# Patient Record
Sex: MALE | Race: WHITE | URBAN - METROPOLITAN AREA
[De-identification: names, ages, dates, MRNs, and addresses within clinical notes are randomized per-mention and may not be internally consistent; named-entity substitution may affect disease eponyms.]

---

## 2022-06-29 ENCOUNTER — TELEPHONE (OUTPATIENT)
Dept: PAIN MANAGEMENT | Age: 62
End: 2022-06-29

## 2022-07-06 ENCOUNTER — TELEPHONE (OUTPATIENT)
Dept: PAIN MANAGEMENT | Age: 62
End: 2022-07-06

## 2022-07-06 NOTE — TELEPHONE ENCOUNTER
PATIENT IS SCHEDULED FOR A FOLLOW UP ON 7/26    PATIENT TAKES OXYCODONE 5/325  TAKE 1 TABLET Q6H      I WAS CALLING TO SEE IF THE PATIENT NEEDED A REFILL BEFORE HIS APPOINTMENT

## 2022-07-13 PROBLEM — M51.36 DEGENERATION OF LUMBAR INTERVERTEBRAL DISC: Status: ACTIVE | Noted: 2022-07-13

## 2022-07-15 ENCOUNTER — OFFICE VISIT (OUTPATIENT)
Dept: PAIN MANAGEMENT | Age: 62
End: 2022-07-15
Payer: MEDICARE

## 2022-07-15 VITALS
OXYGEN SATURATION: 95 % | SYSTOLIC BLOOD PRESSURE: 126 MMHG | HEART RATE: 84 BPM | DIASTOLIC BLOOD PRESSURE: 74 MMHG | BODY MASS INDEX: 33.88 KG/M2 | HEIGHT: 74 IN | TEMPERATURE: 97.1 F | WEIGHT: 264 LBS

## 2022-07-15 DIAGNOSIS — M19.011 LOCALIZED OSTEOARTHRITIS OF BOTH SHOULDER REGIONS: ICD-10-CM

## 2022-07-15 DIAGNOSIS — M54.16 LUMBAR RADICULOPATHY: ICD-10-CM

## 2022-07-15 DIAGNOSIS — M51.36 DEGENERATION OF LUMBAR INTERVERTEBRAL DISC: Primary | ICD-10-CM

## 2022-07-15 DIAGNOSIS — M19.012 LOCALIZED OSTEOARTHRITIS OF BOTH SHOULDER REGIONS: ICD-10-CM

## 2022-07-15 PROCEDURE — 99213 OFFICE O/P EST LOW 20 MIN: CPT | Performed by: PAIN MEDICINE

## 2022-07-15 RX ORDER — OXYCODONE HYDROCHLORIDE AND ACETAMINOPHEN 5; 325 MG/1; MG/1
TABLET ORAL
COMMUNITY
Start: 2022-06-15 | End: 2022-07-15 | Stop reason: SDUPTHER

## 2022-07-15 RX ORDER — OXYCODONE HYDROCHLORIDE AND ACETAMINOPHEN 5; 325 MG/1; MG/1
1 TABLET ORAL 3 TIMES DAILY
Qty: 84 TABLET | Refills: 0 | Status: SHIPPED | OUTPATIENT
Start: 2022-07-15 | End: 2022-08-12

## 2022-07-15 RX ORDER — OMEPRAZOLE 10 MG/1
10 CAPSULE, DELAYED RELEASE ORAL DAILY
COMMUNITY

## 2022-07-15 ASSESSMENT — ENCOUNTER SYMPTOMS
EYES NEGATIVE: 1
ALLERGIC/IMMUNOLOGIC NEGATIVE: 1
RESPIRATORY NEGATIVE: 1
GASTROINTESTINAL NEGATIVE: 1

## 2022-07-15 NOTE — PROGRESS NOTES
Lui Don (:  1960) is a 64 y.o. male,Established patient, here for evaluation of the following chief complaint(s):  Back Pain         ASSESSMENT/PLAN:  1. Degeneration of lumbar intervertebral disc  2. Localized osteoarthritis of both shoulder regions  3. Lumbar radiculopathy    Patient presents with   low back which goes into the buttocks on both sides down the back of both legs. The pain in the left leg will go down to the calf, the right leg will go to the back of the knee. He had TFESI done on the left at L4-5 and right L5-S1 on 2022, he states this procedure gave him a week of relief from his pain in his legs but back pain persists. Stim was discussed with the pt as well as a surgey. pt is still having pain from the surgery he had on the left shoulder about 5 weeks ago. Working with PT , he is also having trouble lifting the arm because of the swelling. pain level today is a 7/10 ,Pt is currently prescribed Oxycodone 5/325 mg TID,  he would like to go back on the QID. Will wait on Rt shoulder surgery till he recovers from his left shoulder surgery. No adverse effects, no aberrant behaviors, he is able to complete his daily functions. OARRS reviewed, consistent  Will continue current meds. Subjective   SUBJECTIVE/OBJECTIVE:  History of Present Illness    Patient Identification  Lui Don is a 64 y.o. male. Patient information was obtained from patient. History/Exam limitations: none. Patient presented to the Emergency Department ambulatory. Chief Complaint   Back Pain      Patient presents with   low back which goes into the buttocks on both sides down the back of both legs. The pain in the left leg will go down to the calf, the right leg will go to the back of the knee. He had TFESI done on the left at L4-5 and right L5-S1 on 2022, he states this procedure gave him a week of relief from his pain in his legs but back pain persists.  Stim was discussed with the pt as well as a surgey. pt is still having pain from the surgery he had on the left shoulder about 5 weeks ago. Working with PT , he is also having trouble lifting the arm because of the swelling. pain level today is a 7/10 ,Pt is currently prescribed Oxycodone 5/325 mg TID,  he would like to go back on the QID. Will wait on Rt shoulder surgery till he recovers from his left shoulder surgery. No adverse effects, no aberrant behaviors, he is able to complete his daily functions. OARRS reviewed, consistent    History reviewed. No pertinent past medical history. History reviewed. No pertinent family history. Current Outpatient Medications:  oxyCODONE-acetaminophen (PERCOCET) 5-325 MG per tablet, TAKE 1 TABLET BY MOUTH EVERY 8 HOURS AS NEEDED, Disp: , Rfl:   omeprazole (PRILOSEC) 10 MG delayed release capsule, Take 10 mg by mouth in the morning., Disp: , Rfl:     No current facility-administered medications for this visit. No Known Allergies  Social History    Socioeconomic History      Marital status:     Tobacco Use      Smoking status: Never      Smokeless tobacco: Never    Vaping Use      Vaping Use: Never used    Substance and Sexual Activity      Alcohol use: Never      Drug use: Never  /74   Pulse 84   Temp 97.1 °F (36.2 °C)   Ht 6' 2\" (1.88 m)   Wt 264 lb (119.7 kg)   SpO2 95%   BMI 33.90 kg/m²       Review of Systems   Constitutional: Negative. HENT: Negative. Eyes: Negative. Respiratory: Negative. Cardiovascular: Negative. Gastrointestinal: Negative. Endocrine: Negative. Genitourinary: Negative. Musculoskeletal: Negative. Skin: Negative. Allergic/Immunologic: Negative. Neurological: Negative. Hematological: Negative. Psychiatric/Behavioral: Negative. All other systems reviewed and are negative. Objective   Physical Exam  Constitutional:       Appearance: Normal appearance. He is normal weight. HENT:      Head: Normocephalic and atraumatic. Right Ear: Ear canal normal.      Left Ear: Ear canal normal.      Nose: Nose normal.      Mouth/Throat:      Mouth: Mucous membranes are moist.   Eyes:      Extraocular Movements: Extraocular movements intact. Conjunctiva/sclera: Conjunctivae normal.      Pupils: Pupils are equal, round, and reactive to light. Cardiovascular:      Rate and Rhythm: Normal rate and regular rhythm. Pulses: Normal pulses. Heart sounds: Normal heart sounds. Pulmonary:      Effort: Pulmonary effort is normal.      Breath sounds: Normal breath sounds. Abdominal:      General: Abdomen is flat. Bowel sounds are normal.      Palpations: Abdomen is soft. Musculoskeletal:         General: Normal range of motion. Cervical back: Normal range of motion and neck supple. Skin:     General: Skin is warm. Capillary Refill: Capillary refill takes less than 2 seconds. Neurological:      General: No focal deficit present. Mental Status: He is alert and oriented to person, place, and time. Mental status is at baseline. Psychiatric:         Mood and Affect: Mood normal.         Behavior: Behavior normal.         Thought Content: Thought content normal.         Judgment: Judgment normal.          On this date 7/15/2022 I have spent 15 minutes reviewing previous notes, test results and face to face with the patient discussing the diagnosis and importance of compliance with the treatment plan as well as documenting on the day of the visit. An electronic signature was used to authenticate this note.     --Marc Bolaños MD

## 2022-08-17 ENCOUNTER — OFFICE VISIT (OUTPATIENT)
Dept: PAIN MANAGEMENT | Age: 62
End: 2022-08-17
Payer: MEDICARE

## 2022-08-17 VITALS
HEIGHT: 73 IN | SYSTOLIC BLOOD PRESSURE: 138 MMHG | TEMPERATURE: 98 F | DIASTOLIC BLOOD PRESSURE: 72 MMHG | WEIGHT: 271 LBS | BODY MASS INDEX: 35.92 KG/M2

## 2022-08-17 DIAGNOSIS — M54.16 LUMBAR RADICULOPATHY: ICD-10-CM

## 2022-08-17 DIAGNOSIS — M51.36 DEGENERATION OF LUMBAR INTERVERTEBRAL DISC: Primary | ICD-10-CM

## 2022-08-17 DIAGNOSIS — M19.011 LOCALIZED OSTEOARTHRITIS OF BOTH SHOULDER REGIONS: ICD-10-CM

## 2022-08-17 DIAGNOSIS — S13.9XXA NECK SPRAIN, INITIAL ENCOUNTER: ICD-10-CM

## 2022-08-17 DIAGNOSIS — M19.012 LOCALIZED OSTEOARTHRITIS OF BOTH SHOULDER REGIONS: ICD-10-CM

## 2022-08-17 LAB
6-ACETYLMORPHINE, UR: NORMAL
AMPHETAMINE SCREEN, URINE: NORMAL
BARBITURATE SCREEN, URINE: NORMAL
BENZODIAZEPINE SCREEN, URINE: NORMAL
CANNABINOID SCREEN URINE: NORMAL
COCAINE METABOLITE, URINE: NORMAL
CREATININE URINE: NORMAL
EDDP, URINE: NORMAL
ETHANOL URINE: NORMAL
MDMA URINE: NORMAL
METHADONE SCREEN, URINE: NORMAL
METHAMPHETAMINE, URINE: NORMAL
OPIATES, URINE: NORMAL
OXYCODONE: NORMAL
PCP: NORMAL
PH, URINE: NORMAL
PHENCYCLIDINE, URINE: NORMAL
PROPOXYPHENE, URINE: NORMAL
TRICYCLIC ANTIDEPRESSANTS, UR: NORMAL

## 2022-08-17 PROCEDURE — 99213 OFFICE O/P EST LOW 20 MIN: CPT | Performed by: PAIN MEDICINE

## 2022-08-17 RX ORDER — FLUTICASONE FUROATE, UMECLIDINIUM BROMIDE AND VILANTEROL TRIFENATATE 100; 62.5; 25 UG/1; UG/1; UG/1
POWDER RESPIRATORY (INHALATION)
COMMUNITY

## 2022-08-17 RX ORDER — OXYCODONE HYDROCHLORIDE AND ACETAMINOPHEN 5; 325 MG/1; MG/1
TABLET ORAL
COMMUNITY
End: 2022-08-17 | Stop reason: SDUPTHER

## 2022-08-17 RX ORDER — GABAPENTIN 300 MG/1
CAPSULE ORAL
COMMUNITY
Start: 2022-05-07

## 2022-08-17 RX ORDER — OXYCODONE HYDROCHLORIDE AND ACETAMINOPHEN 5; 325 MG/1; MG/1
1 TABLET ORAL EVERY 8 HOURS PRN
Qty: 84 TABLET | Refills: 0 | Status: SHIPPED | OUTPATIENT
Start: 2022-08-17 | End: 2022-09-14

## 2022-08-17 RX ORDER — ALBUTEROL SULFATE 90 UG/1
2 AEROSOL, METERED RESPIRATORY (INHALATION) EVERY 4 HOURS PRN
COMMUNITY
Start: 2022-07-26

## 2022-08-17 NOTE — PROGRESS NOTES
Meg Duggan (:  1960) is a 64 y.o. male,Established patient, here for evaluation of the following chief complaint(s):  Back Pain         ASSESSMENT/PLAN:  Patient following up for  low back which goes into the buttocks on both sides down the back of both legs. The pain in the left leg will go down to the calf, the right leg will go to the back of the knee. He had TFESI done on the left at L4-5 and right L5-S1 on 2022, he states this procedure gave him a week of relief from his pain in his legs but back pain persists. Stim was discussed with the pt as well as a surgey. pt is still having pain from the surgery he had on the left shoulder about may 10th . Still Working with PT , he is also having trouble lifting the arm because of the swelling. pain level today is a 7/10 ,Pt is currently prescribed Oxycodone 5/325 mg TID. Good relief, no side effects no adverse events, able to do his ADLs, Will wait on Rt shoulder surgery till he recovers from his left shoulder surgery. No adverse effects, no aberrant behaviors, he is able to complete his daily functions. New onset Neck pain past 1 week  no radiation. Will treate his neck pain as a sprain andfollow. OARRS reviewed, consistent  Will continue current meds. Will get UA today. Subjective   SUBJECTIVE/OBJECTIVE:  History of Present Illness    Patient Identification  Meg Duggan is a 64 y.o. male. Patient information was obtained from patient. History/Exam limitations: none. Patient presented to the Emergency Department ambulatory. Chief Complaint   Back Pain  Patient following up for  low back which goes into the buttocks on both sides down the back of both legs. The pain in the left leg will go down to the calf, the right leg will go to the back of the knee. He had TFESI done on the left at L4-5 and right L5-S1 on 2022, he states this procedure gave him a week of relief from his pain in his legs but back pain persists.  Stim was discussed with the pt as well as a surgey. pt is still having pain from the surgery he had on the left shoulder about may 10th . Still Working with PT , he is also having trouble lifting the arm because of the swelling. pain level today is a 7/10 ,Pt is currently prescribed Oxycodone 5/325 mg TID. Good relief, no side effects no adverse events, able to do his ADLs, Will wait on Rt shoulder surgery till he recovers from his left shoulder surgery. No adverse effects, no aberrant behaviors, he is able to complete his daily functions. New onset Neck pain past 1 week  no radiation. OARRS reviewed, consistent    History reviewed. No pertinent past medical history. History reviewed. No pertinent family history. Current Outpatient Medications:  oxyCODONE-acetaminophen (PERCOCET) 5-325 MG per tablet, TAKE 1 TABLET BY MOUTH EVERY 8 HOURS AS NEEDED, Disp: , Rfl:   omeprazole (PRILOSEC) 10 MG delayed release capsule, Take 10 mg by mouth in the morning., Disp: , Rfl:     No current facility-administered medications for this visit. No Known Allergies  Social History    Socioeconomic History      Marital status:     Tobacco Use      Smoking status: Never      Smokeless tobacco: Never    Vaping Use      Vaping Use: Never used    Substance and Sexual Activity      Alcohol use: Never      Drug use: Never  /74   Pulse 84   Temp 97.1 °F (36.2 °C)   Ht 6' 2\" (1.88 m)   Wt 264 lb (119.7 kg)   SpO2 95%   BMI 33.90 kg/m²       Review of Systems   Constitutional: Negative. HENT: Negative. Eyes: Negative. Respiratory: Negative. Cardiovascular: Negative. Gastrointestinal: Negative. Endocrine: Negative. Genitourinary: Negative. Musculoskeletal: Negative. Skin: Negative. Allergic/Immunologic: Negative. Neurological: Negative. Hematological: Negative. Psychiatric/Behavioral: Negative. All other systems reviewed and are negative.        Objective   Physical Exam  Constitutional:       Appearance: Normal appearance. He is normal weight. HENT:      Head: Normocephalic and atraumatic. Right Ear: Ear canal normal.      Left Ear: Ear canal normal.      Nose: Nose normal.      Mouth/Throat:      Mouth: Mucous membranes are moist.   Eyes:      Extraocular Movements: Extraocular movements intact. Conjunctiva/sclera: Conjunctivae normal.      Pupils: Pupils are equal, round, and reactive to light. Cardiovascular:      Rate and Rhythm: Normal rate and regular rhythm. Pulses: Normal pulses. Heart sounds: Normal heart sounds. Pulmonary:      Effort: Pulmonary effort is normal.      Breath sounds: Normal breath sounds. Abdominal:      General: Abdomen is flat. Bowel sounds are normal.      Palpations: Abdomen is soft. Musculoskeletal:         General: Normal range of motion. Cervical back: Normal range of motion and neck supple. Left sided facet tenderness,     Good Gait able to walk on toes and heels, no tenderness, SLRs painful both sides. Skin:     General: Skin is warm. Capillary Refill: Capillary refill takes less than 2 seconds. Neurological:      General: No focal deficit present. Mental Status: He is alert and oriented to person, place, and time. Mental status is at baseline. Psychiatric:         Mood and Affect: Mood normal.         Behavior: Behavior normal.         Thought Content: Thought content normal.         Judgment: Judgment normal.          On this date 8/17/2022 I have spent 15 minutes reviewing previous notes, test results and face to face with the patient discussing the diagnosis and importance of compliance with the treatment plan as well as documenting on the day of the visit. An electronic signature was used to authenticate this note.     --Merissa Steele MD

## 2022-08-29 ENCOUNTER — TELEPHONE (OUTPATIENT)
Dept: PAIN MANAGEMENT | Age: 62
End: 2022-08-29

## 2022-08-29 NOTE — TELEPHONE ENCOUNTER
Patient is scheduled to see Dr Lg Portillo Tuesday morning @ 9AM and was hoping that he could get an injection at that time. He saw a neurosurgeon who suggested a \"Right Trapezius Muscle Trigger Point Injection\" and that his pain management doctor perform the procedure. Can Dr Lg Portillo do this tomorrow morning or advise what patient should do?

## 2022-08-30 ENCOUNTER — OFFICE VISIT (OUTPATIENT)
Dept: PAIN MANAGEMENT | Age: 62
End: 2022-08-30
Payer: MEDICARE

## 2022-08-30 VITALS
HEIGHT: 73 IN | OXYGEN SATURATION: 96 % | WEIGHT: 270 LBS | BODY MASS INDEX: 35.78 KG/M2 | DIASTOLIC BLOOD PRESSURE: 84 MMHG | SYSTOLIC BLOOD PRESSURE: 136 MMHG | HEART RATE: 88 BPM | TEMPERATURE: 97 F

## 2022-08-30 DIAGNOSIS — S13.9XXA NECK SPRAIN, INITIAL ENCOUNTER: Primary | ICD-10-CM

## 2022-08-30 DIAGNOSIS — M47.812 CERVICAL SPONDYLOSIS WITHOUT MYELOPATHY: ICD-10-CM

## 2022-08-30 PROCEDURE — 99213 OFFICE O/P EST LOW 20 MIN: CPT | Performed by: PAIN MEDICINE

## 2022-08-30 RX ORDER — PREDNISONE 50 MG/1
50 TABLET ORAL DAILY
COMMUNITY
Start: 2022-08-25 | End: 2022-09-01

## 2022-08-30 NOTE — PROGRESS NOTES
Andrae Foley (:  1960) is a 64 y.o. male,Established patient, here for evaluation of the following chief complaint(s):  Back Pain         ASSESSMENT/PLAN:  Neck pain   past 2-3 weeks with Clicking an Popping Rt side, Seen neuro surgery , remote injury in the past,  Dropping things past 1 year, No loss of bowel or bladder controll. Will Get PT and plan Facet blocks as needed. Back Pain  Patient Known for  low back which goes into the buttocks on both sides down the back of both legs. The pain in the left leg will go down to the calf, the right leg will go to the back of the knee. He had TFESI done on the left at L4-5 and right L5-S1 on 2022, he states this procedure gave him a week of relief from his pain in his legs but back pain persists. Stim was discussed with the pt as well as a surgey. pt is still having pain from the surgery he had on the left shoulder about may 10th . Still Working with PT , he is also having trouble lifting the arm because of the swelling. pain level today is a 7/10 ,Pt is currently prescribed Oxycodone 5/325 mg TID. Good relief, no side effects no adverse events, able to do his ADLs, Will wait on Rt shoulder surgery till he recovers from his left shoulder surgery. No adverse effects, no aberrant behaviors, he is able to complete his daily functions. New onset Neck pain past 1 week  no radiation. Will treate his neck pain as a sprain andfollow. OARRS reviewed, consistent, UA consistent    Subjective   SUBJECTIVE/OBJECTIVE:  History of Present Illness    Patient Identification  Andrae Foley is a 64 y.o. male. Patient information was obtained from patient. History/Exam limitations: none. Patient presented to the Emergency Department ambulatory. Chief Complaint   Neck pain past 2-3 weeks with Clicking an Popping Rt side, Seen neuro surgery , remote injury in the past,  Dropping things past 1 year, No loss of bowel or bladder controll.   Back Pain  Patient known for  low back which goes into the buttocks on both sides down the back of both legs. The pain in the left leg will go down to the calf, the right leg will go to the back of the knee. He had TFESI done on the left at L4-5 and right L5-S1 on 2/7/2022, he states this procedure gave him a week of relief from his pain in his legs but back pain persists. Stim was discussed with the pt as well as a surgey. pt is still having pain from the surgery he had on the left shoulder about may 10th . Still Working with PT , he is also having trouble lifting the arm because of the swelling. pain level today is a 7/10 ,Pt is currently prescribed Oxycodone 5/325 mg TID. Good relief, no side effects no adverse events, able to do his ADLs, Will wait on Rt shoulder surgery till he recovers from his left shoulder surgery. No adverse effects, no aberrant behaviors, he is able to complete his daily functions. New onset Neck pain past 1 week  no radiation. OARRS reviewed, consistent    History reviewed. No pertinent past medical history. History reviewed. No pertinent family history. Current Outpatient Medications:  oxyCODONE-acetaminophen (PERCOCET) 5-325 MG per tablet, TAKE 1 TABLET BY MOUTH EVERY 8 HOURS AS NEEDED, Disp: , Rfl:   omeprazole (PRILOSEC) 10 MG delayed release capsule, Take 10 mg by mouth in the morning., Disp: , Rfl:     No current facility-administered medications for this visit. No Known Allergies  Social History    Socioeconomic History      Marital status:     Tobacco Use      Smoking status: Never      Smokeless tobacco: Never    Vaping Use      Vaping Use: Never used    Substance and Sexual Activity      Alcohol use: Never      Drug use: Never  /74   Pulse 84   Temp 97.1 °F (36.2 °C)   Ht 6' 2\" (1.88 m)   Wt 264 lb (119.7 kg)   SpO2 95%   BMI 33.90 kg/m²       Review of Systems   Constitutional: Negative. HENT: Negative. Eyes: Negative. Respiratory: Negative. Cardiovascular: Negative. Gastrointestinal: Negative. Endocrine: Negative. Genitourinary: Negative. Musculoskeletal: Negative. Skin: Negative. Allergic/Immunologic: Negative. Neurological: Negative. Hematological: Negative. Psychiatric/Behavioral: Negative. All other systems reviewed and are negative. Objective   Physical Exam  Constitutional:       Appearance: Normal appearance. He is normal weight. HENT:      Head: Normocephalic and atraumatic. Right Ear: Ear canal normal.      Left Ear: Ear canal normal.      Nose: Nose normal.      Mouth/Throat:      Mouth: Mucous membranes are moist.   Eyes:      Extraocular Movements: Extraocular movements intact. Conjunctiva/sclera: Conjunctivae normal.      Pupils: Pupils are equal, round, and reactive to light. Cardiovascular:      Rate and Rhythm: Normal rate and regular rhythm. Pulses: Normal pulses. Heart sounds: Normal heart sounds. Pulmonary:      Effort: Pulmonary effort is normal.      Breath sounds: Normal breath sounds. Abdominal:      General: Abdomen is flat. Bowel sounds are normal.      Palpations: Abdomen is soft. Musculoskeletal:         General: Normal range of motion. Cervical back: Normal range of motion and neck supple. Left sided facet tenderness,     Good Gait able to walk on toes and heels, no tenderness, SLRs painful both sides. Skin:     General: Skin is warm. Capillary Refill: Capillary refill takes less than 2 seconds. Neurological:      General: No focal deficit present. Mental Status: He is alert and oriented to person, place, and time. Mental status is at baseline. Psychiatric:         Mood and Affect: Mood normal.         Behavior: Behavior normal.         Thought Content:  Thought content normal.         Judgment: Judgment normal.          On this date 8/17/2022 I have spent 15 minutes reviewing previous notes, test results and face to face with the patient discussing the diagnosis and importance of compliance with the treatment plan as well as documenting on the day of the visit. An electronic signature was used to authenticate this note.     --Tayler Dave MD

## 2022-08-31 ENCOUNTER — TELEPHONE (OUTPATIENT)
Dept: PAIN MANAGEMENT | Age: 62
End: 2022-08-31

## 2022-08-31 NOTE — TELEPHONE ENCOUNTER
ORDER PLACED:    Date: 8/30/22  Description: LEFT C5,6,7 MBB  Order Number: [id number]  Ordering Provider: Tori Whyte  Performing Provider: Tori Whyte  CPT Codes: 79063  ICD10 Codes: O76.368

## 2024-02-09 ENCOUNTER — OFFICE VISIT (OUTPATIENT)
Dept: NEUROSURGERY | Facility: CLINIC | Age: 64
End: 2024-02-09
Payer: MEDICARE

## 2024-02-09 VITALS
WEIGHT: 260 LBS | BODY MASS INDEX: 34.46 KG/M2 | HEART RATE: 108 BPM | HEIGHT: 73 IN | DIASTOLIC BLOOD PRESSURE: 76 MMHG | SYSTOLIC BLOOD PRESSURE: 144 MMHG | TEMPERATURE: 97.8 F

## 2024-02-09 DIAGNOSIS — M47.12 CERVICAL SPONDYLOSIS WITH MYELOPATHY: Primary | ICD-10-CM

## 2024-02-09 DIAGNOSIS — R26.89 IMBALANCE: ICD-10-CM

## 2024-02-09 PROBLEM — G47.33 OSA (OBSTRUCTIVE SLEEP APNEA): Status: ACTIVE | Noted: 2022-05-10

## 2024-02-09 PROBLEM — M43.26 FUSION OF LUMBAR SPINE: Status: ACTIVE | Noted: 2023-09-17

## 2024-02-09 PROBLEM — J38.3 VOCAL CORD MASS: Status: ACTIVE | Noted: 2023-08-29

## 2024-02-09 PROBLEM — Z98.890 PONV (POSTOPERATIVE NAUSEA AND VOMITING): Status: ACTIVE | Noted: 2023-10-12

## 2024-02-09 PROBLEM — R11.2 PONV (POSTOPERATIVE NAUSEA AND VOMITING): Status: ACTIVE | Noted: 2023-10-12

## 2024-02-09 PROBLEM — M25.312 ROTATOR CUFF INSUFFICIENCY OF LEFT SHOULDER: Status: ACTIVE | Noted: 2024-01-22

## 2024-02-09 PROCEDURE — 1036F TOBACCO NON-USER: CPT | Performed by: NURSE PRACTITIONER

## 2024-02-09 PROCEDURE — 99205 OFFICE O/P NEW HI 60 MIN: CPT | Performed by: NURSE PRACTITIONER

## 2024-02-09 RX ORDER — PANTOPRAZOLE SODIUM 40 MG/1
40 TABLET, DELAYED RELEASE ORAL
COMMUNITY
Start: 2022-04-11

## 2024-02-09 RX ORDER — GABAPENTIN 300 MG/1
300 CAPSULE ORAL 4 TIMES DAILY
COMMUNITY
Start: 2016-03-03

## 2024-02-09 RX ORDER — LOSARTAN POTASSIUM 25 MG/1
25 TABLET ORAL DAILY
COMMUNITY
Start: 2023-03-10

## 2024-02-09 RX ORDER — TAMSULOSIN HYDROCHLORIDE 0.4 MG/1
0.4 CAPSULE ORAL DAILY
COMMUNITY
Start: 2022-11-07

## 2024-02-09 RX ORDER — ATORVASTATIN CALCIUM 40 MG/1
40 TABLET, FILM COATED ORAL DAILY
COMMUNITY
Start: 2023-12-01

## 2024-02-09 RX ORDER — METOPROLOL SUCCINATE 50 MG/1
50 TABLET, EXTENDED RELEASE ORAL DAILY
COMMUNITY
Start: 2023-09-11

## 2024-02-09 RX ORDER — ASPIRIN 81 MG/1
81 TABLET ORAL
COMMUNITY
Start: 2012-07-31

## 2024-02-09 RX ORDER — OXYCODONE AND ACETAMINOPHEN 5; 325 MG/1; MG/1
1 TABLET ORAL 3 TIMES DAILY
COMMUNITY
Start: 2022-05-31

## 2024-02-09 ASSESSMENT — PAIN SCALES - GENERAL: PAINLEVEL: 8

## 2024-02-09 ASSESSMENT — PATIENT HEALTH QUESTIONNAIRE - PHQ9
2. FEELING DOWN, DEPRESSED OR HOPELESS: NOT AT ALL
1. LITTLE INTEREST OR PLEASURE IN DOING THINGS: NOT AT ALL
SUM OF ALL RESPONSES TO PHQ9 QUESTIONS 1 AND 2: 0

## 2024-02-09 NOTE — PROGRESS NOTES
"It was a pleasure to see Jerman Colón on 2/9/2024. He is a 63 y.o. year old, right-handed male who presents, with his wife, to the [facility] for evaluation of cervical stenosis. Patient is referred by Ferdinand Sullivan MD. 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 He lives at home with his wife. He is retired from law enforcement.    Jerman Colón has had symptoms of neck pain over the past at least 2 years without inciting event. He was previously scheduled for ACDF, but surgical procedure was changed to PCDF. During initial procedure, appropriate pathway to cervical spine could not be found, so procedure was aborted. He is planning lumbar procedure with Dr. Sullivan, who instructed him to have cervical procedure completed prior to lumbar intervention. His referral is to Dr. Marcos Doran    He is here today for assessment for surgical intervention.. He reports significant.  Thus far, patient has tried oral medications with little to no improvement of symptoms. He denies change in bowel / bladder function, saddle anesthesia, . He admits to imbalance, difficulty dressing, difficulty holding / opening objects    PREVIOUS TREATMENTS  Pain Management - prior Cervical YVES in 2023 -not helpful  NSAIDs  Gabapentin  Topical patch    Previous Spine Surgery:  L5 - S1 decompression, fusion 09/12/2023, by Dr. Christopher Velasco at [facility]      Smoker: No   Anticoagulation / Antiplatelets: No     ROS: 12 / 12 systems reviewed and are negative unless noted in HPI    /76 (BP Location: Left arm, Patient Position: Sitting, BP Cuff Size: Adult)   Pulse 108   Temp 36.6 °C (97.8 °F) (Temporal)   Ht 1.854 m (6' 1\")   Wt 118 kg (260 lb)   BMI 34.30 kg/m²     ON EXAM:  General: Well developed, awake/alert/oriented x 3, no distress, alert and cooperative  Skin: Warm and dry, no " visible lesions / rashes  ENMT: Mucous membranes moist, no apparent injury  Head/Neck: No apparent injury. HEAD FLEXED / FIXED FORWARD. SIGNIFICANT LIMITATION WITH ALL ROM  Respiratory/Thorax: Normal breathing with good chest expansion, thorax symmetric  Cardiovascular: No JVD  Gastrointestinal: Non-distended  NEUROLOGICAL EXAM:  EOMI, face symmetric  Motor Strength:  SHOULDER AB/ADDUCTION, ELBOW FLEXION / EXTENSION 4 / 5; BILATERAL INTEROSSEI 3 / 5; otherwise, 5/5 in BUE  Muscle Tone: Normal without spasticity or contractures in all extremities  Muscle Bulk: Normal and symmetric in all extremities  Posture:  -- Cervical: FIXED IN FLEXION  -- Thoracic: Normal  -- Lumbar : Normal  Paraspinal muscle spasm/tenderness absent.  No palpable tenderness along the spinous processes.  Sensation: DECREASED SENSORY IN LEFT 3rd, 4th, 5th fingers (prior injury); otherwise, SILT in BUE  Gait: Normal, TANDEM GAIT IS UNSTEADY  Deep Tendon Reflexes: 3+ BLE, 1+ BUE    Krissy's sign PRESENT, mildly bilaterally  Lhermitte Sign/Spurling Sign: PRESENT  Finger escape sign: Absent   and release test: Negative  Romberg test: Negative    Jerman Colón has symptoms and exam findings consistent with cervical myelopathy. He has significant limitation with cervical spine ROM, suspicious for DISH, facet arthrosis, and / or ligament calcification. We reviewed report of dynamic cervical  x-rays from 2023 (images are not available at today's visit). We reviewed MRI C Spine images via PACS from more than one year ago, from outside facility, that demonstrates significant facet arthropathy, mild to moderate canal stenosis. Physical Therapy will not improve his symptoms at this point. Encouraged follow up after repeat MRI Cervical Spine to evaluate progression of disease and for surgical planning. He verbalizes understanding and agreement with plan.  Sneha Rivas, APRN-CNP

## 2024-02-12 DIAGNOSIS — R26.89 IMBALANCE: ICD-10-CM

## 2024-02-12 DIAGNOSIS — M47.12 CERVICAL SPONDYLOSIS WITH MYELOPATHY: Primary | ICD-10-CM

## 2024-03-08 DIAGNOSIS — R26.89 IMBALANCE: ICD-10-CM

## 2024-03-08 DIAGNOSIS — M47.12 CERVICAL SPONDYLOSIS WITH MYELOPATHY: Primary | ICD-10-CM

## 2024-04-29 ENCOUNTER — HOSPITAL ENCOUNTER (OUTPATIENT)
Dept: RADIOLOGY | Facility: HOSPITAL | Age: 64
End: 2024-04-29
Payer: MEDICARE

## 2024-04-29 ENCOUNTER — APPOINTMENT (OUTPATIENT)
Dept: RADIOLOGY | Facility: HOSPITAL | Age: 64
End: 2024-04-29
Payer: MEDICARE

## 2024-04-29 ENCOUNTER — ANESTHESIA EVENT (OUTPATIENT)
Dept: RADIOLOGY | Facility: HOSPITAL | Age: 64
End: 2024-04-29
Payer: MEDICARE

## 2024-04-29 ENCOUNTER — ANESTHESIA (OUTPATIENT)
Dept: RADIOLOGY | Facility: HOSPITAL | Age: 64
End: 2024-04-29
Payer: MEDICARE

## 2024-05-03 ENCOUNTER — APPOINTMENT (OUTPATIENT)
Dept: NEUROSURGERY | Facility: CLINIC | Age: 64
End: 2024-05-03
Payer: MEDICARE

## 2024-05-29 ENCOUNTER — APPOINTMENT (OUTPATIENT)
Dept: NEUROSURGERY | Facility: CLINIC | Age: 64
End: 2024-05-29
Payer: MEDICARE

## 2024-06-07 ENCOUNTER — HOSPITAL ENCOUNTER (OUTPATIENT)
Dept: RADIOLOGY | Facility: HOSPITAL | Age: 64
Discharge: HOME | End: 2024-06-07
Payer: MEDICARE

## 2024-06-07 VITALS
OXYGEN SATURATION: 95 % | RESPIRATION RATE: 14 BRPM | SYSTOLIC BLOOD PRESSURE: 158 MMHG | HEART RATE: 77 BPM | DIASTOLIC BLOOD PRESSURE: 60 MMHG | TEMPERATURE: 97.2 F

## 2024-06-07 DIAGNOSIS — R26.89 IMBALANCE: ICD-10-CM

## 2024-06-07 DIAGNOSIS — M47.12 CERVICAL SPONDYLOSIS WITH MYELOPATHY: ICD-10-CM

## 2024-06-07 DIAGNOSIS — K86.2 CYST OF PANCREAS (HHS-HCC): ICD-10-CM

## 2024-06-07 DIAGNOSIS — M79.604 PAIN IN RIGHT LEG: ICD-10-CM

## 2024-06-07 DIAGNOSIS — M79.605 PAIN IN LEFT LEG: ICD-10-CM

## 2024-06-07 DIAGNOSIS — M48.062 SPINAL STENOSIS, LUMBAR REGION WITH NEUROGENIC CLAUDICATION: ICD-10-CM

## 2024-06-07 PROCEDURE — 72148 MRI LUMBAR SPINE W/O DYE: CPT

## 2024-06-07 PROCEDURE — A9575 INJ GADOTERATE MEGLUMI 0.1ML: HCPCS

## 2024-06-07 PROCEDURE — 3700000002 HC GENERAL ANESTHESIA TIME - EACH INCREMENTAL 1 MINUTE

## 2024-06-07 PROCEDURE — 72141 MRI NECK SPINE W/O DYE: CPT

## 2024-06-07 PROCEDURE — 2500000005 HC RX 250 GENERAL PHARMACY W/O HCPCS: Performed by: NURSE ANESTHETIST, CERTIFIED REGISTERED

## 2024-06-07 PROCEDURE — 7100000010 HC PHASE TWO TIME - EACH INCREMENTAL 1 MINUTE

## 2024-06-07 PROCEDURE — 3700000001 HC GENERAL ANESTHESIA TIME - INITIAL BASE CHARGE

## 2024-06-07 PROCEDURE — 74183 MRI ABD W/O CNTR FLWD CNTR: CPT

## 2024-06-07 PROCEDURE — 2550000001 HC RX 255 CONTRASTS

## 2024-06-07 PROCEDURE — 7100000009 HC PHASE TWO TIME - INITIAL BASE CHARGE

## 2024-06-07 PROCEDURE — 2500000004 HC RX 250 GENERAL PHARMACY W/ HCPCS (ALT 636 FOR OP/ED): Performed by: NURSE ANESTHETIST, CERTIFIED REGISTERED

## 2024-06-07 RX ORDER — MIDAZOLAM HYDROCHLORIDE 1 MG/ML
0.5 INJECTION INTRAMUSCULAR; INTRAVENOUS
Status: DISCONTINUED | OUTPATIENT
Start: 2024-06-07 | End: 2024-06-07 | Stop reason: HOSPADM

## 2024-06-07 RX ORDER — MIDAZOLAM HYDROCHLORIDE 1 MG/ML
INJECTION INTRAMUSCULAR; INTRAVENOUS AS NEEDED
Status: DISCONTINUED | OUTPATIENT
Start: 2024-06-07 | End: 2024-06-07

## 2024-06-07 RX ORDER — ONDANSETRON HYDROCHLORIDE 2 MG/ML
INJECTION, SOLUTION INTRAVENOUS AS NEEDED
Status: DISCONTINUED | OUTPATIENT
Start: 2024-06-07 | End: 2024-06-07

## 2024-06-07 RX ORDER — LIDOCAINE HCL/PF 100 MG/5ML
SYRINGE (ML) INTRAVENOUS AS NEEDED
Status: DISCONTINUED | OUTPATIENT
Start: 2024-06-07 | End: 2024-06-07

## 2024-06-07 RX ORDER — HYDROMORPHONE HYDROCHLORIDE 1 MG/ML
0.2 INJECTION, SOLUTION INTRAMUSCULAR; INTRAVENOUS; SUBCUTANEOUS EVERY 5 MIN PRN
Status: DISCONTINUED | OUTPATIENT
Start: 2024-06-07 | End: 2024-06-07 | Stop reason: HOSPADM

## 2024-06-07 RX ORDER — HYDROMORPHONE HYDROCHLORIDE 1 MG/ML
0.4 INJECTION, SOLUTION INTRAMUSCULAR; INTRAVENOUS; SUBCUTANEOUS EVERY 5 MIN PRN
Status: DISCONTINUED | OUTPATIENT
Start: 2024-06-07 | End: 2024-06-07 | Stop reason: HOSPADM

## 2024-06-07 RX ORDER — METHOCARBAMOL 100 MG/ML
1000 INJECTION, SOLUTION INTRAMUSCULAR; INTRAVENOUS ONCE
Status: DISCONTINUED | OUTPATIENT
Start: 2024-06-07 | End: 2024-06-07 | Stop reason: HOSPADM

## 2024-06-07 RX ORDER — PHENYLEPHRINE 10 MG/250 ML(40 MCG/ML)IN 0.9 % SOD.CHLORIDE INTRAVENOUS
CONTINUOUS PRN
Status: DISCONTINUED | OUTPATIENT
Start: 2024-06-07 | End: 2024-06-07

## 2024-06-07 RX ORDER — LIDOCAINE IN NACL,ISO-OSMOT/PF 30 MG/3 ML
0.1 SYRINGE (ML) INJECTION ONCE
Status: DISCONTINUED | OUTPATIENT
Start: 2024-06-07 | End: 2024-06-07 | Stop reason: HOSPADM

## 2024-06-07 RX ORDER — DROPERIDOL 2.5 MG/ML
0.62 INJECTION, SOLUTION INTRAMUSCULAR; INTRAVENOUS ONCE AS NEEDED
Status: DISCONTINUED | OUTPATIENT
Start: 2024-06-07 | End: 2024-06-07 | Stop reason: HOSPADM

## 2024-06-07 RX ORDER — ACETAMINOPHEN 325 MG/1
650 TABLET ORAL EVERY 4 HOURS PRN
Status: DISCONTINUED | OUTPATIENT
Start: 2024-06-07 | End: 2024-06-07 | Stop reason: HOSPADM

## 2024-06-07 RX ORDER — SODIUM CHLORIDE, SODIUM LACTATE, POTASSIUM CHLORIDE, CALCIUM CHLORIDE 600; 310; 30; 20 MG/100ML; MG/100ML; MG/100ML; MG/100ML
INJECTION, SOLUTION INTRAVENOUS CONTINUOUS PRN
Status: DISCONTINUED | OUTPATIENT
Start: 2024-06-07 | End: 2024-06-07

## 2024-06-07 RX ORDER — OXYCODONE HYDROCHLORIDE 5 MG/1
5 TABLET ORAL EVERY 4 HOURS PRN
Status: DISCONTINUED | OUTPATIENT
Start: 2024-06-07 | End: 2024-06-07 | Stop reason: HOSPADM

## 2024-06-07 RX ORDER — PROPOFOL 10 MG/ML
INJECTION, EMULSION INTRAVENOUS AS NEEDED
Status: DISCONTINUED | OUTPATIENT
Start: 2024-06-07 | End: 2024-06-07

## 2024-06-07 RX ORDER — ROCURONIUM BROMIDE 10 MG/ML
INJECTION, SOLUTION INTRAVENOUS AS NEEDED
Status: DISCONTINUED | OUTPATIENT
Start: 2024-06-07 | End: 2024-06-07

## 2024-06-07 RX ORDER — PHENYLEPHRINE HYDROCHLORIDE 10 MG/ML
INJECTION INTRAVENOUS AS NEEDED
Status: DISCONTINUED | OUTPATIENT
Start: 2024-06-07 | End: 2024-06-07

## 2024-06-07 RX ORDER — GADOTERATE MEGLUMINE 376.9 MG/ML
15 INJECTION INTRAVENOUS
Status: COMPLETED | OUTPATIENT
Start: 2024-06-07 | End: 2024-06-07

## 2024-06-07 RX ORDER — LABETALOL HYDROCHLORIDE 5 MG/ML
5 INJECTION, SOLUTION INTRAVENOUS ONCE AS NEEDED
Status: DISCONTINUED | OUTPATIENT
Start: 2024-06-07 | End: 2024-06-07 | Stop reason: HOSPADM

## 2024-06-07 RX ORDER — DIPHENHYDRAMINE HYDROCHLORIDE 50 MG/ML
12.5 INJECTION INTRAMUSCULAR; INTRAVENOUS ONCE AS NEEDED
Status: DISCONTINUED | OUTPATIENT
Start: 2024-06-07 | End: 2024-06-07 | Stop reason: HOSPADM

## 2024-06-07 RX ORDER — PROPOFOL 10 MG/ML
INJECTION, EMULSION INTRAVENOUS CONTINUOUS PRN
Status: DISCONTINUED | OUTPATIENT
Start: 2024-06-07 | End: 2024-06-07

## 2024-06-07 RX ORDER — ONDANSETRON HYDROCHLORIDE 2 MG/ML
4 INJECTION, SOLUTION INTRAVENOUS ONCE AS NEEDED
Status: DISCONTINUED | OUTPATIENT
Start: 2024-06-07 | End: 2024-06-07 | Stop reason: HOSPADM

## 2024-06-07 RX ORDER — SODIUM CHLORIDE, SODIUM LACTATE, POTASSIUM CHLORIDE, CALCIUM CHLORIDE 600; 310; 30; 20 MG/100ML; MG/100ML; MG/100ML; MG/100ML
100 INJECTION, SOLUTION INTRAVENOUS CONTINUOUS
Status: DISCONTINUED | OUTPATIENT
Start: 2024-06-07 | End: 2024-06-07 | Stop reason: HOSPADM

## 2024-06-07 RX ORDER — ALBUTEROL SULFATE 0.83 MG/ML
2.5 SOLUTION RESPIRATORY (INHALATION) ONCE AS NEEDED
Status: DISCONTINUED | OUTPATIENT
Start: 2024-06-07 | End: 2024-06-07 | Stop reason: HOSPADM

## 2024-06-07 RX ORDER — HYDRALAZINE HYDROCHLORIDE 20 MG/ML
5 INJECTION INTRAMUSCULAR; INTRAVENOUS EVERY 30 MIN PRN
Status: DISCONTINUED | OUTPATIENT
Start: 2024-06-07 | End: 2024-06-07 | Stop reason: HOSPADM

## 2024-06-07 RX ADMIN — ROCURONIUM BROMIDE 80 MG: 10 INJECTION INTRAVENOUS at 08:50

## 2024-06-07 RX ADMIN — PROPOFOL 150 MG: 10 INJECTION, EMULSION INTRAVENOUS at 08:50

## 2024-06-07 RX ADMIN — PHENYLEPHRINE HYDROCHLORIDE 120 MCG: 10 INJECTION INTRAVENOUS at 09:25

## 2024-06-07 RX ADMIN — PROPOFOL 20 MG: 10 INJECTION, EMULSION INTRAVENOUS at 08:53

## 2024-06-07 RX ADMIN — PROPOFOL 100 MCG/KG/MIN: 10 INJECTION, EMULSION INTRAVENOUS at 09:08

## 2024-06-07 RX ADMIN — PHENYLEPHRINE HYDROCHLORIDE 200 MCG: 10 INJECTION INTRAVENOUS at 09:28

## 2024-06-07 RX ADMIN — ROCURONIUM BROMIDE 20 MG: 10 INJECTION INTRAVENOUS at 09:51

## 2024-06-07 RX ADMIN — ROCURONIUM BROMIDE 15 MG: 10 INJECTION INTRAVENOUS at 11:13

## 2024-06-07 RX ADMIN — ONDANSETRON 4 MG: 2 INJECTION INTRAMUSCULAR; INTRAVENOUS at 12:09

## 2024-06-07 RX ADMIN — SODIUM CHLORIDE, POTASSIUM CHLORIDE, SODIUM LACTATE AND CALCIUM CHLORIDE: 600; 310; 30; 20 INJECTION, SOLUTION INTRAVENOUS at 08:46

## 2024-06-07 RX ADMIN — MIDAZOLAM HYDROCHLORIDE 2 MG: 1 INJECTION, SOLUTION INTRAMUSCULAR; INTRAVENOUS at 08:46

## 2024-06-07 RX ADMIN — PROPOFOL 50 MG: 10 INJECTION, EMULSION INTRAVENOUS at 11:57

## 2024-06-07 RX ADMIN — PROPOFOL 30 MG: 10 INJECTION, EMULSION INTRAVENOUS at 09:08

## 2024-06-07 RX ADMIN — GADOTERATE MEGLUMINE 24 ML: 376.9 INJECTION INTRAVENOUS at 11:40

## 2024-06-07 RX ADMIN — PHENYLEPHRINE HYDROCHLORIDE 80 MCG: 10 INJECTION INTRAVENOUS at 09:15

## 2024-06-07 RX ADMIN — PROPOFOL 50 MG: 10 INJECTION, EMULSION INTRAVENOUS at 12:02

## 2024-06-07 RX ADMIN — SUGAMMADEX 200 MG: 100 INJECTION, SOLUTION INTRAVENOUS at 12:09

## 2024-06-07 RX ADMIN — LIDOCAINE HYDROCHLORIDE 60 MG: 20 INJECTION INTRAVENOUS at 08:46

## 2024-06-07 RX ADMIN — PROPOFOL 30 MG: 10 INJECTION, EMULSION INTRAVENOUS at 10:57

## 2024-06-07 RX ADMIN — PROPOFOL 30 MG: 10 INJECTION, EMULSION INTRAVENOUS at 10:28

## 2024-06-07 RX ADMIN — PROPOFOL 50 MG: 10 INJECTION, EMULSION INTRAVENOUS at 11:08

## 2024-06-07 RX ADMIN — LIDOCAINE HYDROCHLORIDE 40 MG: 20 INJECTION INTRAVENOUS at 08:49

## 2024-06-07 RX ADMIN — Medication 0.2 MCG/KG/MIN: at 09:34

## 2024-06-07 RX ADMIN — PHENYLEPHRINE HYDROCHLORIDE 120 MCG: 10 INJECTION INTRAVENOUS at 09:21

## 2024-06-07 SDOH — HEALTH STABILITY: MENTAL HEALTH: CURRENT SMOKER: 0

## 2024-06-07 ASSESSMENT — PAIN - FUNCTIONAL ASSESSMENT
PAIN_FUNCTIONAL_ASSESSMENT: 0-10

## 2024-06-07 ASSESSMENT — PAIN SCALES - GENERAL
PAINLEVEL_OUTOF10: 0 - NO PAIN
PAIN_LEVEL: 0
PAINLEVEL_OUTOF10: 0 - NO PAIN

## 2024-06-07 NOTE — PERIOPERATIVE NURSING NOTE
Patient was brought to [facility] PACU from MRI. Procedure was done under general anesthesia. Patient was recovered in PH 1 and moved to PH 2 when criteria was met. Anesthesia discharge instructions were given and patient discharged to home.

## 2024-06-07 NOTE — ANESTHESIA PREPROCEDURE EVALUATION
Patient: Jerman Colón    Procedure Information       Date/Time: 06/07/24 0730    Procedure: MR CERVICAL SPINE WO CONTRAST    Location: [facility]            Relevant Problems   Anesthesia   (+) PONV (postoperative nausea and vomiting)      Pulmonary   (+) BIANCA (obstructive sleep apnea)       Clinical information reviewed:    Allergies                NPO Detail:  No data recorded     Physical Exam    Airway  Mallampati: IV  TM distance: >3 FB  Neck ROM: limited  Comments: Cannot bite upper lip; very little neck motion   Cardiovascular   Rhythm: regular  Rate: normal     Dental - normal exam     Pulmonary    Abdominal        Anesthesia Plan    History of general anesthesia?: yes  History of complications of general anesthesia?: no    ASA 3     general     The patient is not a current smoker.    intravenous induction   Anesthetic plan and risks discussed with patient.  Use of blood products discussed with patient who.    Plan discussed with attending and CRNA.

## 2024-06-07 NOTE — ANESTHESIA POSTPROCEDURE EVALUATION
Patient: Jerman Colón    Procedure Summary       Date: 06/07/24 Room / Location: [facility]    Anesthesia Start: 0834 Anesthesia Stop: 1241    Procedure: MR CERVICAL SPINE WO CONTRAST Diagnosis:       Cervical spondylosis with myelopathy      Imbalance      (significant limitation with ROM, imbalance, dropping items  significant limitation with ROM, imbalance, dropping items)    Scheduled Providers:  Responsible Provider: Marifer Hanlye MD    Anesthesia Type: general ASA Status: 3            Anesthesia Type: general    Vitals Value Taken Time   /55 06/07/24 1230   Temp 36 06/07/24 1241   Pulse 81 06/07/24 1234   Resp 16 06/07/24 1234   SpO2 94 % 06/07/24 1234   Vitals shown include unfiled device data.    Anesthesia Post Evaluation    Patient location during evaluation: bedside  Patient participation: complete - patient participated  Level of consciousness: awake  Pain score: 0  Pain management: adequate  Airway patency: patent  Cardiovascular status: acceptable  Respiratory status: acceptable  Hydration status: acceptable  Postoperative Nausea and Vomiting: none      There were no known notable events for this encounter.

## 2024-06-07 NOTE — ANESTHESIA PROCEDURE NOTES
Airway  Date/Time: 6/7/2024 8:54 AM  Urgency: elective    Airway not difficult    Staffing  Performed: CRNA   Authorized by: Marifer Hanley MD    Performed by: CAROLINA Boogie-ALEXEI  Patient location during procedure: OR    Indications and Patient Condition  Indications for airway management: anesthesia  Spontaneous Ventilation: absent  Sedation level: deep  Preoxygenated: yes  Patient position: sniffing  Mask difficulty assessment: 1 - vent by mask    Final Airway Details  Final airway type: endotracheal airway      Successful airway: ETT  Cuffed: yes   Successful intubation technique: video laryngoscopy  Facilitating devices/methods: intubating stylet  Endotracheal tube insertion site: oral  Blade size: #4  ETT size (mm): 7.0  Cormack-Lehane Classification: grade I - full view of glottis  Placement verified by: capnometry   Measured from: lips  Number of attempts at approach: 1

## 2024-06-14 ENCOUNTER — APPOINTMENT (OUTPATIENT)
Dept: NEUROSURGERY | Facility: CLINIC | Age: 64
End: 2024-06-14
Payer: MEDICARE

## 2024-06-14 VITALS
WEIGHT: 260 LBS | DIASTOLIC BLOOD PRESSURE: 84 MMHG | BODY MASS INDEX: 34.46 KG/M2 | HEIGHT: 73 IN | TEMPERATURE: 97.8 F | SYSTOLIC BLOOD PRESSURE: 142 MMHG | HEART RATE: 103 BPM

## 2024-06-14 DIAGNOSIS — M79.601 PARESTHESIA AND PAIN OF BOTH UPPER EXTREMITIES: ICD-10-CM

## 2024-06-14 DIAGNOSIS — M48.02 CERVICAL STENOSIS OF SPINAL CANAL: Primary | ICD-10-CM

## 2024-06-14 DIAGNOSIS — R20.2 PARESTHESIA AND PAIN OF BOTH UPPER EXTREMITIES: ICD-10-CM

## 2024-06-14 DIAGNOSIS — M79.602 PARESTHESIA AND PAIN OF BOTH UPPER EXTREMITIES: ICD-10-CM

## 2024-06-14 PROCEDURE — 1036F TOBACCO NON-USER: CPT | Performed by: NURSE PRACTITIONER

## 2024-06-14 PROCEDURE — 99214 OFFICE O/P EST MOD 30 MIN: CPT | Performed by: NURSE PRACTITIONER

## 2024-06-14 RX ORDER — GABAPENTIN 400 MG/1
400 CAPSULE ORAL 3 TIMES DAILY
COMMUNITY
Start: 2024-06-10

## 2024-06-14 ASSESSMENT — PATIENT HEALTH QUESTIONNAIRE - PHQ9
SUM OF ALL RESPONSES TO PHQ9 QUESTIONS 1 & 2: 1
2. FEELING DOWN, DEPRESSED OR HOPELESS: NOT AT ALL
10. IF YOU CHECKED OFF ANY PROBLEMS, HOW DIFFICULT HAVE THESE PROBLEMS MADE IT FOR YOU TO DO YOUR WORK, TAKE CARE OF THINGS AT HOME, OR GET ALONG WITH OTHER PEOPLE: NOT DIFFICULT AT ALL
1. LITTLE INTEREST OR PLEASURE IN DOING THINGS: SEVERAL DAYS

## 2024-06-14 ASSESSMENT — PAIN SCALES - GENERAL: PAINLEVEL: 8

## 2024-06-14 NOTE — PROGRESS NOTES
Jerman Colón is here today in follow up for cervical myelopathy and to review images.     To review, he was initially evaluated on 02/09/2024. He reported symptoms of neck pain over the past at least 2 years without inciting event. He was previously scheduled for ACDF, but surgical procedure was changed to PCDF. During initial procedure, appropriate pathway to cervical spine could not be found, so procedure was aborted. He is planning lumbar procedure with Dr. Sullivan, who instructed him to have cervical procedure completed prior to lumbar intervention. His referral is to Dr. Marcos Doran. He presented for assessment for cervical spine surgical intervention. He reported significant limitation with ROM in neck x > 1 year.  Thus far, he had tried oral medications, cervical YVES, activity adjustment with little to no improvement of symptoms. He denies change in bowel / bladder function, saddle anesthesia. He admitted to imbalance, difficulty dressing, difficulty holding / opening objects. On exam, he had significant cervical spine limitation, motor and sensory deficits in BUE, unsteady tandem gait, +Krissy's sign. Repeat MRI C Spine was requested.     Today, he continues with difficulty with cervical spine movement, limiting his ability to walk steadily; he is unable to drive. He had imaging completed at [facility], along with lumbar MRI for Dr. Sullivan, and MRI pancreas and is here to review cervical spine findings.    MRI C SPINE on 06/07/2024:  IMPRESSION:  Multilevel degenerative changes of the cervical spine, most notably  at C3-4, as detailed above.  Signed by: Claus Sweeney     TREATMENTS:  Pain Management - prior Cervical YVES in 2023 -not helpful  NSAIDs  Gabapentin  Opioid  Topical patch  L5 - S1 decompression, fusion 09/12/2023, by Dr. Christopher Velasco at [facility]       SMOKER: No  ANTICOAGULANT USE: YES: Daily baby ASA    ROS x 10 is, otherwise, negative unless documented in HPI above    /84 (BP Location: Left  "arm, Patient Position: Sitting, BP Cuff Size: Adult)   Pulse 103   Temp 36.6 °C (97.8 °F) (Temporal)   Ht 1.854 m (6' 1\")   Wt 118 kg (260 lb)   BMI 34.30 kg/m²     On Exam: Appears comfortable  A&O x 4, speech clear / fluent  Cervical spine fixed in flexed position  Respirations even / unlabored  Abdomen without distension  MOORE  Gait is slow, steady    We reviewed cervical spine imaging with notation of right foraminal narrowing at C3 - 4, no severe canal narrowing in cervical spine. He would like to discuss with Dr. Marcos Doran if cervical surgery is possible to allow better cervical spine motion. CT C Spine requested prior to evaluation / discussion with Dr. Doran. Since he has BUE paresthesia that awakens him at night, requested EMG / NCS for further evaluation. He is agreeable with plan. He has plans to discuss lumbar intervention with Dr. Sullivan next week.  Sneha Rivas, APRN-CNP           "

## 2024-07-05 ENCOUNTER — HOSPITAL ENCOUNTER (OUTPATIENT)
Dept: RADIOLOGY | Facility: CLINIC | Age: 64
Discharge: HOME | End: 2024-07-05
Payer: MEDICARE

## 2024-07-05 DIAGNOSIS — M54.51 VERTEBROGENIC LOW BACK PAIN: ICD-10-CM

## 2024-07-05 DIAGNOSIS — M79.605 PAIN IN LEFT LEG: ICD-10-CM

## 2024-07-05 DIAGNOSIS — M79.604 PAIN IN RIGHT LEG: ICD-10-CM

## 2024-07-05 DIAGNOSIS — M48.02 CERVICAL STENOSIS OF SPINAL CANAL: ICD-10-CM

## 2024-07-05 DIAGNOSIS — M48.062 SPINAL STENOSIS, LUMBAR REGION WITH NEUROGENIC CLAUDICATION: ICD-10-CM

## 2024-07-05 PROCEDURE — 72125 CT NECK SPINE W/O DYE: CPT

## 2024-07-05 PROCEDURE — 72131 CT LUMBAR SPINE W/O DYE: CPT

## 2024-07-10 ENCOUNTER — APPOINTMENT (OUTPATIENT)
Dept: RADIOLOGY | Facility: CLINIC | Age: 64
End: 2024-07-10
Payer: MEDICARE

## 2024-08-27 ENCOUNTER — APPOINTMENT (OUTPATIENT)
Dept: NEUROSURGERY | Facility: CLINIC | Age: 64
End: 2024-08-27
Payer: MEDICARE

## 2024-08-27 VITALS
HEART RATE: 93 BPM | HEIGHT: 73 IN | BODY MASS INDEX: 34.03 KG/M2 | DIASTOLIC BLOOD PRESSURE: 86 MMHG | SYSTOLIC BLOOD PRESSURE: 144 MMHG | WEIGHT: 256.8 LBS | TEMPERATURE: 97.8 F | RESPIRATION RATE: 22 BRPM

## 2024-08-27 DIAGNOSIS — M47.12 CERVICAL SPONDYLOSIS WITH MYELOPATHY: ICD-10-CM

## 2024-08-27 DIAGNOSIS — M54.12 CERVICAL RADICULOPATHY AT C7: ICD-10-CM

## 2024-08-27 DIAGNOSIS — M40.202 KYPHOSIS OF CERVICAL REGION, UNSPECIFIED KYPHOSIS TYPE: Primary | ICD-10-CM

## 2024-08-27 PROCEDURE — 3008F BODY MASS INDEX DOCD: CPT | Performed by: NEUROLOGICAL SURGERY

## 2024-08-27 PROCEDURE — 1036F TOBACCO NON-USER: CPT | Performed by: NEUROLOGICAL SURGERY

## 2024-08-27 PROCEDURE — 99215 OFFICE O/P EST HI 40 MIN: CPT | Performed by: NEUROLOGICAL SURGERY

## 2024-08-27 ASSESSMENT — PAIN SCALES - GENERAL: PAINLEVEL: 8

## 2024-08-27 NOTE — PROGRESS NOTES
[facility] [place]  Department of Neurological Surgery  Established Patient Visit    History of Present Illness  Jerman Colón is a 64 y.o. year old male who presents to the spine clinic in follow up with his his wife complaining of significant neck and shoulder pain with loss of strength in his right arm.  Approximately a year ago he was scheduled for cervical surgery.  The surgeon aborted the case because of difficulty with positioning and he did not feel he could adequately take care of the situation.  I told the patient and his wife that I thought that might be an example of excellent judgment.  I do see that he has significant flexion on the x-rays that were done last year but limited extension.  I can see on his CAT scan of the cervical spine that the facets at C3-4 are limited and the facets at C4-5 are significantly sclerotic.  Both levels are worse on the right side.  At C5-6 and C6-7 he has significant foraminal stenosis again worse on the right side than the left.  The MRI does not show severe cord compression or signal change in the spinal cord.  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  He stopped about a year and a half ago.  He has had lumbar surgery for grade 1-2 spondylolisthesis and he presently is fused at that level.  Dr. Sullivan is considering repairing the lumbar spine up to L3.  I think it is possible that we will not be recommending surgery on his cervical spine, but I want to get an EMG of his upper extremities and I want to get new x-rays with extension of the cervical spine but no flexion and I would like him to see my ear nose and throat colleague to see what he thinks about attempting exposure.  Again I started out the visit by expressing that he should not be bending forward or flexing and so I am going to get him a rigid cervical collar to try to see if he gets any improvement such as lessening of his neck pain and a rigid  collar.    Patient's BMI is Body mass index is 33.88 kg/m².    14/14 systems reviewed and negative other than what is listed in the history of present illness    Patient Active Problem List   Diagnosis    BIANCA (obstructive sleep apnea)    PONV (postoperative nausea and vomiting)    Rotator cuff insufficiency of left shoulder    Fusion of lumbar spine    Vocal cord mass     Past Medical History:   Diagnosis Date    COPD (chronic obstructive pulmonary disease) (Multi)     Coronary artery disease     GERD (gastroesophageal reflux disease)     Hyperlipidemia     Hypertension     PONV (postoperative nausea and vomiting)     Sleep apnea     Vocal cord mass      Past Surgical History:   Procedure Laterality Date    HERNIA REPAIR      TONSILLECTOMY      TOTAL SHOULDER ARTHROPLASTY       Social History     Tobacco Use    Smoking status: Never    Smokeless tobacco: Never   Substance Use Topics    Alcohol use: Not Currently     family history is not on file.    Current Outpatient Medications:     aspirin 81 mg EC tablet, Take 1 tablet (81 mg) by mouth once daily., Disp: , Rfl:     atorvastatin (Lipitor) 40 mg tablet, Take 1 tablet (40 mg) by mouth once daily., Disp: , Rfl:     gabapentin (Neurontin) 400 mg capsule, Take 1 capsule (400 mg) by mouth 3 times a day., Disp: , Rfl:     losartan (Cozaar) 25 mg tablet, Take 1 tablet (25 mg) by mouth once daily., Disp: , Rfl:     oxyCODONE-acetaminophen (Percocet) 5-325 mg tablet, Take 1 tablet by mouth 3 times a day., Disp: , Rfl:     pantoprazole (ProtoNix) 40 mg EC tablet, Take 1 tablet (40 mg) by mouth once daily in the morning. Take before meals., Disp: , Rfl:     tamsulosin (Flomax) 0.4 mg 24 hr capsule, Take 1 capsule (0.4 mg) by mouth once daily., Disp: , Rfl:     metoprolol succinate XL (Toprol-XL) 50 mg 24 hr tablet, Take 1 tablet (50 mg) by mouth once daily., Disp: , Rfl:   No Known Allergies    Physical Examination:    General: NAD, AOx 3,  no aphasia or dysarthria, normal  fund of knowledge  Cranial Nerves II-XII: VFF, PERRL, EOMI, Face Symm, Facial SILT, Palate/Tongue midline and symmetric  Motor: 5/5 Throughout all extremities,  No drift, no dysmetria on finger to nose  Sensation: SILT and PP throughout all extremities  DTRS: Trace in the uppers and 3+ in the lowers, positive Hoffmans   His gait is very unsteady and it does seem myelopathic.  Negative Romberg      Results:  I personally reviewed and interpreted the imaging results which included the MRI does not show any cord compression that is causing any signal change.  But there is terrific kyphosis in the cervical region.  The plain x-rays show limited extension.  The CAT scan shows no level that is already fused.    Assessment and Plan:      Jerman Colón is a 64 y.o. year old male who presents to the spine clinic in follow up with his his wife complaining of significant neck and shoulder pain with loss of strength in his right arm.  Approximately a year ago he was scheduled for cervical surgery.  The surgeon aborted the case because of difficulty with positioning and he did not feel he could adequately take care of the situation.  I told the patient and his wife that I thought that might be an example of excellent judgment.  I do see that he has significant flexion on the x-rays that were done last year but limited extension.  I can see on his CAT scan of the cervical spine that the facets at C3-4 are limited and the facets at C4-5 are significantly sclerotic.  Both levels are worse on the right side.  At C5-6 and C6-7 he has significant foraminal stenosis again worse on the right side than the left.  The MRI does not show severe cord compression or signal change in the spinal cord.  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  He stopped about a year and a half ago.  He has had lumbar surgery for grade 1-2 spondylolisthesis and he presently is fused at that level.  Dr. Sullivan is considering  repairing the lumbar spine up to L3.  I think it is possible that we will not be recommending surgery on his cervical spine, but I want to get an EMG of his upper extremities and I want to get new x-rays with extension of the cervical spine but no flexion and I would like him to see my ear nose and throat colleague to see what he thinks about attempting exposure.  Again I started out the visit by expressing that he should not be bending forward or flexing and so I am going to get him a rigid cervical collar to try to see if he gets any improvement such as lessening of his neck pain and a rigid collar.  I will see him back to discuss his options but I have yet to be persuaded to offer surgery and will review the studies with an open mind.      I have reviewed all prior documentation and reviewed the electronic medical record since admission. I have personally have reviewed all advanced imaging not just the reports and used my interpretation as documented as the relevant findings. I have reviewed the risks and benefits of all treatment recommendations listed in this note with the patient and family. I spent a total of 60 minutes in service to this patient's care during this date of service.      The above clinical summary has been dictated with voice recognition software. It has not been proofread for grammatical errors, typographical mistakes, or other semantic inconsistencies.    Thank you for visiting our office today. It was our pleasure to take part in your healthcare.     Do not hesitate to call with any questions regarding your plan of care after leaving. My office can be reached at [phone] M-F 8am-4pm.     To clinicians, thank you very much for this kind referral. It is a privilege to partner with you in the care of your patients. My office would be delighted to assist you with any further consultations or with questions regarding the plan of care outlined. Do not hesitate to call the office or contact  me directly.     Sincerely,    Marcos Doran MD, FAANS, FACS  Board Certified Neurological Surgeon  , Department of Neurological Surgery  [employer] [facility] [employer]    [facility]  [address]  Medical [facility] Building [address]  [address]  [address]  [address]  [place] [id number]    Phone: [phone]  Fax: [phone]

## 2024-09-23 ENCOUNTER — APPOINTMENT (OUTPATIENT)
Dept: OTOLARYNGOLOGY | Facility: CLINIC | Age: 64
End: 2024-09-23
Payer: MEDICARE

## 2024-10-03 NOTE — PROGRESS NOTES
Jerman Colón is a 64 y.o. year old male p/w lumbar radiculopathy with cervical myelopathy.    14/14 systems reviewed and negative other than what is listed in the history of present illness        Past Medical History:   Diagnosis Date    COPD (chronic obstructive pulmonary disease) (Multi)     Coronary artery disease     GERD (gastroesophageal reflux disease)     Hyperlipidemia     Hypertension     PONV (postoperative nausea and vomiting)     Sleep apnea     Vocal cord mass        Past Surgical History:   Procedure Laterality Date    HERNIA REPAIR      TONSILLECTOMY      TOTAL SHOULDER ARTHROPLASTY             Current Outpatient Medications:     aspirin 81 mg EC tablet, Take 1 tablet (81 mg) by mouth once daily., Disp: , Rfl:     atorvastatin (Lipitor) 40 mg tablet, Take 1 tablet (40 mg) by mouth once daily., Disp: , Rfl:     gabapentin (Neurontin) 400 mg capsule, Take 1 capsule (400 mg) by mouth 3 times a day., Disp: , Rfl:     losartan (Cozaar) 25 mg tablet, Take 1 tablet (25 mg) by mouth once daily., Disp: , Rfl:     oxyCODONE-acetaminophen (Percocet) 5-325 mg tablet, Take 1 tablet by mouth 3 times a day., Disp: , Rfl:     pantoprazole (ProtoNix) 40 mg EC tablet, Take 1 tablet (40 mg) by mouth once daily in the morning. Take before meals., Disp: , Rfl:     tamsulosin (Flomax) 0.4 mg 24 hr capsule, Take 1 capsule (0.4 mg) by mouth once daily., Disp: , Rfl:     metoprolol succinate XL (Toprol-XL) 50 mg 24 hr tablet, Take 1 tablet (50 mg) by mouth once daily., Disp: , Rfl:       Vitals:    10/04/24 1145   BP: 128/80   Pulse: 88     Objective   General: Well developed, awake/alert/oriented x3, no distress, alert and cooperative  Skin: Warm and dry, no lesions, no rashes  ENMT: Mucous membranes moist, no apparent injury, no lesions seen  Head/Neck: Neck Supple, no apparent injury  Respiratory/Thorax: Normal breath sounds with good chest expansion, thorax symmetric  Cardiovascular: No pitting edema, no JVD    Motor  Strength: 5/5 Throughout all extremities    Muscle Bulk: Normal and symmetric in all extremities    Posture:   -- Cervical: Kyphotic  -- Thoracic: Normal  -- Lumbar : Normal  Paraspinal muscle spasm/tenderness absent.     Sensation: intact to light touch     Relevant Results    upright xrays c-spine: kyphotic deformity with slight rotation   upright xrays L-spine: maintenance of good lordosis  EOS: 8.5 cm SVA CT C-spine: no autofusion, slight rotation of spine   CT L-spine: autofusion of anterior L5-S1 through grade II slip  MRI C-spine: C3-4 moderate stenosis   MRI L-spine: L3-4 severe central stenosis, L4-5 moderate bilateral lateral recess stenosis, L5-S1 bilateral foraminal stenosis and moderate central stenosis, prior L5-S1 fusion       Problem List Items Addressed This Visit    None         Assessment/Plan       Mr. Jerman Colón is a very nice 64 y.o. year old male presenting today for a second opinion with lower back pain that is radiating to the lower extremity. His pain starts at the back and is moving to the groin and to the anterior thigh till the knee. He has had lumbar surgery for grade 1-2 spondylolisthesis  with L5-S1 fusion in 09/2023 at [facility].  He states that after the surgery, he had some improvement in his foot pain but still endorses severe pain in his right groin and thigh. He denies weakness/ numbness/ perianal anesthesia/bowel and bladder incontinence.     He also has neck pain and shoulder pain with loss of strength in his right arm. Approximately a year ago he was scheduled for cervical surgery. The surgeon aborted the case because of difficulty with positioning and he did not feel he could adequately take care of the situation.     We reviewed Mri scans together today. On examination, he is standing up in a kyphotic position and is not able to lift his head up and he is moving all four limbs with full strength and has intact neurological examination.     I discussed with him that I think  the anterior thigh pain is likely due to the L3-4 compression seen on MRI.  If he were to require surgery, I would need to revise and extend his prior fusion.  He is seeing Dr. Doran in the next few weeks to follow-up on his consultation regarding his neck issues.  We will discuss this patient with his multiple complex issues at spine conference.    Chrissy Hastings MD    of Neurosurgery   [facility] [place]   [facility]   Office: [phone]  Fax: [phone]      Scribe Attestation  By signing my name below, IKimberly , Jabier   attest that this documentation has been prepared under the direction and in the presence of Chrissy Hastings MD.

## 2024-10-04 ENCOUNTER — OFFICE VISIT (OUTPATIENT)
Dept: NEUROSURGERY | Facility: CLINIC | Age: 64
End: 2024-10-04
Payer: MEDICARE

## 2024-10-04 ENCOUNTER — APPOINTMENT (OUTPATIENT)
Dept: NEUROSURGERY | Facility: CLINIC | Age: 64
End: 2024-10-04
Payer: MEDICARE

## 2024-10-04 ENCOUNTER — HOSPITAL ENCOUNTER (OUTPATIENT)
Dept: RADIOLOGY | Facility: HOSPITAL | Age: 64
Discharge: HOME | End: 2024-10-04
Payer: MEDICARE

## 2024-10-04 VITALS
SYSTOLIC BLOOD PRESSURE: 128 MMHG | DIASTOLIC BLOOD PRESSURE: 80 MMHG | WEIGHT: 258.2 LBS | BODY MASS INDEX: 34.07 KG/M2 | HEART RATE: 88 BPM

## 2024-10-04 DIAGNOSIS — M54.12 CERVICAL RADICULOPATHY AT C7: ICD-10-CM

## 2024-10-04 DIAGNOSIS — M47.12 CERVICAL SPONDYLOSIS WITH MYELOPATHY: ICD-10-CM

## 2024-10-04 DIAGNOSIS — M40.202 KYPHOSIS OF CERVICAL REGION, UNSPECIFIED KYPHOSIS TYPE: ICD-10-CM

## 2024-10-04 DIAGNOSIS — M43.26 FUSION OF LUMBAR SPINE: Primary | ICD-10-CM

## 2024-10-04 DIAGNOSIS — M43.26 FUSION OF LUMBAR SPINE: ICD-10-CM

## 2024-10-04 PROCEDURE — 72050 X-RAY EXAM NECK SPINE 4/5VWS: CPT

## 2024-10-04 PROCEDURE — 72082 X-RAY EXAM ENTIRE SPI 2/3 VW: CPT

## 2024-10-04 PROCEDURE — 1036F TOBACCO NON-USER: CPT | Performed by: STUDENT IN AN ORGANIZED HEALTH CARE EDUCATION/TRAINING PROGRAM

## 2024-10-04 PROCEDURE — 99215 OFFICE O/P EST HI 40 MIN: CPT | Performed by: STUDENT IN AN ORGANIZED HEALTH CARE EDUCATION/TRAINING PROGRAM

## 2024-10-04 ASSESSMENT — ENCOUNTER SYMPTOMS
LOSS OF SENSATION IN FEET: 0
OCCASIONAL FEELINGS OF UNSTEADINESS: 1
DEPRESSION: 0

## 2024-10-04 ASSESSMENT — PAIN SCALES - GENERAL: PAINLEVEL: 8

## 2024-10-11 ENCOUNTER — APPOINTMENT (OUTPATIENT)
Dept: NEUROSURGERY | Facility: CLINIC | Age: 64
End: 2024-10-11
Payer: MEDICARE

## 2024-10-14 ENCOUNTER — OFFICE VISIT (OUTPATIENT)
Dept: OTOLARYNGOLOGY | Facility: CLINIC | Age: 64
End: 2024-10-14
Payer: MEDICARE

## 2024-10-14 DIAGNOSIS — M40.202 KYPHOSIS OF CERVICAL REGION, UNSPECIFIED KYPHOSIS TYPE: ICD-10-CM

## 2024-10-14 DIAGNOSIS — M54.12 CERVICAL RADICULOPATHY AT C7: ICD-10-CM

## 2024-10-15 ENCOUNTER — APPOINTMENT (OUTPATIENT)
Dept: NEUROSURGERY | Facility: CLINIC | Age: 64
End: 2024-10-15
Payer: MEDICARE

## 2024-10-21 ENCOUNTER — APPOINTMENT (OUTPATIENT)
Dept: NEUROSURGERY | Facility: CLINIC | Age: 64
End: 2024-10-21
Payer: MEDICARE

## 2024-10-29 ENCOUNTER — APPOINTMENT (OUTPATIENT)
Dept: NEUROSURGERY | Facility: CLINIC | Age: 64
End: 2024-10-29
Payer: MEDICARE

## 2024-10-29 VITALS
BODY MASS INDEX: 35.28 KG/M2 | RESPIRATION RATE: 16 BRPM | SYSTOLIC BLOOD PRESSURE: 152 MMHG | HEIGHT: 73 IN | TEMPERATURE: 96.8 F | HEART RATE: 108 BPM | WEIGHT: 266.2 LBS | DIASTOLIC BLOOD PRESSURE: 84 MMHG

## 2024-10-29 DIAGNOSIS — M48.02 CERVICAL STENOSIS OF SPINAL CANAL: ICD-10-CM

## 2024-10-29 DIAGNOSIS — M40.12 OTHER SECONDARY KYPHOSIS, CERVICAL REGION: Primary | ICD-10-CM

## 2024-10-29 DIAGNOSIS — M40.202 KYPHOSIS OF CERVICAL REGION, UNSPECIFIED KYPHOSIS TYPE: Primary | ICD-10-CM

## 2024-10-29 DIAGNOSIS — M54.12 CERVICAL RADICULOPATHY AT C7: ICD-10-CM

## 2024-10-29 PROCEDURE — 99214 OFFICE O/P EST MOD 30 MIN: CPT | Performed by: NEUROLOGICAL SURGERY

## 2024-10-29 PROCEDURE — 1036F TOBACCO NON-USER: CPT | Performed by: NEUROLOGICAL SURGERY

## 2024-10-29 PROCEDURE — 3008F BODY MASS INDEX DOCD: CPT | Performed by: NEUROLOGICAL SURGERY

## 2024-10-29 RX ORDER — AMOXICILLIN 500 MG/1
500 TABLET, FILM COATED ORAL EVERY 8 HOURS SCHEDULED
COMMUNITY
Start: 2024-10-22

## 2024-10-29 ASSESSMENT — PAIN SCALES - GENERAL: PAINLEVEL_OUTOF10: 8

## 2024-10-29 ASSESSMENT — ENCOUNTER SYMPTOMS
LOSS OF SENSATION IN FEET: 1
OCCASIONAL FEELINGS OF UNSTEADINESS: 1
DEPRESSION: 0

## 2024-11-06 ENCOUNTER — TELEPHONE (OUTPATIENT)
Dept: NEUROLOGY | Facility: CLINIC | Age: 64
End: 2024-11-06
Payer: MEDICARE

## 2024-11-06 NOTE — TELEPHONE ENCOUNTER
Wife, Zion, was returning your call to reschedule the 12/30 appt. They did not want scheduled on 1/17. Please call her back.

## 2024-11-11 ENCOUNTER — OFFICE VISIT (OUTPATIENT)
Dept: URGENT CARE | Age: 64
End: 2024-11-11
Payer: MEDICARE

## 2024-11-11 VITALS
WEIGHT: 260 LBS | RESPIRATION RATE: 16 BRPM | DIASTOLIC BLOOD PRESSURE: 89 MMHG | BODY MASS INDEX: 34.3 KG/M2 | SYSTOLIC BLOOD PRESSURE: 141 MMHG | TEMPERATURE: 98.2 F | HEART RATE: 92 BPM | OXYGEN SATURATION: 95 %
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
## 2024-11-11 DIAGNOSIS — B96.89 LOCALIZED BACTERIAL SKIN INFECTION: ICD-10-CM

## 2024-11-11 DIAGNOSIS — L08.9 LOCALIZED BACTERIAL SKIN INFECTION: ICD-10-CM

## 2024-11-11 RX ORDER — AMOXICILLIN AND CLAVULANATE POTASSIUM 875; 125 MG/1; MG/1
TABLET, FILM COATED ORAL
Qty: 20 TABLET | Refills: 0 | Status: SHIPPED | OUTPATIENT
Start: 2024-11-11

## 2024-11-11 RX ORDER — MUPIROCIN 20 MG/G
OINTMENT TOPICAL
Qty: 22 G | Refills: 0 | Status: SHIPPED | OUTPATIENT
Start: 2024-11-11

## 2024-11-11 ASSESSMENT — ENCOUNTER SYMPTOMS
CARDIOVASCULAR NEGATIVE: 1
CONSTITUTIONAL NEGATIVE: 1
MUSCULOSKELETAL NEGATIVE: 1
EYES NEGATIVE: 1
RESPIRATORY NEGATIVE: 1
HEMATOLOGIC/LYMPHATIC NEGATIVE: 1
GASTROINTESTINAL NEGATIVE: 1
NEUROLOGICAL NEGATIVE: 1
ALLERGIC/IMMUNOLOGIC NEGATIVE: 1
PSYCHIATRIC NEGATIVE: 1
ENDOCRINE NEGATIVE: 1

## 2024-11-11 ASSESSMENT — PAIN SCALES - GENERAL: PAINLEVEL_OUTOF10: 2

## 2024-11-11 NOTE — PROGRESS NOTES
Subjective   Patient ID: Jerman Colón is a 64 y.o. male. They present today with a chief complaint of Animal Bite (Lower left leg lac from dog scratch today at 1pm. ).    History of Present Illness  Patient is a 65 y/o male presenting with laceration to left lower leg to which occurred secondary to dog scratch. Denies symptoms of excessive bleeding, paresthesias including numbness/tingling of extremity, extremity weakness, loss of ROM, inability to bear weight on extremity, fever, chills, bodyaches, lethargy, weakness, chest pain/tightness/pressure, SOB, wheezing, N/V/D, ABD pain. No OTC medication reported to be taken. Patient reports his dog is UTD with standard vaccinations; patient is not UTD with Tetanus vaccination.      Animal Bite      Past Medical History  Allergies as of 11/11/2024    (No Known Allergies)       (Not in a hospital admission)       Past Medical History:   Diagnosis Date    COPD (chronic obstructive pulmonary disease) (Multi)     Coronary artery disease     GERD (gastroesophageal reflux disease)     Hyperlipidemia     Hypertension     PONV (postoperative nausea and vomiting)     Sleep apnea     Vocal cord mass        Past Surgical History:   Procedure Laterality Date    DENTAL SURGERY      HERNIA REPAIR      TONSILLECTOMY      TOTAL SHOULDER ARTHROPLASTY          reports that he has never smoked. He has never been exposed to tobacco smoke. He has never used smokeless tobacco. He reports that he does not currently use alcohol. He reports that he does not use drugs.    Review of Systems  Review of Systems   Constitutional: Negative.    HENT: Negative.     Eyes: Negative.    Respiratory: Negative.     Cardiovascular: Negative.    Gastrointestinal: Negative.    Endocrine: Negative.    Genitourinary: Negative.    Musculoskeletal: Negative.    Skin:         Laceration to left lower leg   Allergic/Immunologic: Negative.    Neurological: Negative.    Hematological: Negative.     Psychiatric/Behavioral: Negative.                                    Objective    Vitals:    11/11/24 1444   BP: 141/89   Pulse: 92   Resp: 16   Temp: 36.8 °C (98.2 °F)   SpO2: 95%   Weight: 118 kg (260 lb)     No LMP for male patient.    Physical Exam  Constitutional:       Comments: Patient A/O x4, LOC 5, calm and cooperative. Patient self-ambulatory to treatment area without a limp, and is in no acute distress.    HENT:      Head: Normocephalic and atraumatic.      Nose: Nose normal.   Eyes:      Extraocular Movements: Extraocular movements intact.      Conjunctiva/sclera: Conjunctivae normal.      Pupils: Pupils are equal, round, and reactive to light.   Cardiovascular:      Rate and Rhythm: Normal rate and regular rhythm.      Pulses: Normal pulses.      Heart sounds: Normal heart sounds.   Pulmonary:      Effort: Pulmonary effort is normal.      Breath sounds: Normal breath sounds.   Musculoskeletal:         General: Normal range of motion.      Cervical back: Neck supple.   Skin:     Capillary Refill: Capillary refill takes less than 2 seconds.      Comments: Lateroanterior aspect of left tib/fib region with a single linear laceration measuring 6cm in length. Patient with baseline generalized edema of unknown etiology to BLE; has follow-up with cardiology regarding it. Laceration approximated loosely; see procedural note.    Neurological:      General: No focal deficit present.      Mental Status: He is oriented to person, place, and time.   Psychiatric:         Mood and Affect: Mood normal.         Behavior: Behavior normal.         Laceration Repair    Date/Time: 11/11/2024 4:43 PM    Performed by: NIKI Sanabria  Authorized by: NIKI Sanabria    Consent:     Consent obtained:  Verbal    Consent given by:  Patient    Risks, benefits, and alternatives were discussed: yes      Risks discussed:  Infection, poor cosmetic result and poor wound healing    Alternatives discussed:   Observation  Universal protocol:     Procedure explained and questions answered to patient or proxy's satisfaction: yes      Patient identity confirmed:  Verbally with patient  Anesthesia:     Anesthesia method:  Local infiltration    Local anesthetic:  Lidocaine 1% w/o epi  Laceration details:     Location:  Leg    Leg location:  L lower leg    Wound length (cm): 6cm.  Pre-procedure details:     Preparation:  Patient was prepped and draped in usual sterile fashion  Exploration:     Limited defect created (wound extended): no      Contaminated: no    Treatment:     Area cleansed with:  Povidone-iodine and saline    Amount of cleaning:  Standard    Irrigation solution:  Sterile saline    Visualized foreign bodies/material removed: no      Debridement:  None    Undermining:  None    Scar revision: no    Skin repair:     Repair method:  Sutures    Suture size:  4-0    Suture material:  Prolene    Suture technique:  Simple interrupted    Number of sutures:  7  Approximation:     Approximation:  Loose  Repair type:     Repair type:  Simple  Post-procedure details:     Dressing:  Non-adherent dressing    Procedure completion:  Tolerated      Point of Care Test & Imaging Results from this visit  No results found for this visit on 11/11/24.   No results found.    Diagnostic study results (if any) were reviewed by NIKI Sanabria.    Assessment/Plan   Allergies, medications, history, and pertinent labs/EKGs/Imaging reviewed by NIKI Sanabria.     Medical Decision Making  Patient advised to monitor for s/s of infection along with wound care. Patient to return in 7-10 days for suture removal. At time of discharge, patient was clinically well-appearing and appropriate for outpatient management. The patient/parent/guardian was educated regarding diagnosis, supportive care, OTC and Rx medications. The patient/parent/guardian was given the opportunity to ask questions prior to discharge. They verbalized understanding  of discussion of treatment plan, expected course of illness and/or injury, indications on when to return to , when to seek further evaluation in ED/call 911, and the need to follow up with PCP and/or specialist as referred. Patient/parent/guardian was provided with work/school documentation if requested. Patient stable upon discharge.     Orders and Diagnoses  Diagnoses and all orders for this visit:  Dog scratch  -     Tdap vaccine, age 7 years and older  -     mupirocin (Bactroban) 2 % ointment; After gently cleansing and drying area, apply thin film topically to affected areas 2 times daily x7 days.  -     amoxicillin-pot clavulanate (Augmentin) 875-125 mg tablet; Take 1 tablet by mouth twice daily x10 days. Take with food  Localized bacterial skin infection  -     mupirocin (Bactroban) 2 % ointment; After gently cleansing and drying area, apply thin film topically to affected areas 2 times daily x7 days.  -     amoxicillin-pot clavulanate (Augmentin) 875-125 mg tablet; Take 1 tablet by mouth twice daily x10 days. Take with food  Other orders  -     Laceration Repair      Medical Admin Record      Patient disposition: Home    Electronically signed by NIKI Sanabria  4:44 PM

## 2024-11-20 ENCOUNTER — OFFICE VISIT (OUTPATIENT)
Dept: URGENT CARE | Age: 64
End: 2024-11-20
Payer: MEDICARE

## 2024-11-20 VITALS
TEMPERATURE: 97.7 F | DIASTOLIC BLOOD PRESSURE: 95 MMHG | HEART RATE: 92 BPM | OXYGEN SATURATION: 95 % | SYSTOLIC BLOOD PRESSURE: 163 MMHG | RESPIRATION RATE: 20 BRPM

## 2024-11-20 DIAGNOSIS — Z48.02 VISIT FOR SUTURE REMOVAL: ICD-10-CM

## 2024-11-20 NOTE — PROGRESS NOTES
Suture Removal    Date/Time: 11/20/2024 3:41 PM    Performed by: Laila Jc MA  Authorized by: NIKI Sanabria    Consent:     Consent obtained:  Verbal    Consent given by:  Patient    Risks, benefits, and alternatives were discussed: yes      Risks discussed:  Bleeding and pain    Alternatives discussed:  No treatment  Location:     Location:  Lower extremity  Procedure details:     Wound appearance:  No signs of infection, good wound healing and clean  Post-procedure details:     Post-removal:  Antibiotic ointment applied and Band-Aid applied    Procedure completion:  Tolerated

## 2024-11-20 NOTE — PROGRESS NOTES
Subjective   Patient ID: Jerman Colón is a 64 y.o. male. They present today with a chief complaint of Suture / Staple Removal (Lt shin sutures - placed 11/11).    History of Present Illness  HPI    Past Medical History  Allergies as of 11/20/2024    (No Known Allergies)       (Not in a hospital admission)       Past Medical History:   Diagnosis Date    COPD (chronic obstructive pulmonary disease) (Multi)     Coronary artery disease     GERD (gastroesophageal reflux disease)     Hyperlipidemia     Hypertension     PONV (postoperative nausea and vomiting)     Sleep apnea     Vocal cord mass        Past Surgical History:   Procedure Laterality Date    DENTAL SURGERY      HERNIA REPAIR      TONSILLECTOMY      TOTAL SHOULDER ARTHROPLASTY          reports that he has never smoked. He has never been exposed to tobacco smoke. He has never used smokeless tobacco. He reports that he does not currently use alcohol. He reports that he does not use drugs.    Review of Systems  Review of Systems                               Objective    Vitals:    11/20/24 1519   BP: (!) 163/95   Pulse: 92   Resp: 20   Temp: 36.5 °C (97.7 °F)   TempSrc: Temporal   SpO2: 95%     No LMP for male patient.    Physical Exam    Procedures    Point of Care Test & Imaging Results from this visit  No results found for this visit on 11/20/24.   No results found.    Diagnostic study results (if any) were reviewed by NIKI Sanabria.    Assessment/Plan   Allergies, medications, history, and pertinent labs/EKGs/Imaging reviewed by NIKI Sanabria.     Medical Decision Making  At time of discharge, patient was clinically well-appearing and appropriate for outpatient management. The patient/parent/guardian was educated regarding diagnosis, supportive care, OTC and Rx medications. The patient/parent/guardian was given the opportunity to ask questions prior to discharge. They verbalized understanding of discussion of treatment plan, expected course  of illness and/or injury, indications on when to return to , when to seek further evaluation in ED/call 911, and the need to follow up with PCP and/or specialist as referred. Patient/parent/guardian was provided with work/school documentation if requested. Patient stable upon discharge.     Orders and Diagnoses  There are no diagnoses linked to this encounter.    Medical Admin Record      Patient disposition: Home    Electronically signed by NIKI Sanabria  3:40 PM

## 2024-12-04 NOTE — PROGRESS NOTES
It was a pleasure to see Mr. Colón at the Neurosurgery Spine Clinic at [facility].     He is a really nice 64 y.o. male  who presents to us with complaints primarily axial NECK pain  that started about  2  years  ago, and have been gradually worsening since that time.  The symptoms started after no known injury    The pain is 9 /10. The pain is described as burning and stabbing and occurs all day.  Symptoms are exacerbated by nothing in particular. Factors which relieve the pain include  not sure       Patient has a history of lumbar fusion at [facility] which largely did not resolve his lumbar symptoms.    He still continues to have back pain along with bilateral lower extremity pain.  He also has severe neck pain that is worse with standing up and relieved when laying down.  He also has pain in the right C5 distribution.    He has a history of an attempted surgery in the cervical spine at [facility] though was aborted for severe cervical kyphosis.    He has stooped posture and complains of weakness in his hands.  His problems buttoning up his T-shirt and using a pen to write.  He used to be very active and now has severe decline in his activity and his normal activities of daily living.    No numbness tingling in his legs, no new urinary issues or bowel issues.  He currently takes aspirin 81 daily.    Numbness and/or tingling - YES    Weakness : YES    Bladder/Bowel symptoms - NO    The patient has tried medications (eg: gabapentin, NSAIDS and narcotics ) : Yes  PT : Yes    Date: last was 2 years  ago  Pain Management with ESIs/selective nerve blocks  - YES    he is a NON-SMOKER and NON-DIABETIC    History of Depression : NO    PRIOR SPINE SURGERY: YES lumbar fusion with Dr. Velasco at [facility] 9/2023    Denies use of Aspirin, Coumadin, or Plavix or any other anticoagulants     NARCOTICS for pain : YES    Part of this patient’s history is from personal review of the patient’s previous  charts.      Past Medical History:   Diagnosis Date    COPD (chronic obstructive pulmonary disease) (Multi)     Coronary artery disease     GERD (gastroesophageal reflux disease)     Hyperlipidemia     Hypertension     PONV (postoperative nausea and vomiting)     Sleep apnea     Vocal cord mass            Current Outpatient Medications:     amoxicillin (Amoxil) 500 mg tablet, Take 1 tablet (500 mg) by mouth every 8 hours. (Patient not taking: Reported on 11/11/2024), Disp: , Rfl:     amoxicillin-pot clavulanate (Augmentin) 875-125 mg tablet, Take 1 tablet by mouth twice daily x10 days. Take with food, Disp: 20 tablet, Rfl: 0    aspirin 81 mg EC tablet, Take 1 tablet (81 mg) by mouth once daily., Disp: , Rfl:     atorvastatin (Lipitor) 40 mg tablet, Take 1 tablet (40 mg) by mouth once daily., Disp: , Rfl:     gabapentin (Neurontin) 400 mg capsule, Take 1 capsule (400 mg) by mouth 3 times a day., Disp: , Rfl:     losartan (Cozaar) 25 mg tablet, Take 1 tablet (25 mg) by mouth once daily. (Patient not taking: Reported on 11/11/2024), Disp: , Rfl:     mupirocin (Bactroban) 2 % ointment, After gently cleansing and drying area, apply thin film topically to affected areas 2 times daily x7 days., Disp: 22 g, Rfl: 0    oxyCODONE-acetaminophen (Percocet) 5-325 mg tablet, Take 1 tablet by mouth 3 times a day., Disp: , Rfl:     pantoprazole (ProtoNix) 40 mg EC tablet, Take 1 tablet (40 mg) by mouth once daily in the morning. Take before meals., Disp: , Rfl:     tamsulosin (Flomax) 0.4 mg 24 hr capsule, Take 1 capsule (0.4 mg) by mouth once daily., Disp: , Rfl:       Review of Systems :   14/14 systems reviewed and negative other than what is listed in the history of present illness       EXAM:   GENERAL: no apparent distress  RESPIRATORY: normal respiratory effort; no audible wheezes/rhonchi  EXTREMITIES: normal pulses, no edema  SKIN: Normal temperature; no rash, ulcers or subcutaneous nodules     MUSCULOSKELETAL:   Range of  motion of the cervical spine is restricted and kyphotic with stooped neck posture  Paraspinal muscle spasm/tenderness absent.   Lhermitte Sign: Absent  NO scars in the neck.  Some muscle atrophy in his neck  Evidence of severe sagittal malalignment in the cervical spine  Romberg's test: Negative  When laying down has evidence of ridge rigid cervical deformity as his head does not come down to the ground  Spurling's : Positive  Gait: Wide based staggering gait     NEUROLOGIC: Patient is awake, alert and oriented to name, place and time.  No obvious cranial nerve deficit.    MOTOR   RUE Delt 4- Bi 4 Tri 5 HG 4- IO 5 WF 5 WE 5 finger ect 5  LUE Delt 4+ Bi 4 Tri 5 HG 4 IO 5 WF/WE 5 finger ext 5   BLE 5/5      Bulk: Decreased in right deltoid  Tone: Normal  No Velasco  No pronator drift  Reflexes are symmetric throughout.   Sensory exam shows no gross dermatomal sensory deficits to light touch and pain       IMAGING:   MRI cervical spine with no sign of cord compression and with multilevel foraminal stenosis worst at the left C5-6 level with severe foraminal stenosis as level, severe foraminal stenosis at C4-5 level and at the C6-7 level bilaterally    CT lumbar spine showing evidence of prior L5-S1 fusion with screws in good position and intact fusion across the disc base at L5-S1, vacuum disc phenomenon at L4-5 and L3-4 with sclerotic endplate changes at L3-4    MRI L-spine showing signs of adjacent segment disease evident by L4-5 disc herniation causing severe lateral recess and central canal stenosis, as well as L3-4 disc herniation causing moderate to severe lateral recess as well as severe central canal stenosis with L3-4 joint effusions CT lumbar spine showing multilevel spondylosis with kyphotic alignment.  When compared to his upright x-rays there is no change in the alignment which is concerning for fixed cervical kyphosis and deformity the EOS scoliosis x-ray showing overall normal thoracolumbar alignment  though severe cervical sagittal malalignment with evidence of cervical kyphosis positive cervical sagittal alignment.  When compared to the supine imaging is clear that this is a rigid cervical deformity.  The cervical sagittal vertical axis is approximately 6 to 7 cm which is severe in nature.    ASSESSMENT AND PLAN:  Patient is a 64-year-old male with history of prior L5-S1 fusion at the [facility] coming in with progressive neck pain and cervical radiculopathy along with right upper extremity greater than left upper extremity weakness over the course of the past 1 to 2 years.  He has had increasing pain in his neck because of history of posture and cervical kyphosis.   His major symptom at this point of time remains difficulty holding his head up and difficulty swallowing along with challenges involved in keeping an upright forward gaze.   He also has ongoing continued lower back symptoms for which she was recommended more surgeries by another surgeon.   Imaging is consistent with lumbar advancing disease secondary to his lumbar fusion, but most importantly he has a rigid cervical deformity with multilevel cervical spondylosis.  His upright imaging shows severe sagittal malalignment and when compared to supine imaging there is no reduction of his cervical kyphosis which is concerning for a rigid deformity    I have reviewed imaging and diagnosis with the patient, discussed the natural history of the disease and both non-operative and operative treatments available and rationale vs risks for both.     The patient's clinical symptoms correlates well with the radiological findings.    He has been having significant functional impairment with decreased ability to perform his ADL secondary to pain symptomatic cervical rigid deformity preventing him from maintaining a forward gaze holding his head up and basic function like eating and swallowing which she believes is getting worse.  He also has signs or symptoms  cervical radiculopathy.  The pain has been debilitating on a daily basis with a score of more than 5 on scale of 0 - 10.Overall the patient is suffering from increasing pain, which he rates roughly 8-9 out of 10 when standing up.  This is secondary to his cervical kyphosis as well as cervical spondylosis and foraminal stenosis.  He is also referring from right upper extremity C5 cervical radiculopathy.    He has tried various conservative treatment options such as PAIN MEDICATIONS and formal PHYSICIAN DIRECTED PHYSICAL THERAPY (PT) program.  He has not had recent physical therapy and hence I I have given him referral for another session of physical therapy so that he can continue to be active but given the structural nature of his cervical deformity affecting his basic day-to-day functioning; he would require surgical treatment regardless.      Considering the degree of pain and disability secondary to nerve root compression from cervical foraminal stenosis along with presence of a symptomatic cervical deformity with cervical kyphosis preventing him from holding his head up in a neutral alignment, surgery at this point is indicated and is medically necessary to improve the quality of life and day to day functioning and prevent continued neurological decline.    I offered him the option of surgery that would consist of C4-5 C5-6 and C6-7 anterior cervical discectomy and fusion using stand-alone cages and allograft followed by posterior cervical C4-5 laminectomy and facetectomy for decompression along with C3-4 C6-7 C7-T1 T1-T2 posterior column osteotomy and a C2-T4 instrumented fusion using local bone autograft and C5 which is an osteoporotic material for spine surgery.  We may consider stopping the fusion at T2 if we would be able to obtain appropriate cervical alignment during surgery without resorting to go up to T4.    I have explained the surgical procedure in detail with expected duration and extent of recovery  along risks of surgery that include, but is not limited to neck pain, postoperative hematoma, dysphagia with possible need of feeding tube or PEG placement, prolonged intubation with possible need of tracheostomy, recurrent laryngeal nerve palsy, voice changes/hoarseness of voice, pharyngeal or esophageal laceration, bleeding, infection, blood vessel injury or damage, nerve/spinal cord  injury or damage resulting in loss of sensation, loss of bladder, bowel or sexual function and weakness/complete paralysis, malunion, nonunion, CSF leak , blindness,  brachial plexus injury, failure of implants/fusion, failure to relieve symptoms, recurrent disease, adjacent segment disease, need to reoperate for any reason and general anesthesia reaction such as stroke, coma, heart attack, delirium, confusion, death as well as worsening of preexisted medical conditions and any other unforeseen complication related to unrelated to the spinal procedure per se. I have also discussed with the patient the chances of C5 palsy  ( about 5- 8 % ) and the expected course and the chances of recovery if that happens. Of those 5-8 % of patietns majority of patients with C5 palsy would recover within 3-6 months but 5  -10 percent of all patiens who would develop C5 palsy may not recover and may have a permament C5 palsy with resultant limitations.    A Shared decision was made to proceed with surgery after involving the patient in the treatment decision-making process.  The patient clearly expressed understanding of possible risks and benefits of surgery and the realistic expectations of surgery along with the fact that the goal of the surgery is to improve the  overall functioning and quality of life by improvement of the current level of function,  possible improvement and/or prevent progression of preexisting neurological deficits and not necessarily 100 % pain relief. There is no guarantee that the prexisiting deficits will improve definitely  after surgery. Also discussed with the patient a small but possible chances of worsening of his her symptoms or development of new symptoms despite a successful surgery that may be worse than what he has now. The option of continued non-operative management was very clearly discussed as well with its associated risks and benefits and the patient was clearly provided that option.     We would schedule his surgery sometime early next year.  I explained to the patient that subsequent to cervical spine surgery if he continues to remain symptomatic I will be more than happy to consider surgery for his lumbar spine at that point.    I discussed the possible impacts and necessary lifestyle adjustments even after a successful fusion surgery as follows that the patient should understand:     1)  Reduced Mobility and Stiffness: Patients might have limited flexibility and range of motion in the fused spine area. ADLs (activities of daily living) can still be managed with lifestyle adjustments that depends on the number of levels of fusion.    2)  Pain Management:  While there should be significant improvement in patients preoperative pain post-surgery, the possibility of continued pain ( likely not as severe as before surgery ) or development of chronic pain requiring ongoing management cannot be ruled out and in that case the patient may need to continue follow-up with pain management or may require longtime pain medications.  I do not prescribe or recommend opioids past 6 to 12 weeks after surgery.      3) Recovery Time: Recovery can vary greatly depending on the individual and surgical factors, ranging from 2 - 12 weeks to several months.    4) Risk of Adjacent Segment Disease (ASD): Additional stress on vertebrae adjacent to the fusion site may lead to degeneration over time, though it can also occur due to natural aging and may be unrelated to the fusion.     5) Activity Restrictions: While the patient should be able to  participate in majority of activity of daily living especially in comparison to her preoperative status; the stiffness in the spine from fusion may mandate activity restrictions/changes  some of which may be permanent to prevent complications and ensure proper healing.        It was a pleasure to participate in Mr. Colón clinical care. All questions were answered to him satisfaction and he explained understanding of the further treatment plan.     Paul Patel MD, [facility]   of Neurological Surgery  [employer]  Attending Surgeon  Director - Minimally Invasive Spine Surgery  [place], [place]      Some of this note was completed using Dragon voice recognition technology and sometimes the software misinterprets words. This may include unintended errors with respect to translation of words, typographical errors or grammar errors which may not have been identified prior to finalization of the chart note. Please take this into account when reading this note

## 2024-12-05 ENCOUNTER — OFFICE VISIT (OUTPATIENT)
Dept: NEUROSURGERY | Facility: CLINIC | Age: 64
End: 2024-12-05
Payer: MEDICARE

## 2024-12-05 VITALS
BODY MASS INDEX: 33.8 KG/M2 | HEART RATE: 90 BPM | SYSTOLIC BLOOD PRESSURE: 154 MMHG | RESPIRATION RATE: 20 BRPM | WEIGHT: 255 LBS | DIASTOLIC BLOOD PRESSURE: 90 MMHG | HEIGHT: 73 IN

## 2024-12-05 DIAGNOSIS — G95.9 CERVICAL MYELOPATHY: ICD-10-CM

## 2024-12-05 DIAGNOSIS — M51.369 LUMBAR ADJACENT SEGMENT DISEASE WITH SPONDYLOLISTHESIS: ICD-10-CM

## 2024-12-05 DIAGNOSIS — M43.16 LUMBAR ADJACENT SEGMENT DISEASE WITH SPONDYLOLISTHESIS: ICD-10-CM

## 2024-12-05 DIAGNOSIS — M48.02 CERVICAL STENOSIS OF SPINAL CANAL: ICD-10-CM

## 2024-12-05 DIAGNOSIS — M54.16 LUMBAR RADICULOPATHY, CHRONIC: ICD-10-CM

## 2024-12-05 DIAGNOSIS — R29.898 DROPPED HEAD SYNDROME: Primary | ICD-10-CM

## 2024-12-05 DIAGNOSIS — M47.10 SPINAL CORD COMPRESSION DUE TO DEGENERATIVE DISORDER OF SPINAL COLUMN: ICD-10-CM

## 2024-12-05 DIAGNOSIS — M54.12 CERVICAL RADICULOPATHY AT C7: ICD-10-CM

## 2024-12-05 DIAGNOSIS — M40.202 KYPHOSIS OF CERVICAL REGION, UNSPECIFIED KYPHOSIS TYPE: ICD-10-CM

## 2024-12-05 DIAGNOSIS — M40.12 OTHER SECONDARY KYPHOSIS, CERVICAL REGION: ICD-10-CM

## 2024-12-05 PROCEDURE — 1036F TOBACCO NON-USER: CPT | Performed by: NEUROLOGICAL SURGERY

## 2024-12-05 PROCEDURE — 3008F BODY MASS INDEX DOCD: CPT | Performed by: NEUROLOGICAL SURGERY

## 2024-12-05 PROCEDURE — 99215 OFFICE O/P EST HI 40 MIN: CPT | Performed by: NEUROLOGICAL SURGERY

## 2024-12-05 PROCEDURE — 99215 OFFICE O/P EST HI 40 MIN: CPT | Mod: GC | Performed by: NEUROLOGICAL SURGERY

## 2024-12-05 ASSESSMENT — PAIN SCALES - GENERAL: PAINLEVEL_OUTOF10: 8

## 2024-12-10 PROBLEM — G95.9 CERVICAL MYELOPATHY: Status: ACTIVE | Noted: 2024-12-05

## 2024-12-10 PROBLEM — M47.10 SPINAL CORD COMPRESSION DUE TO DEGENERATIVE DISORDER OF SPINAL COLUMN: Status: ACTIVE | Noted: 2024-12-05

## 2024-12-16 DIAGNOSIS — M54.12 CERVICAL RADICULOPATHY AT C7: ICD-10-CM

## 2024-12-16 DIAGNOSIS — M48.02 CERVICAL STENOSIS OF SPINAL CANAL: ICD-10-CM

## 2024-12-16 DIAGNOSIS — M47.12 CERVICAL SPONDYLOSIS WITH MYELOPATHY: ICD-10-CM

## 2024-12-30 ENCOUNTER — APPOINTMENT (OUTPATIENT)
Dept: NEUROSURGERY | Facility: CLINIC | Age: 64
End: 2024-12-30
Payer: MEDICARE

## 2025-01-03 ENCOUNTER — CLINICAL SUPPORT (OUTPATIENT)
Dept: PREADMISSION TESTING | Facility: HOSPITAL | Age: 65
End: 2025-01-03
Payer: MEDICARE

## 2025-01-03 NOTE — CPM/PAT H&P
CPM/PAT Evaluation       Name: Jerman Colón (Jerman Colón)  /Age: 1960/64 y.o.     { PAT Visit Type:53362}      Chief Complaint: ***    HPI    Jerman Colón is scheduled for C4-5 C5-6 and C6-7 anterior cervical discectomy and fusion using stand-alone cages and allograft followed by posterior cervical C4-5 laminectomy and facetectomy for decompression along with C3-4 C6-7 C7-T1 T1-T2 posterior column osteotomy and a C2-T4 instrumented fusion using local bone autograft and C5 which is an osteoporotic material for spine surgery. - Bilateral with Dr. Patel on 25.  Past Medical History:   Diagnosis Date    Asthma     Cervical myelopathy     COPD (chronic obstructive pulmonary disease) (Multi)     Coronary artery disease     GERD (gastroesophageal reflux disease)     Hyperlipidemia     Hypertension     Polycythemia     PONV (postoperative nausea and vomiting)     Sepsis (Multi)     Sleep apnea     Vocal cord mass        Past Surgical History:   Procedure Laterality Date    CARDIAC CATHETERIZATION Left 2023    COLONOSCOPY      DENTAL SURGERY      HERNIA REPAIR      LUMBAR FUSION  2018    L5-S1 Fusion    SKIN BIOPSY      TONSILLECTOMY      TOTAL SHOULDER ARTHROPLASTY         Patient Sexual activity questions deferred to the physician.    Family History   Problem Relation Name Age of Onset    Other (htn) Mother      Diabetes Mother      Other (htn) Father      Heart attack Father         No Known Allergies    Prior to Admission medications    Medication Sig Start Date End Date Taking? Authorizing Provider   amoxicillin (Amoxil) 500 mg tablet Take 1 tablet (500 mg) by mouth every 8 hours.  Patient not taking: Reported on 2024 10/22/24   Historical Provider, MD   amoxicillin-pot clavulanate (Augmentin) 875-125 mg tablet Take 1 tablet by mouth twice daily x10 days. Take with food 24   CAROLINA Sanabria-CNP   aspirin 81 mg EC tablet Take 1 tablet (81 mg) by mouth once daily. 12    Historical Provider, MD   atorvastatin (Lipitor) 40 mg tablet Take 1 tablet (40 mg) by mouth once daily. 12/1/23   Historical Provider, MD   gabapentin (Neurontin) 400 mg capsule Take 1 capsule (400 mg) by mouth 3 times a day. 6/10/24   Historical Provider, MD   losartan (Cozaar) 25 mg tablet Take 1 tablet (25 mg) by mouth once daily.  Patient not taking: Reported on 11/11/2024 3/10/23   Historical Provider, MD   mupirocin (Bactroban) 2 % ointment After gently cleansing and drying area, apply thin film topically to affected areas 2 times daily x7 days. 11/11/24   Tia Mandela, APRN-CNP   oxyCODONE-acetaminophen (Percocet) 5-325 mg tablet Take 1 tablet by mouth 3 times a day. 5/31/22   Historical Provider, MD   pantoprazole (ProtoNix) 40 mg EC tablet Take 1 tablet (40 mg) by mouth once daily in the morning. Take before meals. 4/11/22   Historical Provider, MD   tamsulosin (Flomax) 0.4 mg 24 hr capsule Take 1 capsule (0.4 mg) by mouth once daily. 11/7/22   Historical Provider, MD BOWEN WISEMAN Physical Exam     Airway    Testing/Diagnostic:    Nuclear Stress Test; 5/15/23  CONCLUSIONS:    1. SPECT Perfusion Study: Abnormal.    2. There is no scintigraphic evidence for inducible ischemia.    3. There is a small (<10%) fixed perfusion defect in the RCA territory.    4. Left ventricle is normal in size. The left ventricle systolic   function is mildly decreased.    5. Right ventricle is normal in size. The right ventricle systolic   function is normal.    6. This is a low risk scan.             Gated Stress IR:3D    LVEF % 52       [facility] 9/2023:  1. Left main trunk was normal  2. Left anterior descending artery had 40% mid stenosis and mild diffuse disease throughout and a large first diagonal branch with an 85% mid stenosis that extended to the lateral apical wall.  3. Circumflex artery had mild diffuse disease  4. Dominant right coronary artery had a 60% distal stenosis and mild diffuse disease throughout      Echo 6/2022:  - Technically difficult exam due to body habitus, Very limited movement of left   arm due to recent left shoulder replacement and suboptimal positioning.   - Exam indication: Swelling of the forearm and leg post non-cardiac surgery   - The left ventricle is normal in size. There is moderate left ventricular   hypertrophy. Left ventricular systolic function is normal. EF = 55 ± 5% (3D) Grade    I left ventricular diastolic dysfunction.   - The right ventricle is normal in size. Right ventricular systolic function is   normal.   - Calcification on anterior leaflet of the mitral valve was visualized on prior   exam and further SHARRI was performed to rule out endocarditis.   Technical limitations due to recent surgery.       - Exam was compared with the prior  echocardiographic exam performed on 7-22-20.    There is no significant change.       Patient Specialist/PCP:   PCP: Stefan Aguilar MD  Neurosurgery: Chrissy Hastings MD [facility] 10/4/24 presenting today for a second opinion with lower back pain that is radiating to the lower extremity.   Neurosurgery: Paul Patel MD [facility] 12/5/24 presents to us with complaints primarily axial NECK pain  that started about  2  years  ago, and have been gradually worsening since that time.     Neurosurgery: Marcos Doran MD L[facility] 10/29/24presents to the spine clinic in follow up with his wife and he brought in his EMG results which shows that he does not have any radiculopathy but he does have some mild carpal tunnel both sides.     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     Endocrinology: Erica Loera MD LOV 5/6/24 @ [facility] presents for evaluation of symptoms which he was recommended to  confirm are not due to hormonal issues.   Visit Vitals  Smoking Status Never       DASI Risk Score    No data to display       Caprini DVT Assessment    No data to display       Modified Frailty Index    No data to display       CHADS2 Stroke Risk  Current as of 4 hours ago        N/A 3 to 100%: High Risk   2 to < 3%: Medium Risk   0 to < 2%: Low Risk     Last Change: N/A          This score determines the patient's risk of having a stroke if the patient has atrial fibrillation.        This score is not applicable to this patient. Components are not calculated.          Revised Cardiac Risk Index    No data to display       Apfel Simplified Score    No data to display       Risk Analysis Index Results This Encounter    No data found in the last 10 encounters.       Prodigy: High Risk  Total Score: 19              Prodigy Age Score      Prodigy Gender Score     Prodigy Previous Opioid Use Score           ARISCAT Score for Postoperative Pulmonary Complications    No data to display       Naik Perioperative Risk for Myocardial Infarction or Cardiac Arrest (CRISTI)    No data to display         Assessment and Plan:   Faxed risk stratification to cardiology office on 1/3/25.  Unable to reach the patient, LVM.  ONLY    Chiquita Duggan RN  Pre-Admission Testing   {[facility] EMBEDDED ASSESSMENT AND PLAN:[id number]}

## 2025-01-09 ENCOUNTER — TELEPHONE (OUTPATIENT)
Dept: NEUROSURGERY | Facility: HOSPITAL | Age: 65
End: 2025-01-09
Payer: MEDICARE

## 2025-01-09 NOTE — TELEPHONE ENCOUNTER
Called pt and let him know his insurance is denying the surgery and that he has sent a denial appeal letter to them.

## 2025-01-10 ENCOUNTER — APPOINTMENT (OUTPATIENT)
Dept: PREADMISSION TESTING | Facility: HOSPITAL | Age: 65
End: 2025-01-10
Payer: MEDICARE

## 2025-01-10 ENCOUNTER — APPOINTMENT (OUTPATIENT)
Dept: NEUROSURGERY | Facility: CLINIC | Age: 65
End: 2025-01-10
Payer: MEDICARE

## 2025-01-16 ENCOUNTER — PRE-ADMISSION TESTING (OUTPATIENT)
Dept: PREADMISSION TESTING | Facility: HOSPITAL | Age: 65
End: 2025-01-16
Payer: MEDICARE

## 2025-01-16 VITALS
DIASTOLIC BLOOD PRESSURE: 76 MMHG | HEART RATE: 83 BPM | WEIGHT: 252.4 LBS | HEIGHT: 73 IN | BODY MASS INDEX: 33.45 KG/M2 | TEMPERATURE: 98.1 F | SYSTOLIC BLOOD PRESSURE: 116 MMHG | OXYGEN SATURATION: 95 %

## 2025-01-16 DIAGNOSIS — R60.0 LOCALIZED EDEMA: ICD-10-CM

## 2025-01-16 DIAGNOSIS — I10 HYPERTENSION, UNSPECIFIED TYPE: ICD-10-CM

## 2025-01-16 DIAGNOSIS — M40.202 KYPHOSIS OF CERVICAL REGION, UNSPECIFIED KYPHOSIS TYPE: ICD-10-CM

## 2025-01-16 DIAGNOSIS — G95.9 CERVICAL MYELOPATHY: ICD-10-CM

## 2025-01-16 DIAGNOSIS — Z01.810 PREOP CARDIOVASCULAR EXAM: ICD-10-CM

## 2025-01-16 DIAGNOSIS — Z01.818 PREOPERATIVE EXAMINATION: ICD-10-CM

## 2025-01-16 DIAGNOSIS — R22.42 LOCALIZED SWELLING OF LEFT LOWER LEG: Primary | ICD-10-CM

## 2025-01-16 LAB
ABO GROUP (TYPE) IN BLOOD: NORMAL
ANION GAP SERPL CALC-SCNC: 14 MMOL/L (ref 10–20)
ANTIBODY SCREEN: NORMAL
BUN SERPL-MCNC: 17 MG/DL (ref 6–23)
CALCIUM SERPL-MCNC: 9.8 MG/DL (ref 8.6–10.6)
CHLORIDE SERPL-SCNC: 105 MMOL/L (ref 98–107)
CO2 SERPL-SCNC: 27 MMOL/L (ref 21–32)
CREAT SERPL-MCNC: 1.07 MG/DL (ref 0.5–1.3)
EGFRCR SERPLBLD CKD-EPI 2021: 77 ML/MIN/1.73M*2
ERYTHROCYTE [DISTWIDTH] IN BLOOD BY AUTOMATED COUNT: 19.2 % (ref 11.5–14.5)
GLUCOSE SERPL-MCNC: 78 MG/DL (ref 74–99)
HCT VFR BLD AUTO: 55.6 % (ref 41–52)
HGB BLD-MCNC: 16.8 G/DL (ref 13.5–17.5)
MCH RBC QN AUTO: 23.8 PG (ref 26–34)
MCHC RBC AUTO-ENTMCNC: 30.2 G/DL (ref 32–36)
MCV RBC AUTO: 79 FL (ref 80–100)
NRBC BLD-RTO: 0 /100 WBCS (ref 0–0)
PLATELET # BLD AUTO: 189 X10*3/UL (ref 150–450)
POTASSIUM SERPL-SCNC: 4.7 MMOL/L (ref 3.5–5.3)
RBC # BLD AUTO: 7.05 X10*6/UL (ref 4.5–5.9)
RH FACTOR (ANTIGEN D): NORMAL
SODIUM SERPL-SCNC: 141 MMOL/L (ref 136–145)
WBC # BLD AUTO: 8.3 X10*3/UL (ref 4.4–11.3)

## 2025-01-16 PROCEDURE — 86850 RBC ANTIBODY SCREEN: CPT

## 2025-01-16 PROCEDURE — 80048 BASIC METABOLIC PNL TOTAL CA: CPT

## 2025-01-16 PROCEDURE — 93005 ELECTROCARDIOGRAM TRACING: CPT

## 2025-01-16 PROCEDURE — 87081 CULTURE SCREEN ONLY: CPT

## 2025-01-16 PROCEDURE — 85027 COMPLETE CBC AUTOMATED: CPT

## 2025-01-16 PROCEDURE — 99204 OFFICE O/P NEW MOD 45 MIN: CPT

## 2025-01-16 PROCEDURE — 93010 ELECTROCARDIOGRAM REPORT: CPT | Performed by: INTERNAL MEDICINE

## 2025-01-16 PROCEDURE — 36415 COLL VENOUS BLD VENIPUNCTURE: CPT

## 2025-01-16 RX ORDER — CHLORHEXIDINE GLUCONATE ORAL RINSE 1.2 MG/ML
15 SOLUTION DENTAL AS NEEDED
Qty: 120 ML | Refills: 0 | Status: ON HOLD | OUTPATIENT
Start: 2025-01-16 | End: 2025-01-18

## 2025-01-16 RX ORDER — CHLORHEXIDINE GLUCONATE 40 MG/ML
SOLUTION TOPICAL DAILY PRN
Qty: 473 ML | Refills: 0 | Status: ON HOLD | OUTPATIENT
Start: 2025-01-16

## 2025-01-16 ASSESSMENT — ENCOUNTER SYMPTOMS
DOUBLE VISION: 0
LIGHT-HEADEDNESS: 0
EYE DISCHARGE: 0
NECK PAIN: 0
WEAKNESS: 0
NECK MASS: 0
WHEEZING: 0
ARTHRALGIAS: 1
BLOOD IN STOOL: 0
SKIN CHANGES: 0
TREMORS: 0
FEVER: 0
TROUBLE SWALLOWING: 0
NAUSEA: 0
EXCESSIVE BLEEDING: 0
DIARRHEA: 0
SHORTNESS OF BREATH: 0
ABDOMINAL DISTENTION: 0
CONFUSION: 0
HEMOPTYSIS: 0
NECK SWELLING: 0
VOICE CHANGE: 0
NERVOUS/ANXIOUS: 0
POLYDIPSIA: 0
CONSTIPATION: 0
UNEXPECTED WEIGHT CHANGE: 0
PALPITATIONS: 0
WOUND: 0
JOINT SWELLING: 1
VOMITING: 0
NUMBNESS: 0
COUGH: 0
VISUAL CHANGE: 0
DIFFICULTY URINATING: 0
PND: 0
CHILLS: 0
ABDOMINAL PAIN: 0
DYSPNEA WITH EXERTION: 0
SINUS CONGESTION: 0
LIMITED RANGE OF MOTION: 0
NECK STIFFNESS: 0
DYSURIA: 0
DYSPNEA AT REST: 0
MYALGIAS: 1
BRUISES/BLEEDS EASILY: 0
RHINORRHEA: 0
VERTIGO: 0

## 2025-01-16 ASSESSMENT — DUKE ACTIVITY SCORE INDEX (DASI)
CAN YOU TAKE CARE OF YOURSELF (EAT, DRESS, BATHE, OR USE TOILET): YES
CAN YOU CLIMB A FLIGHT OF STAIRS OR WALK UP A HILL: NO
CAN YOU HAVE SEXUAL RELATIONS: NO
CAN YOU RUN A SHORT DISTANCE: NO
CAN YOU WALK A BLOCK OR TWO ON LEVEL GROUND: NO
CAN YOU DO MODERATE WORK AROUND THE HOUSE LIKE VACUUMING, SWEEPING FLOORS OR CARRYING GROCERIES: NO
CAN YOU PARTICIPATE IN MODERATE RECREATIONAL ACTIVITIES LIKE GOLF, BOWLING, DANCING, DOUBLES TENNIS OR THROWING A BASEBALL OR FOOTBALL: NO
DASI METS SCORE: 3.6
CAN YOU WALK INDOORS, SUCH AS AROUND YOUR HOUSE: YES
CAN YOU PARTICIPATE IN STRENOUS SPORTS LIKE SWIMMING, SINGLES TENNIS, FOOTBALL, BASKETBALL, OR SKIING: NO
TOTAL_SCORE: 7.2
CAN YOU DO YARD WORK LIKE RAKING LEAVES, WEEDING OR PUSHING A MOWER: NO
CAN YOU DO HEAVY WORK AROUND THE HOUSE LIKE SCRUBBING FLOORS OR LIFTING AND MOVING HEAVY FURNITURE: NO
CAN YOU DO LIGHT WORK AROUND THE HOUSE LIKE DUSTING OR WASHING DISHES: YES

## 2025-01-16 ASSESSMENT — LIFESTYLE VARIABLES: SMOKING_STATUS: NONSMOKER

## 2025-01-16 NOTE — H&P (VIEW-ONLY)
CPM/PAT Evaluation       Name: Jerman Colón (Jerman Colón)  /Age: 1960/64 y.o.     Visit Type:   In-Person       Chief Complaint: Perioperative Evaluation    HPI HPI: 65 y/o male scheduled for C4-5 C5-6 and C6-7 anterior cervical discectomy and fusion using stand-alone cages and allograft followed by posterior cervical C4-5 laminectomy and facetectomy for decompression along with C3-4 C6-7 C7-T1 T1-T2 posterior column osteotomy and a C2-T4 instrumented fusion using local bone autograft and C5 which is an osteoporotic material for spine surgery. - Bilateral  on 2025  with Dr. Paul Patel  secondary to Other secondary kyphosis, cervical region, Cervical myelopathy, Spinal cord compression due to degenerative disorder of spinal column.   Presents to CPM today for perioperative risk stratification and optimization. PMHX includes PONV, Vocal Cord Mass, HTN, HLD, CAD, COPD, Asthma BIANCA (no CPAP), GERD, BPH, hypogonadism, tubular adenoma of colon, proteinuria  Polcythemia, Fusion of lumbar spine, kyphosis of cervical region, cervical myelopathy, Spinal cord compression due to degenerative disorder of spinal column.    PCP: Stefan Aguilar MD  Specialists:    Nephrology - Hailey Bertrand MD   Cardiology - Jaime Fried MD   ENT - Shade Smith MD   Endocrinology - Erica Loera MD           Past Medical History:   Diagnosis Date    Anemia     Asthma     BPH (benign prostatic hyperplasia)     Cervical myelopathy     COPD (chronic obstructive pulmonary disease) (Multi)     Coronary artery disease     GERD (gastroesophageal reflux disease)     controlled    Hyperlipidemia     Hypertension     Polycythemia     PONV (postoperative nausea and vomiting)     Proteinuria     Sepsis (Multi)     Sleep apnea     Vocal cord mass        Past Surgical History:   Procedure Laterality Date    CARDIAC CATHETERIZATION Left 2023    COLONOSCOPY      DENTAL SURGERY      HERNIA REPAIR      LUMBAR FUSION   2018    L5-S1 Fusion    SKIN BIOPSY      TONSILLECTOMY      TOTAL SHOULDER ARTHROPLASTY Left        Patient Sexual activity questions deferred to the physician.    Family History   Problem Relation Name Age of Onset    Other (htn) Mother      Diabetes Mother      Other (htn) Father      Heart attack Father      Lung cancer Mother's Sister         No Known Allergies    Prior to Admission medications    Medication Sig Start Date End Date Taking? Authorizing Provider   amoxicillin (Amoxil) 500 mg tablet Take 1 tablet (500 mg) by mouth every 8 hours.  Patient not taking: Reported on 11/11/2024 10/22/24   Historical Provider, MD   amoxicillin-pot clavulanate (Augmentin) 875-125 mg tablet Take 1 tablet by mouth twice daily x10 days. Take with food 11/11/24   NIKI Sanabria   aspirin 81 mg EC tablet Take 1 tablet (81 mg) by mouth once daily. 7/31/12   Historical Provider, MD   atorvastatin (Lipitor) 40 mg tablet Take 1 tablet (40 mg) by mouth once daily. 12/1/23   Historical Provider, MD   gabapentin (Neurontin) 400 mg capsule Take 1 capsule (400 mg) by mouth 3 times a day. 6/10/24   Historical Provider, MD   losartan (Cozaar) 25 mg tablet Take 1 tablet (25 mg) by mouth once daily.  Patient not taking: Reported on 11/11/2024 3/10/23   Historical Provider, MD   mupirocin (Bactroban) 2 % ointment After gently cleansing and drying area, apply thin film topically to affected areas 2 times daily x7 days. 11/11/24   NIKI Sanabria   oxyCODONE-acetaminophen (Percocet) 5-325 mg tablet Take 1 tablet by mouth 3 times a day. 5/31/22   Historical Provider, MD   pantoprazole (ProtoNix) 40 mg EC tablet Take 1 tablet (40 mg) by mouth once daily in the morning. Take before meals. 4/11/22   Historical Provider, MD   tamsulosin (Flomax) 0.4 mg 24 hr capsule Take 1 capsule (0.4 mg) by mouth once daily. 11/7/22   Historical Provider, MD WISEMAN ROS:   Constitutional:    no fever   no chills   no unexpected weight  change  Neuro/Psych:    no numbness   no weakness   no light-headedness   no tremors   no confusion   not nervous/anxious   no self-injury   no suicidal ideation  Eyes:    no discharge   no vision loss   no diplopia   no visual disturbance   use of corrective lenses (readers)  Ears:    no ear pain   no hearing loss   no vertigo   no tinnitus   no hearing aides  Nose:    no nasal discharge   no sinus congestion   no epistaxis  Mouth:    no dental issues   no mouth pain   no oral bleeding   no mouth lesions  Throat:    no throat pain   no dysphagia   no voice change  Neck:    no neck pain   neck swelling   no neck stiffness   no neck masses  Cardio:    no chest pain   no palpitations   no peripheral edema   no dyspnea   no FONG   no paroxysmal nocturnal dyspnea  Respiratory:    no cough   no wheezing   no hemoptysis   no shortness of breath  Endocrine:    no cold intolerance   no heat intolerance   no polydipsia  GI:    no abdominal distention   no abdominal pain   no constipation   no diarrhea   no nausea   no vomiting   no blood in stool  :    no difficulty urinating   no dysuria   no oliguria   no polyuria  Musculoskeletal:    arthralgias (spine pain, unchanged from baseline)   myalgias (upper back spine pain, shoulder pain on right side)   no decreased ROM   swelling (left lower leg/ankle swelling, patient reports it hasbeenlike that andfluctuates.)  Hematologic:    does not bruise/bleed easily   no excessive bleeding   no history of blood transfusion   no blood clots  Skin:   no skin changes   no sores/wound   no rash      Physical Exam  Vitals reviewed.   Constitutional:       General: He is not in acute distress.     Appearance: Normal appearance. He is normal weight. He is not ill-appearing, toxic-appearing or diaphoretic.   HENT:      Head: Normocephalic.      Nose: Nose normal. No congestion or rhinorrhea.      Mouth/Throat:      Mouth: Mucous membranes are moist.      Pharynx: Oropharynx is clear. No  "oropharyngeal exudate or posterior oropharyngeal erythema.   Eyes:      General: No scleral icterus.        Right eye: No discharge.         Left eye: No discharge.      Conjunctiva/sclera: Conjunctivae normal.   Neck:      Vascular: No carotid bruit.   Cardiovascular:      Rate and Rhythm: Normal rate and regular rhythm.      Pulses: Normal pulses.      Heart sounds: Normal heart sounds. No murmur heard.     No friction rub. No gallop.   Pulmonary:      Effort: Pulmonary effort is normal. No respiratory distress.      Breath sounds: Normal breath sounds. No stridor. No wheezing, rhonchi or rales.   Chest:      Chest wall: No tenderness.   Musculoskeletal:         General: Swelling (left lower leg swelling.) present. No tenderness.      Cervical back: Normal range of motion. No rigidity or tenderness.      Comments: Left lower leg, mild induration and erythema (patient repots unchanged from past 10+years). Nontender, no warmth   Neurological:      General: No focal deficit present.      Mental Status: He is alert.   Psychiatric:         Mood and Affect: Mood normal.         Behavior: Behavior normal.         Thought Content: Thought content normal.         Judgment: Judgment normal.          PAT AIRWAY:   Airway:     Mallampati::  III    TM distance::  >3 FB    Neck ROM::  Limited   Bottom right bridge        Visit Vitals  /76   Pulse 83   Temp 36.7 °C (98.1 °F)   Ht 1.854 m (6' 1\")   Wt 114 kg (252 lb 6.4 oz)   SpO2 95%   BMI 33.30 kg/m²   Smoking Status Never   BSA 2.42 m²       DASI Risk Score      Flowsheet Row Pre-Admission Testing [facility]16/[facility]   Can you take care of yourself (eat, dress, bathe, or use toilet)?  2.75 filed at 01/16/2025 0954   Can you walk indoors, such as around your house? 1.75 filed at 01/16/2025 0954   Can you walk a block or two on level ground?  0 filed at 01/16/2025 0954   Can you climb a flight of stairs or walk up a hill? 0 filed at 01/16/2025 " 0954   Can you run a short distance? 0 filed at 01/16/2025 0954   Can you do light work around the house like dusting or washing dishes? 2.7 filed at 01/16/2025 0954   Can you do moderate work around the house like vacuuming, sweeping floors or carrying groceries? 0 filed at 01/16/2025 0954   Can you do heavy work around the house like scrubbing floors or lifting and moving heavy furniture?  0 filed at 01/16/2025 0954   Can you do yard work like raking leaves, weeding or pushing a mower? 0 filed at 01/16/2025 0954   Can you have sexual relations? 0 filed at 01/16/2025 0954   Can you participate in moderate recreational activities like golf, bowling, dancing, doubles tennis or throwing a baseball or football? 0 filed at 01/16/2025 0954   Can you participate in strenous sports like swimming, singles tennis, football, basketball, or skiing? 0 filed at 01/16/2025 0954   DASI SCORE 7.2 filed at 01/16/2025 0954   METS Score (Will be calculated only when all the questions are answered) 3.6 filed at 01/16/2025 0954          Caprini DVT Assessment      Flowsheet Row Pre-Admission Testing from 1/16/2025 in [facility]   DVT Score (IF A SCORE IS NOT CALCULATING, MUST SELECT A BMI TO COMPLETE) 9 filed at 01/16/2025 1355   Medical Factors Swollen legs filed at 01/16/2025 1355   Surgical Factors Major surgery planned, lasting 2-3 hours filed at 01/16/2025 1355   BMI (BMI MUST BE CHOSEN) 31-40 (Obesity) filed at 01/16/2025 1355          Modified Frailty Index    No data to display       CHADS2 Stroke Risk  Current as of 10 minutes ago        N/A 3 to 100%: High Risk   2 to < 3%: Medium Risk   0 to < 2%: Low Risk     Last Change: N/A          This score determines the patient's risk of having a stroke if the patient has atrial fibrillation.        This score is not applicable to this patient. Components are not calculated.          Revised Cardiac Risk Index      Flowsheet Row Pre-Admission Testing from 1/16/2025 in  [facility]   High-Risk Surgery (Intraperitoneal, Intrathoracic,Suprainguinal vascular) 0 filed at 01/16/2025 1319   History of ischemic heart disease (History of MI, History of positive exercuse test, Current chest paint considered due to myocardial ischemia, Use of nitrate therapy, ECG with pathological Q Waves) 0 filed at 01/16/2025 1319   History of congestive heart failure (pulmonary edemia, bilateral rales or S3 gallop, Paroxysmal nocturnal dyspnea, CXR showing pulmonary vascular redistribution) 0 filed at 01/16/2025 1319   History of cerebrovascular disease (Prior TIA or stroke) 0 filed at 01/16/2025 1319   Pre-operative insulin treatment 0 filed at 01/16/2025 1319   Pre-operative creatinine>2 mg/dl 0 filed at 01/16/2025 1319   Revised Cardiac Risk Calculator 0 filed at 01/16/2025 1319          Apfel Simplified Score      Flowsheet Row Pre-Admission Testing from 1/16/2025 in [facility]   Smoking status 1 filed at 01/16/2025 1356   History of motion sickness or PONV  0 filed at 01/16/2025 1356   Use of postoperative opioids 1 filed at 01/16/2025 1356   Gender - Female 0=No filed at 01/16/2025 1356   Apfel Simplified Score Calculator 2 filed at 01/16/2025 1356          Risk Analysis Index Results This Encounter    No data found in the last 10 encounters.       Stop Bang Score      Flowsheet Row Pre-Admission Testing from 1/16/2025 in [facility]   Do you snore loudly? 1 filed at 01/16/2025 0954   Do you often feel tired or fatigued after your sleep? 1 filed at 01/16/2025 0954   Has anyone ever observed you stop breathing in your sleep? 1 filed at 01/16/2025 0954   Do you have or are you being treated for high blood pressure? 1 filed at 01/16/2025 0954   Recent BMI (Calculated) 33.7 filed at 01/16/2025 0954   Is BMI greater than 35 kg/m2? 0=No filed at 01/16/2025 0954   Age older than 50 years old? 1=Yes filed at 01/16/2025 0954   Is your neck circumference  greater than 17 inches (Male) or 16 inches (Female)? 1 filed at 01/16/2025 0954   Gender - Male 1=Yes filed at 01/16/2025 0954   STOP-BANG Total Score 7 filed at 01/16/2025 0954          Prodigy: High Risk  Total Score: 19              Prodigy Age Score      Prodigy Gender Score     Prodigy Previous Opioid Use Score           ARISCAT Score for Postoperative Pulmonary Complications      Flowsheet Row Pre-Admission Testing from 1/16/2025 in [facility]   Age Calculated Score 3 filed at 01/16/2025 1357   Preoperative SpO2 8 filed at 01/16/2025 1357   Respiratory infection in the last month Either upper or lower (i.e., URI, bronchitis, pneumonia), with fever and antibiotic treatment 0 filed at 01/16/2025 1357   Preoperative anemia (Hgb less than 10 g/dl) 0 filed at 01/16/2025 1357   Surgical incision  0 filed at 01/16/2025 1357   Duration of surgery  16 filed at 01/16/2025 1357   Emergency Procedure  0 filed at 01/16/2025 1357   ARISCAT Total Score  27 filed at 01/16/2025 1357          Heena Perioperative Risk for Myocardial Infarction or Cardiac Arrest (CRISTI)    No data to display       Assessment and Plan:   Anesthesia/Airway:  PONV - moderate PONV      Neuro:    No neurologic diagnoses, however, the patient is at an increased risk for post operative delirium secondary to age >/= 65 and type and duration of surgery.  Preoperative brain exercise educational handout provided to patient.    The patient is at an increased risk for perioperative stroke secondary to increased age, HTN, HLD, neuro surgery, general anesthesia, and op time >2.5 hours.       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 Patient follows with ENT who has been working up his symptoms, patient states his EAT-10 score is mostly unchanged to his baseline and ENT is aware. No further preoperative testing/intervention indicated at this time.      Cardiovascular:    # HTN, HLD CAD- medicated with atorvastatin  "(continue), losartan (last dose on 1/19), aspirin (patient states he stopped 1/13, 7 days prior to surgery). Patient follows with cardiology for management and was last seen on 4/11/24.  Patient has history of NSTEMI type II, with left heart cath showing 85% stenosis in diagonal branch which cardiology planned no intervention for unless patient became symptomatic. Patient today reports negative cardiovascular symptoms. . EKG updated in [facility]. Currently pending cardiovascular risk stratifications. Jaime Tariq office contacted today and was told office receive risk stratification form on 1/6/25 and will fill out form and return today. Patient reports he stopped Aspirin himself on 1/13, due to previously knowing he stops Aspirin 7 days prior to spine surgeries.   Addendum 1/20/25: Risk stratification has not been sent to [facility], however chart review notes from patient cardiology office -   Noted in Care Everywhere :   On date 12/27/24 Surgical Clearance  \"Jaime Fried MD  to [facility] Clinical Pool  1/17/25 4:02 PM  If the surgeon feel the bleeding risk is high. Yes, ok to hold ASA 5-7 days before surgery. If you do not have new symptoms such as exertional chest pain, you can proceed with the surgery.  Electronically signed by Vikki Beard LPN at 01/17/2025 4:38 PM EST \"  \"Spoke with Jerman Colón Jr. on January 17, 2025. Informed of instructions as stated above. Patient voiced understanding at this time. Patient voiced he was not experiencing any exertional chest pain or new symptoms at this time.  Vikki Beard LPN  Electronically signed by Vikki Beard LPN at 01/17/2025 4:39 PM EST \"        METS are 3.6    RCRI  0 which is 3.9% 30 day risk of MACE (risk for cardiac death, nonfatal myocardial infarction, and nonfactal cardiac arrest    CRISTI score which indicates a   0.3 % risk of intraoperative or 30-day postoperative MACE    EKG 1/16/25 - today in [facility]  Pending Cardiology Review  Similar " "to ECG in [facility] labeled \"CARDIAC\" on 11/7/23    Holter Monitor 3/18/2024 through 3/23/2024  Preliminary findings   primary rhythm was sinus rhythm.  Average heart rate of 76 bpm.  Minimum heart rate was 47 bpm on day 4 max heart rate was 119 bpm day 3  SVE burden was 0.06% day of 6 total SVE  SVE arrhythmia 1 event longest event 7 beats on day 4, fastest event 107 bpm day 4  PVC burden was 0.02%, 82 total PVC, 3 disparate morphologies  Patient reported 1 event during monitoring.    [facility] 9/2023: (per Cardiology note on 4/11/24, could not find diagnostic test in Care Everywhere)  1. Left main trunk was normal  2. Left anterior descending artery had 40% mid stenosis and mild diffuse disease throughout and a large first diagonal branch with an 85% mid stenosis that extended to the lateral apical wall.  3. Circumflex artery had mild diffuse disease  4. Dominant right coronary artery had a 60% distal stenosis and mild diffuse disease throughout  **Noted in Cards note \"No indication for intervention to the diagonal disease unless patient having symptoms. \" -Dr Jaime Fried MD**    Nuclear Stress Test 5/15/23 (found [facility])  CONCLUSIONS:    1. SPECT Perfusion Study: Abnormal.    2. There is no scintigraphic evidence for inducible ischemia.    3. There is a small (<10%) fixed perfusion defect in the RCA territory.    4. Left ventricle is normal in size. The left ventricle systolic   function is mildly decreased.    5. Right ventricle is normal in size. The right ventricle systolic   function is normal.    6. This is a low risk scan.     NM CTA Interpretation  5/15/23  1. Incidental Findings from limited non-diagnostic CTAC:       - No distinct coronary calcifications.     Electronically signed by Kimberly Naik MD on 6/4/2023 at 8:37:19 PM     Echo 6/2022: (per Cardiology note on 4/11/24, could not find diagnostic test in Care Everywhere)  - Technically difficult exam due to body habitus, Very limited movement of " left arm due to recent left shoulder replacement and suboptimal positioning.   - Exam indication: Swelling of the forearm and leg post non-cardiac surgery   - The left ventricle is normal in size. There is moderate left ventricular   hypertrophy. Left ventricular systolic function is normal. EF = 55 ± 5% (3D) Grade  I left ventricular diastolic dysfunction.   - The right ventricle is normal in size. Right ventricular systolic function is   normal.   - Calcification on anterior leaflet of the mitral valve was visualized on prior   exam and further SHARRI was performed to rule out endocarditis.   Technical limitations due to recent surgery.  - Exam was compared with the prior  echocardiographic exam performed on 20.  There is no significant change.     Pulmonary:    #COPD, Asthma,  BIANCA (noCPAP)- Patient denies either COPD or Asthma, states he does not use any respirtory medications or sees Pulmonology. States that PFT and reports they were negative for lung disease. Patient also states that he is noncompliant with CPAP therapy. No use of supplemental oxygen. Patient physical exam is benign. Preoperative deep breathing educational handout provided to patient.    Patient is at increased risk of perioperative complications secondary to age > 60, COPD, obesity, duration of surgery > 2 hours, types of anesthetic    ARISCAT:    27  points which is a intermediate (13.3%) risk of in-hospital post-op pulmonary complications     PRODIGY:  21  points which is a high risk of post op opioid induced respiratory depression episodes    STOP BAN   points which is a high risk for moderate to severe BIANCA    Pumonary toilet education discussed, patient also provided deep breathing exercises and incentive spirometry educational handout      Renal:   #proteinuria - patient follows with Nephorlogy who currently has him refrain from NSAID use and on a low salt diet. No further perioperative management.   Patient is at increase risk  for perioperative renal complications secondary to Age equal to or greater than 56, BMI equal to or greater than 30, HTN, COPD, use of an ace, arb, or NSAID      Endocrine:   No endocrine diagnosis or significant findings on chart review or clinical presentation and evaluation. No further testing or intervention is indicated at this time.      Hematologic:      #Lower leg swelling -presented to [facility] with left lower leg swelling.  Patient states that has been present for least 10+ years, and fluctuates in severity.  On presentation left lower leg/ankle region is notably more swollen than the right lower extremity, with mild erythema.  Skin is not warm however.  Mild induration noted.  Patient denies tenderness.  Patient states that he has not noticed any change with claudication, pain.  Patient does state had a recent scratch from his puppy which he had sutures removed for and antibiotic therapy he had previously completed.  Noted wound above area of swelling, this area is now fully healed and scar present, no sign of infection.  Patient reports no previous trauma to the area/past surgeries. Venous duplex ordered and appointment for today at 1 PM scheduled for patient which he denied due to need for patient son to go to work who drove him to appointment. After discussing with MA with transportation concerns, soonest appointment arranged is on 1/20 the day before surgery. Surgeon notified. Patient educated on warning signs of DVT and PE and to arrive to Emergency Department so such symptoms. Patient understood risks associated with waiting if DVT present.   Addendum 1/20/25: patient pending final results, preliminary noted no DVT.      #Polycythemia - patient reports he once followed with heme/oncology about 3 years ago however now PCP monitors his blood levels as needed. He reports no blood transfusion history. No further perioperative interventions.     Preoperative DVT educational handout provided to  patient.  Caprini Score:  9  points which is a highest risk of perioperative VTE    Lower extremity venous duplex 1/20/25  PRELIMINARY CONCLUSIONS: (pending final)  Right Lower Venous: The right common femoral vein demonstrates normal spontaneous and respirophasic flow.  Left Lower Venous: No evidence of acute deep vein thrombus visualized in the left lower extremity.    Gastrointestinal:   #GERD, BPH, hypogonadism, tubular adenoma of colon - Patient medicated with flomax (continue) and follows with Nephrology and PCP for management. His PCP sends him for colonoscopy as needed for surveillance of adenoma of colon. Patient denies GI/ symptoms. No further perioperative interventions.        EAT-10 score of    11  : self-perceived oropharyngeal dysphagia scale (0-40)   *See HEENT Vocal Cord Mass)    Apfel: 2 points 39% risk for post operative N/V      Infectious disease:     No infectious diagnosis or significant findings on chart review or clinical presentation and evaluation.   Prescription provided for CHG body wash and dental rinse. CHG use instructions reviewed and provided to patient.  Staph screen collected      Musculoskeletal:    #Fusion of lumbar spine, kyphosis of cervical region, cervical myelopathy, Spinal cord compression due to degenerative disorder of spinal column- seeking surgical intervention.  Medicated with gabapentin (continue) percocet (Continue), and follows with neurosurgery and ortho.      Labs ordered  Recent Results (from the past 4 weeks)   ECG 12 lead    Collection Time: 01/16/25  9:04 AM   Result Value Ref Range    Ventricular Rate 85 BPM    Atrial Rate 85 BPM    ID Interval 152 ms    QRS Duration 88 ms    QT Interval 342 ms    QTC Calculation(Bazett) 406 ms    P Axis 35 degrees    R Axis -9 degrees    T Axis -7 degrees    QRS Count 14 beats    Q Onset 225 ms    P Onset 149 ms    P Offset 206 ms    T Offset 396 ms    QTC Fredericia 384 ms   Staphylococcus aureus/MRSA colonization,  Culture    Collection Time: 01/16/25 10:57 AM    Specimen: Nares/Axilla/Groin; Swab   Result Value Ref Range    Staph/MRSA Screen Culture No Staphylococcus aureus isolated    CBC    Collection Time: 01/16/25 10:57 AM   Result Value Ref Range    WBC 8.3 4.4 - 11.3 x10*3/uL    nRBC 0.0 0.0 - 0.0 /100 WBCs    RBC 7.05 (H) 4.50 - 5.90 x10*6/uL    Hemoglobin 16.8 13.5 - 17.5 g/dL    Hematocrit 55.6 (H) 41.0 - 52.0 %    MCV 79 (L) 80 - 100 fL    MCH 23.8 (L) 26.0 - 34.0 pg    MCHC 30.2 (L) 32.0 - 36.0 g/dL    RDW 19.2 (H) 11.5 - 14.5 %    Platelets 189 150 - 450 x10*3/uL   Basic metabolic panel    Collection Time: 01/16/25 10:57 AM   Result Value Ref Range    Glucose 78 74 - 99 mg/dL    Sodium 141 136 - 145 mmol/L    Potassium 4.7 3.5 - 5.3 mmol/L    Chloride 105 98 - 107 mmol/L    Bicarbonate 27 21 - 32 mmol/L    Anion Gap 14 10 - 20 mmol/L    Urea Nitrogen 17 6 - 23 mg/dL    Creatinine 1.07 0.50 - 1.30 mg/dL    eGFR 77 >60 mL/min/1.73m*2    Calcium 9.8 8.6 - 10.6 mg/dL   Type And Screen    Collection Time: 01/16/25 10:57 AM   Result Value Ref Range    ABO TYPE A     Rh TYPE POS     ANTIBODY SCREEN NEG         Medication instructions and NPO guidelines reviewed with the patient.  All questions or concerns discussed and addressed.      Yuli Bradshaw PA-C

## 2025-01-16 NOTE — CPM/PAT H&P
CPM/PAT Evaluation       Name: Jerman Colón (Jerman Colón)  /Age: 1960/64 y.o.     Visit Type:   In-Person       Chief Complaint: Perioperative Evaluation    HPI HPI: 63 y/o male scheduled for C4-5 C5-6 and C6-7 anterior cervical discectomy and fusion using stand-alone cages and allograft followed by posterior cervical C4-5 laminectomy and facetectomy for decompression along with C3-4 C6-7 C7-T1 T1-T2 posterior column osteotomy and a C2-T4 instrumented fusion using local bone autograft and C5 which is an osteoporotic material for spine surgery. - Bilateral  on 2025  with Dr. Paul Patel  secondary to Other secondary kyphosis, cervical region, Cervical myelopathy, Spinal cord compression due to degenerative disorder of spinal column.   Presents to CPM today for perioperative risk stratification and optimization. PMHX includes PONV, Vocal Cord Mass, HTN, HLD, CAD, COPD, Asthma BIANCA (no CPAP), GERD, BPH, hypogonadism, tubular adenoma of colon, proteinuria  Polcythemia, Fusion of lumbar spine, kyphosis of cervical region, cervical myelopathy, Spinal cord compression due to degenerative disorder of spinal column.    PCP: Stefan Aguilar MD  Specialists:    Nephrology - Hailey Bertrand MD   Cardiology - Jaime Fried MD   ENT - Shade Smith MD   Endocrinology - Erica Loera MD           Past Medical History:   Diagnosis Date    Anemia     Asthma     BPH (benign prostatic hyperplasia)     Cervical myelopathy     COPD (chronic obstructive pulmonary disease) (Multi)     Coronary artery disease     GERD (gastroesophageal reflux disease)     controlled    Hyperlipidemia     Hypertension     Polycythemia     PONV (postoperative nausea and vomiting)     Proteinuria     Sepsis (Multi)     Sleep apnea     Vocal cord mass        Past Surgical History:   Procedure Laterality Date    CARDIAC CATHETERIZATION Left 2023    COLONOSCOPY      DENTAL SURGERY      HERNIA REPAIR      LUMBAR FUSION   2018    L5-S1 Fusion    SKIN BIOPSY      TONSILLECTOMY      TOTAL SHOULDER ARTHROPLASTY Left        Patient Sexual activity questions deferred to the physician.    Family History   Problem Relation Name Age of Onset    Other (htn) Mother      Diabetes Mother      Other (htn) Father      Heart attack Father      Lung cancer Mother's Sister         No Known Allergies    Prior to Admission medications    Medication Sig Start Date End Date Taking? Authorizing Provider   amoxicillin (Amoxil) 500 mg tablet Take 1 tablet (500 mg) by mouth every 8 hours.  Patient not taking: Reported on 11/11/2024 10/22/24   Historical Provider, MD   amoxicillin-pot clavulanate (Augmentin) 875-125 mg tablet Take 1 tablet by mouth twice daily x10 days. Take with food 11/11/24   NIKI Sanabria   aspirin 81 mg EC tablet Take 1 tablet (81 mg) by mouth once daily. 7/31/12   Historical Provider, MD   atorvastatin (Lipitor) 40 mg tablet Take 1 tablet (40 mg) by mouth once daily. 12/1/23   Historical Provider, MD   gabapentin (Neurontin) 400 mg capsule Take 1 capsule (400 mg) by mouth 3 times a day. 6/10/24   Historical Provider, MD   losartan (Cozaar) 25 mg tablet Take 1 tablet (25 mg) by mouth once daily.  Patient not taking: Reported on 11/11/2024 3/10/23   Historical Provider, MD   mupirocin (Bactroban) 2 % ointment After gently cleansing and drying area, apply thin film topically to affected areas 2 times daily x7 days. 11/11/24   NIKI Sanabria   oxyCODONE-acetaminophen (Percocet) 5-325 mg tablet Take 1 tablet by mouth 3 times a day. 5/31/22   Historical Provider, MD   pantoprazole (ProtoNix) 40 mg EC tablet Take 1 tablet (40 mg) by mouth once daily in the morning. Take before meals. 4/11/22   Historical Provider, MD   tamsulosin (Flomax) 0.4 mg 24 hr capsule Take 1 capsule (0.4 mg) by mouth once daily. 11/7/22   Historical Provider, MD WISEMAN ROS:   Constitutional:    no fever   no chills   no unexpected weight  change  Neuro/Psych:    no numbness   no weakness   no light-headedness   no tremors   no confusion   not nervous/anxious   no self-injury   no suicidal ideation  Eyes:    no discharge   no vision loss   no diplopia   no visual disturbance   use of corrective lenses (readers)  Ears:    no ear pain   no hearing loss   no vertigo   no tinnitus   no hearing aides  Nose:    no nasal discharge   no sinus congestion   no epistaxis  Mouth:    no dental issues   no mouth pain   no oral bleeding   no mouth lesions  Throat:    no throat pain   no dysphagia   no voice change  Neck:    no neck pain   neck swelling   no neck stiffness   no neck masses  Cardio:    no chest pain   no palpitations   no peripheral edema   no dyspnea   no FONG   no paroxysmal nocturnal dyspnea  Respiratory:    no cough   no wheezing   no hemoptysis   no shortness of breath  Endocrine:    no cold intolerance   no heat intolerance   no polydipsia  GI:    no abdominal distention   no abdominal pain   no constipation   no diarrhea   no nausea   no vomiting   no blood in stool  :    no difficulty urinating   no dysuria   no oliguria   no polyuria  Musculoskeletal:    arthralgias (spine pain, unchanged from baseline)   myalgias (upper back spine pain, shoulder pain on right side)   no decreased ROM   swelling (left lower leg/ankle swelling, patient reports it hasbeenlike that andfluctuates.)  Hematologic:    does not bruise/bleed easily   no excessive bleeding   no history of blood transfusion   no blood clots  Skin:   no skin changes   no sores/wound   no rash      Physical Exam  Vitals reviewed.   Constitutional:       General: He is not in acute distress.     Appearance: Normal appearance. He is normal weight. He is not ill-appearing, toxic-appearing or diaphoretic.   HENT:      Head: Normocephalic.      Nose: Nose normal. No congestion or rhinorrhea.      Mouth/Throat:      Mouth: Mucous membranes are moist.      Pharynx: Oropharynx is clear. No  "oropharyngeal exudate or posterior oropharyngeal erythema.   Eyes:      General: No scleral icterus.        Right eye: No discharge.         Left eye: No discharge.      Conjunctiva/sclera: Conjunctivae normal.   Neck:      Vascular: No carotid bruit.   Cardiovascular:      Rate and Rhythm: Normal rate and regular rhythm.      Pulses: Normal pulses.      Heart sounds: Normal heart sounds. No murmur heard.     No friction rub. No gallop.   Pulmonary:      Effort: Pulmonary effort is normal. No respiratory distress.      Breath sounds: Normal breath sounds. No stridor. No wheezing, rhonchi or rales.   Chest:      Chest wall: No tenderness.   Musculoskeletal:         General: Swelling (left lower leg swelling.) present. No tenderness.      Cervical back: Normal range of motion. No rigidity or tenderness.      Comments: Left lower leg, mild induration and erythema (patient repots unchanged from past 10+years). Nontender, no warmth   Neurological:      General: No focal deficit present.      Mental Status: He is alert.   Psychiatric:         Mood and Affect: Mood normal.         Behavior: Behavior normal.         Thought Content: Thought content normal.         Judgment: Judgment normal.          PAT AIRWAY:   Airway:     Mallampati::  III    TM distance::  >3 FB    Neck ROM::  Limited   Bottom right bridge        Visit Vitals  /76   Pulse 83   Temp 36.7 °C (98.1 °F)   Ht 1.854 m (6' 1\")   Wt 114 kg (252 lb 6.4 oz)   SpO2 95%   BMI 33.30 kg/m²   Smoking Status Never   BSA 2.42 m²       DASI Risk Score      Flowsheet Row Pre-Admission Testing [facility]/16/2025 in [facility]   Can you take care of yourself (eat, dress, bathe, or use toilet)?  2.75 filed at 01/16/2025 0954   Can you walk indoors, such as around your house? 1.75 filed at 01/16/2025 0954   Can you walk a block or two on level ground?  0 filed at 01/16/2025 0954   Can you climb a flight of stairs or walk up a hill? 0 filed at 01/16/2025 " 0954   Can you run a short distance? 0 filed at 01/16/2025 0954   Can you do light work around the house like dusting or washing dishes? 2.7 filed at 01/16/2025 0954   Can you do moderate work around the house like vacuuming, sweeping floors or carrying groceries? 0 filed at 01/16/2025 0954   Can you do heavy work around the house like scrubbing floors or lifting and moving heavy furniture?  0 filed at 01/16/2025 0954   Can you do yard work like raking leaves, weeding or pushing a mower? 0 filed at 01/16/2025 0954   Can you have sexual relations? 0 filed at 01/16/2025 0954   Can you participate in moderate recreational activities like golf, bowling, dancing, doubles tennis or throwing a baseball or football? 0 filed at 01/16/2025 0954   Can you participate in strenous sports like swimming, singles tennis, football, basketball, or skiing? 0 filed at 01/16/2025 0954   DASI SCORE 7.2 filed at 01/16/2025 0954   METS Score (Will be calculated only when all the questions are answered) 3.6 filed at 01/16/2025 0954          Caprini DVT Assessment      Flowsheet Row Pre-Admission Testing from 1/16/2025 in [facility]   DVT Score (IF A SCORE IS NOT CALCULATING, MUST SELECT A BMI TO COMPLETE) 9 filed at 01/16/2025 1355   Medical Factors Swollen legs filed at 01/16/2025 1355   Surgical Factors Major surgery planned, lasting 2-3 hours filed at 01/16/2025 1355   BMI (BMI MUST BE CHOSEN) 31-40 (Obesity) filed at 01/16/2025 1355          Modified Frailty Index    No data to display       CHADS2 Stroke Risk  Current as of 10 minutes ago        N/A 3 to 100%: High Risk   2 to < 3%: Medium Risk   0 to < 2%: Low Risk     Last Change: N/A          This score determines the patient's risk of having a stroke if the patient has atrial fibrillation.        This score is not applicable to this patient. Components are not calculated.          Revised Cardiac Risk Index      Flowsheet Row Pre-Admission Testing from 1/16/2025 in  [facility]   High-Risk Surgery (Intraperitoneal, Intrathoracic,Suprainguinal vascular) 0 filed at 01/16/2025 1319   History of ischemic heart disease (History of MI, History of positive exercuse test, Current chest paint considered due to myocardial ischemia, Use of nitrate therapy, ECG with pathological Q Waves) 0 filed at 01/16/2025 1319   History of congestive heart failure (pulmonary edemia, bilateral rales or S3 gallop, Paroxysmal nocturnal dyspnea, CXR showing pulmonary vascular redistribution) 0 filed at 01/16/2025 1319   History of cerebrovascular disease (Prior TIA or stroke) 0 filed at 01/16/2025 1319   Pre-operative insulin treatment 0 filed at 01/16/2025 1319   Pre-operative creatinine>2 mg/dl 0 filed at 01/16/2025 1319   Revised Cardiac Risk Calculator 0 filed at 01/16/2025 1319          Apfel Simplified Score      Flowsheet Row Pre-Admission Testing from 1/16/2025 in [facility]   Smoking status 1 filed at 01/16/2025 1356   History of motion sickness or PONV  0 filed at 01/16/2025 1356   Use of postoperative opioids 1 filed at 01/16/2025 1356   Gender - Female 0=No filed at 01/16/2025 1356   Apfel Simplified Score Calculator 2 filed at 01/16/2025 1356          Risk Analysis Index Results This Encounter    No data found in the last 10 encounters.       Stop Bang Score      Flowsheet Row Pre-Admission Testing from 1/16/2025 in [facility]   Do you snore loudly? 1 filed at 01/16/2025 0954   Do you often feel tired or fatigued after your sleep? 1 filed at 01/16/2025 0954   Has anyone ever observed you stop breathing in your sleep? 1 filed at 01/16/2025 0954   Do you have or are you being treated for high blood pressure? 1 filed at 01/16/2025 0954   Recent BMI (Calculated) 33.7 filed at 01/16/2025 0954   Is BMI greater than 35 kg/m2? 0=No filed at 01/16/2025 0954   Age older than 50 years old? 1=Yes filed at 01/16/2025 0954   Is your neck circumference  greater than 17 inches (Male) or 16 inches (Female)? 1 filed at 01/16/2025 0954   Gender - Male 1=Yes filed at 01/16/2025 0954   STOP-BANG Total Score 7 filed at 01/16/2025 0954          Prodigy: High Risk  Total Score: 19              Prodigy Age Score      Prodigy Gender Score     Prodigy Previous Opioid Use Score           ARISCAT Score for Postoperative Pulmonary Complications      Flowsheet Row Pre-Admission Testing from 1/16/2025 in [facility]   Age Calculated Score 3 filed at 01/16/2025 1357   Preoperative SpO2 8 filed at 01/16/2025 1357   Respiratory infection in the last month Either upper or lower (i.e., URI, bronchitis, pneumonia), with fever and antibiotic treatment 0 filed at 01/16/2025 1357   Preoperative anemia (Hgb less than 10 g/dl) 0 filed at 01/16/2025 1357   Surgical incision  0 filed at 01/16/2025 1357   Duration of surgery  16 filed at 01/16/2025 1357   Emergency Procedure  0 filed at 01/16/2025 1357   ARISCAT Total Score  27 filed at 01/16/2025 1357          Heena Perioperative Risk for Myocardial Infarction or Cardiac Arrest (CRISTI)    No data to display       Assessment and Plan:   Anesthesia/Airway:  PONV - moderate PONV      Neuro:    No neurologic diagnoses, however, the patient is at an increased risk for post operative delirium secondary to age >/= 65 and type and duration of surgery.  Preoperative brain exercise educational handout provided to patient.    The patient is at an increased risk for perioperative stroke secondary to increased age, HTN, HLD, neuro surgery, general anesthesia, and op time >2.5 hours.       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 Patient follows with ENT who has been working up his symptoms, patient states his EAT-10 score is mostly unchanged to his baseline and ENT is aware. No further preoperative testing/intervention indicated at this time.      Cardiovascular:    # HTN, HLD CAD- medicated with atorvastatin  "(continue), losartan (last dose on 1/19), aspirin (patient states he stopped 1/13, 7 days prior to surgery). Patient follows with cardiology for management and was last seen on 4/11/24.  Patient has history of NSTEMI type II, with left heart cath showing 85% stenosis in diagonal branch which cardiology planned no intervention for unless patient became symptomatic. Patient today reports negative cardiovascular symptoms. . EKG updated in [facility]. Currently pending cardiovascular risk stratifications. [facility] office contacted today and was told office receive risk stratification form on 1/6/25 and will fill out form and return today. Patient reports he stopped Aspirin himself on 1/13, due to previously knowing he stops Aspirin 7 days prior to spine surgeries.    No additional preoperative testing is currently indicated.    METS are 3.6    RCRI  0 which is 3.9% 30 day risk of MACE (risk for cardiac death, nonfatal myocardial infarction, and nonfactal cardiac arrest    CRISTI score which indicates a   0.3 % risk of intraoperative or 30-day postoperative MACE    EKG 1/16/25 - today in [facility]  Pending Cardiology Review  Similar to ECG in [facility] labeled \"CARDIAC\" on 11/7/23    Holter Monitor 3/18/2024 through 3/23/2024  Preliminary findings   primary rhythm was sinus rhythm.  Average heart rate of 76 bpm.  Minimum heart rate was 47 bpm on day 4 max heart rate was 119 bpm day 3  SVE burden was 0.06% day of 6 total SVE  SVE arrhythmia 1 event longest event 7 beats on day 4, fastest event 107 bpm day 4  PVC burden was 0.02%, 82 total PVC, 3 disparate morphologies  Patient reported 1 event during monitoring.    [facility] 9/2023: (per Cardiology note on 4/11/24, could not find diagnostic test in Care Everywhere)  1. Left main trunk was normal  2. Left anterior descending artery had 40% mid stenosis and mild diffuse disease throughout and a large first diagonal branch with an 85% mid stenosis that extended to the lateral apical " "wall.  3. Circumflex artery had mild diffuse disease  4. Dominant right coronary artery had a 60% distal stenosis and mild diffuse disease throughout  **Noted in Cards note \"No indication for intervention to the diagonal disease unless patient having symptoms. \" -Dr Jaime Fried MD**    Nuclear Stress Test 5/15/23 (found Joey)  CONCLUSIONS:    1. SPECT Perfusion Study: Abnormal.    2. There is no scintigraphic evidence for inducible ischemia.    3. There is a small (<10%) fixed perfusion defect in the RCA territory.    4. Left ventricle is normal in size. The left ventricle systolic   function is mildly decreased.    5. Right ventricle is normal in size. The right ventricle systolic   function is normal.    6. This is a low risk scan.     NM CTAC Interpretation  5/15/23  1. Incidental Findings from limited non-diagnostic CTAC:       - No distinct coronary calcifications.     Electronically signed by Kimberly Naik MD on 6/4/2023 at 8:37:19 PM     [facility] 6/2022: (per Cardiology note on 4/11/24, could not find diagnostic test in Care Everywhere)  - Technically difficult exam due to body habitus, Very limited movement of left arm due to recent left shoulder replacement and suboptimal positioning.   - Exam indication: Swelling of the forearm and leg post non-cardiac surgery   - The left ventricle is normal in size. There is moderate left ventricular   hypertrophy. Left ventricular systolic function is normal. EF = 55 ± 5% (3D) Grade  I left ventricular diastolic dysfunction.   - The right ventricle is normal in size. Right ventricular systolic function is   normal.   - Calcification on anterior leaflet of the mitral valve was visualized on prior   exam and further SHARRI was performed to rule out endocarditis.   Technical limitations due to recent surgery.  - Exam was compared with the prior  echocardiographic exam performed on 7-22-20.  There is no significant change.     Pulmonary:    #COPD, Asthma,  BIANCA " (noCPAP)- Patient denies either COPD or Asthma, states he does not use any respirtory medications or sees Pulmonology. States that PFT and reports they were negative for lung disease. Patient also states that he is noncompliant with CPAP therapy. No use of supplemental oxygen. Patient physical exam is benign. Preoperative deep breathing educational handout provided to patient.    Patient is at increased risk of perioperative complications secondary to age > 60, COPD, obesity, duration of surgery > 2 hours, types of anesthetic    ARISCAT:    27  points which is a intermediate (13.3%) risk of in-hospital post-op pulmonary complications     PRODIGY:  21  points which is a high risk of post op opioid induced respiratory depression episodes    STOP BAN   points which is a high risk for moderate to severe BIANCA    Pumonary toilet education discussed, patient also provided deep breathing exercises and incentive spirometry educational handout      Renal:   #proteinuria - patient follows with Nephorlogy who currently has him refrain from NSAID use and on a low salt diet. No further perioperative management.   Patient is at increase risk for perioperative renal complications secondary to Age equal to or greater than 56, BMI equal to or greater than 30, HTN, COPD, use of an ace, arb, or NSAID      Endocrine:   No endocrine diagnosis or significant findings on chart review or clinical presentation and evaluation. No further testing or intervention is indicated at this time.      Hematologic:      #Lower leg swelling -presented to [facility] with left lower leg swelling.  Patient states that has been present for least 10+ years, and fluctuates in severity.  On presentation left lower leg/ankle region is notably more swollen than the right lower extremity, with mild erythema.  Skin is not warm however.  Mild induration noted.  Patient denies tenderness.  Patient states that he has not noticed any change with claudication, pain.   Patient does state had a recent scratch from his puppy which he had sutures removed for and antibiotic therapy he had previously completed.  Noted wound above area of swelling, this area is now fully healed and scar present, no sign of infection.  Patient reports no previous trauma to the area/past surgeries. Venous duplex ordered and appointment for today at 1 PM scheduled for patient which he denied due to need for patient son to go to work who drove him to appointment. After discussing with MA with transportation concerns, soonest appointment arranged is on 1/20 the day before surgery. Surgeon notified. Patient educated on warning signs of DVT and PE and to arrive to Emergency Department so such symptoms. Patient understood risks associated with waiting if DVT present.   #Polycythemia - patient reports he once followed with heme/oncology about 3 years ago however now PCP monitors his blood levels as needed. He reports no blood transfusion history. No further perioperative interventions.     Preoperative DVT educational handout provided to patient.  Caprini Score:  9  points which is a highest risk of perioperative VTE      Gastrointestinal:   #GERD, BPH, hypogonadism, tubular adenoma of colon - Patient medicated with flomax (continue) and follows with Nephrology and PCP for management. His PCP sends him for colonoscopy as needed for surveillance of adenoma of colon. Patient denies GI/ symptoms. No further perioperative interventions.        EAT-10 score of    11  : self-perceived oropharyngeal dysphagia scale (0-40)   *See HEENT Vocal Cord Mass)    Apfel: 2 points 39% risk for post operative N/V      Infectious disease:     No infectious diagnosis or significant findings on chart review or clinical presentation and evaluation.   Prescription provided for CHG body wash and dental rinse. CHG use instructions reviewed and provided to patient.  Staph screen collected      Musculoskeletal:    #Fusion of lumbar  spine, kyphosis of cervical region, cervical myelopathy, Spinal cord compression due to degenerative disorder of spinal column- seeking surgical intervention.  Medicated with gabapentin (continue) percocet (Continue), and follows with neurosurgery and ortho.      Labs ordered  cbc, basic, t/s, and MSSA/MRSA culture  Venous US duplex - left    Medication instructions and NPO guidelines reviewed with the patient.  All questions or concerns discussed and addressed.      Yuli Bradshaw PA-C         Culture    Collection Time: 01/16/25 10:57 AM    Specimen: Nares/Axilla/Groin; Swab   Result Value Ref Range    Staph/MRSA Screen Culture No Staphylococcus aureus isolated    CBC    Collection Time: 01/16/25 10:57 AM   Result Value Ref Range    WBC 8.3 4.4 - 11.3 x10*3/uL    nRBC 0.0 0.0 - 0.0 /100 WBCs    RBC 7.05 (H) 4.50 - 5.90 x10*6/uL    Hemoglobin 16.8 13.5 - 17.5 g/dL    Hematocrit 55.6 (H) 41.0 - 52.0 %    MCV 79 (L) 80 - 100 fL    MCH 23.8 (L) 26.0 - 34.0 pg    MCHC 30.2 (L) 32.0 - 36.0 g/dL    RDW 19.2 (H) 11.5 - 14.5 %    Platelets 189 150 - 450 x10*3/uL   Basic metabolic panel    Collection Time: 01/16/25 10:57 AM   Result Value Ref Range    Glucose 78 74 - 99 mg/dL    Sodium 141 136 - 145 mmol/L    Potassium 4.7 3.5 - 5.3 mmol/L    Chloride 105 98 - 107 mmol/L    Bicarbonate 27 21 - 32 mmol/L    Anion Gap 14 10 - 20 mmol/L    Urea Nitrogen 17 6 - 23 mg/dL    Creatinine 1.07 0.50 - 1.30 mg/dL    eGFR 77 >60 mL/min/1.73m*2    Calcium 9.8 8.6 - 10.6 mg/dL   Type And Screen    Collection Time: 01/16/25 10:57 AM   Result Value Ref Range    ABO TYPE A     Rh TYPE POS     ANTIBODY SCREEN NEG         Medication instructions and NPO guidelines reviewed with the patient.  All questions or concerns discussed and addressed.      Yuli Bradshaw PA-C

## 2025-01-16 NOTE — PREPROCEDURE INSTRUCTIONS
"                Thank you for visiting [facility] ([facility]) today for your pre-procedure evaluation, you were seen by     Yuli Bradshaw PA-C   Department of Anesthesiology and Perioperative Medicine  Main phone [phone]  Fax [phone]      This summary includes instructions and information to aid you during your perioperative period.  Please read carefully. If you have any questions about your visit today, please call the number listed above.  If you become ill or have any changes to your health before your surgery, please contact your primary care provider and alert your surgeon.    Preparing for Surgery       Preoperative Fasting Guidelines    Why must I stop eating and drinking near surgery time?  With sedation, food or liquid in your stomach can enter your lungs causing serious complications  Food can increase nausea and vomiting  When do I need to stop eating and drinking before my surgery?  Do not eat any food after midnight the night before your surgery/procedure.  You may have up to 13.5 ounces of clear liquid until TWO hours before your instructed arrival time to the hospital.  This includes water, black tea/coffee, (no milk or cream) apple juice, and electrolyte drinks (Gatorade)  You may chew gum until TWO hours before your surgery/procedure    Tobacco and Alcohol;  Do not drink alcohol or smoke within 24 hours of surgery.  It is best to quit smoking for as long as possible before any surgery or procedure.       Other Instructions  Why did I have my nose, under my arms, and groin swabbed? The purpose of the swab is to identify Staphylococcus aureus inside your nose or on your skin.  The swab was sent to the laboratory for culture.  A positive swab/culture for Staphylococcus aureus is called colonization or carriage.   What is Staphylococcus aureus? Staphylococcus aureus, also known as \"staph\", is a germ found on the skin or in the nose of healthy people.  Sometimes " Staphylococcus aureus can get into the body and cause an infection.  This can be minor (such as pimples, boils, or other skin problems).  It might also be serious (such as a blood infection, pneumonia, or a surgical site infection). What is Staphylococcus aureus colonization or carriage? Colonization or carriage means that a person has the germ but is not sick from it.  These bacteria can be spread on the hands or when breathing or sneezing. How is Staphylococcus aureus spread? It is most often spread by close contact with a person or item that carries it. What happens if my culture is positive for Staphylococcus aureus? Your doctor/medical team will use this information to guide any antibiotic treatment which may be necessary.  Regardless of the culture results, we will clean the inside of your nose with a betadine swab just before you have your surgery. Will I get an infection if I have Staphylococcus aureus in my nose or on my skin? Anyone can get an infection with Staphylococcus aureus.  However, the best way to reduce your risk of infection is to follow the instructions provided to you for the use of your CHG soap and dental rinse.  , Body Wash; What is a home preoperative antibacterial shower? This shower is a way of cleaning the skin with a germ-killing solution before surgery.  The solution contains chlorhexidine, commonly known as CHG.  CHG is a skin cleanser with germ-killing ability.  Let your doctor know if you are allergic to chlorhexidine. Why do I need to take a preoperative antibacterial shower? Skin is not sterile.  It is best to try to make your skin as free of germs as possible before surgery.  Proper cleansing with a germ-killing soap before surgery can lower the number of germs on your skin.  This helps to reduce the risk of infection at the surgical site.  Following the instructions listed below will help you prepare your skin for surgery.   How do I use the solution? Steps:  Begin using your  CHG soap 5 days before your scheduled surgery on ___________.   First, wash and rinse your hair using the CHG soap. Keep CHG soap away from ear canals and eyes.  Rinse completely, do not condition.  Hair extensions should be removed. , Oral/Dental Rinse: What is oral/dental rinse?  It is a mouthwash. It is a way of cleaning the mouth with a germ-killing solution before your surgery.  The solution contains chlorhexidine, commonly known as CHG. It is used inside the mouth to kill a bacteria known as Staphylococcus aureus.  Let your doctor know if you are allergic to Chlorhexidine. Why do I need to use CHG oral/dental rinse? The CHG oral/dental rinse helps to kill a bacteria in your mouth known as Staphylococcus aureus.  This reduces the risk of infection at the surgical site.  Using your CHG oral/dental rinse STEPS: Use your CHG oral/dental rinse after you brush your teeth the night before (at bedtime) and the morning of your surgery.  Follow all directions on your prescription label.  Use the cap on the container to measure 15 ml.  Swish (gargle if you can) the mouthwash in your mouth for at least 30 seconds, (do not swallow) and spit out.  After you use your CHG rinse, do not rinse your mouth with water, drink or eat.  Please refer to the prescription label for the appropriate time to resume oral intake What side effects might I have using the CHG oral/dental rinse? CHG rinse will stick to plaque on the teeth.  Brush and floss just before use.  Teeth brushing will help avoid staining of plaque during use.       The Week before Surgery        Seven days before Surgery  Check your CPM medication instructions  Do the exercises provided to you by CPM   Arrange for a responsible, adult licensed  to take you home after surgery and stay with you for 24 hours.  You will not be permitted to drive yourself home if you have received any anesthetic/sedation  Five days before surgery  Check your CPM medication  instructions  Do the exercises provided to you by CPM   Start using Chlorhexidene (CHG) body wash if prescribed (Continue till day of surgery)      The Day before Surgery       Check your CPM medication and all other CPM instructions including when to stop eating and drinking  You will be called with your arrival time for surgery in the late afternoon.  If you do not receive a call please reach out to your surgeon's office.  Do not smoke or drink 24 hours before surgery  Prepare items to bring with you to the hospital  Shower with your chlorhexidine wash if prescribed  Brush your teeth and use your chlorhexidine dental rinse if prescribed    The Day of Surgery       Check your CPM medication instructions  Ensure you follow the instructions for when to stop eating and drinking  Shower, if prescribed use CHG.  Do not apply any lotions, creams, moisturizers, perfume or deodorant  Brush your teeth and use your CHG dental rinse if prescribed  Wear loose comfortable clothing  Avoid make-up  Remove  jewelry and piercings, consider professional piercing removal with a plastic spacer if needed  Bring photo ID and Insurance card  Bring an accurate medication list that includes medication dose, frequency and allergies  Bring a copy of your advanced directives (will, health care power of )  Bring any devices and controllers as well as medical devices you have been provided with for surgery (CPAP, slings, braces, etc.)  Dentures, eyeglasses, and contacts will be removed before surgery, please bring cases for contacts or glasses    Preoperative Deep Breathing Exercises    Why it is important to do deep breathing exercises before my surgery?  Deep breathing exercises strengthen your breathing muscles.  This helps you to recover after your surgery and decreases the chance of breathing complications.    How are the deep breathing exercises done?  Sit straight with your back supported.  Breathe in deeply and slowly through  your nose. Your lower rib cage should expand and your abdomen may move forward.  Hold that breath for 3 to 5 seconds.  Breathe out through pursed lips, slowly and completely.  Rest and repeat 10 times every hour while awake.  Rest longer if you become dizzy or lightheaded.      Preoperative Brain Exercises    What are brain exercises?  A brain exercise is any activity that engages your thinking (cognitive) skills.    What types of activities are considered brain exercises?  Jigsaw puzzles, crossword puzzles, word jumble, memory games, word search, and many more.  Many can be found free online or on your phone via a mobile satya.    Why should I do brain exercises before my surgery?  More recent research has shown brain exercise before surgery can lower the risk of postoperative delirium (confusion) which can be especially important for older adults.  Patients who did brain exercises for 5 to 10 hours the days before surgery, cut their risk of postoperative delirium in half up to 1 week after surgery.    Sit-to-Stand Exercise    What is the sit-to-stand exercise?  The sit-to-stand exercise strengthens the muscles of your lower body and muscles in the center of your body (core muscles for stability) helping to maintain and improve your strength and mobility.  How do I do the sit-to-stand exercise?  The goal is to do this exercise without using your arms or hands.  If this is too difficult, use your arms and hands or a chair with armrests to help slowly push yourself to the standing position and lower yourself back to the sitting position. As the movement becomes easier use your arms and hands less.    Steps to the sit-to-stand exercise  Sit up tall in a sturdy chair, knees bent, feet flat on the floor shoulder-width apart.  Shift your hips/pelvis forward in the chair to correctly position yourself for the next movement.  Lean forward at your hips.  Stand up straight putting equal weight on both feet.  Check to be sure  you are properly aligned with the chair, in a slow controlled movement sit back down.  Repeat this exercise 10-15 times.  If needed you can do it fewer times until your strength improves.  Rest for 1 minute.  Do another 10-15 sit-to-stand exercises.  Try to do this in the morning and evening.       Simple things you can do to help prevent blood clots     Blood clots are blockages that can form in the body's veins. When a blood clot forms in your deep veins, it may be called a deep vein thrombosis, or DVT for short. Blood clots can happen in any part of the body where blood flows, but they are most common in the arms and legs. If a piece of a blood clot breaks free and travels to the lungs, it is called a pulmonary embolus (PE). A PE can be a very serious problem.      Being in the hospital or having surgery can raise your chances of getting a blood clot because you may not be well enough to move around as much as you normally do.         Ways you can help prevent blood clots in the hospital       Wearing SCDs  SCDs stands for Sequential Compression Devices.   SCDs are special sleeves that wrap around your legs. They attach to a pump that fills them with air to gently squeeze your legs every few minutes.  This helps return the blood in your legs to your heart.   SCDs should only be taken off when walking or bathing. SCDs may not be comfortable, but they can help save your life.              Pump SCD leg sleeves  Wearing compression stockings - if your doctor orders them. These special snug-fitting stockings gently squeeze your legs to help blood flow.       Walking. Walking helps move the blood in your legs.   If your doctor says it is ok, try walking the halls at least   5 times a day. Ask us to help you get up, so you don't fall.      Taking any blood-thinning medicines your doctor orders.              Ways you can help prevent blood clots at home         Wearing compression stockings - if your doctor orders them.    Walking - to help move the blood in your legs.    Taking any blood-thinning medicines your doctor orders.      Signs of a blood clot or PE    Tell your doctor or nurse right away if you have any of the problems listed below.         If you are at home, seek medical care right away. Call 911 for chest pain or problems breathing.            Signs of a blood clot (DVT) - such as pain, swelling, redness, or warmth in your arm or legs.  Signs of a pulmonary embolism (PE) - such as chest pain or feeling short of breath

## 2025-01-17 LAB — STAPHYLOCOCCUS SPEC CULT: NORMAL

## 2025-01-20 ENCOUNTER — HOSPITAL ENCOUNTER (OUTPATIENT)
Dept: VASCULAR MEDICINE | Facility: CLINIC | Age: 65
Discharge: HOME | End: 2025-01-20
Payer: MEDICARE

## 2025-01-20 ENCOUNTER — ANESTHESIA EVENT (OUTPATIENT)
Dept: OPERATING ROOM | Facility: HOSPITAL | Age: 65
End: 2025-01-20
Payer: MEDICARE

## 2025-01-20 PROCEDURE — 93971 EXTREMITY STUDY: CPT

## 2025-01-20 PROCEDURE — 93971 EXTREMITY STUDY: CPT | Performed by: SURGERY

## 2025-01-20 NOTE — PROGRESS NOTES
Pharmacy Medication History Review    Jerman Colón is a 64 y.o. male who is planned to be admitted for No Principal Problem: There is no principal problem currently on the Problem List. Please update the Problem List and refresh.. Pharmacy called the patient prior to their scheduled procedure and reviewed the patient's [id number] medications for accuracy.    Medications ADDED:  none  Medications CHANGED:  Gabapentin 400mg TO gabapentin 600mg  Pantoprazole 40mg directions from #1QD to #1BID  Medications REMOVED:   none    Please review updated prior to admission medication list and comments regarding how patient may be taking medications differently by going to Admission tab --> Admission Orders --> Admit Orders / Review prior to admission medications.     Preferred pharmacy, last doses of medications, and allergies to be confirmed with patient by nursing the day of procedure.     Sources used to complete the med history include:  [facility]  Pharmacy dispense history  Patient interview  Chart Review  Care Everywhere     Below are additional concerns with the patient's PTA list.  Patient confirmed they are taking #1 tablet of gabapentin 600mg three times a day. OAS verified. L.F. 01/13/25 #90/30d  Patient confirmed they are taking #1 tablet of pantoprazole 40mg twice daily. L.F. 01/17/25 #180/90d    Mai Foster  [facility]  Please reach out via Secure Chat for questions or call [facility] or [employer]

## 2025-01-21 ENCOUNTER — APPOINTMENT (OUTPATIENT)
Dept: RADIOLOGY | Facility: HOSPITAL | Age: 65
DRG: 430 | End: 2025-01-21
Payer: MEDICARE

## 2025-01-21 ENCOUNTER — HOSPITAL ENCOUNTER (INPATIENT)
Facility: HOSPITAL | Age: 65
Discharge: HOME | DRG: 430 | End: 2025-01-21
Attending: NEUROLOGICAL SURGERY | Admitting: NEUROLOGICAL SURGERY
Payer: MEDICARE

## 2025-01-21 ENCOUNTER — ANESTHESIA (OUTPATIENT)
Dept: OPERATING ROOM | Facility: HOSPITAL | Age: 65
End: 2025-01-21
Payer: MEDICARE

## 2025-01-21 DIAGNOSIS — Z74.09 IMPAIRED FUNCTIONAL MOBILITY AND ENDURANCE: ICD-10-CM

## 2025-01-21 DIAGNOSIS — G95.9 CERVICAL MYELOPATHY: ICD-10-CM

## 2025-01-21 DIAGNOSIS — G89.18 POST-OP PAIN: ICD-10-CM

## 2025-01-21 DIAGNOSIS — G47.33 OSA (OBSTRUCTIVE SLEEP APNEA): ICD-10-CM

## 2025-01-21 DIAGNOSIS — M47.10 SPINAL CORD COMPRESSION DUE TO DEGENERATIVE DISORDER OF SPINAL COLUMN: ICD-10-CM

## 2025-01-21 DIAGNOSIS — M40.12 OTHER SECONDARY KYPHOSIS, CERVICAL REGION: Primary | ICD-10-CM

## 2025-01-21 PROBLEM — I10 HTN (HYPERTENSION): Status: ACTIVE | Noted: 2025-01-21

## 2025-01-21 PROBLEM — E78.5 HYPERLIPIDEMIA: Status: ACTIVE | Noted: 2025-01-21

## 2025-01-21 PROBLEM — J43.9 PULMONARY EMPHYSEMA (MULTI): Status: ACTIVE | Noted: 2025-01-21

## 2025-01-21 PROBLEM — I25.10 CAD (CORONARY ARTERY DISEASE): Status: ACTIVE | Noted: 2025-01-21

## 2025-01-21 PROBLEM — D75.1 POLYCYTHEMIA: Status: ACTIVE | Noted: 2025-01-21

## 2025-01-21 PROBLEM — K21.9 GASTROESOPHAGEAL REFLUX DISEASE WITHOUT ESOPHAGITIS: Status: ACTIVE | Noted: 2025-01-21

## 2025-01-21 LAB
ABO GROUP (TYPE) IN BLOOD: NORMAL
ALBUMIN SERPL BCP-MCNC: 4.1 G/DL (ref 3.4–5)
ANION GAP BLDA CALCULATED.4IONS-SCNC: 12 MMO/L (ref 10–25)
ANION GAP BLDA CALCULATED.4IONS-SCNC: 13 MMO/L (ref 10–25)
ANION GAP BLDA CALCULATED.4IONS-SCNC: 14 MMO/L (ref 10–25)
ANION GAP BLDA CALCULATED.4IONS-SCNC: 18 MMO/L (ref 10–25)
ANION GAP BLDA CALCULATED.4IONS-SCNC: 8 MMO/L (ref 10–25)
ANION GAP SERPL CALC-SCNC: 22 MMOL/L
APTT PPP: 24 SECONDS (ref 27–38)
ATRIAL RATE: 85 BPM
BASE EXCESS BLDA CALC-SCNC: -0.2 MMOL/L (ref -2–3)
BASE EXCESS BLDA CALC-SCNC: -1.6 MMOL/L (ref -2–3)
BASE EXCESS BLDA CALC-SCNC: -2.7 MMOL/L (ref -2–3)
BASE EXCESS BLDA CALC-SCNC: -5.2 MMOL/L (ref -2–3)
BASE EXCESS BLDA CALC-SCNC: 0.9 MMOL/L (ref -2–3)
BASOPHILS # BLD AUTO: 0.03 X10*3/UL (ref 0–0.1)
BASOPHILS NFR BLD AUTO: 0.2 %
BODY TEMPERATURE: 37 DEGREES CELSIUS
BODY TEMPERATURE: ABNORMAL
BUN SERPL-MCNC: 19 MG/DL (ref 6–23)
CA-I BLD-SCNC: 0.98 MMOL/L (ref 1.1–1.33)
CA-I BLDA-SCNC: 1.01 MMOL/L (ref 1.1–1.33)
CA-I BLDA-SCNC: 1.11 MMOL/L (ref 1.1–1.33)
CA-I BLDA-SCNC: 1.16 MMOL/L (ref 1.1–1.33)
CA-I BLDA-SCNC: 1.16 MMOL/L (ref 1.1–1.33)
CA-I BLDA-SCNC: 1.21 MMOL/L (ref 1.1–1.33)
CALCIUM SERPL-MCNC: 8.1 MG/DL (ref 8.6–10.6)
CHLORIDE BLDA-SCNC: 101 MMOL/L (ref 98–107)
CHLORIDE BLDA-SCNC: 103 MMOL/L (ref 98–107)
CHLORIDE BLDA-SCNC: 104 MMOL/L (ref 98–107)
CHLORIDE BLDA-SCNC: 104 MMOL/L (ref 98–107)
CHLORIDE BLDA-SCNC: 106 MMOL/L (ref 98–107)
CHLORIDE SERPL-SCNC: 102 MMOL/L (ref 98–107)
CO2 SERPL-SCNC: 19 MMOL/L (ref 21–32)
CREAT SERPL-MCNC: 1.24 MG/DL (ref 0.5–1.3)
EGFRCR SERPLBLD CKD-EPI 2021: 65 ML/MIN/1.73M*2
EOSINOPHIL # BLD AUTO: 0 X10*3/UL (ref 0–0.7)
EOSINOPHIL NFR BLD AUTO: 0 %
ERYTHROCYTE [DISTWIDTH] IN BLOOD BY AUTOMATED COUNT: 17.3 % (ref 11.5–14.5)
FIBRINOGEN PPP-MCNC: 300 MG/DL (ref 200–400)
GLUCOSE BLDA-MCNC: 119 MG/DL (ref 74–99)
GLUCOSE BLDA-MCNC: 155 MG/DL (ref 74–99)
GLUCOSE BLDA-MCNC: 167 MG/DL (ref 74–99)
GLUCOSE BLDA-MCNC: 216 MG/DL (ref 74–99)
GLUCOSE BLDA-MCNC: 98 MG/DL (ref 74–99)
GLUCOSE SERPL-MCNC: 212 MG/DL (ref 74–99)
HCO3 BLDA-SCNC: 20 MMOL/L (ref 22–26)
HCO3 BLDA-SCNC: 22.6 MMOL/L (ref 22–26)
HCO3 BLDA-SCNC: 23.7 MMOL/L (ref 22–26)
HCO3 BLDA-SCNC: 24.8 MMOL/L (ref 22–26)
HCO3 BLDA-SCNC: 26 MMOL/L (ref 22–26)
HCT VFR BLD AUTO: 43.2 % (ref 41–52)
HCT VFR BLD EST: 41 % (ref 41–52)
HCT VFR BLD EST: 46 % (ref 41–52)
HCT VFR BLD EST: 46 % (ref 41–52)
HCT VFR BLD EST: 47 % (ref 41–52)
HCT VFR BLD EST: 47 % (ref 41–52)
HGB BLD-MCNC: 13.4 G/DL (ref 13.5–17.5)
HGB BLDA-MCNC: 13.7 G/DL (ref 13.5–17.5)
HGB BLDA-MCNC: 15.3 G/DL (ref 13.5–17.5)
HGB BLDA-MCNC: 15.3 G/DL (ref 13.5–17.5)
HGB BLDA-MCNC: 15.6 G/DL (ref 13.5–17.5)
HGB BLDA-MCNC: 15.7 G/DL (ref 13.5–17.5)
IMM GRANULOCYTES # BLD AUTO: 0.12 X10*3/UL (ref 0–0.7)
IMM GRANULOCYTES NFR BLD AUTO: 0.7 % (ref 0–0.9)
INHALED O2 CONCENTRATION: 30 %
INHALED O2 CONCENTRATION: 50 %
INHALED O2 CONCENTRATION: 70 %
INR PPP: 1.1 (ref 0.9–1.1)
LACTATE BLDA-SCNC: 1 MMOL/L (ref 0.4–2)
LACTATE BLDA-SCNC: 1.5 MMOL/L (ref 0.4–2)
LACTATE BLDA-SCNC: 1.6 MMOL/L (ref 0.4–2)
LACTATE BLDA-SCNC: 1.8 MMOL/L (ref 0.4–2)
LACTATE BLDA-SCNC: 4.6 MMOL/L (ref 0.4–2)
LACTATE BLDV-SCNC: 4 MMOL/L (ref 0.4–2)
LACTATE SERPL-SCNC: 2.9 MMOL/L (ref 0.4–2)
LYMPHOCYTES # BLD AUTO: 0.36 X10*3/UL (ref 1.2–4.8)
LYMPHOCYTES NFR BLD AUTO: 2 %
MAGNESIUM SERPL-MCNC: 1.88 MG/DL (ref 1.6–2.4)
MCH RBC QN AUTO: 24.7 PG (ref 26–34)
MCHC RBC AUTO-ENTMCNC: 31 G/DL (ref 32–36)
MCV RBC AUTO: 80 FL (ref 80–100)
MONOCYTES # BLD AUTO: 0.8 X10*3/UL (ref 0.1–1)
MONOCYTES NFR BLD AUTO: 4.4 %
NEUTROPHILS # BLD AUTO: 16.89 X10*3/UL (ref 1.2–7.7)
NEUTROPHILS NFR BLD AUTO: 92.7 %
NRBC BLD-RTO: 0 /100 WBCS (ref 0–0)
OXYHGB MFR BLDA: 95.7 % (ref 94–98)
OXYHGB MFR BLDA: 97.8 % (ref 94–98)
OXYHGB MFR BLDA: 97.9 % (ref 94–98)
OXYHGB MFR BLDA: 97.9 % (ref 94–98)
OXYHGB MFR BLDA: 98 % (ref 94–98)
P AXIS: 35 DEGREES
P OFFSET: 206 MS
P ONSET: 149 MS
PCO2 BLDA: 37 MM HG (ref 38–42)
PCO2 BLDA: 40 MM HG (ref 38–42)
PCO2 BLDA: 41 MM HG (ref 38–42)
PCO2 BLDA: 41 MM HG (ref 38–42)
PCO2 BLDA: 42 MM HG (ref 38–42)
PH BLDA: 7.34 PH (ref 7.38–7.42)
PH BLDA: 7.36 PH (ref 7.38–7.42)
PH BLDA: 7.37 PH (ref 7.38–7.42)
PH BLDA: 7.39 PH (ref 7.38–7.42)
PH BLDA: 7.4 PH (ref 7.38–7.42)
PHOSPHATE SERPL-MCNC: 6.7 MG/DL (ref 2.5–4.9)
PLATELET # BLD AUTO: 198 X10*3/UL (ref 150–450)
PO2 BLDA: 204 MM HG (ref 85–95)
PO2 BLDA: 222 MM HG (ref 85–95)
PO2 BLDA: 234 MM HG (ref 85–95)
PO2 BLDA: 245 MM HG (ref 85–95)
PO2 BLDA: 84 MM HG (ref 85–95)
POTASSIUM BLDA-SCNC: 4.3 MMOL/L (ref 3.5–5.3)
POTASSIUM BLDA-SCNC: 4.4 MMOL/L (ref 3.5–5.3)
POTASSIUM BLDA-SCNC: 4.5 MMOL/L (ref 3.5–5.3)
POTASSIUM BLDA-SCNC: 4.7 MMOL/L (ref 3.5–5.3)
POTASSIUM BLDA-SCNC: 6.4 MMOL/L (ref 3.5–5.3)
POTASSIUM SERPL-SCNC: 6.3 MMOL/L (ref 3.5–5.3)
PR INTERVAL: 152 MS
PROTHROMBIN TIME: 12.9 SECONDS (ref 9.8–12.8)
Q ONSET: 225 MS
QRS COUNT: 14 BEATS
QRS DURATION: 88 MS
QT INTERVAL: 342 MS
QTC CALCULATION(BAZETT): 406 MS
QTC FREDERICIA: 384 MS
R AXIS: -9 DEGREES
RBC # BLD AUTO: 5.43 X10*6/UL (ref 4.5–5.9)
RH FACTOR (ANTIGEN D): NORMAL
SAO2 % BLDA: 100 % (ref 94–100)
SAO2 % BLDA: 98 % (ref 94–100)
SAO2 % BLDA: 99 % (ref 94–100)
SODIUM BLDA-SCNC: 133 MMOL/L (ref 136–145)
SODIUM BLDA-SCNC: 135 MMOL/L (ref 136–145)
SODIUM BLDA-SCNC: 136 MMOL/L (ref 136–145)
SODIUM SERPL-SCNC: 137 MMOL/L (ref 136–145)
T AXIS: -7 DEGREES
T OFFSET: 396 MS
VENTRICULAR RATE: 85 BPM
WBC # BLD AUTO: 18.2 X10*3/UL (ref 4.4–11.3)

## 2025-01-21 PROCEDURE — 3700000001 HC GENERAL ANESTHESIA TIME - INITIAL BASE CHARGE: Performed by: NEUROLOGICAL SURGERY

## 2025-01-21 PROCEDURE — A22600 PR ARTHRODESIS POSTERIOR/POSTERIORLATERAL CERVICAL BELOW C2: Performed by: ANESTHESIOLOGY

## 2025-01-21 PROCEDURE — 83605 ASSAY OF LACTIC ACID: CPT

## 2025-01-21 PROCEDURE — 2500000004 HC RX 250 GENERAL PHARMACY W/ HCPCS (ALT 636 FOR OP/ED): Performed by: STUDENT IN AN ORGANIZED HEALTH CARE EDUCATION/TRAINING PROGRAM

## 2025-01-21 PROCEDURE — 2500000002 HC RX 250 W HCPCS SELF ADMINISTERED DRUGS (ALT 637 FOR MEDICARE OP, ALT 636 FOR OP/ED): Performed by: ANESTHESIOLOGY

## 2025-01-21 PROCEDURE — 0RG7071 FUSION OF 2 TO 7 THORACIC VERTEBRAL JOINTS WITH AUTOLOGOUS TISSUE SUBSTITUTE, POSTERIOR APPROACH, POSTERIOR COLUMN, OPEN APPROACH: ICD-10-PCS | Performed by: NEUROLOGICAL SURGERY

## 2025-01-21 PROCEDURE — 84132 ASSAY OF SERUM POTASSIUM: CPT

## 2025-01-21 PROCEDURE — 85025 COMPLETE CBC W/AUTO DIFF WBC: CPT

## 2025-01-21 PROCEDURE — 2500000004 HC RX 250 GENERAL PHARMACY W/ HCPCS (ALT 636 FOR OP/ED): Mod: TB | Performed by: NEUROLOGICAL SURGERY

## 2025-01-21 PROCEDURE — 2500000004 HC RX 250 GENERAL PHARMACY W/ HCPCS (ALT 636 FOR OP/ED)

## 2025-01-21 PROCEDURE — 85610 PROTHROMBIN TIME: CPT

## 2025-01-21 PROCEDURE — 22853 INSJ BIOMECHANICAL DEVICE: CPT | Performed by: NEUROLOGICAL SURGERY

## 2025-01-21 PROCEDURE — C1889 IMPLANT/INSERT DEVICE, NOC: HCPCS | Performed by: NEUROLOGICAL SURGERY

## 2025-01-21 PROCEDURE — 2500000004 HC RX 250 GENERAL PHARMACY W/ HCPCS (ALT 636 FOR OP/ED): Performed by: ANESTHESIOLOGIST ASSISTANT

## 2025-01-21 PROCEDURE — 2500000005 HC RX 250 GENERAL PHARMACY W/O HCPCS

## 2025-01-21 PROCEDURE — 36620 INSERTION CATHETER ARTERY: CPT | Performed by: ANESTHESIOLOGY

## 2025-01-21 PROCEDURE — 2500000005 HC RX 250 GENERAL PHARMACY W/O HCPCS: Performed by: NEUROLOGICAL SURGERY

## 2025-01-21 PROCEDURE — 22600 ARTHRD PST TQ 1NTRSPC CRV: CPT | Performed by: NEUROLOGICAL SURGERY

## 2025-01-21 PROCEDURE — 3700000002 HC GENERAL ANESTHESIA TIME - EACH INCREMENTAL 1 MINUTE: Performed by: NEUROLOGICAL SURGERY

## 2025-01-21 PROCEDURE — 94002 VENT MGMT INPAT INIT DAY: CPT

## 2025-01-21 PROCEDURE — 63045 LAM FACETEC & FORAMOT CRV: CPT | Performed by: NEUROLOGICAL SURGERY

## 2025-01-21 PROCEDURE — 99291 CRITICAL CARE FIRST HOUR: CPT

## 2025-01-21 PROCEDURE — 3600000018 HC OR TIME - INITIAL BASE CHARGE - PROCEDURE LEVEL SIX: Performed by: NEUROLOGICAL SURGERY

## 2025-01-21 PROCEDURE — 22614 ARTHRD PST TQ 1NTRSPC EA ADD: CPT | Performed by: NEUROLOGICAL SURGERY

## 2025-01-21 PROCEDURE — 0RT30ZZ RESECTION OF CERVICAL VERTEBRAL DISC, OPEN APPROACH: ICD-10-PCS | Performed by: NEUROLOGICAL SURGERY

## 2025-01-21 PROCEDURE — C1713 ANCHOR/SCREW BN/BN,TIS/BN: HCPCS | Performed by: NEUROLOGICAL SURGERY

## 2025-01-21 PROCEDURE — 5A1935Z RESPIRATORY VENTILATION, LESS THAN 24 CONSECUTIVE HOURS: ICD-10-PCS | Performed by: NEUROLOGICAL SURGERY

## 2025-01-21 PROCEDURE — 22212 INCIS 1 VERTEBRAL SEG THORAC: CPT | Performed by: NEUROLOGICAL SURGERY

## 2025-01-21 PROCEDURE — 37799 UNLISTED PX VASCULAR SURGERY: CPT

## 2025-01-21 PROCEDURE — 0BH17EZ INSERTION OF ENDOTRACHEAL AIRWAY INTO TRACHEA, VIA NATURAL OR ARTIFICIAL OPENING: ICD-10-PCS | Performed by: NEUROLOGICAL SURGERY

## 2025-01-21 PROCEDURE — 3600000017 HC OR TIME - EACH INCREMENTAL 1 MINUTE - PROCEDURE LEVEL SIX: Performed by: NEUROLOGICAL SURGERY

## 2025-01-21 PROCEDURE — 83735 ASSAY OF MAGNESIUM: CPT

## 2025-01-21 PROCEDURE — 2780000003 HC OR 278 NO HCPCS: Performed by: NEUROLOGICAL SURGERY

## 2025-01-21 PROCEDURE — 0RG4071 FUSION OF CERVICOTHORACIC VERTEBRAL JOINT WITH AUTOLOGOUS TISSUE SUBSTITUTE, POSTERIOR APPROACH, POSTERIOR COLUMN, OPEN APPROACH: ICD-10-PCS | Performed by: NEUROLOGICAL SURGERY

## 2025-01-21 PROCEDURE — 0RG20A0 FUSION OF 2 OR MORE CERVICAL VERTEBRAL JOINTS WITH INTERBODY FUSION DEVICE, ANTERIOR APPROACH, ANTERIOR COLUMN, OPEN APPROACH: ICD-10-PCS | Performed by: NEUROLOGICAL SURGERY

## 2025-01-21 PROCEDURE — 36415 COLL VENOUS BLD VENIPUNCTURE: CPT

## 2025-01-21 PROCEDURE — 74018 RADEX ABDOMEN 1 VIEW: CPT

## 2025-01-21 PROCEDURE — 2500000004 HC RX 250 GENERAL PHARMACY W/ HCPCS (ALT 636 FOR OP/ED): Performed by: ANESTHESIOLOGY

## 2025-01-21 PROCEDURE — 82435 ASSAY OF BLOOD CHLORIDE: CPT

## 2025-01-21 PROCEDURE — 85384 FIBRINOGEN ACTIVITY: CPT

## 2025-01-21 PROCEDURE — 82330 ASSAY OF CALCIUM: CPT

## 2025-01-21 PROCEDURE — 2020000001 HC ICU ROOM DAILY

## 2025-01-21 PROCEDURE — A22600 PR ARTHRODESIS POSTERIOR/POSTERIORLATERAL CERVICAL BELOW C2

## 2025-01-21 PROCEDURE — 00NW0ZZ RELEASE CERVICAL SPINAL CORD, OPEN APPROACH: ICD-10-PCS | Performed by: NEUROLOGICAL SURGERY

## 2025-01-21 PROCEDURE — 2720000007 HC OR 272 NO HCPCS: Performed by: NEUROLOGICAL SURGERY

## 2025-01-21 PROCEDURE — P9045 ALBUMIN (HUMAN), 5%, 250 ML: HCPCS | Mod: JZ,TB

## 2025-01-21 PROCEDURE — 22551 ARTHRD ANT NTRBDY CERVICAL: CPT | Performed by: NEUROLOGICAL SURGERY

## 2025-01-21 PROCEDURE — 22843 INSERT SPINE FIXATION DEVICE: CPT | Performed by: NEUROLOGICAL SURGERY

## 2025-01-21 PROCEDURE — 63015 REMOVE SPINE LAMINA >2 CRVCL: CPT | Performed by: NEUROLOGICAL SURGERY

## 2025-01-21 PROCEDURE — 01N10ZZ RELEASE CERVICAL NERVE, OPEN APPROACH: ICD-10-PCS | Performed by: NEUROLOGICAL SURGERY

## 2025-01-21 PROCEDURE — 22552 ARTHRD ANT NTRBD CERVICAL EA: CPT | Performed by: NEUROLOGICAL SURGERY

## 2025-01-21 PROCEDURE — 74018 RADEX ABDOMEN 1 VIEW: CPT | Performed by: RADIOLOGY

## 2025-01-21 PROCEDURE — 0RG2071 FUSION OF 2 OR MORE CERVICAL VERTEBRAL JOINTS WITH AUTOLOGOUS TISSUE SUBSTITUTE, POSTERIOR APPROACH, POSTERIOR COLUMN, OPEN APPROACH: ICD-10-PCS | Performed by: NEUROLOGICAL SURGERY

## 2025-01-21 DEVICE — QUICK START DISTRACTION SCREW, 14 MM, STERILE, 5/BOX
Type: IMPLANTABLE DEVICE | Site: SPINE CERVICAL | Status: NON-FUNCTIONAL
Brand: TSI

## 2025-01-21 DEVICE — COMPONENT, SIGNIFY, BIOACTIVE CRUNCH, 10CC: Type: IMPLANTABLE DEVICE | Site: SPINE CERVICAL | Status: FUNCTIONAL

## 2025-01-21 DEVICE — INTERLOCK II TI-C, 10X17X14MM 15°
Type: IMPLANTABLE DEVICE | Site: SPINE CERVICAL | Status: FUNCTIONAL
Brand: COROENT

## 2025-01-21 RX ORDER — METOPROLOL TARTRATE 1 MG/ML
INJECTION, SOLUTION INTRAVENOUS AS NEEDED
Status: DISCONTINUED | OUTPATIENT
Start: 2025-01-21 | End: 2025-01-21

## 2025-01-21 RX ORDER — SCOPOLAMINE 1 MG/3D
1 PATCH, EXTENDED RELEASE TRANSDERMAL
Status: COMPLETED | OUTPATIENT
Start: 2025-01-21 | End: 2025-01-21

## 2025-01-21 RX ORDER — SODIUM CHLORIDE, SODIUM LACTATE, POTASSIUM CHLORIDE, CALCIUM CHLORIDE 600; 310; 30; 20 MG/100ML; MG/100ML; MG/100ML; MG/100ML
INJECTION, SOLUTION INTRAVENOUS CONTINUOUS PRN
Status: DISCONTINUED | OUTPATIENT
Start: 2025-01-21 | End: 2025-01-21

## 2025-01-21 RX ORDER — APREPITANT 40 MG/1
40 CAPSULE ORAL ONCE
Status: COMPLETED | OUTPATIENT
Start: 2025-01-21 | End: 2025-01-21

## 2025-01-21 RX ORDER — OXYCODONE HYDROCHLORIDE 5 MG/1
5 TABLET ORAL EVERY 4 HOURS PRN
Status: DISCONTINUED | OUTPATIENT
Start: 2025-01-21 | End: 2025-01-21 | Stop reason: HOSPADM

## 2025-01-21 RX ORDER — HYDROMORPHONE HYDROCHLORIDE 1 MG/ML
INJECTION, SOLUTION INTRAMUSCULAR; INTRAVENOUS; SUBCUTANEOUS AS NEEDED
Status: DISCONTINUED | OUTPATIENT
Start: 2025-01-21 | End: 2025-01-21

## 2025-01-21 RX ORDER — POLYETHYLENE GLYCOL 3350 17 G/17G
17 POWDER, FOR SOLUTION ORAL 2 TIMES DAILY
Status: DISCONTINUED | OUTPATIENT
Start: 2025-01-21 | End: 2025-01-28 | Stop reason: HOSPADM

## 2025-01-21 RX ORDER — DEXTROSE 50 % IN WATER (D50W) INTRAVENOUS SYRINGE
12.5
Status: DISCONTINUED | OUTPATIENT
Start: 2025-01-21 | End: 2025-01-28 | Stop reason: HOSPADM

## 2025-01-21 RX ORDER — BUPIVACAINE HYDROCHLORIDE 2.5 MG/ML
INJECTION, SOLUTION INFILTRATION; PERINEURAL AS NEEDED
Status: DISCONTINUED | OUTPATIENT
Start: 2025-01-21 | End: 2025-01-21 | Stop reason: HOSPADM

## 2025-01-21 RX ORDER — MAGNESIUM SULFATE HEPTAHYDRATE 500 MG/ML
INJECTION, SOLUTION INTRAMUSCULAR; INTRAVENOUS AS NEEDED
Status: DISCONTINUED | OUTPATIENT
Start: 2025-01-21 | End: 2025-01-21

## 2025-01-21 RX ORDER — HEPARIN SODIUM 5000 [USP'U]/ML
5000 INJECTION, SOLUTION INTRAVENOUS; SUBCUTANEOUS EVERY 8 HOURS
Status: DISCONTINUED | OUTPATIENT
Start: 2025-01-22 | End: 2025-01-28 | Stop reason: HOSPADM

## 2025-01-21 RX ORDER — PANTOPRAZOLE SODIUM 40 MG/1
40 TABLET, DELAYED RELEASE ORAL 2 TIMES DAILY
Status: DISCONTINUED | OUTPATIENT
Start: 2025-01-21 | End: 2025-01-28 | Stop reason: HOSPADM

## 2025-01-21 RX ORDER — ATORVASTATIN CALCIUM 40 MG/1
40 TABLET, FILM COATED ORAL NIGHTLY
Status: DISCONTINUED | OUTPATIENT
Start: 2025-01-21 | End: 2025-01-28 | Stop reason: HOSPADM

## 2025-01-21 RX ORDER — LOSARTAN POTASSIUM 25 MG/1
25 TABLET ORAL DAILY
Status: DISCONTINUED | OUTPATIENT
Start: 2025-01-21 | End: 2025-01-28 | Stop reason: HOSPADM

## 2025-01-21 RX ORDER — ONDANSETRON 4 MG/1
4 TABLET, FILM COATED ORAL EVERY 8 HOURS PRN
Status: DISCONTINUED | OUTPATIENT
Start: 2025-01-21 | End: 2025-01-28 | Stop reason: HOSPADM

## 2025-01-21 RX ORDER — BISACODYL 5 MG
10 TABLET, DELAYED RELEASE (ENTERIC COATED) ORAL DAILY PRN
Status: DISCONTINUED | OUTPATIENT
Start: 2025-01-21 | End: 2025-01-23

## 2025-01-21 RX ORDER — SUCCINYLCHOLINE CHLORIDE 20 MG/ML
INJECTION INTRAMUSCULAR; INTRAVENOUS AS NEEDED
Status: DISCONTINUED | OUTPATIENT
Start: 2025-01-21 | End: 2025-01-21

## 2025-01-21 RX ORDER — ALBUMIN HUMAN 50 G/1000ML
SOLUTION INTRAVENOUS AS NEEDED
Status: DISCONTINUED | OUTPATIENT
Start: 2025-01-21 | End: 2025-01-21

## 2025-01-21 RX ORDER — NORETHINDRONE AND ETHINYL ESTRADIOL 0.5-0.035
KIT ORAL AS NEEDED
Status: DISCONTINUED | OUTPATIENT
Start: 2025-01-21 | End: 2025-01-21

## 2025-01-21 RX ORDER — PROPOFOL 10 MG/ML
INJECTION, EMULSION INTRAVENOUS
Status: COMPLETED
Start: 2025-01-21 | End: 2025-01-21

## 2025-01-21 RX ORDER — INSULIN LISPRO 100 [IU]/ML
0-5 INJECTION, SOLUTION INTRAVENOUS; SUBCUTANEOUS
Status: DISCONTINUED | OUTPATIENT
Start: 2025-01-22 | End: 2025-01-22

## 2025-01-21 RX ORDER — TRANEXAMIC ACID 100 MG/ML
INJECTION, SOLUTION INTRAVENOUS AS NEEDED
Status: DISCONTINUED | OUTPATIENT
Start: 2025-01-21 | End: 2025-01-21

## 2025-01-21 RX ORDER — LIDOCAINE HCL/PF 100 MG/5ML
SYRINGE (ML) INTRAVENOUS AS NEEDED
Status: DISCONTINUED | OUTPATIENT
Start: 2025-01-21 | End: 2025-01-21

## 2025-01-21 RX ORDER — SODIUM CHLORIDE, SODIUM LACTATE, POTASSIUM CHLORIDE, CALCIUM CHLORIDE 600; 310; 30; 20 MG/100ML; MG/100ML; MG/100ML; MG/100ML
100 INJECTION, SOLUTION INTRAVENOUS CONTINUOUS
Status: DISCONTINUED | OUTPATIENT
Start: 2025-01-21 | End: 2025-01-21

## 2025-01-21 RX ORDER — ROCURONIUM BROMIDE 10 MG/ML
INJECTION, SOLUTION INTRAVENOUS AS NEEDED
Status: DISCONTINUED | OUTPATIENT
Start: 2025-01-21 | End: 2025-01-21

## 2025-01-21 RX ORDER — PROPOFOL 10 MG/ML
INJECTION, EMULSION INTRAVENOUS AS NEEDED
Status: DISCONTINUED | OUTPATIENT
Start: 2025-01-21 | End: 2025-01-21

## 2025-01-21 RX ORDER — TAMSULOSIN HYDROCHLORIDE 0.4 MG/1
0.4 CAPSULE ORAL NIGHTLY
Status: DISCONTINUED | OUTPATIENT
Start: 2025-01-21 | End: 2025-01-28 | Stop reason: HOSPADM

## 2025-01-21 RX ORDER — MIDAZOLAM HYDROCHLORIDE 1 MG/ML
INJECTION INTRAMUSCULAR; INTRAVENOUS AS NEEDED
Status: DISCONTINUED | OUTPATIENT
Start: 2025-01-21 | End: 2025-01-21

## 2025-01-21 RX ORDER — ONDANSETRON HYDROCHLORIDE 2 MG/ML
4 INJECTION, SOLUTION INTRAVENOUS EVERY 8 HOURS PRN
Status: DISCONTINUED | OUTPATIENT
Start: 2025-01-21 | End: 2025-01-28 | Stop reason: HOSPADM

## 2025-01-21 RX ORDER — CALCIUM GLUCONATE 20 MG/ML
1 INJECTION, SOLUTION INTRAVENOUS EVERY 6 HOURS PRN
Status: DISCONTINUED | OUTPATIENT
Start: 2025-01-21 | End: 2025-01-28 | Stop reason: HOSPADM

## 2025-01-21 RX ORDER — MAGNESIUM SULFATE HEPTAHYDRATE 40 MG/ML
4 INJECTION, SOLUTION INTRAVENOUS EVERY 6 HOURS PRN
Status: DISCONTINUED | OUTPATIENT
Start: 2025-01-21 | End: 2025-01-28 | Stop reason: HOSPADM

## 2025-01-21 RX ORDER — DROPERIDOL 2.5 MG/ML
0.62 INJECTION, SOLUTION INTRAMUSCULAR; INTRAVENOUS ONCE AS NEEDED
Status: DISCONTINUED | OUTPATIENT
Start: 2025-01-21 | End: 2025-01-21 | Stop reason: HOSPADM

## 2025-01-21 RX ORDER — FENTANYL CITRATE 50 UG/ML
INJECTION, SOLUTION INTRAMUSCULAR; INTRAVENOUS AS NEEDED
Status: DISCONTINUED | OUTPATIENT
Start: 2025-01-21 | End: 2025-01-21

## 2025-01-21 RX ORDER — PHENYLEPHRINE HCL IN 0.9% NACL 0.4MG/10ML
SYRINGE (ML) INTRAVENOUS AS NEEDED
Status: DISCONTINUED | OUTPATIENT
Start: 2025-01-21 | End: 2025-01-21

## 2025-01-21 RX ORDER — LIDOCAINE HYDROCHLORIDE AND EPINEPHRINE 5; 5 MG/ML; UG/ML
INJECTION, SOLUTION INFILTRATION; PERINEURAL AS NEEDED
Status: DISCONTINUED | OUTPATIENT
Start: 2025-01-21 | End: 2025-01-21 | Stop reason: HOSPADM

## 2025-01-21 RX ORDER — SODIUM CHLORIDE, SODIUM LACTATE, POTASSIUM CHLORIDE, CALCIUM CHLORIDE 600; 310; 30; 20 MG/100ML; MG/100ML; MG/100ML; MG/100ML
100 INJECTION, SOLUTION INTRAVENOUS CONTINUOUS
Status: DISCONTINUED | OUTPATIENT
Start: 2025-01-21 | End: 2025-01-21 | Stop reason: HOSPADM

## 2025-01-21 RX ORDER — ONDANSETRON HYDROCHLORIDE 2 MG/ML
INJECTION, SOLUTION INTRAVENOUS AS NEEDED
Status: DISCONTINUED | OUTPATIENT
Start: 2025-01-21 | End: 2025-01-21

## 2025-01-21 RX ORDER — PHENYLEPHRINE 10 MG/250 ML(40 MCG/ML)IN 0.9 % SOD.CHLORIDE INTRAVENOUS
CONTINUOUS PRN
Status: DISCONTINUED | OUTPATIENT
Start: 2025-01-21 | End: 2025-01-21

## 2025-01-21 RX ORDER — HYDROMORPHONE HYDROCHLORIDE 0.2 MG/ML
0.2 INJECTION INTRAMUSCULAR; INTRAVENOUS; SUBCUTANEOUS EVERY 5 MIN PRN
Status: DISCONTINUED | OUTPATIENT
Start: 2025-01-21 | End: 2025-01-21 | Stop reason: HOSPADM

## 2025-01-21 RX ORDER — CYCLOBENZAPRINE HCL 10 MG
5 TABLET ORAL 3 TIMES DAILY
Status: DISCONTINUED | OUTPATIENT
Start: 2025-01-21 | End: 2025-01-28 | Stop reason: HOSPADM

## 2025-01-21 RX ORDER — POLYETHYLENE GLYCOL 3350 17 G/17G
17 POWDER, FOR SOLUTION ORAL DAILY
Status: DISCONTINUED | OUTPATIENT
Start: 2025-01-21 | End: 2025-01-21

## 2025-01-21 RX ORDER — ONDANSETRON HYDROCHLORIDE 2 MG/ML
4 INJECTION, SOLUTION INTRAVENOUS ONCE AS NEEDED
Status: DISCONTINUED | OUTPATIENT
Start: 2025-01-21 | End: 2025-01-21 | Stop reason: HOSPADM

## 2025-01-21 RX ORDER — SODIUM CHLORIDE, SODIUM LACTATE, POTASSIUM CHLORIDE, CALCIUM CHLORIDE 600; 310; 30; 20 MG/100ML; MG/100ML; MG/100ML; MG/100ML
100 INJECTION, SOLUTION INTRAVENOUS CONTINUOUS
Status: ACTIVE | OUTPATIENT
Start: 2025-01-21 | End: 2025-01-22

## 2025-01-21 RX ORDER — MAGNESIUM SULFATE HEPTAHYDRATE 40 MG/ML
2 INJECTION, SOLUTION INTRAVENOUS EVERY 6 HOURS PRN
Status: DISCONTINUED | OUTPATIENT
Start: 2025-01-21 | End: 2025-01-28 | Stop reason: HOSPADM

## 2025-01-21 RX ORDER — GLYCOPYRROLATE 0.2 MG/ML
INJECTION INTRAMUSCULAR; INTRAVENOUS AS NEEDED
Status: DISCONTINUED | OUTPATIENT
Start: 2025-01-21 | End: 2025-01-21

## 2025-01-21 RX ORDER — LIDOCAINE HYDROCHLORIDE 20 MG/ML
INJECTION, SOLUTION INFILTRATION; PERINEURAL AS NEEDED
Status: DISCONTINUED | OUTPATIENT
Start: 2025-01-21 | End: 2025-01-21

## 2025-01-21 RX ORDER — CEFAZOLIN 1 G/1
INJECTION, POWDER, FOR SOLUTION INTRAVENOUS AS NEEDED
Status: DISCONTINUED | OUTPATIENT
Start: 2025-01-21 | End: 2025-01-21 | Stop reason: HOSPADM

## 2025-01-21 RX ORDER — PROPOFOL 10 MG/ML
0-50 INJECTION, EMULSION INTRAVENOUS CONTINUOUS
Status: DISCONTINUED | OUTPATIENT
Start: 2025-01-21 | End: 2025-01-22

## 2025-01-21 RX ORDER — LIDOCAINE HYDROCHLORIDE 10 MG/ML
0.1 INJECTION, SOLUTION INFILTRATION; PERINEURAL ONCE
Status: CANCELLED | OUTPATIENT
Start: 2025-01-21 | End: 2025-01-21

## 2025-01-21 RX ORDER — GABAPENTIN 300 MG/1
600 CAPSULE ORAL 3 TIMES DAILY
Status: DISCONTINUED | OUTPATIENT
Start: 2025-01-21 | End: 2025-01-28 | Stop reason: HOSPADM

## 2025-01-21 RX ORDER — DEXTROSE 50 % IN WATER (D50W) INTRAVENOUS SYRINGE
25
Status: DISCONTINUED | OUTPATIENT
Start: 2025-01-21 | End: 2025-01-28 | Stop reason: HOSPADM

## 2025-01-21 RX ORDER — FENTANYL CITRATE-0.9 % NACL/PF 10 MCG/ML
PLASTIC BAG, INJECTION (ML) INTRAVENOUS
Status: COMPLETED
Start: 2025-01-21 | End: 2025-01-21

## 2025-01-21 RX ORDER — CALCIUM GLUCONATE 20 MG/ML
2 INJECTION, SOLUTION INTRAVENOUS EVERY 6 HOURS PRN
Status: DISCONTINUED | OUTPATIENT
Start: 2025-01-21 | End: 2025-01-28 | Stop reason: HOSPADM

## 2025-01-21 RX ORDER — FENTANYL CITRATE-0.9 % NACL/PF 10 MCG/ML
0-300 PLASTIC BAG, INJECTION (ML) INTRAVENOUS CONTINUOUS
Status: DISCONTINUED | OUTPATIENT
Start: 2025-01-21 | End: 2025-01-22

## 2025-01-21 RX ORDER — VANCOMYCIN HYDROCHLORIDE 1 G/20ML
INJECTION, POWDER, LYOPHILIZED, FOR SOLUTION INTRAVENOUS AS NEEDED
Status: DISCONTINUED | OUTPATIENT
Start: 2025-01-21 | End: 2025-01-21 | Stop reason: HOSPADM

## 2025-01-21 RX ORDER — CEFAZOLIN 1 G/1
INJECTION, POWDER, FOR SOLUTION INTRAVENOUS AS NEEDED
Status: DISCONTINUED | OUTPATIENT
Start: 2025-01-21 | End: 2025-01-21

## 2025-01-21 RX ORDER — ESMOLOL HYDROCHLORIDE 10 MG/ML
INJECTION INTRAVENOUS AS NEEDED
Status: DISCONTINUED | OUTPATIENT
Start: 2025-01-21 | End: 2025-01-21

## 2025-01-21 RX ORDER — MEPERIDINE HYDROCHLORIDE 25 MG/ML
12.5 INJECTION INTRAMUSCULAR; INTRAVENOUS; SUBCUTANEOUS EVERY 10 MIN PRN
Status: DISCONTINUED | OUTPATIENT
Start: 2025-01-21 | End: 2025-01-21 | Stop reason: HOSPADM

## 2025-01-21 RX ADMIN — FENTANYL CITRATE 25 MCG: 50 INJECTION, SOLUTION INTRAMUSCULAR; INTRAVENOUS at 10:26

## 2025-01-21 RX ADMIN — EPHEDRINE SULFATE 10 MG: 50 INJECTION, SOLUTION INTRAVENOUS at 12:55

## 2025-01-21 RX ADMIN — HYDROMORPHONE HYDROCHLORIDE 0.2 MG: 1 INJECTION, SOLUTION INTRAMUSCULAR; INTRAVENOUS; SUBCUTANEOUS at 13:02

## 2025-01-21 RX ADMIN — Medication 120 MCG: at 09:25

## 2025-01-21 RX ADMIN — ROCURONIUM BROMIDE 20 MG: 10 INJECTION INTRAVENOUS at 10:38

## 2025-01-21 RX ADMIN — PROPOFOL 200 MG: 10 INJECTION, EMULSION INTRAVENOUS at 07:46

## 2025-01-21 RX ADMIN — ROCURONIUM BROMIDE 30 MG: 10 INJECTION INTRAVENOUS at 09:17

## 2025-01-21 RX ADMIN — ROCURONIUM BROMIDE 20 MG: 10 INJECTION INTRAVENOUS at 13:10

## 2025-01-21 RX ADMIN — PROPOFOL 50 MCG/KG/MIN: 10 INJECTION, EMULSION INTRAVENOUS at 17:47

## 2025-01-21 RX ADMIN — SUGAMMADEX 200 MG: 100 INJECTION, SOLUTION INTRAVENOUS at 16:50

## 2025-01-21 RX ADMIN — ROCURONIUM BROMIDE 20 MG: 10 INJECTION INTRAVENOUS at 11:32

## 2025-01-21 RX ADMIN — FENTANYL CITRATE 50 MCG: 50 INJECTION, SOLUTION INTRAMUSCULAR; INTRAVENOUS at 09:15

## 2025-01-21 RX ADMIN — GLYCOPYRROLATE 0.1 MG: 0.2 INJECTION, SOLUTION INTRAMUSCULAR; INTRAVENOUS at 09:10

## 2025-01-21 RX ADMIN — GLYCOPYRROLATE 0.1 MG: 0.2 INJECTION, SOLUTION INTRAMUSCULAR; INTRAVENOUS at 07:39

## 2025-01-21 RX ADMIN — METOPROLOL TARTRATE 5 MG: 1 INJECTION, SOLUTION INTRAVENOUS at 07:46

## 2025-01-21 RX ADMIN — ALBUMIN HUMAN 250 ML: 0.05 INJECTION, SOLUTION INTRAVENOUS at 14:11

## 2025-01-21 RX ADMIN — FENTANYL CITRATE 50 MCG: 50 INJECTION, SOLUTION INTRAMUSCULAR; INTRAVENOUS at 08:16

## 2025-01-21 RX ADMIN — EPHEDRINE SULFATE 10 MG: 50 INJECTION, SOLUTION INTRAVENOUS at 16:03

## 2025-01-21 RX ADMIN — ROCURONIUM BROMIDE 30 MG: 10 INJECTION INTRAVENOUS at 09:47

## 2025-01-21 RX ADMIN — ALBUMIN HUMAN 250 ML: 0.05 INJECTION, SOLUTION INTRAVENOUS at 13:11

## 2025-01-21 RX ADMIN — MAGNESIUM SULFATE HEPTAHYDRATE 2 G: 500 INJECTION, SOLUTION INTRAMUSCULAR; INTRAVENOUS at 09:17

## 2025-01-21 RX ADMIN — EPHEDRINE SULFATE 5 MG: 50 INJECTION, SOLUTION INTRAVENOUS at 13:46

## 2025-01-21 RX ADMIN — Medication 120 MCG: at 12:47

## 2025-01-21 RX ADMIN — DEXAMETHASONE SODIUM PHOSPHATE 4 MG: 4 INJECTION, SOLUTION INTRA-ARTICULAR; INTRALESIONAL; INTRAMUSCULAR; INTRAVENOUS; SOFT TISSUE at 18:21

## 2025-01-21 RX ADMIN — ROCURONIUM BROMIDE 20 MG: 10 INJECTION INTRAVENOUS at 14:31

## 2025-01-21 RX ADMIN — SUCCINYLCHOLINE CHLORIDE 100 MG: 20 INJECTION, SOLUTION INTRAMUSCULAR; INTRAVENOUS at 17:07

## 2025-01-21 RX ADMIN — ROCURONIUM BROMIDE 20 MG: 10 INJECTION INTRAVENOUS at 13:37

## 2025-01-21 RX ADMIN — Medication 120 MCG: at 12:37

## 2025-01-21 RX ADMIN — PHENYLEPHRINE-NACL IV SOLUTION 10 MG/250ML-0.9% 0.3 MCG/KG/MIN: 10-0.9/25 SOLUTION at 08:07

## 2025-01-21 RX ADMIN — TRANEXAMIC ACID 1000 MG: 100 INJECTION INTRAVENOUS at 12:55

## 2025-01-21 RX ADMIN — ROCURONIUM BROMIDE 20 MG: 10 INJECTION INTRAVENOUS at 11:50

## 2025-01-21 RX ADMIN — HYDROMORPHONE HYDROCHLORIDE 0.2 MG: 1 INJECTION, SOLUTION INTRAMUSCULAR; INTRAVENOUS; SUBCUTANEOUS at 16:07

## 2025-01-21 RX ADMIN — PROPOFOL 50 MCG/KG/MIN: 10 INJECTION, EMULSION INTRAVENOUS at 17:11

## 2025-01-21 RX ADMIN — EPHEDRINE SULFATE 5 MG: 50 INJECTION, SOLUTION INTRAVENOUS at 16:22

## 2025-01-21 RX ADMIN — Medication 120 MCG: at 09:02

## 2025-01-21 RX ADMIN — GLYCOPYRROLATE 0.1 MG: 0.2 INJECTION, SOLUTION INTRAMUSCULAR; INTRAVENOUS at 08:32

## 2025-01-21 RX ADMIN — HYDROMORPHONE HYDROCHLORIDE 0.2 MG: 1 INJECTION, SOLUTION INTRAMUSCULAR; INTRAVENOUS; SUBCUTANEOUS at 13:38

## 2025-01-21 RX ADMIN — Medication 50 MCG/HR: at 17:50

## 2025-01-21 RX ADMIN — SODIUM CHLORIDE 500 ML: 9 INJECTION, SOLUTION INTRAVENOUS at 18:32

## 2025-01-21 RX ADMIN — LIDOCAINE HYDROCHLORIDE 100 MG: 20 INJECTION INTRAVENOUS at 07:46

## 2025-01-21 RX ADMIN — SODIUM CHLORIDE, POTASSIUM CHLORIDE, SODIUM LACTATE AND CALCIUM CHLORIDE 100 ML/HR: 600; 310; 30; 20 INJECTION, SOLUTION INTRAVENOUS at 17:55

## 2025-01-21 RX ADMIN — Medication 160 MCG: at 09:29

## 2025-01-21 RX ADMIN — MIDAZOLAM HYDROCHLORIDE 2 MG: 1 INJECTION, SOLUTION INTRAMUSCULAR; INTRAVENOUS at 07:39

## 2025-01-21 RX ADMIN — Medication 120 MCG: at 08:27

## 2025-01-21 RX ADMIN — EPHEDRINE SULFATE 5 MG: 50 INJECTION, SOLUTION INTRAVENOUS at 13:15

## 2025-01-21 RX ADMIN — ROCURONIUM BROMIDE 20 MG: 10 INJECTION INTRAVENOUS at 14:03

## 2025-01-21 RX ADMIN — SODIUM CHLORIDE, POTASSIUM CHLORIDE, SODIUM LACTATE AND CALCIUM CHLORIDE: 600; 310; 30; 20 INJECTION, SOLUTION INTRAVENOUS at 07:39

## 2025-01-21 RX ADMIN — DEXAMETHASONE SODIUM PHOSPHATE 8 MG: 4 INJECTION INTRA-ARTICULAR; INTRALESIONAL; INTRAMUSCULAR; INTRAVENOUS; SOFT TISSUE at 08:08

## 2025-01-21 RX ADMIN — ALBUMIN HUMAN 250 ML: 0.05 INJECTION, SOLUTION INTRAVENOUS at 14:52

## 2025-01-21 RX ADMIN — Medication 160 MCG: at 12:44

## 2025-01-21 RX ADMIN — Medication 30 PERCENT: at 17:48

## 2025-01-21 RX ADMIN — ROCURONIUM BROMIDE 30 MG: 10 INJECTION INTRAVENOUS at 08:47

## 2025-01-21 RX ADMIN — CEFAZOLIN 2 G: 1 INJECTION, POWDER, FOR SOLUTION INTRAMUSCULAR; INTRAVENOUS at 12:43

## 2025-01-21 RX ADMIN — PROPOFOL 40 MCG/KG/MIN: 10 INJECTION, EMULSION INTRAVENOUS at 21:04

## 2025-01-21 RX ADMIN — FENTANYL CITRATE 50 MCG: 50 INJECTION, SOLUTION INTRAMUSCULAR; INTRAVENOUS at 08:21

## 2025-01-21 RX ADMIN — SODIUM CHLORIDE, POTASSIUM CHLORIDE, SODIUM LACTATE AND CALCIUM CHLORIDE 100 ML/HR: 600; 310; 30; 20 INJECTION, SOLUTION INTRAVENOUS at 19:30

## 2025-01-21 RX ADMIN — ROCURONIUM BROMIDE 20 MG: 10 INJECTION INTRAVENOUS at 11:04

## 2025-01-21 RX ADMIN — Medication 160 MCG: at 08:00

## 2025-01-21 RX ADMIN — FENTANYL CITRATE 25 MCG: 50 INJECTION, SOLUTION INTRAMUSCULAR; INTRAVENOUS at 11:07

## 2025-01-21 RX ADMIN — Medication 120 MCG: at 14:31

## 2025-01-21 RX ADMIN — ROCURONIUM BROMIDE 50 MG: 10 INJECTION INTRAVENOUS at 07:46

## 2025-01-21 RX ADMIN — Medication 240 MCG: at 08:01

## 2025-01-21 RX ADMIN — CEFAZOLIN 2 G: 1 INJECTION, POWDER, FOR SOLUTION INTRAMUSCULAR; INTRAVENOUS at 08:45

## 2025-01-21 RX ADMIN — APREPITANT 40 MG: 40 CAPSULE ORAL at 07:15

## 2025-01-21 RX ADMIN — Medication 120 MCG: at 08:32

## 2025-01-21 RX ADMIN — ROCURONIUM BROMIDE 30 MG: 10 INJECTION INTRAVENOUS at 12:40

## 2025-01-21 RX ADMIN — Medication 320 MCG: at 12:09

## 2025-01-21 RX ADMIN — Medication 30 PERCENT: at 20:06

## 2025-01-21 RX ADMIN — ALBUMIN HUMAN 250 ML: 0.05 INJECTION, SOLUTION INTRAVENOUS at 15:09

## 2025-01-21 RX ADMIN — PROPOFOL 50 MG: 10 INJECTION, EMULSION INTRAVENOUS at 17:07

## 2025-01-21 RX ADMIN — ESMOLOL HYDROCHLORIDE 60 MG: 100 INJECTION, SOLUTION INTRAVENOUS at 08:16

## 2025-01-21 RX ADMIN — ONDANSETRON 4 MG: 2 INJECTION INTRAMUSCULAR; INTRAVENOUS at 16:02

## 2025-01-21 RX ADMIN — SCOPOLAMINE 1 PATCH: 1.5 PATCH, EXTENDED RELEASE TRANSDERMAL at 07:15

## 2025-01-21 RX ADMIN — Medication 80 MCG: at 14:20

## 2025-01-21 RX ADMIN — EPHEDRINE SULFATE 15 MG: 50 INJECTION, SOLUTION INTRAVENOUS at 16:27

## 2025-01-21 RX ADMIN — ROCURONIUM BROMIDE 30 MG: 10 INJECTION INTRAVENOUS at 13:21

## 2025-01-21 ASSESSMENT — COLUMBIA-SUICIDE SEVERITY RATING SCALE - C-SSRS
2. HAVE YOU ACTUALLY HAD ANY THOUGHTS OF KILLING YOURSELF?: NO
1. IN THE PAST MONTH, HAVE YOU WISHED YOU WERE DEAD OR WISHED YOU COULD GO TO SLEEP AND NOT WAKE UP?: NO
6. HAVE YOU EVER DONE ANYTHING, STARTED TO DO ANYTHING, OR PREPARED TO DO ANYTHING TO END YOUR LIFE?: NO

## 2025-01-21 ASSESSMENT — ACTIVITIES OF DAILY LIVING (ADL)
JUDGMENT_ADEQUATE_SAFELY_COMPLETE_DAILY_ACTIVITIES: UNABLE TO ASSESS
BATHING: UNABLE TO ASSESS
GROOMING: UNABLE TO ASSESS
FEEDING YOURSELF: UNABLE TO ASSESS
HEARING - LEFT EAR: UNABLE TO ASSESS
WALKS IN HOME: UNABLE TO ASSESS
DRESSING YOURSELF: UNABLE TO ASSESS
ADEQUATE_TO_COMPLETE_ADL: UNABLE TO ASSESS
TOILETING: UNABLE TO ASSESS
PATIENT'S MEMORY ADEQUATE TO SAFELY COMPLETE DAILY ACTIVITIES?: UNABLE TO ASSESS
HEARING - RIGHT EAR: UNABLE TO ASSESS

## 2025-01-21 ASSESSMENT — PAIN SCALES - GENERAL: PAINLEVEL_OUTOF10: 0 - NO PAIN

## 2025-01-21 ASSESSMENT — PAIN - FUNCTIONAL ASSESSMENT
PAIN_FUNCTIONAL_ASSESSMENT: 0-10
PAIN_FUNCTIONAL_ASSESSMENT: CPOT (CRITICAL CARE PAIN OBSERVATION TOOL)
PAIN_FUNCTIONAL_ASSESSMENT: CPOT (CRITICAL CARE PAIN OBSERVATION TOOL)

## 2025-01-21 ASSESSMENT — COGNITIVE AND FUNCTIONAL STATUS - GENERAL: PATIENT BASELINE BEDBOUND: UNABLE TO ASSESS AT THIS TIME

## 2025-01-21 NOTE — ANESTHESIA PROCEDURE NOTES
Airway  Date/Time: 1/21/2025 7:49 AM  Urgency: elective    Airway not difficult    Staffing  Performed: GALEN   Authorized by: Cordell Mills MD    Performed by: WILFRIDO Whiteside  Patient location during procedure: OR    Indications and Patient Condition  Indications for airway management: anesthesia and airway protection  Spontaneous Ventilation: absent  Sedation level: deep  Preoxygenated: yes  Patient position: sniffing  Mask difficulty assessment: 2 - vent by mask + OA or adjuvant +/- NMBA  Planned trial extubation    Final Airway Details  Final airway type: endotracheal airway      Successful airway: ETT  Cuffed: yes   Successful intubation technique: video laryngoscopy  Facilitating devices/methods: intubating stylet  Endotracheal tube insertion site: oral  Blade: Alina  Blade size: #4  ETT size (mm): 7.0  Cormack-Lehane Classification: grade I - full view of glottis  Placement verified by: chest auscultation   Measured from: teeth  ETT to teeth (cm): 21  Number of attempts at approach: 1    Additional Comments  Lips/teeth in preanesthetic condition. Glottis seemed swollen.

## 2025-01-21 NOTE — ANESTHESIA PROCEDURE NOTES
Peripheral IV  Date/Time: 1/21/2025 7:48 AM  Inserted by: Cordell Mills MD    Placement  Needle size: 14 G  Laterality: right  Location: forearm  Local anesthetic: none  Site prep: alcohol  Technique: anatomical landmarks  Attempts: 1

## 2025-01-21 NOTE — ANESTHESIA PROCEDURE NOTES
Arterial Line:    Date/Time: 1/21/2025 7:58 AM    Staffing  Performed: GALEN   Authorized by: Cordell Mills MD    Performed by: WILFRIDO Whiteside    An arterial line was placed. Procedure performed using surface landmarks.in the OR for the following indication(s): continuous blood pressure monitoring.    A 20 gauge (size), 1 and 1/4 inch (length), Angiocath (type) catheter was placed into the Left radial artery, secured by Tegaderm,   Seldinger technique not used.  Events:  patient tolerated procedure well with no complications.

## 2025-01-21 NOTE — ANESTHESIA PREPROCEDURE EVALUATION
Patient: Jerman Colón    Procedure Information       Date/Time: 01/21/25 0700    Procedure: C4-5 C5-6 and C6-7 anterior cervical discectomy and fusion using stand-alone cages and allograft followed by posterior cervical C4-5 laminectomy and facetectomy for decompression along with C3-4 C6-7 C7-T1 T1-T2 posterior column osteotomy and a C2-T4 instrumented fusion using local bone autograft and C5 which is an osteoporotic material for spine surgery. (Bilateral: Spine Cervical)    Location: [facility] OR 23 / [facility] OR    Surgeons: Paul Patel MD            Relevant Problems   Anesthesia   (+) PONV (postoperative nausea and vomiting)      Cardiac   (+) CAD (coronary artery disease)   (+) HTN (hypertension)   (+) Hyperlipidemia      Pulmonary   (+) BIANCA (obstructive sleep apnea)   (+) Pulmonary emphysema (Multi)      GI   (+) Gastroesophageal reflux disease without esophagitis      Hematology   (+) Polycythemia      HEENT  Vocal cord lesion       Clinical information reviewed:     Meds               NPO Detail:  No data recorded     PHYSICAL EXAM    Anesthesia Plan    History of general anesthesia?: yes  History of complications of general anesthesia?: no    ASA 3     general     intravenous induction   Postoperative administration of opioids is intended.  Trial extubation is planned.  Anesthetic plan and risks discussed with patient.

## 2025-01-21 NOTE — ANESTHESIA POSTPROCEDURE EVALUATION
Patient: Jerman Colón    Procedure Summary       Date: 01/21/25 Room / Location: [facility] OR 23 / [facility] OR    Anesthesia Start: 0739 Anesthesia Stop: 1743    Procedure: C4-5, C5-6 anterior cervical discectomy and fusion using stand-alone cages and allograft followed by posterior cervical C4-5 laminectomy and facetectomy for decompression along with C3-4 C6-7 C7-T1 T1-T2 posterior column osteotomy and a C2-T4 instrumented fusion using local bone autograft and C5 which is an osteoporotic material for spine surgery. (Bilateral: Spine Cervical) Diagnosis:       Other secondary kyphosis, cervical region      Cervical myelopathy      Spinal cord compression due to degenerative disorder of spinal column      (Other secondary kyphosis, cervical region [M40.12])      (Cervical myelopathy [G95.9])      (Spinal cord compression due to degenerative disorder of spinal column [M47.10])    Surgeons: Paul Patel MD Responsible Provider: Antwon Blackwood MD    Anesthesia Type: general ASA Status: 3            Anesthesia Type: general    Vitals Value Taken Time   /73 01/21/25 1801   Temp 36 01/21/25 1819   Pulse 117 01/21/25 1818   Resp 22 01/21/25 1818   SpO2 95 % 01/21/25 1818   Vitals shown include unfiled device data.    Anesthesia Post Evaluation    Patient location during evaluation: ICU  Patient participation: waiting for patient participation  Level of consciousness: sedated  Pain management: adequate  Airway patency: patent  Cardiovascular status: acceptable and hemodynamically stable  Respiratory status: acceptable, ETT, ventilator and intubated  Hydration status: acceptable  Postoperative Nausea and Vomiting: none  Comments: Emergence. Pt reversed with Bridion. Spontaneous respirations through ETT. Pt was following following simple commands, opening eyes to his name. Upper airway thoroughly suctioned. After the extubation, very rapidly progressing respiratory insufficiency with apparent  inability to breathe in spite of a very good muscle strength. Pt did not tolerate OP airway with a strong gag reflex. NP airway x 2 did not relieve obstruction. With mask positive pressure ventilation, SpO2 > 97% with low tidal volumes and increasing ETCO2. After 5 min, pt was induced with propofol and succinycholine and reintubated with ETT 7,5 using glidescope. No swelling of the epiglottis and vocal cords noted. After intubation and securing the tube at 24 cm at the lips, PPV resumed with low peak pressures and adequate tidal volumes. There was a leak heard with the deflated cuff and the airway pressure exceeding 20 cm H2O. There is possibility of the compromise of the tracheal patency related to the correction of severe cervical kyphosis and local edema.     No notable events documented.

## 2025-01-21 NOTE — OP NOTE
C4-5, C5-6 anterior cervical discectomy and fusion using stand-alone cages and allograft followed by posterior cervical C4-5 laminectomy and facetectomy for decompression along with C3, C6 and C7 Laminectomy, T1-T2 posterior column osteotomy and a C2-T4 instrumented fusion using local bone autograft and signify which is an osteoporotic material for spine surgery. 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    PROPHYLAXIS:    Venous thromboembolism prophylaxis was ordered at the time of surgery in the form of mechanical prophylaxis using sequential compression devices.    INDICATION:  Mr. Colón is a 64 y.o. male who presented with cervical stenosis and kyphosis, spinal cord compression and good correlation between the imaging studies and clinical signs and symptoms. In view of the presence of cervical stenosis cervical deformity that was symptomatic, surgical treatment was discussed with the goals, risk and benefits of surgery and the fact that surgery may or may not alleviate the symptoms completely with small chances of even worsening of the symptoms.  The patient chose to proceed with surgery after clear understanding of all the risk and benefits and goals of surgery.    OPERATION/PROCEDURE:   Anterior Posterior Same Day approach with :   Anterior cervical  diskectomy for decompression at C4-5 and C5-6    C4-5 and C5-6 anterior interbody arthrodesis.   Placement of interbody biomechanical device at C4-5 and C5-6.   Use of osteopromotive material for spine surgery.   C4/5 Laminectomy with Facetectomy   C3 C6 and C7 laminectomy for decompression.  T1 to posterior column osteotomy  Posterior C2 through T4 posterolateral instrumented arthrodesis.   Placement of posterior segmental spinal instrumentation from C2 to T4   Use of local bone autograft         TECHNIQUE:    After induction of general anesthesia and maintenance of mean arterial pressures over 80 mm and throughout the procedure, the patient was placed supine on the operating room table and all dependent portions carefully padded. [employer] tongs were applied to the head for intraoperative traction during the anterior approach and for head fixation doing the posterior approach. The head could not be extended a lot because of fixed cervical kyphosis and had to be supported with towels placed under the occiput. The tongs were hooked up to in-line traction of 15 pounds, and all dependent portions of the patient's body were carefully padded.  A transverse incision was marked out to the right of the midline over the C5 level based on lateral fluoroscopy. The anterior neck was scrubbed, prepped, and draped in the usual sterile fashion.  This was injected with local anesthesia and carried out sharply. Subcutaneous tissue divided using monopolar cautery through the platysmal layer, and subplatysmal dissection was carried out using bipolar cautery and Metzenbaum scissors. The carotid sheath was retracted laterally and pharynx retracted medially. The ventral spine was then palpated and the deep cervical fascia opened sharply and swept away using Kittner dissectors. Intraoperative fluoroscopy was once again used to confirm levels. The medial attachments of the longus colli were released bilaterally from C4-C6 with  electrocautery. The exposure was maintained with the self retaining retractor system. There were anterior osteophytes that were removed with rongeurs. The [place] distraction pins were placed in C4, C5 and C6. The decompression began at C4-5. Gentle distraction placed across the interspace. The anterior longitudinal ligament and disc annulus were incised. The disc material and cartilaginous endplates were removed with curettes and rongeurs. The posterior longitudinal ligament was identified. The operating microscope was brought into the field for microdissection and illumination and utilized for the remainder of the case until closure. The posterior osteophytes along with PLL was removed with a match-head luli and curettes and kerrison rongeurs ensuring complete bilateral decompression of the central canal and nerve roots. Once an adequate decompression was achieved attention was directed to the arthrodesis. An intervertebral lordotic PEEK biomechanical device with integrated fixation from the NUVASIVE was selected and packed with allograft in the form of signify and was inserted into the intervertebral space under fluoroscopy. The distraction was relieved, the pins removed, and the holes waxed.  A single screw was inserted into the body of C4.  C5-6 level A similar decompression was performed at C5-6 level followed by placement of lordotic PEEK cage filled with allograft.  No screw was utilized at the C5-6 level. The distraction was relieved, the pins removed, and the holes waxed.  At this point appropriate lordosis was restored and a decision not to proceed with C6-7 anterior cervical measurements initially plan was made.  The wound was copiously irrigated. Hemostasis was achieved and a medium Hemovac drain was placed over the plate and exited out a separate stab incision and secured to the skin with a 3-0 non-absorbable suture. The platysma was closed with interrupted 3-0 Vicryl suture followed by subcutaneous  layer. The skin was sealed with glue.   The patient's was then carefully turned prone on a Emmanuel table, tongs were hooked up to in-line traction of 15 pounds, and all dependent portions of the patient's body were carefully padded. The posterior neck was clipped of hair and then scrubbed, prepped, and draped in the usual sterile fashion. A midline incision was marked out from C2-T4 with the help of intraoperative fluoroscopy, and injected with local anesthesia and carried out sharply. Subcutaneous tissues were divided using monopolar cautery over the tips of the spinous processes and out to the edges of the lateral masses of C2 - 6 and TPs of T1 through T4. Intraoperative fluoroscopy was used to confirm levels. C4-5 Laminectomy and foraminotomy was performed to decompress the central canal and nerve roots.  At this point C3 C6 and C7 laminectomy was also performed for decompression of the spinal cord and prevent any iatrogenic compression after correction of the deformity.  Posterior column osteotomy was performed at T1-T2 level where removal of the bilateral inferior facet of T1 and the lamina along with ligamentum flavum was performed to introduce some flexibility of the spine for correction of the cervical thoracic kyphosis.  Bilateral lateral mass screw paths were then prepared by marking entry sites in the middle lateral masses and using the twist drill to create screw trajectories in a superolateral fashion. A ball-tip feeler was used to confirm no cortical breach. The holes were then tapped. The lateral mass screws were then placed at C3, C4, C5, C6 from the [employer] reline posterior cervical SYSTEM. Bilateral C2 pars screws were then placed using fluoroscopy.  At this point bilateral T1-T4 placed bilaterally using freehand technique and the appropriate placement was confirmed using intraoperative fluoroscopy. Appropriately sized 4.0 mm titanium rods were then measured, cut, and contoured to fit in the heads  of the screws. The wound was copiously irrigated, and the posterolateral elements of C2-T4 were decorticated using a high-speed drill. The rods were seated into the heads of the screws after lifting the head up and using the bivectral traction creating the desirable lordosis/kyphosis correction allowing further correction of the deformity and fixed into place using set caps and final tightened using the torque  and counter-torque. The posterolateral arthrodesis was completed by laying down signify which is an osteoporotic material for spine surgery for arthrodesis mixed with autograft obtained from the decompression and osteotomy.  An additional moon was placed on the right side using a side-to-side connector across the cervical thoracic junction for added stability.  Final AP and lateral fluoroscopic images were taken to confirm satisfactory implant placement. A gram of vancomycin and ancef powder was scattered about the wound and a Hemovac drain x2 was tunneled out percutaneously from the wound. The wound was closed in layers using a running #1 Vicryl for the muscle, interrupted 0 Vicryl for the fascia, interrupted inverted 2-0 Vicryl for the subcutaneous layer, and glue for the skin. The EBL was about 700 ml       Complications:  None; patient tolerated the procedure well.    Disposition : Patient had to be reintubated following extubation given the fact that he had expected swelling in the neck following the surgery and was having difficulty breathing likely due to the same.  Left intubated and will be transferred to the ICU.     Condition: stable         Paul CHRISTI Amanda  Phone Number: [phone]

## 2025-01-21 NOTE — ANESTHESIA PROCEDURE NOTES
Peripheral IV  Date/Time: 1/21/2025 8:04 AM  Inserted by: Yuriy Carballo    Placement  Needle size: 16 G  Laterality: left  Location: forearm  Site prep: alcohol  Technique: anatomical landmarks  Attempts: 1

## 2025-01-21 NOTE — HOSPITAL COURSE
Jerman Colón is a 64 y.o. male with a past medical history of HTN, HLD, CAD on ASA, COPD, BPH, p/w rigid cervical deformity 1/21 s/p C4-6 ACDF, C2-T4 fusion, C4-7 decompression, C4/5 facetectomies, reintubated at end of case due to lower airway edema. He was later extubated with ENT at bedside. Bedside swallow eval with nursing failed and will need SLP eval.  1/23 failed SLP eval, MBS done and passed for bite size diet        PT/OT evaluated patient and recommended acute rehab, family would like Home care     On the day of discharge, the patient was seen and evaluated by the neurosurgery team and deemed suitable for discharge. The patient was given detailed discharge instructions and were scheduled to follow up as an outpatient.

## 2025-01-21 NOTE — PROGRESS NOTES
[facility]  NEUROSCIENCE INTENSIVE CARE UNIT  RECOVERY NOTE       Patient Name: Jerman Colón     MRN: [id number]     Admit Date: 2025     : 1960 AGE: 64 y.o. GENDER: male    Dx: Other secondary kyphosis, cervical region  PROCEDURE: C-4-5 C5-6 and C6-7 ACDF, posterior C4-5 laminectomy and facetectomy for decompression, C3-4, C6-7, C7-T1, T1-T2 posterior column osteotomy and C2-T4 instrumented fusion     Subjective  Jerman Colón is a 77 y.o. male with PMH of COPD, CAD, GERD, HLD, HTN, sleep apnea, vocal cord mass, L5-S1 fusion at [facility] with Dr. Velasco in 2023 which was aborted d/t severe cervical kyphosis presenting today for scheduled C-4-5 C5-6 and C6-7 ACDF, posterior C4-5 laminectomy and facetectomy for decompression, C3-4, C6-7, C7-T1, T1-T2 posterior column osteotomy and C2-T4 instrumented fusion. Per chart review, Pt with burning and stabbing axial neck pain that started 2 years ago, without apparent injury. Pt had lumbar fusion at [facility] which was unable to entirely alleviate his symptoms. He continued to have back pain along with BLE pain.     Arrival to NSU: 1730  Report received from anesthesia and neurosurgery.  - Pt has a hx of vocal cord mass. Intubated under Glidescope. Grade I airway. Pt was extubated in the PACU, and needed immediate re-intubated for c/f lower airway obstruction. Per anesthesia, cuff leak wasn't checked prior to extubation. However, pt initially met all other extubation criteria.   - PVCs s/p mag, currently ST 110s  - 700 EBL  - s/p 3L crystalloids, 1L albumin. No blood products    Interval Events: Admitted to NSU post-operative for close monitoring      Objective   VITALS (24H):  Heart Rate:  [86]   Temp:  [36 °C (96.8 °F)]   Resp:  [18]   BP: (172)/(83)   SpO2:  [97 %]      INTAKE/OUTPUT:    Intake/Output Summary (Last 24 hours) at 2025 1752  Last data filed at 2025 1726  Gross per 24 hour   Intake 4000 ml   Output 1300 ml   Net 2700 ml         PHYSICAL EXAM:  NEURO:  - intubated, prop off for approx 1 minute   - PERRL 3 mm bilaterally   - EOV, follows command x4. Oriented x4 by choice    CV:  - RRR on telemetry, ST  - Arterial line in place, waveform: good  RESP:  - Intubated  - Oxygen: 30% / 5/   :  - Alex catheter in place  GI:  - Abdomen NT/ND, soft  SKIN:  - 1 anterior, 2 posterior Hemovac  - Anterior & posterior incisional site intact, KAYLYNN    MEDICATIONS:  Scheduled: PRN: Continuous:   atorvastatin, 40 mg, oral, Nightly  cyclobenzaprine, 5 mg, oral, TID  dexAMETHasone, 4 mg, intravenous, q6h  fentaNYL, 25 mcg, intravenous, Once  fentaNYL, , ,   gabapentin, 600 mg, oral, TID  [START ON 1/22/2025] heparin (porcine), 5,000 Units, subcutaneous, q8h  [Held by provider] losartan, 25 mg, oral, Daily  pantoprazole, 40 mg, oral, BID  polyethylene glycol, 17 g, oral, BID  polyethylene glycol, 17 g, oral, Daily  tamsulosin, 0.4 mg, oral, Nightly     PRN medications: bisacodyl, dextrose, dextrose, fentaNYL, fentaNYL, glucagon, glucagon, ondansetron **OR** ondansetron fentaNYL, 0-300 mcg/hr  lactated Ringer's, 100 mL/hr  propofol, 0-50 mcg/kg/min, Last Rate: 50 mcg/kg/min (01/21/25 6537)         LAB RESULTS:      IMAGING RESULTS:  FL fluoro images no charge   Final Result              Assessment/Plan      NEURO:  #s/p C-4-5 C5-6 and C6-7 ACDF, posterior C4-5 laminectomy and facetectomy for decompression, C3-4, C6-7, C7-T1, T1-T2 posterior column osteotomy and C2-T4 instrumented fusion  Assessment:  - Exam as above   Plan:  - NSU - Post-op vitals and neuro checks: Q15min x4, then Q30 min x3, then Q1H  - Neuro Checks: Q1H  - Post-op & Q8H labs    --> CBC/RFP/ICa/Mg/Coags/Fibrinogen     --> Goal Hgb>8; INR<1.6, fibrinogen>150, plt>100  - Drain management per neurosurgery (65053)  - PT/OT daily starting POD1.  - Activity as tolerated with assist as needed, should be up in chair TID.  - Sedation: Propofol and Fentanyl  - Pain: Fent gtt   - Nausea:  ondansetron  - Muscle Spasm: Cyclobenzaprine 10 mg TID  - PT/OT/SLP  [] Post-op labs  - Continue Gabapentin 600 mg TID      CARDIOVASCULAR:  #HTN  #HLD  Assessment:  - ST  - ECHO: Not on file    Plan:  - Continue to monitor on telemetry  - BP goal: MAP >65    RESPIRATORY:  #COPD  #Vocal cord mass?   Assessment:  - re-intubated in PACU for c/f lower airway obstruction  Plan:  - Continuous pulse oximetry   - O2 PRN to maintain SpO2 > 94%, wean as tolerated  - Ventilator bundle while intubated  - Consult ENT and thoracic in AM  - Decadron 4 mg q6h    RENAL/:  #SAILAJA  Assessment:  - Baseline BUN/Cr: .  Plan:  - Monitor with daily RFP  - Maintain richardson catheter for: critically ill patient who need accurate urinary output measurements    FEN/GI:  #Hyperlactatemia   Assessment:  -  Last BM: PTA  - Lactate - 4.1  Plan:  - Monitor and replace electrolytes per protocol  - IVF: LR @ 100 mL/hr  - Diet: NPO   - Bowel Regimen: Miralax BID  - Resuscitate PRN     ENDOCRINE:  #SAILAJA  Assessment:  Results from last 7 days   Lab Units 25  1057   GLUCOSE mg/dL 78      Plan:  - Accuchecks & ISS PRN     HEMATOLOGY:  #SAILAJA  Assessment:  - Baseline Hgb: 16.8  - Baseline Plts: 189  Results from last 7 days   Lab Units 25  1057   HEMOGLOBIN g/dL 16.8   HEMATOCRIT % 55.6*   PLATELETS AUTO x10*3/uL 189     Plan:  - Continue to monitor with daily CBC and Coag panel    INFECTIOUS DISEASE:  #SAILAJA  Assessment:  Results from last 7 days   Lab Units 25  1057   WBC AUTO x10*3/uL 8.3    - Temp (24hrs), Av °C (96.8 °F), Min:36 °C (96.8 °F), Max:36 °C (96.8 °F)       Plan:  - Continue to monitor for s/sx of infection  - Pan culture for temperature > 38.4 C    MUSCULOSKELETAL:  - No acute issues    SKIN:  - No acute issues  - Turns and skin care per NSU protocol    ACCESS:  - PIVs  - L rad Hernando     PROPHYLAXIS:  - DVT Ppx: SCDs resume POD2  - GI Ppx: Pantoprazole    RESTRAINTS:  I agree with nursing assessment of the patient's  need for restraints to protect the patient from injury and facilitate healing. The patient is unable to cooperate with the plan of care and at risk for disrupting critical therapy (i.e., removing medical devices, lines, tubes and/or dressings).  Please see order for specifics. Restraints can be removed when the patient is able to cooperate with plan of care and allow healing to occur, or the medical devices at risk are discontinued by the medical team.     Tatianna Trevizo, APRN-CNP  Neuroscience Intensive Care       Total critical care time of 60 minutes, with > 50% of time spent in direct contact with patient/family for education, counseling and coordination of care.

## 2025-01-22 ENCOUNTER — APPOINTMENT (OUTPATIENT)
Dept: RADIOLOGY | Facility: HOSPITAL | Age: 65
DRG: 430 | End: 2025-01-22
Payer: MEDICARE

## 2025-01-22 LAB
ALBUMIN SERPL BCP-MCNC: 3.7 G/DL (ref 3.4–5)
ANION GAP BLDA CALCULATED.4IONS-SCNC: 10 MMO/L (ref 10–25)
ANION GAP SERPL CALC-SCNC: 14 MMOL/L (ref 10–20)
BASE EXCESS BLDA CALC-SCNC: 3.9 MMOL/L (ref -2–3)
BASOPHILS # BLD AUTO: 0.02 X10*3/UL (ref 0–0.1)
BASOPHILS NFR BLD AUTO: 0.1 %
BODY TEMPERATURE: ABNORMAL
BUN SERPL-MCNC: 16 MG/DL (ref 6–23)
CA-I BLD-SCNC: 1.06 MMOL/L (ref 1.1–1.33)
CA-I BLDA-SCNC: 1.15 MMOL/L (ref 1.1–1.33)
CALCIUM SERPL-MCNC: 8.1 MG/DL (ref 8.6–10.6)
CHLORIDE BLDA-SCNC: 103 MMOL/L (ref 98–107)
CHLORIDE SERPL-SCNC: 103 MMOL/L (ref 98–107)
CO2 SERPL-SCNC: 26 MMOL/L (ref 21–32)
CREAT SERPL-MCNC: 0.95 MG/DL (ref 0.5–1.3)
EGFRCR SERPLBLD CKD-EPI 2021: 89 ML/MIN/1.73M*2
EOSINOPHIL # BLD AUTO: 0 X10*3/UL (ref 0–0.7)
EOSINOPHIL NFR BLD AUTO: 0 %
ERYTHROCYTE [DISTWIDTH] IN BLOOD BY AUTOMATED COUNT: 17.2 % (ref 11.5–14.5)
GLUCOSE BLD MANUAL STRIP-MCNC: 148 MG/DL (ref 74–99)
GLUCOSE BLD MANUAL STRIP-MCNC: 149 MG/DL (ref 74–99)
GLUCOSE BLD MANUAL STRIP-MCNC: 151 MG/DL (ref 74–99)
GLUCOSE BLD MANUAL STRIP-MCNC: 166 MG/DL (ref 74–99)
GLUCOSE BLD MANUAL STRIP-MCNC: 178 MG/DL (ref 74–99)
GLUCOSE BLD MANUAL STRIP-MCNC: 186 MG/DL (ref 74–99)
GLUCOSE BLD MANUAL STRIP-MCNC: 190 MG/DL (ref 74–99)
GLUCOSE BLDA-MCNC: 162 MG/DL (ref 74–99)
GLUCOSE SERPL-MCNC: 197 MG/DL (ref 74–99)
HCO3 BLDA-SCNC: 27.7 MMOL/L (ref 22–26)
HCT VFR BLD AUTO: 40.4 % (ref 41–52)
HCT VFR BLD EST: 37 % (ref 41–52)
HGB BLD-MCNC: 12.7 G/DL (ref 13.5–17.5)
HGB BLDA-MCNC: 12.2 G/DL (ref 13.5–17.5)
IMM GRANULOCYTES # BLD AUTO: 0.06 X10*3/UL (ref 0–0.7)
IMM GRANULOCYTES NFR BLD AUTO: 0.4 % (ref 0–0.9)
INHALED O2 CONCENTRATION: 21 %
LACTATE BLDA-SCNC: 1.7 MMOL/L (ref 0.4–2)
LACTATE SERPL-SCNC: 1.8 MMOL/L (ref 0.4–2)
LACTATE SERPL-SCNC: 2.2 MMOL/L (ref 0.4–2)
LACTATE SERPL-SCNC: 2.4 MMOL/L (ref 0.4–2)
LYMPHOCYTES # BLD AUTO: 0.55 X10*3/UL (ref 1.2–4.8)
LYMPHOCYTES NFR BLD AUTO: 3.4 %
MAGNESIUM SERPL-MCNC: 1.89 MG/DL (ref 1.6–2.4)
MCH RBC QN AUTO: 24.9 PG (ref 26–34)
MCHC RBC AUTO-ENTMCNC: 31.4 G/DL (ref 32–36)
MCV RBC AUTO: 79 FL (ref 80–100)
MONOCYTES # BLD AUTO: 1.15 X10*3/UL (ref 0.1–1)
MONOCYTES NFR BLD AUTO: 7.2 %
NEUTROPHILS # BLD AUTO: 14.25 X10*3/UL (ref 1.2–7.7)
NEUTROPHILS NFR BLD AUTO: 88.9 %
NRBC BLD-RTO: 0 /100 WBCS (ref 0–0)
OXYHGB MFR BLDA: 96.9 % (ref 94–98)
PCO2 BLDA: 38 MM HG (ref 38–42)
PH BLDA: 7.47 PH (ref 7.38–7.42)
PHOSPHATE SERPL-MCNC: 2.9 MG/DL (ref 2.5–4.9)
PLATELET # BLD AUTO: 177 X10*3/UL (ref 150–450)
PO2 BLDA: 88 MM HG (ref 85–95)
POTASSIUM BLDA-SCNC: 4.5 MMOL/L (ref 3.5–5.3)
POTASSIUM SERPL-SCNC: 4.6 MMOL/L (ref 3.5–5.3)
RBC # BLD AUTO: 5.11 X10*6/UL (ref 4.5–5.9)
SAO2 % BLDA: 98 % (ref 94–100)
SODIUM BLDA-SCNC: 136 MMOL/L (ref 136–145)
SODIUM SERPL-SCNC: 138 MMOL/L (ref 136–145)
WBC # BLD AUTO: 16 X10*3/UL (ref 4.4–11.3)

## 2025-01-22 PROCEDURE — 2500000004 HC RX 250 GENERAL PHARMACY W/ HCPCS (ALT 636 FOR OP/ED): Mod: JZ,TB | Performed by: REGISTERED NURSE

## 2025-01-22 PROCEDURE — 37799 UNLISTED PX VASCULAR SURGERY: CPT

## 2025-01-22 PROCEDURE — 74018 RADEX ABDOMEN 1 VIEW: CPT | Performed by: RADIOLOGY

## 2025-01-22 PROCEDURE — 31575 DIAGNOSTIC LARYNGOSCOPY: CPT | Performed by: OTOLARYNGOLOGY

## 2025-01-22 PROCEDURE — 99291 CRITICAL CARE FIRST HOUR: CPT | Performed by: STUDENT IN AN ORGANIZED HEALTH CARE EDUCATION/TRAINING PROGRAM

## 2025-01-22 PROCEDURE — 85025 COMPLETE CBC W/AUTO DIFF WBC: CPT

## 2025-01-22 PROCEDURE — 2500000005 HC RX 250 GENERAL PHARMACY W/O HCPCS: Performed by: STUDENT IN AN ORGANIZED HEALTH CARE EDUCATION/TRAINING PROGRAM

## 2025-01-22 PROCEDURE — 80069 RENAL FUNCTION PANEL: CPT

## 2025-01-22 PROCEDURE — 97530 THERAPEUTIC ACTIVITIES: CPT | Mod: GO

## 2025-01-22 PROCEDURE — 2500000004 HC RX 250 GENERAL PHARMACY W/ HCPCS (ALT 636 FOR OP/ED)

## 2025-01-22 PROCEDURE — 83735 ASSAY OF MAGNESIUM: CPT

## 2025-01-22 PROCEDURE — 99222 1ST HOSP IP/OBS MODERATE 55: CPT | Performed by: OTOLARYNGOLOGY

## 2025-01-22 PROCEDURE — 83605 ASSAY OF LACTIC ACID: CPT

## 2025-01-22 PROCEDURE — 2020000001 HC ICU ROOM DAILY

## 2025-01-22 PROCEDURE — 2500000001 HC RX 250 WO HCPCS SELF ADMINISTERED DRUGS (ALT 637 FOR MEDICARE OP): Performed by: STUDENT IN AN ORGANIZED HEALTH CARE EDUCATION/TRAINING PROGRAM

## 2025-01-22 PROCEDURE — 82330 ASSAY OF CALCIUM: CPT

## 2025-01-22 PROCEDURE — 0CJS8ZZ INSPECTION OF LARYNX, VIA NATURAL OR ARTIFICIAL OPENING ENDOSCOPIC: ICD-10-PCS | Performed by: OTOLARYNGOLOGY

## 2025-01-22 PROCEDURE — 71045 X-RAY EXAM CHEST 1 VIEW: CPT

## 2025-01-22 PROCEDURE — 2500000005 HC RX 250 GENERAL PHARMACY W/O HCPCS

## 2025-01-22 PROCEDURE — 97166 OT EVAL MOD COMPLEX 45 MIN: CPT | Mod: GO

## 2025-01-22 PROCEDURE — 2500000004 HC RX 250 GENERAL PHARMACY W/ HCPCS (ALT 636 FOR OP/ED): Performed by: STUDENT IN AN ORGANIZED HEALTH CARE EDUCATION/TRAINING PROGRAM

## 2025-01-22 PROCEDURE — 71045 X-RAY EXAM CHEST 1 VIEW: CPT | Performed by: RADIOLOGY

## 2025-01-22 PROCEDURE — 84132 ASSAY OF SERUM POTASSIUM: CPT

## 2025-01-22 PROCEDURE — 94003 VENT MGMT INPAT SUBQ DAY: CPT

## 2025-01-22 PROCEDURE — 2500000002 HC RX 250 W HCPCS SELF ADMINISTERED DRUGS (ALT 637 FOR MEDICARE OP, ALT 636 FOR OP/ED): Performed by: REGISTERED NURSE

## 2025-01-22 PROCEDURE — 74018 RADEX ABDOMEN 1 VIEW: CPT

## 2025-01-22 PROCEDURE — 99291 CRITICAL CARE FIRST HOUR: CPT

## 2025-01-22 PROCEDURE — 82947 ASSAY GLUCOSE BLOOD QUANT: CPT

## 2025-01-22 RX ORDER — INSULIN LISPRO 100 [IU]/ML
2 INJECTION, SOLUTION INTRAVENOUS; SUBCUTANEOUS ONCE
Status: COMPLETED | OUTPATIENT
Start: 2025-01-22 | End: 2025-01-22

## 2025-01-22 RX ORDER — HYDROMORPHONE HYDROCHLORIDE 0.2 MG/ML
INJECTION INTRAMUSCULAR; INTRAVENOUS; SUBCUTANEOUS
Status: COMPLETED
Start: 2025-01-22 | End: 2025-01-22

## 2025-01-22 RX ORDER — METHOCARBAMOL 100 MG/ML
1000 INJECTION, SOLUTION INTRAMUSCULAR; INTRAVENOUS EVERY 8 HOURS PRN
Status: DISPENSED | OUTPATIENT
Start: 2025-01-22 | End: 2025-01-25

## 2025-01-22 RX ORDER — LIDOCAINE 560 MG/1
2 PATCH PERCUTANEOUS; TOPICAL; TRANSDERMAL DAILY
Status: DISCONTINUED | OUTPATIENT
Start: 2025-01-23 | End: 2025-01-28 | Stop reason: HOSPADM

## 2025-01-22 RX ORDER — SODIUM CHLORIDE, SODIUM LACTATE, POTASSIUM CHLORIDE, CALCIUM CHLORIDE 600; 310; 30; 20 MG/100ML; MG/100ML; MG/100ML; MG/100ML
100 INJECTION, SOLUTION INTRAVENOUS CONTINUOUS
Status: DISCONTINUED | OUTPATIENT
Start: 2025-01-22 | End: 2025-01-23

## 2025-01-22 RX ORDER — DEXMEDETOMIDINE HYDROCHLORIDE 4 UG/ML
.1-1.5 INJECTION, SOLUTION INTRAVENOUS CONTINUOUS
Status: DISCONTINUED | OUTPATIENT
Start: 2025-01-22 | End: 2025-01-22

## 2025-01-22 RX ORDER — INSULIN LISPRO 100 [IU]/ML
0-5 INJECTION, SOLUTION INTRAVENOUS; SUBCUTANEOUS EVERY 4 HOURS
Status: DISCONTINUED | OUTPATIENT
Start: 2025-01-22 | End: 2025-01-28 | Stop reason: HOSPADM

## 2025-01-22 RX ORDER — HYDROMORPHONE HYDROCHLORIDE 1 MG/ML
0.4 INJECTION, SOLUTION INTRAMUSCULAR; INTRAVENOUS; SUBCUTANEOUS
Status: DISCONTINUED | OUTPATIENT
Start: 2025-01-22 | End: 2025-01-28 | Stop reason: HOSPADM

## 2025-01-22 RX ORDER — HYDROMORPHONE HYDROCHLORIDE 0.2 MG/ML
0.2 INJECTION INTRAMUSCULAR; INTRAVENOUS; SUBCUTANEOUS
Status: DISCONTINUED | OUTPATIENT
Start: 2025-01-22 | End: 2025-01-22

## 2025-01-22 RX ADMIN — Medication 40 L/MIN: at 20:27

## 2025-01-22 RX ADMIN — DEXAMETHASONE SODIUM PHOSPHATE 4 MG: 4 INJECTION, SOLUTION INTRA-ARTICULAR; INTRALESIONAL; INTRAMUSCULAR; INTRAVENOUS; SOFT TISSUE at 06:10

## 2025-01-22 RX ADMIN — SODIUM CHLORIDE, POTASSIUM CHLORIDE, SODIUM LACTATE AND CALCIUM CHLORIDE 100 ML/HR: 600; 310; 30; 20 INJECTION, SOLUTION INTRAVENOUS at 23:26

## 2025-01-22 RX ADMIN — CYCLOBENZAPRINE 5 MG: 10 TABLET, FILM COATED ORAL at 08:53

## 2025-01-22 RX ADMIN — DEXAMETHASONE SODIUM PHOSPHATE 4 MG: 4 INJECTION, SOLUTION INTRA-ARTICULAR; INTRALESIONAL; INTRAMUSCULAR; INTRAVENOUS; SOFT TISSUE at 00:47

## 2025-01-22 RX ADMIN — METHOCARBAMOL 1000 MG: 100 INJECTION INTRAMUSCULAR; INTRAVENOUS at 18:10

## 2025-01-22 RX ADMIN — DEXMEDETOMIDINE HYDROCHLORIDE 0.2 MCG/KG/HR: 400 INJECTION INTRAVENOUS at 08:54

## 2025-01-22 RX ADMIN — HEPARIN SODIUM 5000 UNITS: 5000 INJECTION INTRAVENOUS; SUBCUTANEOUS at 00:47

## 2025-01-22 RX ADMIN — HEPARIN SODIUM 5000 UNITS: 5000 INJECTION INTRAVENOUS; SUBCUTANEOUS at 23:39

## 2025-01-22 RX ADMIN — INSULIN LISPRO 1 UNITS: 100 INJECTION, SOLUTION INTRAVENOUS; SUBCUTANEOUS at 16:08

## 2025-01-22 RX ADMIN — DEXAMETHASONE SODIUM PHOSPHATE 4 MG: 4 INJECTION, SOLUTION INTRA-ARTICULAR; INTRALESIONAL; INTRAMUSCULAR; INTRAVENOUS; SOFT TISSUE at 11:29

## 2025-01-22 RX ADMIN — HEPARIN SODIUM 5000 UNITS: 5000 INJECTION INTRAVENOUS; SUBCUTANEOUS at 08:53

## 2025-01-22 RX ADMIN — INSULIN LISPRO 1 UNITS: 100 INJECTION, SOLUTION INTRAVENOUS; SUBCUTANEOUS at 23:35

## 2025-01-22 RX ADMIN — Medication 30 PERCENT: at 07:59

## 2025-01-22 RX ADMIN — INSULIN LISPRO 1 UNITS: 100 INJECTION, SOLUTION INTRAVENOUS; SUBCUTANEOUS at 11:29

## 2025-01-22 RX ADMIN — CALCIUM GLUCONATE 1 G: 20 INJECTION, SOLUTION INTRAVENOUS at 04:20

## 2025-01-22 RX ADMIN — MAGNESIUM SULFATE HEPTAHYDRATE 2 G: 40 INJECTION, SOLUTION INTRAVENOUS at 04:20

## 2025-01-22 RX ADMIN — HYDROMORPHONE HYDROCHLORIDE 0.2 MG: 0.2 INJECTION, SOLUTION INTRAMUSCULAR; INTRAVENOUS; SUBCUTANEOUS at 16:09

## 2025-01-22 RX ADMIN — INSULIN LISPRO 2 UNITS: 100 INJECTION, SOLUTION INTRAVENOUS; SUBCUTANEOUS at 01:17

## 2025-01-22 RX ADMIN — SODIUM ZIRCONIUM CYCLOSILICATE 10 G: 10 POWDER, FOR SUSPENSION ORAL at 00:47

## 2025-01-22 RX ADMIN — SODIUM CHLORIDE, POTASSIUM CHLORIDE, SODIUM LACTATE AND CALCIUM CHLORIDE 1000 ML: 600; 310; 30; 20 INJECTION, SOLUTION INTRAVENOUS at 03:41

## 2025-01-22 RX ADMIN — PANTOPRAZOLE SODIUM 40 MG: 40 TABLET, DELAYED RELEASE ORAL at 08:53

## 2025-01-22 RX ADMIN — HYDROMORPHONE HYDROCHLORIDE 0.2 MG: 0.2 INJECTION, SOLUTION INTRAMUSCULAR; INTRAVENOUS; SUBCUTANEOUS at 20:34

## 2025-01-22 RX ADMIN — Medication 75 MCG/HR: at 08:16

## 2025-01-22 RX ADMIN — GABAPENTIN 600 MG: 300 CAPSULE ORAL at 08:53

## 2025-01-22 RX ADMIN — PROPOFOL 40 MCG/KG/MIN: 10 INJECTION, EMULSION INTRAVENOUS at 05:35

## 2025-01-22 RX ADMIN — Medication 4 L/MIN: at 22:46

## 2025-01-22 RX ADMIN — PROPOFOL 40 MCG/KG/MIN: 10 INJECTION, EMULSION INTRAVENOUS at 09:01

## 2025-01-22 RX ADMIN — POLYETHYLENE GLYCOL 3350 17 G: 17 POWDER, FOR SOLUTION ORAL at 08:54

## 2025-01-22 RX ADMIN — PROPOFOL 40 MCG/KG/MIN: 10 INJECTION, EMULSION INTRAVENOUS at 00:47

## 2025-01-22 RX ADMIN — SODIUM CHLORIDE, POTASSIUM CHLORIDE, SODIUM LACTATE AND CALCIUM CHLORIDE 100 ML/HR: 600; 310; 30; 20 INJECTION, SOLUTION INTRAVENOUS at 14:13

## 2025-01-22 RX ADMIN — INSULIN LISPRO 1 UNITS: 100 INJECTION, SOLUTION INTRAVENOUS; SUBCUTANEOUS at 04:19

## 2025-01-22 RX ADMIN — HEPARIN SODIUM 5000 UNITS: 5000 INJECTION INTRAVENOUS; SUBCUTANEOUS at 16:07

## 2025-01-22 ASSESSMENT — COGNITIVE AND FUNCTIONAL STATUS - GENERAL
PERSONAL GROOMING: A LITTLE
DRESSING REGULAR UPPER BODY CLOTHING: A LITTLE
DRESSING REGULAR LOWER BODY CLOTHING: A LOT
DAILY ACTIVITIY SCORE: 15
HELP NEEDED FOR BATHING: A LOT
TOILETING: A LOT
EATING MEALS: A LITTLE

## 2025-01-22 ASSESSMENT — PAIN - FUNCTIONAL ASSESSMENT
PAIN_FUNCTIONAL_ASSESSMENT: 0-10
PAIN_FUNCTIONAL_ASSESSMENT: CPOT (CRITICAL CARE PAIN OBSERVATION TOOL)
PAIN_FUNCTIONAL_ASSESSMENT: 0-10
PAIN_FUNCTIONAL_ASSESSMENT: CPOT (CRITICAL CARE PAIN OBSERVATION TOOL)
PAIN_FUNCTIONAL_ASSESSMENT: 0-10
PAIN_FUNCTIONAL_ASSESSMENT: CPOT (CRITICAL CARE PAIN OBSERVATION TOOL)
PAIN_FUNCTIONAL_ASSESSMENT: 0-10
PAIN_FUNCTIONAL_ASSESSMENT: CPOT (CRITICAL CARE PAIN OBSERVATION TOOL)

## 2025-01-22 ASSESSMENT — PAIN DESCRIPTION - LOCATION
LOCATION: NECK
LOCATION: NECK

## 2025-01-22 ASSESSMENT — PAIN SCALES - GENERAL
PAINLEVEL_OUTOF10: 5 - MODERATE PAIN
PAINLEVEL_OUTOF10: 7

## 2025-01-22 ASSESSMENT — ACTIVITIES OF DAILY LIVING (ADL)
BATHING_ASSISTANCE: MODERATE
ADL_ASSISTANCE: INDEPENDENT

## 2025-01-22 ASSESSMENT — PAIN DESCRIPTION - ORIENTATION: ORIENTATION: ANTERIOR;POSTERIOR

## 2025-01-22 ASSESSMENT — PAIN DESCRIPTION - DESCRIPTORS: DESCRIPTORS: THROBBING;PRESSURE

## 2025-01-22 NOTE — PROGRESS NOTES
Communication Note    Patient Name: Jerman Colón  MRN: [id number]  Today's Date: 1/22/2025   Room: 03/03-A    Discipline: Occupational Therapy      Missed Visit Reason:  (Pt with plans for extubation in the near future. Per fellow, ENT must be at bedside to do this. Will defer OT at this time and reattempt as appropriate/able.)      01/22/25 at 9:03 AM   Sherry Lombardi, OT   Rehab Office: [phone]

## 2025-01-22 NOTE — PROGRESS NOTES
Communication Note    Patient Name: Jerman Colón  MRN: [id number]  Today's Date: 1/22/2025   Room: 03/03A    Discipline: Physical Therapy      PT Missed Visit: Yes  Missed Visit Reason:  (Pt with plans for extubation in the near future. Per fellow, ENT must be at bedside to do this. Plan to re-attempt as available and appropriate.)      01/22/25 at 8:59 AM   Stacey Lopez, PT   Rehab Office: [phone]

## 2025-01-22 NOTE — SIGNIFICANT EVENT
01/22/25 0759   Readings   Resp 16   Daily Screen   Total RSBI 15   Weaning Parameters   Weaning Start Time 0759   Weaning Vital Capacity 1200 mL   Negative Inspiratory Force (NIF) -30   Spontaneous Minute Volume (MV) 6.8   Weaning Tidal Volume 484 mL   Respiratory Depth/Rhythm Regular     (+) cuffleak

## 2025-01-22 NOTE — CARE PLAN
The clinical goals for the shift include remaining hemodynamically and neurologically stable until end of shift, keeping sbp <180 and remaining free from falls until end of shift.      Problem: Pain - Adult  Goal: Verbalizes/displays adequate comfort level or baseline comfort level  Outcome: Progressing     Problem: Safety - Adult  Goal: Free from fall injury  Outcome: Progressing     Problem: Discharge Planning  Goal: Discharge to home or other facility with appropriate resources  Outcome: Progressing     Problem: Chronic Conditions and Co-morbidities  Goal: Patient's chronic conditions and co-morbidity symptoms are monitored and maintained or improved  Outcome: Progressing     Problem: Knowledge Deficit  Goal: Patient/family/caregiver demonstrates understanding of disease process, treatment plan, medications, and discharge instructions  Outcome: Progressing     Problem: Mechanical Ventilation  Goal: Patient Will Maintain Patent Airway  Outcome: Progressing  Goal: Oral health is maintained or improved  Outcome: Progressing  Goal: ET tube will be managed safely  Outcome: Progressing  Goal: Ability to express needs and understand communication  Outcome: Progressing  Goal: Mobility/activity is maintained at optimum level for patient  Outcome: Progressing     Problem: Safety - Medical Restraint  Goal: Remains free of injury from restraints (Restraint for Interference with Medical Device)  Outcome: Progressing  Goal: Free from restraint(s) (Restraint for Interference with Medical Device)  Outcome: Progressing

## 2025-01-22 NOTE — PROGRESS NOTES
Jerman Colón is a 64 y.o. male on day 1 of admission presenting with Other secondary kyphosis, cervical region.    Subjective   Doing well post op.     Objective     Physical Exam  Intubated  EOV  Ox3 w choice  BUE FC AG  BLE FC AG  OGT  Alex catheter   Anterior neck incision c/d/I, drain in place w min output  Posterior incision covered w/ dressing, drains x2     Last Recorded Vitals  Blood pressure 160/83, pulse 98, temperature 36.6 °C (97.9 °F), temperature source Temporal, resp. rate 18, SpO2 96%.  Intake/Output last 3 Shifts:  I/O last 3 completed shifts:  In: 4000 [I.V.:4000]  Out: 1825 [Urine:950; Drains:175; Blood:700]    Relevant Results  Lab Results   Component Value Date    WBC 16.0 (H) 01/22/2025    HGB 12.7 (L) 01/22/2025    HCT 40.4 (L) 01/22/2025    MCV 79 (L) 01/22/2025     01/22/2025     Lab Results   Component Value Date    GLUCOSE 197 (H) 01/22/2025    CALCIUM 8.1 (L) 01/22/2025     01/22/2025    K 4.6 01/22/2025    CO2 26 01/22/2025     01/22/2025    BUN 16 01/22/2025    CREATININE 0.95 01/22/2025     This patient is intubated   Reason for patient to remain intubated today? they are unable to protect their airway             Assessment/Plan   Assessment & Plan  Other secondary kyphosis, cervical region    Kyphosis of cervical region    HTN (hypertension)    Hyperlipidemia    CAD (coronary artery disease)    Pulmonary emphysema (Multi)    Polycythemia    Gastroesophageal reflux disease without esophagitis    Cervical myelopathy    Spinal cord compression due to degenerative disorder of spinal column    H/o HTN, HLD, CAD on ASA, COPD, BPH, p/w rigid cervical deformity 1/21 s/p C4-6 ACDF, C2-T4 fusion, C4-7 decompression, C4/5 facetectomies, reintubated at end of case due to lower airway edema    NSU  Q1 neuro checks  Maintain drains (ant drain, post drain x 2), monitor output  Con't dex 4q6 for 24 hours for airway edema  Wean to extubate, will need ENT at bedside during trial  of extubation when able  Hold ASA, will restart POD 7  Monitor lactate  Con't IV fluids  Ok to work with PTOT  SCDs, H           William Trejo MD

## 2025-01-22 NOTE — PROGRESS NOTES
Occupational Therapy    Evaluation and Treatment    Patient Name: Jerman Colón  MRN: [id number]  Today's Date: 1/22/2025  Room: 03/03  Time Calculation  Start Time: 1553  Stop Time: 1657  Time Calculation (min): 64 min    Assessment  IP OT Assessment  Prognosis: Excellent  Barriers to Discharge Home: Physical needs  Physical Needs: Intermittent mobility assistance needed, Intermittent ADL assistance needed, High falls risk due to function or environment, Ambulating household distances limited by function/safety, Stair navigation to access bed limited by function/safety  Evaluation/Treatment Tolerance: Patient tolerated treatment well  Medical Staff Made Aware: Yes  End of Session Patient Position: Bed, 3 rail up, Alarm off, not on at start of session  Plan:  Inpatient Plan  Treatment Interventions: ADL retraining, Functional transfer training, UE strengthening/ROM, Endurance training, Patient/family training, Equipment evaluation/education, Neuromuscular reeducation, Compensatory technique education  OT Frequency: 4 times per week  OT Discharge Recommendations: High intensity level of continued care  Equipment Recommended upon Discharge: Wheeled walker  OT Recommended Transfer Status: Minimal assist  OT - OK to Discharge: Yes  OT Assessment  OT Assessment Results: Decreased ADL status, Decreased upper extremity strength, Decreased endurance, Decreased functional mobility, Decreased gross motor control, Decreased IADLs  Prognosis: Excellent  Evaluation/Treatment Tolerance: Patient tolerated treatment well  Medical Staff Made Aware: Yes  Strengths: Attitude of self    Subjective   Current Problem:  1. Other secondary kyphosis, cervical region        2. Cervical myelopathy        3. Spinal cord compression due to degenerative disorder of spinal column          General:  Reason for Referral: s/p C-4-5 C5-6 and C6-7 ACDF, posterior C4-5 laminectomy and facetectomy for decompression, C3-4, C6-7, C7-T1, T1-T2 posterior  column osteotomy and C2-T4 instrumented fusion (1/21)  Past Medical History Relevant to Rehab: COPD, CAD, GERD, HLD, HTN, sleep apnea, vocal cord mass, L5-S1 fusion at [facility] with Dr. Velasco in 9/2023 which was aborted d/t severe cervical kyphosis  Prior to Session Communication: Bedside nurse  Patient Position Received: Bed, 3 rail up, Alarm off, not on at start of session  Family/Caregiver Present: Yes  Caregiver Feedback: Wife present and supportive during session  General Comment: Pt pleasant and cooperative for therapy. RN provided pain meds at the start of session.   Precautions:  Hearing/Visual Limitations: Hearing and vision WFL  Medical Precautions: Fall precautions  Post-Surgical Precautions: Spinal precautions  Precautions Comment: MAP > 65  Vital Signs:    Vital Signs     Vitals Session Pre OT During OT Post OT   Heart Rate 94 90s 95   Resp  20  20   SpO2 97 95 95   /60 156/74 141/68   MAP 89 95 90   ICP      '  Pain:  Pain Assessment  Pain Assessment: 0-10  0-10 (Numeric) Pain Score: 7  Pain Type: Acute pain, Surgical pain  Pain Location: Neck  Lines/Tubes/Drains:  A-line, CARL drain, hemovac x 2, richardson, PIV, Airvo on 60L/min/45%.      Objective   Cognition:  Overall Cognitive Status: Within Functional Limits  Orientation Level: Oriented X4  Following Commands: Follows multistep commands with increased time  Attention: Within Functional Limits  Memory: Within Funtional Limits  Problem Solving: Within Functional Limits  Safety/Judgement: Within Functional Limits  Insight: Within function limits  Impulsive: Within functional limits  Processing Speed: Within funtional limits        Ortega Agitation Sedation Scale  Ortega Agitation Sedation Scale (RASS): Drowsy  Home Living:  Type of Home: House  Lives With: Spouse (wife works 2 days/week)  Home Adaptive Equipment: Cane, Reacher, Sock aid  Home Layout: Two level, Bed/bath upstairs, 1/2 bath on main level, Laundry in basement, Stairs to alternate level  with rails  Alternate Level Stairs-Number of Steps: full flight  Home Access: Stairs to enter without rails  Entrance Stairs-Number of Steps: 2 steps to enter through front door; 0 GEOFFREY through garage  Bathroom Shower/Tub: Walk-in shower  Bathroom Toilet: Handicapped height  Bathroom Equipment: Grab bars around toilet, Grab bars in shower   Prior Function:  Level of [place]: Independent with ADLs and functional transfers, Independent with homemaking with ambulation  ADL Assistance: Independent  Homemaking Assistance: Independent  Ambulatory Assistance: Independent (occasionally uses cane for ambulating outside of home)  Vocational: Retired ()  Leisure: enjoys horse racing, gambling, sports, going to the gym  Hand Dominance: Right  Prior Function Comments: (+) drives, (+) falls- reports 2 falls in past year d/t B LE buckling (R>L)  IADL History:     ADL:  Eating Assistance: Stand by  Grooming Assistance: Stand by  Bathing Assistance: Moderate  UE Dressing Assistance: Minimal  LE Dressing Assistance: Maximal  Toileting Assistance with Device: Maximal  Activity Tolerance:  Endurance: Tolerates 30 min exercise with multiple rests  Early Mobility/Exercise Safety Screen: Proceed with mobilization - No exclusion criteria met  Balance:  Dynamic Sitting Balance  Dynamic Sitting-Level of Assistance: Contact guard  Dynamic Standing Balance  Dynamic Standing-Level of Assistance: Minimum assistance  Static Sitting Balance  Static Sitting-Level of Assistance: Contact guard, Close supervision  Static Standing Balance  Static Standing-Level of Assistance: Contact guard, Minimum assistance  Bed Mobility/Transfers: Bed Mobility/Transfers: Bed Mobility  Bed Mobility: Yes  Bed Mobility 1  Bed Mobility 1: Supine to sitting, Sitting to supine  Level of Assistance 1: Maximum assistance (x 1 assist)  Bed Mobility Comments 1: HOB slightly elevated, cues for sequencing steps for log roll technique, utilized bedrail and  drawsheet  Functional Mobility  Functional Mobility Performed: Yes  Functional Mobility 1  Surface 1: Level tile  Device 1: Rolling walker  Assistance 1: Minimum assistance  Comments 1: Pt completed side steps with Min A x 1 at FWW with cues for sequencing, walker management, and body posture.   and Transfers  Transfer: Yes  Transfer 1  Technique 1: Sit to stand, Stand to sit  Transfer Device 1: Walker  Transfer Level of Assistance 1: Minimum assistance  Trials/Comments 1: cues for proper hand placement and sequencing  IADL's:      Vision: Vision - Basic Assessment  Current Vision: Wears glasses only for reading   and Vision - Complex Assessment  Tracking: WFL  Sensation:  Light Touch:  (Decreased sensation in B LE/UE (R> L). Decreased sensation in B LE from shin to knee but able to detect light touch. Decreased sensation from elbow to B hands (Less sensation in R UE in comparison to L UE).)  Sensation Comment: denies numbness/tingling  Strength:  Strength Comments: B UE strength >/= 3+/5 assessed via bed mobility  Perception:  Inattention/Neglect: Appears intact  Coordination:  Coordination Comment: bradykinesia in B UE with slight dyscoordination noted in R in comparison to L.   Hand Function:  Hand Function  Gross Grasp: Functional (B grasp 5/5)  Coordination: Functional  Extremities:   RUE   RUE : Within Functional Limits, LUE   LUE: Within Functional Limits, RLE   RLE : Within Functional Limits (strength >/= 3+/5), and LLE   LLE : Within Functional Limits (strength >/= 3+/5)      Outcome Measures: [facility] Daily Activity  Putting on and taking off regular lower body clothing: A lot  Bathing (including washing, rinsing, drying): A lot  Putting on and taking off regular upper body clothing: A little  Toileting, which includes using toilet, bedpan or urinal: A lot  Taking care of personal grooming such as brushing teeth: A little  Eating Meals: A little  Daily Activity - Total Score: 15    Confusion Assessment  Method-ICU (CAM-ICU)  Feature 3: Altered Level of Consciousness: Positive   ICU Mobility Screen  Early Mobility/Exercise Safety Screen: Proceed with mobilization - No exclusion criteria met  ICU Mobility Scale: Marching on spot (at bedside),   Ortega Agitation Sedation Scale  Ortega Agitation Sedation Scale (RASS): Drowsy      Education Documentation  Handouts, taught by Sherry Lombardi OT at 1/22/2025  6:08 PM.  Learner: Patient  Readiness: Acceptance  Method: Explanation, Demonstration  Response: Verbalizes Understanding, Demonstrated Understanding  Comment: OT POC, spine precautions, ADL retraining, log roll    Body Mechanics, taught by Sherry Lombardi OT at 1/22/2025  6:08 PM.  Learner: Patient  Readiness: Acceptance  Method: Explanation, Demonstration  Response: Verbalizes Understanding, Demonstrated Understanding  Comment: OT POC, spine precautions, ADL retraining, log roll    Precautions, taught by Sherry Lombardi OT at 1/22/2025  6:08 PM.  Learner: Patient  Readiness: Acceptance  Method: Explanation, Demonstration  Response: Verbalizes Understanding, Demonstrated Understanding  Comment: OT POC, spine precautions, ADL retraining, log roll    ADL Training, taught by Sherry Lombardi OT at 1/22/2025  6:08 PM.  Learner: Patient  Readiness: Acceptance  Method: Explanation, Demonstration  Response: Verbalizes Understanding, Demonstrated Understanding  Comment: OT POC, spine precautions, ADL retraining, log roll    Education Comments  No comments found.        Goals:   Encounter Problems       Encounter Problems (Active)       ADLs       Patient will perform UB and LB bathing with Mod I using AE PRN.       Start:  01/22/25    Expected End:  02/05/25            Patient with complete upper body dressing with IND.       Start:  01/22/25    Expected End:  02/05/25            Patient with complete lower body dressing with Mod I using AE PRN.       Start:  01/22/25    Expected End:  02/05/25             Patient will complete daily grooming tasks with Mod I.        Start:  01/22/25    Expected End:  02/05/25            Patient will complete toileting including hygiene clothing management/hygiene with Mod I.       Start:  01/22/25    Expected End:  02/05/25            Pt will perform simulated IADL tasks with Mod I using EC/WS principles as needed and demonstrating good safety awareness for safe return home to prior living environment.         Start:  01/22/25    Expected End:  02/05/25               COGNITION/SAFETY       Patient will recall and adhere to spine precautions with 100% carryover during all functional mobility/ADL tasks in order to demonstrate improved understanding and promote healing post op       Start:  01/22/25    Expected End:  02/05/25               MOBILITY       Pt will perform functional mobility household distances with Mod I using LRAD without LOB and demonstrating good safety awareness.        Start:  01/22/25    Expected End:  02/05/25               TRANSFERS       Patient will perform bed mobility with Mod I in simulated home environment utilizing log roll technique.        Start:  01/22/25    Expected End:  02/05/25            Pt will perform functional transfers on various surfaces with Mod I using LRAD with good safety awareness.        Start:  01/22/25    Expected End:  02/05/25                   Treatment Completed on Evaluation      Activities of Daily Living:     Provided education on Adaptive tech for ADLs and AE use for maintaining spine precautions.             Therapy/Activity:     Therapeutic Activity  Therapeutic Activity Performed: Yes  Therapeutic Activity 1: Pt placed with bed in chair position x 20 min with VSS. Pt sat EOB x 15 min with VSS with CGA-SBA. Provided education on adaptive technique and AE for LB ADLs to maintain spine precautions. Pt completed side steps with FWW with Min A x 1 with cues needed for walker management, sequencing and body posture. Provided cues  and education on maintaining spine precautions with log roll technique with cues for coordinating breathing technique with movements.       01/22/25 at 6:09 PM   Sherry Lombardi, OT   Rehab Office: [phone]

## 2025-01-22 NOTE — PROGRESS NOTES
Brief Attending Summary:   Mr. Jerman Colón is a 63 y/o man with COPD, HLD, HTN, L5-S1 fusion (aborted), who presented for planned cervical ACDF.    Exam: EOV, FCx4, moving all extremities antigravity.      Neuro: Cervical fusion and decompression. Dex 4q6  Cards: MAP >65  Pulm: Intubated for acute respiratory insufficiency. WTE  Renal: No active issues.   GI: NPO for extubation  Heme: Hgb 12.7 from 13.4. PLTs 177  ID: Afebrile, leukocytosis downtrending.      Ppx: SCDs, SQH, PPI    Statement of Acuity: I have reviewed and evaluated all relevant data of the last 24 hours, personally examined the patient and formulated the plan of care. This patient was critically ill and required continued critical care treatment. Total critical care time: 35min.     Ramsey Og DO  Attending Physician  Department of Neurology

## 2025-01-22 NOTE — CARE PLAN
Problem: Safety - Adult  Goal: Free from fall injury  Outcome: Progressing     Problem: Mechanical Ventilation  Goal: Tracheostomy will be managed safely  Outcome: Progressing  Goal: ET tube will be managed safely  Outcome: Progressing     Problem: Safety - Medical Restraint  Goal: Remains free of injury from restraints (Restraint for Interference with Medical Device)  Outcome: Progressing  Goal: Free from restraint(s) (Restraint for Interference with Medical Device)  Outcome: Progressing     Problem: Skin  Goal: Prevent/manage excess moisture  Outcome: Progressing  Goal: Promote/optimize nutrition  Outcome: Progressing  Goal: Promote skin healing  Outcome: Progressing     Problem: Fall/Injury  Goal: Not fall by end of shift  Outcome: Progressing  Goal: Be free from injury by end of the shift  Outcome: Progressing

## 2025-01-22 NOTE — PROGRESS NOTES
[facility]  NEUROSCIENCE INTENSIVE CARE UNIT  RECOVERY NOTE       Patient Name: Jerman Colón     MRN: [id number]     Admit Date: 2025     : 1960 AGE: 64 y.o. GENDER: male    Dx: Other secondary kyphosis, cervical region  PROCEDURE: C-4-5 C5-6 and C6-7 ACDF, posterior C4-5 laminectomy and facetectomy for decompression, C3-4, C6-7, C7-T1, T1-T2 posterior column osteotomy and C2-T4 instrumented fusion     Subjective  Jerman Colón is a 77 y.o. male with PMH of COPD, CAD, GERD, HLD, HTN, sleep apnea, vocal cord mass, L5-S1 fusion at [facility] with Dr. Velasco in 2023 which was aborted d/t severe cervical kyphosis presenting today for scheduled C-4-5 C5-6 and C6-7 ACDF, posterior C4-5 laminectomy and facetectomy for decompression, C3-4, C6-7, C7-T1, T1-T2 posterior column osteotomy and C2-T4 instrumented fusion. Per chart review, Pt with burning and stabbing axial neck pain that started 2 years ago, without apparent injury. Pt had lumbar fusion at [facility] which was unable to entirely alleviate his symptoms. He continued to have back pain along with BLE pain.     Significant Events: NAEON.   Postop : Pt has a hx of vocal cord mass. Intubated under Glidescope. Grade I airway. Pt was extubated in the PACU, and needed immediate re-intubation for c/f lower airway obstruction. Per anesthesia, cuff leak wasn't checked prior to extubation. However, pt initially met all other extubation criteria.    Objective   VITALS (24H):  Heart Rate:  []   Temp:  [36.4 °C (97.5 °F)-37.2 °C (99 °F)]   Resp:  [11-26]   BP: (143-169)/(73-92)   SpO2:  [94 %-98 %]      INTAKE/OUTPUT:    Intake/Output Summary (Last 24 hours) at 2025 0711  Last data filed at 2025 0642  Gross per 24 hour   Intake 6865.09 ml   Output 4165 ml   Net 2700.09 ml        PHYSICAL EXAM:  NEURO:  - Intubated, propofol & fentanyl off for approx 1 minute   - Pupils 5 mm reactive bilaterally  - EOV, MOORE spontaneously, follows command x4.  Oriented x4 by choice  CV:  - RRR on telemetry, ST  - Arterial line in place, waveform: good  RESP:  - Intubated  - Oxygen: FiO2 30%, PEEP 5  :  - Alex catheter in place  GI:  - Abdomen NT/ND, soft  SKIN:  - 1 anterior, 2 posterior Hemovac  - Anterior & posterior incisional site intact, KAYLYNN    MEDICATIONS:  Scheduled: PRN: Continuous:   atorvastatin, 40 mg, oral, Nightly  cyclobenzaprine, 5 mg, oral, TID  dexAMETHasone, 4 mg, intravenous, q6h  gabapentin, 600 mg, oral, TID  heparin (porcine), 5,000 Units, subcutaneous, q8h  insulin lispro, 0-5 Units, subcutaneous, q4h  [Held by provider] losartan, 25 mg, oral, Daily  pantoprazole, 40 mg, oral, BID  polyethylene glycol, 17 g, oral, BID  tamsulosin, 0.4 mg, oral, Nightly     PRN medications: bisacodyl, calcium gluconate, calcium gluconate, dextrose, dextrose, fentaNYL, glucagon, glucagon, magnesium sulfate, magnesium sulfate, ondansetron **OR** ondansetron, oxygen fentaNYL, 0-300 mcg/hr, Last Rate: 75 mcg/hr (01/22/25 0642)  lactated Ringer's, 100 mL/hr, Last Rate: 100 mL/hr (01/21/25 1930)  propofol, 0-50 mcg/kg/min, Last Rate: 40 mcg/kg/min (01/22/25 0535)         LAB RESULTS:    Results from last 72 hours   Lab Units 01/22/25  0102 01/21/25  1815   GLUCOSE mg/dL 197* 212*   SODIUM mmol/L 138 137   POTASSIUM mmol/L 4.6 6.3*   CHLORIDE mmol/L 103 102   CO2 mmol/L 26 19*   ANION GAP mmol/L 14 22   BUN mg/dL 16 19   CREATININE mg/dL 0.95 1.24   EGFR mL/min/1.73m*2 89 65   CALCIUM mg/dL 8.1* 8.1*   PHOSPHORUS mg/dL 2.9 6.7*   ALBUMIN g/dL 3.7 4.1   MAGNESIUM mg/dL 1.89 1.88   POCT CALCIUM IONIZED (MMOL/L) IN BLOOD mmol/L 1.06* 0.98*      Results from last 72 hours   Lab Units 01/22/25  0102 01/21/25  1815   WBC AUTO x10*3/uL 16.0* 18.2*   NRBC AUTO /100 WBCs 0.0 0.0   RBC AUTO x10*6/uL 5.11 5.43   HEMOGLOBIN g/dL 12.7* 13.4*   HEMATOCRIT % 40.4* 43.2   MCV fL 79* 80   MCH pg 24.9* 24.7*   MCHC g/dL 31.4* 31.0*   RDW % 17.2* 17.3*   PLATELETS AUTO x10*3/uL 177 198           IMAGING RESULTS:    XR abdomen 1 view    Result Date: 1/22/2025  STUDY: XR ABDOMEN 1 VIEW;  1/22/2025 12:15 am   INDICATION: Signs/Symptoms:OGT advancement.     COMPARISON: Radiograph 01/21/2025   ACCESSION NUMBER(S): [id number]   ORDERING CLINICIAN: VEENA ESTRELLA   FINDINGS: Single AP view of the abdomen was obtained.   Slight interval advancement of the enteric tube with distal tip overlying the expected location of the gastric body.   Nonobstructive bowel gas pattern. Limited evaluation of pneumoperitoneum on supine imaging, however no gross evidence of free air is noted.   Visualized lungs are clear.   Osseous structures demonstrate no acute bony changes.       1. Slight advancement of the enteric tube with distal tip overlying the expected location of the gastric body.   I personally reviewed the image(s) / study and I agree with the findings as stated by Harpreet De La Cruz MD. This study was interpreted at [place], [place].   MACRO: None     Dictation workstation:   [id number]    XR abdomen 1 view    Result Date: 1/21/2025  STUDY: XR ABDOMEN 1 VIEW;  1/21/2025 9:32 pm   INDICATION: Signs/Symptoms:OGT placement.   COMPARISON: CT lumbar spine 07/05/2024   ACCESSION NUMBER(S): [id number]   ORDERING CLINICIAN: VEENA ESTRELLA   FINDINGS: The nasogastric tube tip projects in the gastric body. However, the side port appears to project in the level gastroesophageal junction.   Nonobstructive bowel gas pattern. Limited evaluation of pneumoperitoneum on supine imaging, however no gross evidence of free air is noted.   Mild streaky/linear bibasilar atelectasis.   Osseous structures demonstrate no acute bony changes. Postoperative changes with L5-S1 spinal fusion hardware.       1.  The nasogastric tube tip projects in the gastric body. However, the side port appears to project in the level gastroesophageal junction. Recommend slight advancement. 2. Nonobstructive bowel-gas  pattern. 3. Mild streaky/linear bibasilar atelectasis.   MACRO: None     Dictation workstation:   [id number]    FL fluoro images no charge    Result Date: 1/21/2025  These images are not reportable by radiology and will not be interpreted by  Radiologists.    ECG 12 lead    Result Date: 1/21/2025  Normal sinus rhythm Possible Inferior infarct , age undetermined Abnormal ECG No previous ECGs available Confirmed by Anderson Bowser ([id number]) on 1/21/2025 12:30:03 AM    Lower extremity venous duplex left    Result Date: 1/20/2025  Preliminary Cardiology Report                [facility] [address], [address], [facility] [address]                  Tel [phone]       Preliminary Vascular Lab Report  VASC US LOWER EXTREMITY VENOUS DUPLEX LEFT  Patient Name:      STEF TOUSSAINT Reading Physician:  [id number] Jolly Fletcher MD Study Date:        1/20/2025      Ordering Physician: [id number] RONEY AHUMADA MRN/PID:           [id number]       Technologist:       Francy Mace RVT, [facility] Accession#:        [id number]   Technologist 2: Date of Birth/Age: 1960      Encounter#:         [id number] Gender:            M Admission Status:  Outpatient     Location Performed: [facility]  Diagnosis/ICD: Localized (leg) edema-R60.0 Procedure/CPT: 63500 Peripheral venous duplex scan for DVT Limited  PRELIMINARY CONCLUSIONS: Right Lower Venous: The right common femoral vein demonstrates normal spontaneous and respirophasic flow. Left Lower Venous: No evidence of acute deep vein thrombus visualized in the left lower extremity.  Imaging & Doppler Findings:  Right        Flow CFV   Spontaneous/Phasic  Left                  Compress Thrombus        Flow Distal External Iliac            None   Spontaneous/Phasic CFV                     Yes      None   Spontaneous/Phasic PFV                     Yes      None FV Proximal             Yes      None   Spontaneous/Phasic FV Mid                  Yes      None FV Distal                Yes      None Popliteal               Yes      None   Spontaneous/Phasic Peroneal                Yes      None PTV                     Yes      None VASCULAR PRELIMINARY REPORT completed by Francy Mace RVT, RDMS on 1/20/2025 at 8:50:39 AM  ** Final **        Assessment/Plan      NEURO:  #s/p C-4-5 C5-6 and C6-7 ACDF, posterior C4-5 laminectomy and facetectomy for decompression, C3-4, C6-7, C7-T1, T1-T2 posterior column osteotomy and C2-T4 instrumented fusion  Assessment:  - Exam as above   Plan:  - Neuro Checks: Q2H  - Drain management per neurosurgery (32525)  - PT/OT daily starting POD1.  - Activity as tolerated with assist as needed, should be up in chair TID.  - Sedation: Propofol and Fentanyl  - Pain: Fentanyl gtt   - Transition to dexmedetomidine gtt  - Nausea: ondansetron  - Muscle Spasm: Cyclobenzaprine 10 mg TID  - PT/OT/SLP  - Continue Gabapentin 600 mg TID    CARDIOVASCULAR:  #HTN  #HLD  Assessment:  - ST  - ECHO: Not on file    Plan:  - Continue to monitor on telemetry  - BP goal: MAP >65    RESPIRATORY:  #COPD  #Vocal cord mass?   Assessment:  - re-intubated in PACU c/f lower airway obstruction  Plan:  - Continuous pulse oximetry   - O2 PRN to maintain SpO2 > 94%, wean as tolerated  - Ventilator bundle while intubated  - Consult ENT to assist with extubation when appropriate  - Dexamethasone 4 mg q6h    RENAL/:  #No active issues  Assessment:  - Baseline BUN/Cr: 17/1.07  Plan:  - Monitor with daily RFP  - Maintain richardson catheter for: critically ill patient who need accurate urinary output measurements    FEN/GI:  #Hyperlactatemia-improving  #Hyperkalemia-resolved  Assessment:  - OGT  - Last BM Date:  (PTA)Last BM: PTA  - 1/21: K 6.3, s/p 1 dose of lokelma. K improved to 4.6  - Lactate - 4.1->2.4->2.2  Plan:  - Monitor and replace electrolytes per protocol  - IVF: LR @ 100 mL/hr  - Diet: NPO for possible extubation  - Bowel Regimen: Miralax BID  - Resuscitate PRN     ENDOCRINE:  #No active  issues  Assessment:  Results from last 7 days   Lab Units 25  0804 25  0412 25  0102 25  0052 25  1815   POCT GLUCOSE mg/dL 149* 190*  --  186*  --    GLUCOSE mg/dL  --   --  197*  --  212*   SODIUM mmol/L  --   --  138  --  137      Plan:  - Accuchecks & ISS Q4H     HEMATOLOGY:  #Acute Blood Loss Anemia 2/2 procedure  Assessment:  - Baseline Hgb: 16.8  - Baseline Plts: 189  Results from last 7 days   Lab Units 25  0102 25  1815   HEMOGLOBIN g/dL 12.7* 13.4*   HEMATOCRIT % 40.4* 43.2   PLATELETS AUTO x10*3/uL 177 198     Plan:  - Continue to monitor with daily CBC and Coag panel    INFECTIOUS DISEASE:  #Leukocytosis, likely reactive to procedure  Assessment:  Results from last 7 days   Lab Units 25  0102 25  1815   WBC AUTO x10*3/uL 16.0* 18.2*    - Temp (24hrs), Av.7 °C (98 °F), Min:36.4 °C (97.5 °F), Max:37.2 °C (99 °F)     Plan:  - Continue to monitor for s/sx of infection  - Pan culture for temperature > 38.4 C    MUSCULOSKELETAL:  - No acute issues    SKIN:  - No acute issues  - Turns and skin care per NSU protocol    ACCESS:  - PIVs  - L rad arterial line     PROPHYLAXIS:  - DVT Ppx: SCDs and SQH   - GI Ppx: Pantoprazole    RESTRAINTS:  I agree with nursing assessment of the patient's need for restraints to protect the patient from injury and facilitate healing. The patient is unable to cooperate with the plan of care and at risk for disrupting critical therapy (i.e., removing medical devices, lines, tubes and/or dressings).  Please see order for specifics. Restraints can be removed when the patient is able to cooperate with plan of care and allow healing to occur, or the medical devices at risk are discontinued by the medical team.     Jacques Avilez, APRN-CNP  Neuroscience Intensive Care       Total critical care time of 60 minutes, with > 50% of time spent in direct contact with patient/family for education, counseling and coordination of care.

## 2025-01-22 NOTE — CARE PLAN
Interprofessional Rounds    Summary:  Cervical spine surgery.  Extubated today, anticipate transfer out of ICU tomorrow.    Participants: Advance Practice Provider, Physician, RN, and     Care Plan Reviewed with:  Interdisciplinary Team

## 2025-01-23 ENCOUNTER — APPOINTMENT (OUTPATIENT)
Dept: RADIOLOGY | Facility: HOSPITAL | Age: 65
DRG: 430 | End: 2025-01-23
Payer: MEDICARE

## 2025-01-23 LAB
ALBUMIN SERPL BCP-MCNC: 3.5 G/DL (ref 3.4–5)
ANION GAP SERPL CALC-SCNC: 15 MMOL/L (ref 10–20)
BASOPHILS # BLD AUTO: 0.02 X10*3/UL (ref 0–0.1)
BASOPHILS NFR BLD AUTO: 0.1 %
BUN SERPL-MCNC: 18 MG/DL (ref 6–23)
CA-I BLD-SCNC: 1.13 MMOL/L (ref 1.1–1.33)
CALCIUM SERPL-MCNC: 8.6 MG/DL (ref 8.6–10.6)
CHLORIDE SERPL-SCNC: 102 MMOL/L (ref 98–107)
CO2 SERPL-SCNC: 27 MMOL/L (ref 21–32)
CREAT SERPL-MCNC: 0.86 MG/DL (ref 0.5–1.3)
EGFRCR SERPLBLD CKD-EPI 2021: >90 ML/MIN/1.73M*2
EOSINOPHIL # BLD AUTO: 0 X10*3/UL (ref 0–0.7)
EOSINOPHIL NFR BLD AUTO: 0 %
ERYTHROCYTE [DISTWIDTH] IN BLOOD BY AUTOMATED COUNT: 17.2 % (ref 11.5–14.5)
GLUCOSE BLD MANUAL STRIP-MCNC: 136 MG/DL (ref 74–99)
GLUCOSE BLD MANUAL STRIP-MCNC: 142 MG/DL (ref 74–99)
GLUCOSE BLD MANUAL STRIP-MCNC: 145 MG/DL (ref 74–99)
GLUCOSE BLD MANUAL STRIP-MCNC: 147 MG/DL (ref 74–99)
GLUCOSE BLD MANUAL STRIP-MCNC: 202 MG/DL (ref 74–99)
GLUCOSE BLD MANUAL STRIP-MCNC: 211 MG/DL (ref 74–99)
GLUCOSE SERPL-MCNC: 149 MG/DL (ref 74–99)
HCT VFR BLD AUTO: 37.1 % (ref 41–52)
HGB BLD-MCNC: 11.7 G/DL (ref 13.5–17.5)
IMM GRANULOCYTES # BLD AUTO: 0.08 X10*3/UL (ref 0–0.7)
IMM GRANULOCYTES NFR BLD AUTO: 0.5 % (ref 0–0.9)
LYMPHOCYTES # BLD AUTO: 0.56 X10*3/UL (ref 1.2–4.8)
LYMPHOCYTES NFR BLD AUTO: 3.4 %
MAGNESIUM SERPL-MCNC: 1.98 MG/DL (ref 1.6–2.4)
MCH RBC QN AUTO: 25.1 PG (ref 26–34)
MCHC RBC AUTO-ENTMCNC: 31.5 G/DL (ref 32–36)
MCV RBC AUTO: 79 FL (ref 80–100)
MONOCYTES # BLD AUTO: 1.51 X10*3/UL (ref 0.1–1)
MONOCYTES NFR BLD AUTO: 9.1 %
NEUTROPHILS # BLD AUTO: 14.36 X10*3/UL (ref 1.2–7.7)
NEUTROPHILS NFR BLD AUTO: 86.9 %
NRBC BLD-RTO: 0 /100 WBCS (ref 0–0)
PHOSPHATE SERPL-MCNC: 2.6 MG/DL (ref 2.5–4.9)
PLATELET # BLD AUTO: 172 X10*3/UL (ref 150–450)
POTASSIUM SERPL-SCNC: 4.7 MMOL/L (ref 3.5–5.3)
RBC # BLD AUTO: 4.67 X10*6/UL (ref 4.5–5.9)
SODIUM SERPL-SCNC: 139 MMOL/L (ref 136–145)
WBC # BLD AUTO: 16.5 X10*3/UL (ref 4.4–11.3)

## 2025-01-23 PROCEDURE — 2500000004 HC RX 250 GENERAL PHARMACY W/ HCPCS (ALT 636 FOR OP/ED)

## 2025-01-23 PROCEDURE — 80069 RENAL FUNCTION PANEL: CPT

## 2025-01-23 PROCEDURE — 97161 PT EVAL LOW COMPLEX 20 MIN: CPT | Mod: GP

## 2025-01-23 PROCEDURE — 97535 SELF CARE MNGMENT TRAINING: CPT | Mod: GO

## 2025-01-23 PROCEDURE — 83735 ASSAY OF MAGNESIUM: CPT

## 2025-01-23 PROCEDURE — 92612 ENDOSCOPY SWALLOW (FEES) VID: CPT | Mod: GN | Performed by: SPEECH-LANGUAGE PATHOLOGIST

## 2025-01-23 PROCEDURE — 92611 MOTION FLUOROSCOPY/SWALLOW: CPT | Mod: GN | Performed by: SPEECH-LANGUAGE PATHOLOGIST

## 2025-01-23 PROCEDURE — 97530 THERAPEUTIC ACTIVITIES: CPT | Mod: GO

## 2025-01-23 PROCEDURE — 74230 X-RAY XM SWLNG FUNCJ C+: CPT

## 2025-01-23 PROCEDURE — 82947 ASSAY GLUCOSE BLOOD QUANT: CPT

## 2025-01-23 PROCEDURE — 2500000005 HC RX 250 GENERAL PHARMACY W/O HCPCS: Performed by: NEUROLOGICAL SURGERY

## 2025-01-23 PROCEDURE — 92610 EVALUATE SWALLOWING FUNCTION: CPT | Mod: GN | Performed by: SPEECH-LANGUAGE PATHOLOGIST

## 2025-01-23 PROCEDURE — 2500000002 HC RX 250 W HCPCS SELF ADMINISTERED DRUGS (ALT 637 FOR MEDICARE OP, ALT 636 FOR OP/ED): Performed by: REGISTERED NURSE

## 2025-01-23 PROCEDURE — 2060000001 HC INTERMEDIATE ICU ROOM DAILY

## 2025-01-23 PROCEDURE — 74230 X-RAY XM SWLNG FUNCJ C+: CPT | Performed by: RADIOLOGY

## 2025-01-23 PROCEDURE — 2500000002 HC RX 250 W HCPCS SELF ADMINISTERED DRUGS (ALT 637 FOR MEDICARE OP, ALT 636 FOR OP/ED): Performed by: STUDENT IN AN ORGANIZED HEALTH CARE EDUCATION/TRAINING PROGRAM

## 2025-01-23 PROCEDURE — 2500000001 HC RX 250 WO HCPCS SELF ADMINISTERED DRUGS (ALT 637 FOR MEDICARE OP): Performed by: STUDENT IN AN ORGANIZED HEALTH CARE EDUCATION/TRAINING PROGRAM

## 2025-01-23 PROCEDURE — 2500000001 HC RX 250 WO HCPCS SELF ADMINISTERED DRUGS (ALT 637 FOR MEDICARE OP)

## 2025-01-23 PROCEDURE — 85025 COMPLETE CBC W/AUTO DIFF WBC: CPT

## 2025-01-23 PROCEDURE — 37799 UNLISTED PX VASCULAR SURGERY: CPT

## 2025-01-23 PROCEDURE — 2500000004 HC RX 250 GENERAL PHARMACY W/ HCPCS (ALT 636 FOR OP/ED): Performed by: STUDENT IN AN ORGANIZED HEALTH CARE EDUCATION/TRAINING PROGRAM

## 2025-01-23 PROCEDURE — 82330 ASSAY OF CALCIUM: CPT

## 2025-01-23 PROCEDURE — 2500000005 HC RX 250 GENERAL PHARMACY W/O HCPCS

## 2025-01-23 RX ORDER — HYDROCODONE BITARTRATE AND HOMATROPINE METHYLBROMIDE ORAL SOLUTION 5; 1.5 MG/5ML; MG/5ML
5 LIQUID ORAL EVERY 6 HOURS PRN
Status: DISCONTINUED | OUTPATIENT
Start: 2025-01-23 | End: 2025-01-28 | Stop reason: HOSPADM

## 2025-01-23 RX ORDER — CODEINE PHOSPHATE AND GUAIFENESIN 10; 100 MG/5ML; MG/5ML
5 SOLUTION ORAL EVERY 6 HOURS PRN
Status: DISCONTINUED | OUTPATIENT
Start: 2025-01-23 | End: 2025-01-23

## 2025-01-23 RX ORDER — OXYCODONE HYDROCHLORIDE 5 MG/1
5 TABLET ORAL EVERY 4 HOURS PRN
Status: DISCONTINUED | OUTPATIENT
Start: 2025-01-23 | End: 2025-01-28 | Stop reason: HOSPADM

## 2025-01-23 RX ORDER — OXYCODONE AND ACETAMINOPHEN 5; 325 MG/1; MG/1
1 TABLET ORAL 3 TIMES DAILY
Status: CANCELLED
Start: 2025-01-23

## 2025-01-23 RX ORDER — BISACODYL 5 MG
10 TABLET, DELAYED RELEASE (ENTERIC COATED) ORAL DAILY
Status: DISCONTINUED | OUTPATIENT
Start: 2025-01-23 | End: 2025-01-28 | Stop reason: HOSPADM

## 2025-01-23 RX ORDER — OXYCODONE HYDROCHLORIDE 10 MG/1
10 TABLET ORAL EVERY 4 HOURS PRN
Status: DISCONTINUED | OUTPATIENT
Start: 2025-01-23 | End: 2025-01-28 | Stop reason: HOSPADM

## 2025-01-23 RX ORDER — BISACODYL 10 MG/1
10 SUPPOSITORY RECTAL ONCE
Status: DISCONTINUED | OUTPATIENT
Start: 2025-01-23 | End: 2025-01-28 | Stop reason: HOSPADM

## 2025-01-23 RX ADMIN — HEPARIN SODIUM 5000 UNITS: 5000 INJECTION INTRAVENOUS; SUBCUTANEOUS at 15:58

## 2025-01-23 RX ADMIN — GABAPENTIN 600 MG: 300 CAPSULE ORAL at 20:35

## 2025-01-23 RX ADMIN — ATORVASTATIN CALCIUM 40 MG: 40 TABLET, FILM COATED ORAL at 20:35

## 2025-01-23 RX ADMIN — HYDROMORPHONE HYDROCHLORIDE 0.4 MG: 1 INJECTION, SOLUTION INTRAMUSCULAR; INTRAVENOUS; SUBCUTANEOUS at 00:12

## 2025-01-23 RX ADMIN — CYCLOBENZAPRINE 5 MG: 10 TABLET, FILM COATED ORAL at 14:20

## 2025-01-23 RX ADMIN — GABAPENTIN 600 MG: 300 CAPSULE ORAL at 14:19

## 2025-01-23 RX ADMIN — HEPARIN SODIUM 5000 UNITS: 5000 INJECTION INTRAVENOUS; SUBCUTANEOUS at 23:52

## 2025-01-23 RX ADMIN — LIDOCAINE 4% 2 PATCH: 40 PATCH TOPICAL at 08:29

## 2025-01-23 RX ADMIN — INSULIN LISPRO 2 UNITS: 100 INJECTION, SOLUTION INTRAVENOUS; SUBCUTANEOUS at 16:38

## 2025-01-23 RX ADMIN — HEPARIN SODIUM 5000 UNITS: 5000 INJECTION INTRAVENOUS; SUBCUTANEOUS at 08:28

## 2025-01-23 RX ADMIN — INSULIN LISPRO 2 UNITS: 100 INJECTION, SOLUTION INTRAVENOUS; SUBCUTANEOUS at 20:04

## 2025-01-23 RX ADMIN — BARIUM SULFATE 20 ML: 400 PASTE ORAL at 13:45

## 2025-01-23 RX ADMIN — HYDROMORPHONE HYDROCHLORIDE 0.4 MG: 1 INJECTION, SOLUTION INTRAMUSCULAR; INTRAVENOUS; SUBCUTANEOUS at 08:27

## 2025-01-23 RX ADMIN — BARIUM SULFATE 50 ML: 400 SUSPENSION ORAL at 13:45

## 2025-01-23 RX ADMIN — OXYCODONE HYDROCHLORIDE 10 MG: 10 TABLET ORAL at 18:51

## 2025-01-23 RX ADMIN — OXYCODONE HYDROCHLORIDE 10 MG: 10 TABLET ORAL at 14:20

## 2025-01-23 RX ADMIN — TAMSULOSIN HYDROCHLORIDE 0.4 MG: 0.4 CAPSULE ORAL at 20:37

## 2025-01-23 RX ADMIN — BARIUM SULFATE 50 ML: 0.81 POWDER, FOR SUSPENSION ORAL at 13:46

## 2025-01-23 RX ADMIN — CYCLOBENZAPRINE 5 MG: 10 TABLET, FILM COATED ORAL at 20:37

## 2025-01-23 RX ADMIN — PANTOPRAZOLE SODIUM 40 MG: 40 TABLET, DELAYED RELEASE ORAL at 20:36

## 2025-01-23 RX ADMIN — HYDROMORPHONE HYDROCHLORIDE 0.4 MG: 1 INJECTION, SOLUTION INTRAMUSCULAR; INTRAVENOUS; SUBCUTANEOUS at 20:35

## 2025-01-23 RX ADMIN — HYDROMORPHONE HYDROCHLORIDE 0.4 MG: 1 INJECTION, SOLUTION INTRAMUSCULAR; INTRAVENOUS; SUBCUTANEOUS at 05:37

## 2025-01-23 RX ADMIN — METHOCARBAMOL 1000 MG: 100 INJECTION INTRAMUSCULAR; INTRAVENOUS at 10:32

## 2025-01-23 RX ADMIN — METHOCARBAMOL 1000 MG: 100 INJECTION INTRAMUSCULAR; INTRAVENOUS at 02:27

## 2025-01-23 ASSESSMENT — PAIN SCALES - GENERAL
PAINLEVEL_OUTOF10: 7
PAINLEVEL_OUTOF10: 7
PAINLEVEL_OUTOF10: 6
PAINLEVEL_OUTOF10: 7
PAINLEVEL_OUTOF10: 7
PAINLEVEL_OUTOF10: 8
PAINLEVEL_OUTOF10: 6
PAINLEVEL_OUTOF10: 7
PAINLEVEL_OUTOF10: 7
PAINLEVEL_OUTOF10: 6
PAINLEVEL_OUTOF10: 7
PAINLEVEL_OUTOF10: 6
PAINLEVEL_OUTOF10: 7

## 2025-01-23 ASSESSMENT — COGNITIVE AND FUNCTIONAL STATUS - GENERAL
MOVING TO AND FROM BED TO CHAIR: A LITTLE
TOILETING: A LITTLE
WALKING IN HOSPITAL ROOM: A LITTLE
DRESSING REGULAR LOWER BODY CLOTHING: A LITTLE
STANDING UP FROM CHAIR USING ARMS: A LITTLE
MOVING FROM LYING ON BACK TO SITTING ON SIDE OF FLAT BED WITH BEDRAILS: A LITTLE
TURNING FROM BACK TO SIDE WHILE IN FLAT BAD: A LITTLE
DRESSING REGULAR UPPER BODY CLOTHING: A LITTLE
CLIMB 3 TO 5 STEPS WITH RAILING: TOTAL
HELP NEEDED FOR BATHING: A LITTLE
MOBILITY SCORE: 16
DAILY ACTIVITIY SCORE: 20

## 2025-01-23 ASSESSMENT — PAIN DESCRIPTION - LOCATION
LOCATION: NECK
LOCATION: BACK

## 2025-01-23 ASSESSMENT — PAIN DESCRIPTION - ORIENTATION
ORIENTATION: RIGHT;LEFT;MID
ORIENTATION: POSTERIOR

## 2025-01-23 ASSESSMENT — ACTIVITIES OF DAILY LIVING (ADL)
ADL_ASSISTANCE: INDEPENDENT
ADLS_ADDRESSED: NO
HOME_MANAGEMENT_TIME_ENTRY: 30

## 2025-01-23 ASSESSMENT — PAIN DESCRIPTION - DESCRIPTORS
DESCRIPTORS: PRESSURE;THROBBING
DESCRIPTORS: THROBBING;PRESSURE

## 2025-01-23 NOTE — PROGRESS NOTES
Occupational Therapy    OT Treatment    Patient Name: Jerman Colón  MRN: [id number]  Department: [facility]  Room: 03/03-A  Today's Date: 1/23/2025  Time Calculation  Start Time: 1354  Stop Time: 1447  Time Calculation (min): 53 min        Assessment:  Prognosis: Good  Barriers to Discharge Home: Physical needs  Physical Needs: Stair navigation to access bed limited by function/safety, Intermittent mobility assistance needed, Intermittent ADL assistance needed, Ambulating household distances limited by function/safety  Evaluation/Treatment Tolerance: Patient tolerated treatment well  Medical Staff Made Aware: Yes  End of Session Communication: Bedside nurse  End of Session Patient Position: Up in chair, Alarm on  Prognosis: Good  Evaluation/Treatment Tolerance: Patient tolerated treatment well  Medical Staff Made Aware: Yes  Plan:  Treatment Interventions: ADL retraining, Functional transfer training, UE strengthening/ROM, Endurance training, Patient/family training, Equipment evaluation/education, Neuromuscular reeducation, Compensatory technique education  OT Frequency: 4 times per week  OT Discharge Recommendations: High intensity level of continued care  Equipment Recommended upon Discharge: Wheeled walker  OT Recommended Transfer Status: Minimal assist  OT - OK to Discharge: Yes  Treatment Interventions: ADL retraining, Functional transfer training, UE strengthening/ROM, Endurance training, Patient/family training, Equipment evaluation/education, Neuromuscular reeducation, Compensatory technique education    Subjective   Previous Visit Info:  OT Last Visit  OT Received On: 01/23/25  General:  General  Reason for Referral: 1/21/25 s/p C4-5, C5-6 anterior cervical discectomy and fusion and posterior cervical C4-5 laminectomy and facetectomy for decompression along with C3-4 C6-7 C7-T1 T1-T2 posterior column osteotomy and a C2-T4 instrumented fusion.  Past Medical History Relevant to Rehab: PONV, Vocal Cord Mass,  Telephone Encounter by Radha Perry CMA at 01/25/18 01:48 PM     Author:  Radha Perry CMA Service:  (none) Author Type:  Certified Medical Assistant     Filed:  01/25/18 01:52 PM Encounter Date:  1/25/2018 Status:  Signed     :  Radha Perry CMA (Certified Medical Assistant)            Dr. Moore,    I called back Venancio's father and informed him that Venancio should follow back up with the Speech Pathologist to continue to work with Venancio and if not then we would refer Venancio to [facility] which Venancio's father verbally understood information given.      Thank you.[LB1.1M]      Revision History        User Key Date/Time User Provider Type Action    > LB1.1 01/25/18 01:52 PM Radha Perry CMA Certified Medical Assistant Sign    M - Manual             HTN, HLD, CAD, COPD, Asthma BIANCA (no CPAP), GERD, BPH, hypogonadism, tubular adenoma of colon, proteinuria Polcythemia, Fusion of lumbar spine, kyphosis of cervical region, cervical myelopathy, Spinal cord compression due to degenerative disorder of spinal column  Family/Caregiver Present: No  Prior to Session Communication: Bedside nurse  Patient Position Received: Bed, 3 rail up, Alarm off, not on at start of session  General Comment: Pt pleasant and motivated for therapy  Precautions:  Hearing/Visual Limitations: Hearing and vision WFL  Medical Precautions: Fall precautions  Post-Surgical Precautions: Spinal precautions  Precautions Comment: MAP > 65      Vital Signs     Vitals Session Pre OT During OT Post OT   Heart Rate 101 108 107   Resp  20  19   SpO2 94 92 94   /78 167/92 171/95    115 115   ICP                  Pain:  Pain Assessment  Pain Assessment: 0-10  0-10 (Numeric) Pain Score: 7  Pain Type: Acute pain  Pain Location: Back    Objective    Cognition:  Cognition  Overall Cognitive Status: Within Functional Limits  Orientation Level: Oriented X4  Following Commands: Follows all commands and directions without difficulty  Insight: Within function limits  Impulsive: Within functional limits  Processing Speed: Within funtional limits    Activities of Daily Living:    Feeding: Pt able to feed self IND seated in chair.    Grooming: Pt able to wash face IND with bath wipes in chair.        UE Dressing  UE Dressing Level of Assistance: Distant supervision  UE Dressing Comments: Pt doffed/donned gown with supervision and min cues for maintaining precautions    LE Dressing  LE Dressing: Yes  LE Dressing Adaptive Equipment: Reacher, Sock aide  Pants Level of Assistance: Minimum assistance  Sock Level of Assistance: Close supervision  LE Dressing Where Assessed: Chair  LE Dressing Comments: Instructed and educated on techniques for using AE for improved LB dressing. Pt doffed socks with reacher with  SBA and min cueing and donned socks with sock-aid with SBA and min cues for technique. Pt donnedpants with reacher with Levi  to thread LE through pant leg and cues to maintain spine precautions.  Functional Standing Tolerance:     Bed Mobility/Transfers: Bed Mobility  Bed Mobility: Yes  Bed Mobility 1  Bed Mobility 1: Supine to sitting  Level of Assistance 1: Contact guard  Bed Mobility Comments 1: HOB minimally elevated, bedrail used, cues for sequenicng for log roll technique and breathing technique    Transfers  Transfer: Yes  Transfer 1  Technique 1: Sit to stand, Stand to sit  Transfer Device 1: Walker  Transfer Level of Assistance 1: Contact guard  Trials/Comments 1: Pt completed 5 trials with min cues for hand placement, technique, and breathing.  Transfers 2  Transfer From 2: Bed to  Transfer to 2: Chair with arms  Technique 2:  (side steps)  Transfer Device 2: Walker  Transfer Level of Assistance 2: Contact guard  Trials/Comments 2: cues for sequencing, walker management and hand placement.      Functional Mobility:  Functional Mobility  Functional Mobility Performed: Yes  Functional Mobility 1  Comments 1: Pt completed 2 trials forward/backward steps with FWW with CGA with min cues for walker management and body posture.  Sitting Balance:  Static Sitting Balance  Static Sitting-Level of Assistance: Close supervision  Dynamic Sitting Balance  Dynamic Sitting-Level of Assistance: Close supervision, Contact guard  Standing Balance:  Static Standing Balance  Static Standing-Level of Assistance: Contact guard  Dynamic Standing Balance  Dynamic Standing-Level of Assistance: Contact guard    Therapy/Activity:  Pt sat EOB x 10 min with supervision without LOB. Completed 2 trials dynamic standing ~ 45 sec with CGA at FWW without LOB. Cues needed for walker management while taking steps.    Outcome Measures:[facility] Daily Activity  Putting on and taking off regular lower body clothing: A little  Bathing (including  washing, rinsing, drying): A little  Putting on and taking off regular upper body clothing: A little  Toileting, which includes using toilet, bedpan or urinal: A little  Taking care of personal grooming such as brushing teeth: None  Eating Meals: None  Daily Activity - Total Score: 20         and Early Mobility/Exercise Safety Screen: Proceed with mobilization - No exclusion criteria met  ICU Mobility Scale: Walking with assistance of 1 person [8]    Education Documentation  Handouts, taught by Sherry Lombardi OT at 1/23/2025  5:06 PM.  Learner: Patient  Readiness: Acceptance  Method: Demonstration  Response: Verbalizes Understanding, Demonstrated Understanding  Comment: OT POC, spine precautions, ADL retraining    Body Mechanics, taught by Sherry Lombardi OT at 1/23/2025  5:06 PM.  Learner: Patient  Readiness: Acceptance  Method: Demonstration  Response: Verbalizes Understanding, Demonstrated Understanding  Comment: OT POC, spine precautions, ADL retraining    Precautions, taught by Sherry Lombardi OT at 1/23/2025  5:06 PM.  Learner: Patient  Readiness: Acceptance  Method: Demonstration  Response: Verbalizes Understanding, Demonstrated Understanding  Comment: OT POC, spine precautions, ADL retraining    ADL Training, taught by Sherry Lombardi OT at 1/23/2025  5:06 PM.  Learner: Patient  Readiness: Acceptance  Method: Demonstration  Response: Verbalizes Understanding, Demonstrated Understanding  Comment: OT POC, spine precautions, ADL retraining    Handouts, taught by Sherry Lombardi OT at 1/22/2025  6:08 PM.  Learner: Patient  Readiness: Acceptance  Method: Explanation, Demonstration  Response: Verbalizes Understanding, Demonstrated Understanding  Comment: OT POC, spine precautions, ADL retraining, log roll    Body Mechanics, taught by Sherry Lombardi OT at 1/22/2025  6:08 PM.  Learner: Patient  Readiness: Acceptance  Method: Explanation, Demonstration  Response: Verbalizes Understanding,  Demonstrated Understanding  Comment: OT POC, spine precautions, ADL retraining, log roll    Precautions, taught by Sherry Lombardi OT at 1/22/2025  6:08 PM.  Learner: Patient  Readiness: Acceptance  Method: Explanation, Demonstration  Response: Verbalizes Understanding, Demonstrated Understanding  Comment: OT POC, spine precautions, ADL retraining, log roll    ADL Training, taught by Sherry Lombardi OT at 1/22/2025  6:08 PM.  Learner: Patient  Readiness: Acceptance  Method: Explanation, Demonstration  Response: Verbalizes Understanding, Demonstrated Understanding  Comment: OT POC, spine precautions, ADL retraining, log roll    Education Comments  No comments found.        OP EDUCATION:       Goals:  Encounter Problems       Encounter Problems (Active)       ADLs       Patient will perform UB and LB bathing with Mod I using AE PRN. (Progressing)       Start:  01/22/25    Expected End:  02/05/25            Patient with complete upper body dressing with IND. (Progressing)       Start:  01/22/25    Expected End:  02/05/25            Patient with complete lower body dressing with Mod I using AE PRN. (Progressing)       Start:  01/22/25    Expected End:  02/05/25            Patient will complete daily grooming tasks with Mod I.  (Met)       Start:  01/22/25    Expected End:  02/05/25    Resolved:  01/23/25         Patient will complete toileting including hygiene clothing management/hygiene with Mod I. (Progressing)       Start:  01/22/25    Expected End:  02/05/25            Pt will perform simulated IADL tasks with Mod I using EC/WS principles as needed and demonstrating good safety awareness for safe return home to prior living environment.   (Progressing)       Start:  01/22/25    Expected End:  02/05/25               COGNITION/SAFETY       Patient will recall and adhere to spine precautions with 100% carryover during all functional mobility/ADL tasks in order to demonstrate improved understanding and promote  healing post op (Progressing)       Start:  01/22/25    Expected End:  02/05/25               MOBILITY       Pt will perform functional mobility household distances with Mod I using LRAD without LOB and demonstrating good safety awareness.  (Progressing)       Start:  01/22/25    Expected End:  02/05/25               TRANSFERS       Patient will perform bed mobility with Mod I in simulated home environment utilizing log roll technique.  (Progressing)       Start:  01/22/25    Expected End:  02/05/25            Pt will perform functional transfers on various surfaces with Mod I using LRAD with good safety awareness.  (Progressing)       Start:  01/22/25    Expected End:  02/05/25

## 2025-01-23 NOTE — CONSULTS
ENT DEPARTMENT CONSULTATION NOTE  Name: Jerman Colón  MRN: [id number]  : 1960  Consulting Team: Neuro  Reason for Consult: ENT observation at bedside    History of Present Illness  63 y/o male s/p C4-5, C5-6 anterior cervical discectomy and fusion using stand-alone cages and allograft followed by posterior cervical C4-5 laminectomy and facetectomy for decompression along with C3-4 C6-7 C7-T1 T1-T2 posterior column osteotomy and a C2-T4 instrumented fusion using local bone autograft and C5 which is an osteoporotic material for spine surgery 25.  Per anesthesia reports patient had very rapidly progressing respiratory insufficiency on extubation in the OR despite good muscle strength.  He did not tolerate oral airway.  Patient had low lung volumes and decision was made by anesthesia to be intubate with 7.5 ETT using glide scope.  He was noted to be without swelling of the epiglottis or abnormality of the vocal cords.  They were concerned for tracheal patency.    ENT was consulted for bedside availability while extubating given airway concerns. No issues with extubation. Patient now awake and alert, denies dyspnea, dysphonia. Managing secretions. He does have a history of vocal fold surgery that was done by Dr Smith at [facility].    PMHX includes PONV, Vocal Cord Mass, HTN, HLD, CAD, COPD, Asthma BIANCA (no CPAP), GERD, BPH, hypogonadism, tubular adenoma of colon, proteinuria Polcythemia, Fusion of lumbar spine, kyphosis of cervical region, cervical myelopathy, Spinal cord compression due to degenerative disorder of spinal column.    Review of Systems  14 point review of systems completed and all negative except as noted in HPI.    Past Medical History  Past Medical History:   Diagnosis Date    Anemia     Asthma     BPH (benign prostatic hyperplasia)     Cervical myelopathy     COPD (chronic obstructive pulmonary disease) (Multi)     Coronary artery disease     GERD (gastroesophageal reflux disease)     controlled     Hyperlipidemia     Hypertension     Polycythemia     PONV (postoperative nausea and vomiting)     Proteinuria     Sepsis (Multi)     Sleep apnea     Vocal cord mass        Past Surgical History  Past Surgical History:   Procedure Laterality Date    CARDIAC CATHETERIZATION Left 09/2023    COLONOSCOPY      DENTAL SURGERY      HERNIA REPAIR      LUMBAR FUSION  2018    L5-S1 Fusion    SKIN BIOPSY      TONSILLECTOMY      TOTAL SHOULDER ARTHROPLASTY Left        Allergies  No Known Allergies    Medications    Current Facility-Administered Medications:     atorvastatin (Lipitor) tablet 40 mg, 40 mg, oral, Nightly, Param Israel MD    bisacodyl (Dulcolax) EC tablet 10 mg, 10 mg, oral, Daily PRN, Param Israel MD    calcium gluconate 1 g in sodium chloride (iso) IV 50 mL, 1 g, intravenous, q6h PRN, CAROLINA Rodriguez-CNP, Stopped at 01/22/25 0529    calcium gluconate 2 g in sodium chloride (iso)  mL, 2 g, intravenous, q6h PRN, CAROLINA Rodriguez-CNP    cyclobenzaprine (Flexeril) tablet 5 mg, 5 mg, oral, TID, Param Israel MD, 5 mg at 01/22/25 0853    dextrose 50 % injection 12.5 g, 12.5 g, intravenous, q15 min PRN, Param Israel MD    dextrose 50 % injection 25 g, 25 g, intravenous, q15 min PRN, Param Israel MD    gabapentin (Neurontin) capsule 600 mg, 600 mg, oral, TID, Param Israel MD, 600 mg at 01/22/25 0853    glucagon (Glucagen) injection 1 mg, 1 mg, intramuscular, q15 min PRN, Param Israel MD    glucagon (Glucagen) injection 1 mg, 1 mg, intramuscular, q15 min PRN, Param Israel MD    heparin (porcine) injection 5,000 Units, 5,000 Units, subcutaneous, q8h, Param Israel MD, 5,000 Units at 01/22/25 1607    HYDROmorphone PF (Dilaudid) injection 0.2 mg, 0.2 mg, intravenous, q3h PRN, CAROLINA Pineda-CNP, 0.2 mg at 01/22/25 1609    insulin lispro injection 0-5 Units, 0-5 Units, subcutaneous, q4h, CAROLINA Rodriguez-CNP, 1 Units at 01/22/25 1608    [Held by provider] losartan (Cozaar) tablet  25 mg, 25 mg, oral, Daily, Param Israel MD    magnesium sulfate 2 g in sterile water for injection 50 mL, 2 g, intravenous, q6h PRN, CAROLINA Rodriguez-CNP, Stopped at 01/22/25 0619    magnesium sulfate 4 g in sterile water for injection 100 mL, 4 g, intravenous, q6h PRN, Lynne Corbett APRN-CNP    methocarbamol (Robaxin) injection 1,000 mg, 1,000 mg, intravenous, q8h PRN, Jacques Avilez APRN-CNP, 1,000 mg at 01/22/25 1810    ondansetron (Zofran) tablet 4 mg, 4 mg, oral, q8h PRN **OR** ondansetron (Zofran) injection 4 mg, 4 mg, intravenous, q8h PRN, Param Israel MD    oxygen (O2) therapy, , inhalation, Continuous PRN - O2/gases, Ramsey Og,     pantoprazole (ProtoNix) EC tablet 40 mg, 40 mg, oral, BID, Param Israel MD, 40 mg at 01/22/25 0853    polyethylene glycol (Glycolax, Miralax) packet 17 g, 17 g, oral, BID, Param Israel MD, 17 g at 01/22/25 0854    tamsulosin (Flomax) 24 hr capsule 0.4 mg, 0.4 mg, oral, Nightly, Param Israel MD    Family History  Family History   Problem Relation Name Age of Onset    Other (htn) Mother      Diabetes Mother      Other (htn) Father      Heart attack Father      Lung cancer Mother's Sister         Social History  Social History     Socioeconomic History    Marital status:      Spouse name: Not on file    Number of children: Not on file    Years of education: Not on file    Highest education level: Not on file   Occupational History    Not on file   Tobacco Use    Smoking status: Never     Passive exposure: Never    Smokeless tobacco: Never   Vaping Use    Vaping status: Never Used   Substance and Sexual Activity    Alcohol use: Not Currently    Drug use: Never    Sexual activity: Defer   Other Topics Concern    Not on file   Social History Narrative    Not on file     Social Drivers of Health     Financial Resource Strain: Patient Declined (9/14/2023)    [facility]    Overall Financial Resource Strain (CARDIA)      Difficulty of Paying Living Expenses: Patient declined   Food Insecurity: No Food Insecurity (9/6/2024)    Received from [facility]    Hunger Vital Sign     Worried About Running Out of Food in the Last Year: Never true     Ran Out of Food in the Last Year: Never true   Transportation Needs: No Transportation Needs (9/6/2024)    Received from [facility]    PRAPARE - Transportation     Lack of Transportation (Medical): No     Lack of Transportation (Non-Medical): No   Physical Activity: Sufficiently Active (7/26/2022)    Received from [facility]    Exercise Vital Sign     Days of Exercise per Week: 3 days     Minutes of Exercise per Session: 60 min   Stress: No Stress Concern Present (7/26/2022)    Received from [facility]    [employer] [place] [employer] - Occupational Stress Questionnaire     Feeling of Stress : Not at all   Social Connections: Unknown (7/26/2022)    Received from [facility]    Social Connection and Isolation Panel [NHANES]     Frequency of Communication with Friends and Family: Patient declined     Frequency of Social Gatherings with Friends and Family: Patient declined     Attends Jehovah's witness Services: Patient declined     Active Member of Clubs or Organizations: Not on file     Attends Club or Organization Meetings: Patient declined     Marital Status:    Intimate Partner Violence: Not on file   Housing Stability: Unknown (9/14/2023)    Received from [facility]    Housing Stability Vital Sign     Unable to Pay for Housing in the Last Year: No     Number of Places Lived in the Last Year: Not on file     Unstable Housing in the Last Year: No       Vital Signs  Vitals:    01/22/25 2000   BP: (!) 175/102   Pulse: 106   Resp: 21   Temp:    SpO2: 97%       Physical Examination  GEN: The patient appears stated age in no acute distress  VOICE: No dysphonia, volume is  appropriate  RESP: Unlabored on room air with no audible stertor or stridor, on hi flow nasal cannula  CV: Clinically well perfused   EYES: EOM grossly intact with no scleral icterus  NEURO: Alert and oriented with no focal deficits  HEAD: Scalp is normocephalic and atraumatic  FACE: No abrasions or lacerations, no maxillary or mandibular stepoffs  EARS: Normal external ears, normal hearing to spoken voice  NOSE: External nose appears normal, no significant septal deviation, anterior rhinoscopy limited with no active bleeding or lesions  OC: Normal lips, normal buccal mucosa, normal alveolar ridge, normal floor of mouth, normal tongue, normal hard palate, normal retromolar trigone  OP: normal pharyngeal walls, normal soft palate, uvula midline  NECK: Trachea midline, no significant lymphadenopathy    Laboratory and Data  Results for orders placed or performed during the hospital encounter of 01/21/25 (from the past 24 hours)   Lactate   Result Value Ref Range    Lactate 2.9 (H) 0.4 - 2.0 mmol/L   POCT GLUCOSE   Result Value Ref Range    POCT Glucose 186 (H) 74 - 99 mg/dL   Renal function panel   Result Value Ref Range    Glucose 197 (H) 74 - 99 mg/dL    Sodium 138 136 - 145 mmol/L    Potassium 4.6 3.5 - 5.3 mmol/L    Chloride 103 98 - 107 mmol/L    Bicarbonate 26 21 - 32 mmol/L    Anion Gap 14 10 - 20 mmol/L    Urea Nitrogen 16 6 - 23 mg/dL    Creatinine 0.95 0.50 - 1.30 mg/dL    eGFR 89 >60 mL/min/1.73m*2    Calcium 8.1 (L) 8.6 - 10.6 mg/dL    Phosphorus 2.9 2.5 - 4.9 mg/dL    Albumin 3.7 3.4 - 5.0 g/dL   Magnesium   Result Value Ref Range    Magnesium 1.89 1.60 - 2.40 mg/dL   CBC and Auto Differential   Result Value Ref Range    WBC 16.0 (H) 4.4 - 11.3 x10*3/uL    nRBC 0.0 0.0 - 0.0 /100 WBCs    RBC 5.11 4.50 - 5.90 x10*6/uL    Hemoglobin 12.7 (L) 13.5 - 17.5 g/dL    Hematocrit 40.4 (L) 41.0 - 52.0 %    MCV 79 (L) 80 - 100 fL    MCH 24.9 (L) 26.0 - 34.0 pg    MCHC 31.4 (L) 32.0 - 36.0 g/dL    RDW 17.2 (H) 11.5 -  14.5 %    Platelets 177 150 - 450 x10*3/uL    Neutrophils % 88.9 40.0 - 80.0 %    Immature Granulocytes %, Automated 0.4 0.0 - 0.9 %    Lymphocytes % 3.4 13.0 - 44.0 %    Monocytes % 7.2 2.0 - 10.0 %    Eosinophils % 0.0 0.0 - 6.0 %    Basophils % 0.1 0.0 - 2.0 %    Neutrophils Absolute 14.25 (H) 1.20 - 7.70 x10*3/uL    Immature Granulocytes Absolute, Automated 0.06 0.00 - 0.70 x10*3/uL    Lymphocytes Absolute 0.55 (L) 1.20 - 4.80 x10*3/uL    Monocytes Absolute 1.15 (H) 0.10 - 1.00 x10*3/uL    Eosinophils Absolute 0.00 0.00 - 0.70 x10*3/uL    Basophils Absolute 0.02 0.00 - 0.10 x10*3/uL   Calcium, ionized   Result Value Ref Range    POCT Calcium, Ionized 1.06 (L) 1.1 - 1.33 mmol/L   Lactate   Result Value Ref Range    Lactate 2.2 (H) 0.4 - 2.0 mmol/L   POCT GLUCOSE   Result Value Ref Range    POCT Glucose 190 (H) 74 - 99 mg/dL   Lactate   Result Value Ref Range    Lactate 2.4 (H) 0.4 - 2.0 mmol/L   POCT GLUCOSE   Result Value Ref Range    POCT Glucose 149 (H) 74 - 99 mg/dL   Lactate   Result Value Ref Range    Lactate 1.8 0.4 - 2.0 mmol/L   POCT GLUCOSE   Result Value Ref Range    POCT Glucose 178 (H) 74 - 99 mg/dL   POCT GLUCOSE   Result Value Ref Range    POCT Glucose 151 (H) 74 - 99 mg/dL   POCT GLUCOSE   Result Value Ref Range    POCT Glucose 148 (H) 74 - 99 mg/dL           PROCEDURE NOTE:  Nasopharyngolaryngoscopy    For better visualization because of larynx, a flexible fiberoptic nasopharyngolaryngoscopy was performed. Patient was correctly identified and verbal consent obtained.  After topical anesthesia with atomized 4% Lidocaine with Afrin, the flexible scope was introduced into the right naris.    The following areas were visualized:  Nasal septum, turbinates, nasopharynx, oropharynx, hypopharynx, soft palate, base of tongue, epiglottis, and larynx.    These structures were found to be normal with the following notable findings:     - Bilaterally mobile vocal cords with no obstructing mass or lesion,  normal epiglottis  - Mild edema of bilateral true vocal folds at the striking zone, erythema on right  - Mild posterior glottic and subglottic irritation from intubation      Assessment  Jerman Colón is a 64 y.o. male who is admitted following spinal surgery who was not able to be extubated day of surgery.  ENT was available at bedside today during extubation where there was no difficulty.  Flexible laryngoscopy following intubation noted no masses or obstruction of larynx. Vocal cords with mild edema but otherwsie normal working function    Recommendations  - No need for further ENT intervention.  - Follow-up as scheduled with regular ENT at [facility].    Seen and discussed with Dr. Mann who agrees with assessment and plan.     Mahamed Arvizu, PGY-2  Rotating Resident, ENT    ENT Consult pager: [phone]  ENT Peds pager: [phone]  ENT Head & Neck Surgery Phone: [phone]  ENT subspecialty team: Charanjit individual resident who wrote today's note  ENT Outpatient scheduling number: [phone]  Please Page [phone] or call [phone] if Urgent

## 2025-01-23 NOTE — PROGRESS NOTES
Physical Therapy    Physical Therapy Evaluation    Patient Name: Jerman Colón  MRN: [id number]  Today's Date: 1/23/2025   Room: 03/03  Time Calculation  Start Time: 0941  Stop Time: 1002  Time Calculation (min): 21 min    Assessment/Plan   PT Assessment  PT Assessment Results: Decreased endurance, Impaired balance, Decreased mobility, Pain, Orthopedic restrictions  Rehab Prognosis: Excellent  Barriers to Discharge Home: Physical needs  Physical Needs: High falls risk due to function or environment  Evaluation/Treatment Tolerance: Patient tolerated treatment well  Strengths: Attitude of self  End of Session Communication: Bedside nurse  Assessment Comment: Pt to benefit from ongoing PT services to address the above limitations and to prepare pt for timely return to prior level of function.  End of Session Patient Position: Up in chair, Alarm on  IP OR SWING BED PT PLAN  Inpatient or Swing Bed: Inpatient  PT Plan  Treatment/Interventions: Bed mobility, Transfer training, Gait training, Stair training, Balance training, Neuromuscular re-education, Strengthening, Endurance training, Therapeutic exercise, Therapeutic activity, Home exercise program, Postural re-education  PT Plan: Ongoing PT  PT Frequency: Daily  PT Discharge Recommendations: High intensity level of continued care  Equipment Recommended upon Discharge: Wheeled walker  PT Recommended Transfer Status: Assist x1, Assistive device  PT - OK to Discharge: Yes (When medically ready)      Subjective   General Visit Information:  Reason for Referral: 1/21/25 s/p C4-5, C5-6 anterior cervical discectomy and fusion and posterior cervical C4-5 laminectomy and facetectomy for decompression along with C3-4 C6-7 C7-T1 T1-T2 posterior column osteotomy and a C2-T4 instrumented fusion.  Past Medical History Relevant to Rehab: PONV, Vocal Cord Mass, HTN, HLD, CAD, COPD, Asthma BIANCA (no CPAP), GERD, BPH, hypogonadism, tubular adenoma of colon, proteinuria Polcythemia,  Fusion of lumbar spine, kyphosis of cervical region, cervical myelopathy, Spinal cord compression due to degenerative disorder of spinal column  Prior to Session Communication: Bedside nurse  Patient Position Received: Bed, 3 rail up, Alarm off, not on at start of session  Family/Caregiver Present: No  Caregiver Feedback: n/a  General Comment: Pt pleasant and cooperative.     Home Living:  Home Living  Type of Home: House  Lives With: Spouse (wife works 2 days/week)  Home Adaptive Equipment: Cane, Reacher, Sock aid  Home Layout: Two level, Bed/bath upstairs, 1/2 bath on main level, Laundry in basement, Stairs to alternate level with rails  Alternate Level Stairs-Number of Steps: Full flight  Home Access: Stairs to enter without rails  Entrance Stairs-Number of Steps: 2 steps to enter through front door; 0 GEOFFREY through garage  Bathroom Shower/Tub: Walk-in shower  Bathroom Toilet: Handicapped height  Bathroom Equipment: Grab bars around toilet, Grab bars in shower    Prior Level of Function:  Prior Function Per Pt/Caregiver Report  Level of [place]: Independent with ADLs and functional transfers, Independent with homemaking with ambulation  ADL Assistance: Independent  Homemaking Assistance: Independent  Ambulatory Assistance: Independent (occasionally uses cane for ambulating outside of home)  Vocational: Retired ()  Leisure: enjoys horse racing, gambling, sports, going to the gym  Hand Dominance: Right  Prior Function Comments: (+) drives, (+) falls- reports 2 falls in past year d/t B LE buckling (R>L)    Precautions:  Precautions  Hearing/Visual Limitations: Hearing and vision WFL  Medical Precautions: Fall precautions  Post-Surgical Precautions: Spinal precautions  Precautions Comment: MAP > 65    Vital Signs:   Date/Time Vitals Session Patient Position Pulse Resp SpO2 BP MAP (mmHg)    01/23/25 0900 --  --  104  21  96 %  154/76  99     01/23/25 0941 Pre PT  Lying  89  15  94 %  174/83  106      01/23/25 0950 During PT  Sitting  107  22  94 %  190/88  112     01/23/25 1002 Post PT  Sitting  106  20  93 %  161/92  109     01/23/25 1021 Post PT  --  --  --  --  --  --             Objective   Lines/Tubes/Drains:  Arterial Line 01/21/25 Left Radial (Active)   Number of days: 2       Closed/Suction Drain Neck Bulb 10 Fr. (Active)   Number of days: 1       Closed/Suction Drain Left;Superior Back Accordion 10 Fr. (Active)   Number of days: 1       Closed/Suction Drain Right;Superior Back Accordion 10 Fr. (Active)   Number of days: 1       Continuous Medications/Drips: n/a       Oxygen: 1 L/min via NC     Pain:  Pain Assessment  Pain Assessment: 0-10  0-10 (Numeric) Pain Score: 6  Pain Type: Acute pain, Surgical pain  Pain Location: Back  Pain Interventions: Repositioned (7/10)    Cognition:  Cognition  Overall Cognitive Status: Within Functional Limits  Orientation Level: Oriented X4  Following Commands: Follows all commands and directions without difficulty  Insight: Within function limits  Impulsive: Within functional limits  Processing Speed: Within funtional limits      Extremity/Trunk Assessments:  Strength:       RUE   RUE : Within Functional Limits    LUE   LUE: Within Functional Limits    RLE   RLE : Exceptions to WFL  Strength RLE  R Hip Flexion: 4/5  R Knee Flexion: 5/5  R Knee Extension: 5/5  R Ankle Dorsiflexion: 5/5  R Ankle Plantar Flexion: 5/5    LLE   LLE : Exceptions to WFL  Strength LLE  L Hip Flexion: 4/5  L Knee Flexion: 5/5  L Knee Extension: 5/5  L Ankle Dorsiflexion: 5/5  L Ankle Plantar Flexion: 5/5    General Assessments:  Strength  Strength Comments: B UE strength >/= 3+/5 assessed via bed mobility      Activity Tolerance  Endurance: Tolerates 10 - 20 min exercise with multiple rests  Early Mobility/Exercise Safety Screen: Proceed with mobilization - No exclusion criteria met  Sensation  Light Touch:  (Chronic L 3rd finger numbness. Otherwise, unremarkable)  Coordination  Movements are  Fluid and Coordinated: Yes  Static Sitting Balance  Static Sitting-Balance Support: Bilateral upper extremity supported, Feet supported  Static Sitting-Level of Assistance: Contact guard  Dynamic Sitting Balance  Dynamic Sitting-Balance Support: Bilateral upper extremity supported, Feet supported  Dynamic Sitting-Level of Assistance: Contact guard  Static Standing Balance  Static Standing-Balance Support: Bilateral upper extremity supported  Static Standing-Level of Assistance: Contact guard  Dynamic Standing Balance  Dynamic Standing-Balance Support: Bilateral upper extremity supported  Dynamic Standing-Level of Assistance: Minimum assistance (x1)  Dynamic Standing-Balance: Turning    Functional Assessments:  Bed Mobility  Bed Mobility: Yes  Bed Mobility 1  Bed Mobility 1: Supine to sitting, Log roll  Level of Assistance 1: Minimum assistance (x1)  Bed Mobility Comments 1: Cues for proper task sequencing and proper use of bed rail    Transfers  Transfer: Yes  Transfer 1  Transfer From 1: Bed to  Transfer to 1: Stand  Technique 1: Stand to sit, Sit to stand  Transfer Device 1: Walker  Transfer Level of Assistance 1: Minimum assistance (x1)  Trials/Comments 1: Cues for proper hand placement and controlling speed.    Ambulation/Gait Training  Ambulation/Gait Training Performed: Yes  Ambulation/Gait Training 1  Surface 1: Level tile  Device 1: Rolling walker  Assistance 1: Minimum assistance (x1)  Quality of Gait 1: Narrow base of support, Diminished heel strike, Decreased step length, Shuffling gait, Forward flexed posture, Antalgic (Cues for proper walker mgmt, posture, and increased B step length)  Comments/Distance (ft) 1: 3 ft laterally, then 3 ft fwd/bwd at bedside    Stairs  Stairs: No         Outcome Measures:  [facility] Basic Mobility  Turning from your back to your side while in a flat bed without using bedrails: A little  Moving from lying on your back to sitting on the side of a flat bed without using bedrails:  A little  Moving to and from bed to chair (including a wheelchair): A little  Standing up from a chair using your arms (e.g. wheelchair or bedside chair): A little  To walk in hospital room: A little  Climbing 3-5 steps with railing: Total  Basic Mobility - Total Score: 16                   FSS-ICU  Ambulation: Walks <50 feet with any assistance x1 or walks any distance with assistance x2 people  Rolling: Supervision or set-up only  Sitting: Supervision or set-up only  Transfer Sit-to-Stand: Minimal assistance (performs 75% or more of task)  Transfer Supine-to-Sit: Minimal assistance (performs 75% or more of task)  Total Score: 19    ICU Mobility Screen  Early Mobility/Exercise Safety Screen: Proceed with mobilization - No exclusion criteria met  ICU Mobility Scale: Walking with assistance of 1 person                        Encounter Problems       Encounter Problems (Active)       PT Problem       Patient will perform bed mobility IND to reduce risk of developing decubitus ulcers.        Start:  01/23/25    Expected End:  02/06/25            Patient will perform sit to stand and stand to sit transfers MOD-I with LRD to increase functional strength.         Start:  01/23/25    Expected End:  02/06/25            Patient will ambulate at least 250  ft. MOD-I with LRD to improve tolerance of community distances.           Start:  01/23/25    Expected End:  02/06/25            Patient will ascend and descend >/= 10 steps MOD-I with railing  to facilitate safe navigation of stairs in the home.           Start:  01/23/25    Expected End:  02/06/25            Patient will perform the 5-Time Sit to Stand test in <14 sec to indicate decreased falls risk.        Start:  01/23/25    Expected End:  02/06/25               Pain - Adult              Education Documentation  Precautions, taught by Stacey Lopez, PT at 1/23/2025 10:39 AM.  Learner: Patient  Readiness: Acceptance  Method: Explanation  Response: Verbalizes  Understanding  Comment: Spine precautions, mobility safety/precautions.    Body Mechanics, taught by Stacey Lopez PT at 1/23/2025 10:39 AM.  Learner: Patient  Readiness: Acceptance  Method: Explanation  Response: Verbalizes Understanding  Comment: Spine precautions, mobility safety/precautions.    Mobility Training, taught by Stacey Lopez PT at 1/23/2025 10:39 AM.  Learner: Patient  Readiness: Acceptance  Method: Explanation  Response: Verbalizes Understanding  Comment: Spine precautions, mobility safety/precautions.    Education Comments  No comments found.            01/23/25 at 10:40 AM   Stacey Lopez PT   Rehab Office: [phone]

## 2025-01-23 NOTE — CARE PLAN
Problem: Skin  Goal: Decreased wound size/increased tissue granulation at next dressing change  Outcome: Progressing  Flowsheets (Taken 1/23/2025 1534)  Decreased wound size/increased tissue granulation at next dressing change:   Promote sleep for wound healing   Protective dressings over bony prominences  Goal: Participates in plan/prevention/treatment measures  Outcome: Progressing  Flowsheets (Taken 1/23/2025 1534)  Participates in plan/prevention/treatment measures:   Elevate heels   Discuss with provider PT/OT consult   Increase activity/out of bed for meals  Goal: Prevent/manage excess moisture  Outcome: Progressing  Flowsheets (Taken 1/23/2025 1534)  Prevent/manage excess moisture:   Cleanse incontinence/protect with barrier cream   Moisturize dry skin   Follow provider orders for dressing changes   Monitor for/manage infection if present  Goal: Prevent/minimize sheer/friction injuries  Outcome: Progressing  Flowsheets (Taken 1/23/2025 1534)  Prevent/minimize sheer/friction injuries:   Complete micro-shifts as needed if patient unable. Adjust patient position to relieve pressure points, not a full turn   Increase activity/out of bed for meals   Use pull sheet   HOB 30 degrees or less   Turn/reposition every 2 hours/use positioning/transfer devices  Goal: Promote/optimize nutrition  Outcome: Progressing  Flowsheets (Taken 1/23/2025 1534)  Promote/optimize nutrition:   Assist with feeding   Monitor/record intake including meals   Consume > 50% meals/supplements   Offer water/supplements/favorite foods  Goal: Promote skin healing  Outcome: Progressing  Flowsheets (Taken 1/23/2025 1534)  Promote skin healing:   Assess skin/pad under line(s)/device(s)   Protective dressings over bony prominences   Turn/reposition every 2 hours/use positioning/transfer devices   Ensure correct size (line/device) and apply per  instructions   Rotate device position/do not position patient on device

## 2025-01-23 NOTE — CARE PLAN
Problem: Pain - Adult  Goal: Verbalizes/displays adequate comfort level or baseline comfort level  Outcome: Progressing     Problem: Safety - Adult  Goal: Free from fall injury  Outcome: Progressing   The patient's goals for the shift include  pain management    The clinical goals for the shift include decrease in pain score with interventions

## 2025-01-23 NOTE — PROGRESS NOTES
Speech-Language Pathology    IP Modified Barium Swallow Study    Patient Name: Jerman Colón  MRN: [id number]  : 1960  Today's Date: 25  Time Calculation  Start Time: 1300  Stop Time: 1315  Time Calculation (min): 15 min          Modified Barium Swallow Study completed. Informed verbal consent obtained prior to completion of exam. Trials of thin, nectar/mildly thick liquid,  puree, regular solids and barium tablet with thin liquid were given.     Modified Barium Swallow Study completed. Informed verbal consent obtained prior to completion of exam. The study was completed per protocol with various liquid barium consistencies, pudding, solids and a 13mm barium tablet.  A 1.9 cm or .75 inch (outer diameter) ring was placed on the chin in the lateral view and on the lateral, left side of the neck in the a-p view in order to complete objective measurements during swallowing. The anatomic structures and function of the oropharynx, larynx, hypopharynx and cervical esophagus were evaluated.    SLP: ANAT Darnell   Contact info: [employer] secure chat      Reason for Referral: ? Aspiration risk s/p ACDF; reintubatiobn  Patient Hx: s/p C4-6 ACDF, C2-T4 fusion, C4-7 decompression, C4/5 facetectomies   Respiratory Status: Room air  Current diet: NPO pending MBS      DIET RECOMMENDATIONS:   - Soft & Bite Sized (IDDSI Level 6)  - Thin liquids (IDDSI Level 0)  - meds crushed in puree as able  STRATEGIES:  - Upright for all PO intake  - Reflux Precautions      SLP PLAN:  Skilled SLP Services: Skilled SLP intervention for dysphagia is warranted.  SLP Frequency: 1x per week   Duration:  Treatment/Interventions:   - Diet tolerance/advancement  - Patient/caregiver education    Discussed POC: Patient  Discussed Risks/Benefits: Yes  Patient/Caregiver Agreeable: Yes    Education Provided: Results and recommendations per MBSS, with video review; recommendations and POC at this time. Verbal understanding and agreement given on all  accounts.     Additional Medical Consults Suggested:   - GI - consider consult per esophageal imaging findings    Repeat Study: Per MD or treating SLP       Mechanics of the Swallow Summary:  ORAL PHASE:  Lip Closure - No labial escape/anterior loss of bolus   Tongue Control During Bolus Hold - Cohesive bolus between tongue to palatal seal   Bolus prep/mastication - Timely and efficient mastication skills   Bolus transport/lingual motion - Brisk tongue motion for A-P movement of the bolus   Oral residue - Complete oral clearance     PHARYNGEAL PHASE:  Initiation of pharyngeal swallow - Bolus head at posterior angle of ramus   Soft palate elevation - No bolus between soft palate/pharyngeal wall   Laryngeal elevation - Complete superior movement of thyroid cartilage with contact of arytenoids to epiglottic petiole   Anterior hyoid excursion - Complete anterior movement   Epiglottic movement - Partial inversion  Laryngeal vestibule closure - Complete - no air/contrast in laryngeal vestibule   Pharyngeal stripping wave - Present, however, diminished   Pharyngeal contraction (A/P view) - Complete  Pharyngoesophageal segment opening - Complete distension and complete duration/no obstruction of flow of bolus   Tongue base retraction - No bolus between tongue base and posterior pharyngeal wall   Pharyngeal residue - Collection of residue within or on the pharyngeal structures     ESOPHAGEAL PHASE:  Esophageal clearance - Esophageal retention with retrograde flow below the pharyngoesophageal segment       SLP Impressions with Severity Rating:   Pt presents with mild oropharyngeal dysphagia upon completion of modified barium swallow study this date. Swallowing physiology is detailed above. Impairments most impacting swallowing safety and efficiency include mild-mod vallecular and pyriform sinus stasis, most notable with solid textures. No evidenced of laryngeal penetration or aspiration. Visualized hardware from recent  surgery and question of prevertebral edema but not adversely affecting bolus transit. Slow bolus transit was evident in the A-P view, characterized by stasis of the mid and distal esophagus and retrograde flow to the proximal esophagus- refer to radiologist for additional details    *Of note: The A-P bolus follow-through is not intended to be utilized as a diagnostic assessment of the esophagus, rather a tool to observe the biomechanical aspects of the swallow continuum and to inform the need for further evaluation by medical specialists, as applicable.       OUTCOME MEASURES:  Functional Oral Intake Scale  Functional Oral Intake Scale: Level 6        total oral diet with multiple consistencies without special preparations but specific food limitations       Eating Assessment Tool (EAT-10)   0=No problem, 1=Mild problem, 2=Mild to moderate problem, 3=Moderate problem, 4=Severe problem         A total score of 3 or above may indicate difficulty with swallowing safely and/or efficiently      Rosenbek's Penetration Aspiration Scale  Thin Liquids: 1. NO ASPIRATION & NO PENETRATION - no aspiration, contrast does not enter airway  [place] Thick Liquids: 1. NO ASPIRATION & NO PENETRATION - no aspiration, contrast does not enter airway  Puree: 1. NO ASPIRATION & NO PENETRATION - no aspiration, contrast does not enter airway  Soft Solid: 1. NO ASPIRATION & NO PENETRATION - no aspiration, contrast does not enter airway  Solids: 1. NO ASPIRATION & NO PENETRATION - no aspiration, contrast does not enter airway

## 2025-01-23 NOTE — PROGRESS NOTES
Social Work Discharge Planning note:    -Patient discussed during interdisciplinary rounds.   -Team members present: NP and SW  -Plan per medical team: Pt is not medically ready for discharge. ADOD may be in a few days.   -Payer: [employer]  -Status: Inpatient  -Discharge disposition: Pt is currently with PT and OT recommendations for High intensity. PT reported that Pt may progress to home care with FWW. SW met with Pt to further discuss discharge planning. Pt reported that he is going to decline a rehab placement, and he prefers to return home at discharge with home PT/OT. PT reported that his wife can assist him on a 24 hour basis. Pt was agreeable with a walker. Pt does not have a  PCP, so  Home Team will not be an option. Pt was agreeable with referrals being sent to multiple home care providers to see which one can accept.   -Potential Barriers: none  -Anticipated Date of Discharge:  1/25/25    This RICCO Clay MSW, LSW    Office: [phone]  Secure chat via [employer]

## 2025-01-23 NOTE — PROGRESS NOTES
Jerman Colón is a 64 y.o. male on day 2 of admission presenting with Other secondary kyphosis, cervical region.    Subjective   Doing well post op. Extubated 1/22    Objective     Physical Exam  AOx3  RUE D4 o/w 5  LUE 5  BLE 5  anterior neck incision c/d/I, drain in place w min output  Posterior incision     Last Recorded Vitals  Blood pressure (!) 163/94, pulse 88, temperature 36.6 °C (97.9 °F), temperature source Temporal, resp. rate 15, SpO2 94%.  Intake/Output last 3 Shifts:  I/O last 3 completed shifts:  In: 8408.5 [I.V.:6648.5; NG/GT:260; IV Piggyback:1500]  Out: 5735 [Urine:4260; Drains:775; Blood:700]    Relevant Results  Lab Results   Component Value Date    WBC 16.5 (H) 01/23/2025    HGB 11.7 (L) 01/23/2025    HCT 37.1 (L) 01/23/2025    MCV 79 (L) 01/23/2025     01/23/2025     Lab Results   Component Value Date    GLUCOSE 149 (H) 01/23/2025    CALCIUM 8.6 01/23/2025     01/23/2025    K 4.7 01/23/2025    CO2 27 01/23/2025     01/23/2025    BUN 18 01/23/2025    CREATININE 0.86 01/23/2025                  Assessment/Plan   Assessment & Plan  Other secondary kyphosis, cervical region    Kyphosis of cervical region    HTN (hypertension)    Hyperlipidemia    CAD (coronary artery disease)    Pulmonary emphysema (Multi)    Polycythemia    Gastroesophageal reflux disease without esophagitis    Cervical myelopathy    Spinal cord compression due to degenerative disorder of spinal column    H/o HTN, HLD, CAD on ASA, COPD, BPH, p/w rigid cervical deformity 1/21 s/p C4-6 ACDF, C2-T4 fusion, C4-7 decompression, C4/5 facetectomies, reintubated at end of case due to lower airway edema    Tele  Maintain ant drain, post drain x 2  ASA POD 7  SLP eval in AM  PTOT  SCDs, SQH  OOBx at least 3 per day       Patricia Lyn MD

## 2025-01-23 NOTE — PROGRESS NOTES
SLP Adult Inpatient Speech-Language Pathology Clinical Swallow Evaluation    Patient Name: Jerman Colón  MRN: [id number]  Today's Date: 1/23/2025   Time Calculation  Start Time: 0908  Stop Time: 0923  Time Calculation (min): 15 min           Assessment/Plan:  #dysphagia   Pt presented with consistent overt s/s of aspiration with ice chips and thin liquids in setting of recent extubation s/p ACDF. MBS indicated to better evaluate oropharyngeal swallow function and determine nutrition recommendations.         Recommendations:  NPO  Frequent, aggressive oral care as tolerated to improve infection control, as well as to reduce dental plaque and bacteria on oropharyngeal surfaces which may increase the risk nosocomial infections, including pneumonia.   OK for small amounts of ice chips  for oral comfort and to prevent swallow disuse atrophy, but only after aggressive oral care to avoid colonization of bacteria within the oral cavity.  Modified Barium Swallow Study for further assessment of oropharyngeal swallow and to guide diet recommendations.  confirmed with primary team. Will complete today    Dictation #1  MRN:[id number]  CSN:[id number]        Inpatient/Swing Bed or Outpatient: Inpatient  SLP Plan: Skilled SLP  SLP Frequency: 2x per week  Duration: 2 weeks  SLP Discharge Recommendations:  (TBD)  SLP - OK to Discharge: Yes        Goals:  Encounter Problems       Encounter Problems (Active)       Swallowing       LTG - Patient will tolerate the least restrictive diet without overt difficulty by time of discharge.       Start:  01/23/25    Expected End:  01/30/25            SLP Goal 1       Start:  01/23/25    Expected End:  01/30/25       STG - Patient will tolerate therapeutic trials of recommended consistency without clinical signs and symptoms of aspiration on 100% of trials                      Subjective       Baseline Assessment:  Respiratory Status: Room air  History of Intubation: Yes  Length of Intubations  "(days): 1 days  Date extubated: 01/22/25  Behavior/Cognition: Cooperative, Alert, Pleasant mood  Hearing: Within Functional Limits  Patient Positioning: Upright in Bed  Baseline Vocal Quality: Normal  Volitional Cough: Strong    History and Physical:    Per H&P:  \" Jerman Colón is a 77 y.o. male with PMH of COPD, CAD, GERD, HLD, HTN, sleep apnea, vocal cord mass, L5-S1 fusion at [facility] with Dr. Velasco in 9/2023 which was aborted d/t severe cervical kyphosis presenting today for scheduled C-4-5 C5-6 and C6-7 ACDF, posterior C4-5 laminectomy and facetectomy for decompression, C3-4, C6-7, C7-T1, T1-T2 posterior column osteotomy and C2-T4 instrumented fusion. Per chart review, Pt with burning and stabbing axial neck pain that started 2 years ago, without apparent injury. Pt had lumbar fusion at [facility] which was unable to entirely alleviate his symptoms. He continued to have back pain along with BLE pain.     \"    Past Medical History:   Diagnosis Date    Anemia     Asthma     BPH (benign prostatic hyperplasia)     Cervical myelopathy     COPD (chronic obstructive pulmonary disease) (Multi)     Coronary artery disease     GERD (gastroesophageal reflux disease)     controlled    Hyperlipidemia     Hypertension     Polycythemia     PONV (postoperative nausea and vomiting)     Proteinuria     Sepsis (Multi)     Sleep apnea     Vocal cord mass      Family History   Problem Relation Name Age of Onset    Other (htn) Mother      Diabetes Mother      Other (htn) Father      Heart attack Father      Lung cancer Mother's Sister         No Known Allergies      Relevant Results  XR chest 1 view 01/22/2025    Narrative  Interpreted By:  Meliza Saldaña and Dulla Kireeti  STUDY:  XR CHEST 1 VIEW;  1/22/2025 6:52 am    INDICATION:  Signs/Symptoms:monitoring.      COMPARISON:  None    ACCESSION NUMBER(S):  [id number]    ORDERING CLINICIAN:  JERMAIN LONG    FINDINGS:  AP radiograph of the chest was provided.    Cervical and thoracic " spine fusion hardware is seen. Endotracheal  tube terminates 4.8 cm above the santiago. Enteric tube is visualized  coursing through the esophagus with tip terminating beyond the  field-of-view of the study.    CARDIOMEDIASTINAL SILHOUETTE:  Cardiomediastinal silhouette is stable in size and configuration.    LUNGS:  Blunting of the left costophrenic angle with adjacent airspace  opacity. No pneumothorax. Linear band of opacification at the right  lung base.    ABDOMEN:  No remarkable upper abdominal findings.    BONES:  No acute osseous changes.    Impression  1. Small left pleural effusion with associated basilar airway  opacities represent atelectasis/edema, however infection can not be  ruled out.  2. Linear bands of opacification of the right lung base likely  represent atelectasis    I personally reviewed the images/study and I agree with the findings  as stated by Dave Ramos MD, PGY-2 this study was interpreted at  [place], [place].    MACRO:  None    Signed by: Meliza Saldaña 1/22/2025 11:22 AM  Dictation workstation:   [id number]        Objective     Oral/Motor Assessment:  Dentition: Adequate/Natural  Oral Motor: Within Functional Limits  Facial Symmetry: Within Functional Limits  Hearing: Within Functional Limits      Clinical Observations:      Patient Positioning: Upright in Bed  Management of Oral Secretions: Inadequate  Was The 3 oz Swallow Protocol Completed: No    Consistencies Trialed: Yes  Consistencies Trialed: Ice Chips, Thin (IDDSI Level 0) - Spoon, Thin (IDDSI Level 0) - Straw    Oral phase:  With tested consistencies WNL    Pharyngeal Phase: Appreciated onset of swallow with all challenges. Pt displayed overt signs of aspiration on 80% of trials regardless of ice chips or thin liquids. Noted wet, nonproductive cough.

## 2025-01-24 ENCOUNTER — APPOINTMENT (OUTPATIENT)
Dept: RADIOLOGY | Facility: HOSPITAL | Age: 65
DRG: 430 | End: 2025-01-24
Payer: MEDICARE

## 2025-01-24 LAB
ALBUMIN SERPL BCP-MCNC: 3.6 G/DL (ref 3.4–5)
ANION GAP SERPL CALC-SCNC: 13 MMOL/L (ref 10–20)
BASOPHILS # BLD AUTO: 0.02 X10*3/UL (ref 0–0.1)
BASOPHILS NFR BLD AUTO: 0.1 %
BUN SERPL-MCNC: 19 MG/DL (ref 6–23)
CA-I BLD-SCNC: 1.08 MMOL/L (ref 1.1–1.33)
CALCIUM SERPL-MCNC: 8.8 MG/DL (ref 8.6–10.6)
CHLORIDE SERPL-SCNC: 104 MMOL/L (ref 98–107)
CO2 SERPL-SCNC: 25 MMOL/L (ref 21–32)
CREAT SERPL-MCNC: 0.82 MG/DL (ref 0.5–1.3)
EGFRCR SERPLBLD CKD-EPI 2021: >90 ML/MIN/1.73M*2
EOSINOPHIL # BLD AUTO: 0 X10*3/UL (ref 0–0.7)
EOSINOPHIL NFR BLD AUTO: 0 %
ERYTHROCYTE [DISTWIDTH] IN BLOOD BY AUTOMATED COUNT: 17 % (ref 11.5–14.5)
GLUCOSE BLD MANUAL STRIP-MCNC: 132 MG/DL (ref 74–99)
GLUCOSE BLD MANUAL STRIP-MCNC: 142 MG/DL (ref 74–99)
GLUCOSE BLD MANUAL STRIP-MCNC: 144 MG/DL (ref 74–99)
GLUCOSE BLD MANUAL STRIP-MCNC: 145 MG/DL (ref 74–99)
GLUCOSE BLD MANUAL STRIP-MCNC: 145 MG/DL (ref 74–99)
GLUCOSE SERPL-MCNC: 129 MG/DL (ref 74–99)
HCT VFR BLD AUTO: 36.4 % (ref 41–52)
HGB BLD-MCNC: 11.9 G/DL (ref 13.5–17.5)
IMM GRANULOCYTES # BLD AUTO: 0.11 X10*3/UL (ref 0–0.7)
IMM GRANULOCYTES NFR BLD AUTO: 0.8 % (ref 0–0.9)
LYMPHOCYTES # BLD AUTO: 0.96 X10*3/UL (ref 1.2–4.8)
LYMPHOCYTES NFR BLD AUTO: 7.1 %
MAGNESIUM SERPL-MCNC: 2.05 MG/DL (ref 1.6–2.4)
MCH RBC QN AUTO: 24.7 PG (ref 26–34)
MCHC RBC AUTO-ENTMCNC: 32.7 G/DL (ref 32–36)
MCV RBC AUTO: 76 FL (ref 80–100)
MONOCYTES # BLD AUTO: 1.65 X10*3/UL (ref 0.1–1)
MONOCYTES NFR BLD AUTO: 12.2 %
NEUTROPHILS # BLD AUTO: 10.73 X10*3/UL (ref 1.2–7.7)
NEUTROPHILS NFR BLD AUTO: 79.8 %
NRBC BLD-RTO: 0.3 /100 WBCS (ref 0–0)
PHOSPHATE SERPL-MCNC: 3.5 MG/DL (ref 2.5–4.9)
PLATELET # BLD AUTO: 170 X10*3/UL (ref 150–450)
POTASSIUM SERPL-SCNC: 4.9 MMOL/L (ref 3.5–5.3)
RBC # BLD AUTO: 4.82 X10*6/UL (ref 4.5–5.9)
SODIUM SERPL-SCNC: 137 MMOL/L (ref 136–145)
WBC # BLD AUTO: 13.5 X10*3/UL (ref 4.4–11.3)

## 2025-01-24 PROCEDURE — 2500000001 HC RX 250 WO HCPCS SELF ADMINISTERED DRUGS (ALT 637 FOR MEDICARE OP): Performed by: PHARMACIST

## 2025-01-24 PROCEDURE — 2500000004 HC RX 250 GENERAL PHARMACY W/ HCPCS (ALT 636 FOR OP/ED): Performed by: STUDENT IN AN ORGANIZED HEALTH CARE EDUCATION/TRAINING PROGRAM

## 2025-01-24 PROCEDURE — 2500000001 HC RX 250 WO HCPCS SELF ADMINISTERED DRUGS (ALT 637 FOR MEDICARE OP)

## 2025-01-24 PROCEDURE — 85025 COMPLETE CBC W/AUTO DIFF WBC: CPT

## 2025-01-24 PROCEDURE — 2500000001 HC RX 250 WO HCPCS SELF ADMINISTERED DRUGS (ALT 637 FOR MEDICARE OP): Performed by: STUDENT IN AN ORGANIZED HEALTH CARE EDUCATION/TRAINING PROGRAM

## 2025-01-24 PROCEDURE — 2500000002 HC RX 250 W HCPCS SELF ADMINISTERED DRUGS (ALT 637 FOR MEDICARE OP, ALT 636 FOR OP/ED): Performed by: STUDENT IN AN ORGANIZED HEALTH CARE EDUCATION/TRAINING PROGRAM

## 2025-01-24 PROCEDURE — 82330 ASSAY OF CALCIUM: CPT

## 2025-01-24 PROCEDURE — 36415 COLL VENOUS BLD VENIPUNCTURE: CPT

## 2025-01-24 PROCEDURE — 1100000001 HC PRIVATE ROOM DAILY

## 2025-01-24 PROCEDURE — 71045 X-RAY EXAM CHEST 1 VIEW: CPT

## 2025-01-24 PROCEDURE — 80069 RENAL FUNCTION PANEL: CPT

## 2025-01-24 PROCEDURE — 82947 ASSAY GLUCOSE BLOOD QUANT: CPT

## 2025-01-24 PROCEDURE — 97530 THERAPEUTIC ACTIVITIES: CPT | Mod: GP,CQ

## 2025-01-24 PROCEDURE — 2500000004 HC RX 250 GENERAL PHARMACY W/ HCPCS (ALT 636 FOR OP/ED)

## 2025-01-24 PROCEDURE — 83735 ASSAY OF MAGNESIUM: CPT

## 2025-01-24 RX ORDER — ASPIRIN 81 MG/1
81 TABLET ORAL
Start: 2025-01-24

## 2025-01-24 RX ORDER — LIDOCAINE 560 MG/1
2 PATCH PERCUTANEOUS; TOPICAL; TRANSDERMAL DAILY
Qty: 14 PATCH | Refills: 0 | Status: CANCELLED | OUTPATIENT
Start: 2025-01-24

## 2025-01-24 RX ORDER — GUAIFENESIN 100 MG/5ML
200 SOLUTION ORAL EVERY 4 HOURS PRN
Status: DISCONTINUED | OUTPATIENT
Start: 2025-01-24 | End: 2025-01-28 | Stop reason: HOSPADM

## 2025-01-24 RX ORDER — CYCLOBENZAPRINE HCL 5 MG
5 TABLET ORAL 3 TIMES DAILY PRN
Qty: 21 TABLET | Refills: 0 | Status: CANCELLED | OUTPATIENT
Start: 2025-01-24

## 2025-01-24 RX ADMIN — HYDROMORPHONE HYDROCHLORIDE 0.4 MG: 1 INJECTION, SOLUTION INTRAMUSCULAR; INTRAVENOUS; SUBCUTANEOUS at 16:35

## 2025-01-24 RX ADMIN — TAMSULOSIN HYDROCHLORIDE 0.4 MG: 0.4 CAPSULE ORAL at 20:54

## 2025-01-24 RX ADMIN — PANTOPRAZOLE SODIUM 40 MG: 40 TABLET, DELAYED RELEASE ORAL at 08:52

## 2025-01-24 RX ADMIN — HEPARIN SODIUM 5000 UNITS: 5000 INJECTION INTRAVENOUS; SUBCUTANEOUS at 08:52

## 2025-01-24 RX ADMIN — LOSARTAN POTASSIUM 25 MG: 25 TABLET, FILM COATED ORAL at 08:52

## 2025-01-24 RX ADMIN — GABAPENTIN 600 MG: 300 CAPSULE ORAL at 08:52

## 2025-01-24 RX ADMIN — CYCLOBENZAPRINE 5 MG: 10 TABLET, FILM COATED ORAL at 15:34

## 2025-01-24 RX ADMIN — HEPARIN SODIUM 5000 UNITS: 5000 INJECTION INTRAVENOUS; SUBCUTANEOUS at 15:34

## 2025-01-24 RX ADMIN — PANTOPRAZOLE SODIUM 40 MG: 40 TABLET, DELAYED RELEASE ORAL at 20:54

## 2025-01-24 RX ADMIN — OXYCODONE HYDROCHLORIDE 10 MG: 10 TABLET ORAL at 20:54

## 2025-01-24 RX ADMIN — CYCLOBENZAPRINE 5 MG: 10 TABLET, FILM COATED ORAL at 08:52

## 2025-01-24 RX ADMIN — HYDROCODONE BITARTRATE AND HOMATROPINE METHYLBROMIDE 5 ML: 5; 1.5 SOLUTION ORAL at 10:40

## 2025-01-24 RX ADMIN — GABAPENTIN 600 MG: 300 CAPSULE ORAL at 15:34

## 2025-01-24 RX ADMIN — OXYCODONE HYDROCHLORIDE 10 MG: 10 TABLET ORAL at 03:34

## 2025-01-24 RX ADMIN — CYCLOBENZAPRINE 5 MG: 10 TABLET, FILM COATED ORAL at 20:54

## 2025-01-24 RX ADMIN — GABAPENTIN 600 MG: 300 CAPSULE ORAL at 20:54

## 2025-01-24 RX ADMIN — ATORVASTATIN CALCIUM 40 MG: 40 TABLET, FILM COATED ORAL at 20:54

## 2025-01-24 RX ADMIN — OXYCODONE HYDROCHLORIDE 10 MG: 10 TABLET ORAL at 09:17

## 2025-01-24 ASSESSMENT — COGNITIVE AND FUNCTIONAL STATUS - GENERAL
WALKING IN HOSPITAL ROOM: A LITTLE
CLIMB 3 TO 5 STEPS WITH RAILING: TOTAL
TOILETING: A LITTLE
MOVING TO AND FROM BED TO CHAIR: A LITTLE
MOBILITY SCORE: 17
TURNING FROM BACK TO SIDE WHILE IN FLAT BAD: A LITTLE
WALKING IN HOSPITAL ROOM: A LOT
CLIMB 3 TO 5 STEPS WITH RAILING: A LOT
DAILY ACTIVITIY SCORE: 18
HELP NEEDED FOR BATHING: A LITTLE
EATING MEALS: A LITTLE
DRESSING REGULAR UPPER BODY CLOTHING: A LITTLE
STANDING UP FROM CHAIR USING ARMS: A LITTLE
PERSONAL GROOMING: A LITTLE
DRESSING REGULAR LOWER BODY CLOTHING: A LITTLE
MOBILITY SCORE: 16
MOVING TO AND FROM BED TO CHAIR: A LITTLE
MOVING FROM LYING ON BACK TO SITTING ON SIDE OF FLAT BED WITH BEDRAILS: A LITTLE
TURNING FROM BACK TO SIDE WHILE IN FLAT BAD: A LITTLE
STANDING UP FROM CHAIR USING ARMS: A LITTLE

## 2025-01-24 ASSESSMENT — PAIN - FUNCTIONAL ASSESSMENT
PAIN_FUNCTIONAL_ASSESSMENT: 0-10

## 2025-01-24 ASSESSMENT — PAIN SCALES - GENERAL
PAINLEVEL_OUTOF10: 0 - NO PAIN
PAINLEVEL_OUTOF10: 7
PAINLEVEL_OUTOF10: 4
PAINLEVEL_OUTOF10: 7
PAINLEVEL_OUTOF10: 5 - MODERATE PAIN
PAINLEVEL_OUTOF10: 5 - MODERATE PAIN
PAINLEVEL_OUTOF10: 7

## 2025-01-24 NOTE — CARE PLAN
The patient's goals for the shift include  Continue to monitor pain; Patient was extubated yesterday    The clinical goals for the shift include Patient to remain comfortable through shift with appropriate pain management    Over the shift, the patient did not make progress toward the following goals. Barriers to progression include none. Recommendations to address these barriers include continue to monitor need for PRN pain meds.

## 2025-01-24 NOTE — CARE PLAN
Problem: Pain - Adult  Goal: Verbalizes/displays adequate comfort level or baseline comfort level  Outcome: Progressing  Flowsheets (Taken 1/24/2025 1321)  Verbalizes/displays adequate comfort level or baseline comfort level:   Encourage patient to monitor pain and request assistance   Assess pain using appropriate pain scale   Administer analgesics based on type and severity of pain and evaluate response   Implement non-pharmacological measures as appropriate and evaluate response   Consider cultural and social influences on pain and pain management   Notify Licensed Independent Practitioner if interventions unsuccessful or patient reports new pain     Problem: Chronic Conditions and Co-morbidities  Goal: Patient's chronic conditions and co-morbidity symptoms are monitored and maintained or improved  Outcome: Progressing  Flowsheets (Taken 1/24/2025 1321)  Care Plan - Patient's Chronic Conditions and Co-Morbidity Symptoms are Monitored and Maintained or Improved:   Monitor and assess patient's chronic conditions and comorbid symptoms for stability, deterioration, or improvement   Collaborate with multidisciplinary team to address chronic and comorbid conditions and prevent exacerbation or deterioration   Update acute care plan with appropriate goals if chronic or comorbid symptoms are exacerbated and prevent overall improvement and discharge     Problem: Skin  Goal: Promote/optimize nutrition  Outcome: Progressing  Flowsheets (Taken 1/24/2025 1321)  Promote/optimize nutrition:   Assist with feeding   Consume > 50% meals/supplements   Monitor/record intake including meals   Offer water/supplements/favorite foods     Problem: Pain  Goal: Takes deep breaths with improved pain control throughout the shift  Outcome: Progressing  Goal: Turns in bed with improved pain control throughout the shift  Outcome: Progressing   The patient's goals for the shift include      The clinical goals for the shift include Patient to  remain comfortable through shift with appropriate pain management

## 2025-01-24 NOTE — DOCUMENTATION CLARIFICATION NOTE
"    PATIENT:               STEF TOUSSAINT  ACCT #:                  [id number]  MRN:                       [id number]  :                       1960  ADMIT DATE:       2025 6:05 AM  DISCH DATE:  RESPONDING PROVIDER #:        [id number]          PROVIDER RESPONSE TEXT:    Acute Metabolic Acidosis is clinically significant and required treatment/monitoring    CDI QUERY TEXT:    Clarification    Instruction:    Based on your assessment of the patient and the clinical information, please provide the requested documentation by clicking on the appropriate radio button and enter any additional information if prompted.    Question: Is there a diagnosis indicative of the lab values or image study    When answering this query, please exercise your independent professional judgment. The fact that a question is being asked, does not imply that any particular answer is desired or expected.    The patient's clinical indicators include:  Clinical Information:  Patient is a 64 year old male who presented for scheduled C-4-5 C5-6 and C6-7 ACDF, posterior C4-5 laminectomy and facetectomy for decompression, C3-4, C6-7, C7-T1, T1-T2 posterior column osteotomy and C2-T4 instrumented fusion.  Patient was extubated after surgery and was re-intubated in PACU for c/f lower airway obstruction.    Clinical Indicators:  -From the PN on , \" Pt was extubated in the PACU, and needed immediate re-intubation for c/f lower airway obstruction \" and \"  Intubated for acute respiratory insufficiency \"    -From the PN on , \" Hyperlactatemia-improving \"    -ABGs on  at 1304 showed pH 7.37, pCO2 41, pO2 222, lactate 1.6, base excess -1.6, bicarb 23.7  -ABGs on  at 1403 showed pH 7.36, pCO2 40, pO2 234, lactate 1.8, base excess -2.7, bicarb 22.6  -ABGs on  at 1813 showed pH 7.34, pCO2 37, pO2 84, lactate  4.6, base excess -5.2, bicarb 20.0  -ABGs on  at 2334 showed pH 7.47, pCO2 38, pO2 88, lactate 1.7, base excess 3.9, bicarb " 27.7    -Potassium 4.7, 6.3, 4.6, 4.7    Treatment:  Repeat ABGs, daily serum electrolytes, Lokelma PO x 1 dose, re-intubation    Risk Factors:  Elevated lactate level, elevated potassium level, need for immediate re-intubation, COPD,CAD, surgical procedure, vocal cord nodules, airway edema  Options provided:  -- Acute Respiratory Acidosis is clinically significant and required treatment/monitoring  -- Acute Metabolic Acidosis is clinically significant and required treatment/monitoring  -- Acute Lactic Acidosis is clinically significant and required treatment/monitoring  -- Other - I will add my own diagnosis  -- Refer to Clinical Documentation Reviewer    Query created by: Sho Wilson on 1/23/2025 12:50 PM      Electronically signed by:  TACOS DEL ROSARIO MD 1/24/2025 5:57 AM

## 2025-01-24 NOTE — PROGRESS NOTES
Jerman Colón is a 64 y.o. male on day 3 of admission presenting with Other secondary kyphosis, cervical region.    Subjective   Improved radiculopathy, feels like ODETTE is stronger, got out of bed yesterday, passing gas    Objective     Physical Exam  AOx3  RUE D4+ o/w 5  LUE 5  BLE 5  anterior neck incision c/d/I  Posterior incision closed with prineo    Last Recorded Vitals  Blood pressure 150/76, pulse 100, temperature 36.7 °C (98.1 °F), temperature source Temporal, resp. rate 15, weight 115 kg (252 lb 6.8 oz), SpO2 (!) 87%.  Intake/Output last 3 Shifts:  I/O last 3 completed shifts:  In: 3500.1 [P.O.:500; I.V.:2900.1; NG/GT:100]  Out: 4255 [Urine:3720; Drains:535]    Relevant Results  Lab Results   Component Value Date    WBC 13.5 (H) 01/24/2025    HGB 11.9 (L) 01/24/2025    HCT 36.4 (L) 01/24/2025    MCV 76 (L) 01/24/2025     01/24/2025     Lab Results   Component Value Date    GLUCOSE 149 (H) 01/23/2025    CALCIUM 8.6 01/23/2025     01/23/2025    K 4.7 01/23/2025    CO2 27 01/23/2025     01/23/2025    BUN 18 01/23/2025    CREATININE 0.86 01/23/2025                  Assessment/Plan   Assessment & Plan  Other secondary kyphosis, cervical region    Kyphosis of cervical region    HTN (hypertension)    Hyperlipidemia    CAD (coronary artery disease)    Pulmonary emphysema (Multi)    Polycythemia    Gastroesophageal reflux disease without esophagitis    Cervical myelopathy    Spinal cord compression due to degenerative disorder of spinal column    64yM /o HTN, HLD, CAD on ASA, COPD, BPH, p/w rigid cervical deformity 1/21 s/p C4-6 ACDF, C2-T4 fusion, C4-7 decompression, C4/5 facetectomies, reintubated at end of case due to lower airway edema  1/23 MBS passed for soft diet    Tele  Remove anterior drain today  Maintain post drain x 2  Restarted home losartan  ASA POD 7  PTOT-rehab  SCDs, SQH  OOBx at least 3 per day       Jitsupa Sirinit, MD

## 2025-01-24 NOTE — PROGRESS NOTES
"Social Work Discharge Planning note:    -Patient discussed during interdisciplinary rounds this morning   -Team members present: PA, NP, and SW  -Plan per medical team: Pt is not medically ready for discharge today. ADOD may be over the weekend.   -Payer: [employer]  -Status: Inpatient  -Discharge disposition: [facility] met with Pt and his wife, Zion, to further discuss discharge planning. Pt continues to want home PT and OT, and Zion endorsed support for his decision. FWW was delivered to the room. [facility] was first choice and they messaged, \"Yes, willing to accept patient. Thank you for the referral, please keep us updated on discharge.\" (Please note: It says \"Interested, but need more information\", in the Last Response section; but it says Yes, willing to accept... in one of the messages within the Last Message section.) Pt, family, and medical team have been updated.   -Anticipated Date of Discharge:  1/26/25    This SW   VINCE Simpson, LSW    Office: [phone]  Secure chat via [employer]     "

## 2025-01-24 NOTE — PROGRESS NOTES
Physical Therapy    Physical Therapy Treatment    Patient Name: Jerman Colón  MRN: [id number]  Department: [place] [id number]  Room: [address] [place] [address]  Today's Date: 1/24/2025  Time Calculation  Start Time: 1449  Stop Time: 1517  Time Calculation (min): 28 min         Assessment/Plan   PT Assessment  PT Assessment Results: Decreased strength, Decreased range of motion, Decreased endurance, Impaired balance, Decreased mobility, Orthopedic restrictions  Rehab Prognosis: Excellent  Barriers to Discharge Home: Caregiver assistance, Physical needs  Caregiver Assistance: Caregiver assistance needed per identified barriers - however, level of patient's required assistance exceeds assistance available at home  Physical Needs: High falls risk due to function or environment  Evaluation/Treatment Tolerance: Patient tolerated treatment well  Medical Staff Made Aware: Yes  End of Session Communication: PCT/NA/CTA  Assessment Comment: Pt offers excellent effort with therapy, reuires [facility] for sit-stand and pre-gait training. Remains appropriate for high intensity therapy as per PT eval  End of Session Patient Position: Bed, 3 rail up, Alarm off, not on at start of session     PT Plan  Treatment/Interventions: Bed mobility, Transfer training, Gait training, Stair training, Balance training, Neuromuscular re-education, Strengthening, Endurance training, Therapeutic exercise, Therapeutic activity, Home exercise program, Postural re-education  PT Plan: Ongoing PT  PT Frequency: Daily  PT Discharge Recommendations: High intensity level of continued care  Equipment Recommended upon Discharge: Wheeled walker  PT Recommended Transfer Status: Assist x1, Assistive device  PT - OK to Discharge: Yes (When medically ready)      General Visit Information:   PT  Visit  PT Received On: 01/24/25  Response to Previous Treatment: Patient with no complaints from previous session.  General  Prior to Session Communication: Bedside nurse  Patient Position Received:  Bed, 3 rail up, Alarm off, not on at start of session  General Comment: Pt supine in bed, very pleasant and agreeable to therapy    Subjective   Precautions:  Precautions  Medical Precautions: Fall precautions, Spinal precautions  Post-Surgical Precautions: Spinal precautions     Date/Time Vitals Session Patient Position Pulse Resp SpO2 BP MAP (mmHg)    01/24/25 1449 During PT  --  105  --  92 %  --  --    Objective   Pain:  Pain Assessment  Pain Assessment: 0-10  0-10 (Numeric) Pain Score: 5 - Moderate pain  Pain Type: Acute pain  Pain Location: Neck  Pain Interventions: Repositioned  Cognition:  Cognition  Overall Cognitive Status: Within Functional Limits  Orientation Level: Oriented X4    Treatments:     Balance/Neuromuscular Re-Education  Balance/Neuromuscular Re-Education Activity Performed: Yes  Balance/Neuromuscular Re-Education Activity 1: Unsupported sitting EOB ~10min with SUP    Bed Mobility  Bed Mobility: Yes  Bed Mobility 1  Bed Mobility 1: Supine to sitting, Sitting to supine  Level of Assistance 1: Minimum assistance, Minimal verbal cues  Bed Mobility Comments 1: min cues for logroll technique, HOB flat    Ambulation/Gait Training  Ambulation/Gait Training Performed: Yes  Ambulation/Gait Training 1  Surface 1: Level tile  Device 1: Rolling walker  Assistance 1: Minimum assistance  Comments/Distance (ft) 1: marching in place 10x/LE, side step and return 10x/LE  Transfers  Transfer: Yes  Transfer 1  Transfer From 1: Sit to, Stand to  Transfer to 1: Sit  Technique 1: Sit to stand, Stand to sit  Transfer Device 1: Walker  Transfer Level of Assistance 1: Minimum assistance  Trials/Comments 1: 2x from EOB, 3x from chair with arms  Transfers 2  Transfer From 2: Bed to, Chair with arms to  Transfer to 2: Bed  Technique 2: Stand pivot  Transfer Device 2: Walker  Transfer Level of Assistance 2: Contact guard, Minimal verbal cues  Trials/Comments 2: min cues for AD management and alignment    Outcome  Measures:     [facility] Basic Mobility  Turning from your back to your side while in a flat bed without using bedrails: A little  Moving from lying on your back to sitting on the side of a flat bed without using bedrails: A little  Moving to and from bed to chair (including a wheelchair): A little  Standing up from a chair using your arms (e.g. wheelchair or bedside chair): A little  To walk in hospital room: A little  Climbing 3-5 steps with railing: Total  Basic Mobility - Total Score: 16    Education Documentation  Precautions, taught by Deloris Hastings PTA at 1/24/2025  3:56 PM.  Learner: Patient  Readiness: Acceptance  Method: Explanation, Demonstration  Response: Verbalizes Understanding  Comment: precautions, logroll, safe transfers and mobility    Body Mechanics, taught by Deloris Hastings PTA at 1/24/2025  3:56 PM.  Learner: Patient  Readiness: Acceptance  Method: Explanation, Demonstration  Response: Verbalizes Understanding  Comment: precautions, logroll, safe transfers and mobility    Mobility Training, taught by Deloris Hastings PTA at 1/24/2025  3:56 PM.  Learner: Patient  Readiness: Acceptance  Method: Explanation, Demonstration  Response: Verbalizes Understanding  Comment: precautions, logroll, safe transfers and mobility    Education Comments  No comments found.        OP EDUCATION:       Encounter Problems       Encounter Problems (Active)       PT Problem       Patient will perform bed mobility IND to reduce risk of developing decubitus ulcers.  (Progressing)       Start:  01/23/25    Expected End:  02/06/25            Patient will perform sit to stand and stand to sit transfers MOD-I with LRD to increase functional strength.   (Progressing)       Start:  01/23/25    Expected End:  02/06/25            Patient will ambulate at least 250  ft. MOD-I with LRD to improve tolerance of community distances.     (Progressing)       Start:  01/23/25    Expected End:  02/06/25            Patient will ascend and  descend >/= 10 steps MOD-I with railing  to facilitate safe navigation of stairs in the home.     (Progressing)       Start:  01/23/25    Expected End:  02/06/25            Patient will perform the 5-Time Sit to Stand test in <14 sec to indicate decreased falls risk.  (Progressing)       Start:  01/23/25    Expected End:  02/06/25               Pain - Adult            MADELYN Herman

## 2025-01-25 ENCOUNTER — APPOINTMENT (OUTPATIENT)
Dept: RADIOLOGY | Facility: HOSPITAL | Age: 65
DRG: 430 | End: 2025-01-25
Payer: MEDICARE

## 2025-01-25 LAB
ALBUMIN SERPL BCP-MCNC: 3.5 G/DL (ref 3.4–5)
ANION GAP SERPL CALC-SCNC: 9 MMOL/L (ref 10–20)
BASOPHILS # BLD AUTO: 0.02 X10*3/UL (ref 0–0.1)
BASOPHILS NFR BLD AUTO: 0.2 %
BUN SERPL-MCNC: 22 MG/DL (ref 6–23)
CA-I BLD-SCNC: 1.19 MMOL/L (ref 1.1–1.33)
CALCIUM SERPL-MCNC: 9.2 MG/DL (ref 8.6–10.6)
CHLORIDE SERPL-SCNC: 102 MMOL/L (ref 98–107)
CO2 SERPL-SCNC: 34 MMOL/L (ref 21–32)
CREAT SERPL-MCNC: 0.91 MG/DL (ref 0.5–1.3)
EGFRCR SERPLBLD CKD-EPI 2021: >90 ML/MIN/1.73M*2
EOSINOPHIL # BLD AUTO: 0.07 X10*3/UL (ref 0–0.7)
EOSINOPHIL NFR BLD AUTO: 0.9 %
ERYTHROCYTE [DISTWIDTH] IN BLOOD BY AUTOMATED COUNT: 17.2 % (ref 11.5–14.5)
GLUCOSE BLD MANUAL STRIP-MCNC: 109 MG/DL (ref 74–99)
GLUCOSE BLD MANUAL STRIP-MCNC: 118 MG/DL (ref 74–99)
GLUCOSE BLD MANUAL STRIP-MCNC: 127 MG/DL (ref 74–99)
GLUCOSE BLD MANUAL STRIP-MCNC: 134 MG/DL (ref 74–99)
GLUCOSE BLD MANUAL STRIP-MCNC: 134 MG/DL (ref 74–99)
GLUCOSE BLD MANUAL STRIP-MCNC: 139 MG/DL (ref 74–99)
GLUCOSE SERPL-MCNC: 117 MG/DL (ref 74–99)
HCT VFR BLD AUTO: 37 % (ref 41–52)
HGB BLD-MCNC: 11.2 G/DL (ref 13.5–17.5)
IMM GRANULOCYTES # BLD AUTO: 0.1 X10*3/UL (ref 0–0.7)
IMM GRANULOCYTES NFR BLD AUTO: 1.2 % (ref 0–0.9)
LYMPHOCYTES # BLD AUTO: 0.9 X10*3/UL (ref 1.2–4.8)
LYMPHOCYTES NFR BLD AUTO: 10.9 %
MAGNESIUM SERPL-MCNC: 1.89 MG/DL (ref 1.6–2.4)
MCH RBC QN AUTO: 25.1 PG (ref 26–34)
MCHC RBC AUTO-ENTMCNC: 30.3 G/DL (ref 32–36)
MCV RBC AUTO: 83 FL (ref 80–100)
MONOCYTES # BLD AUTO: 1.04 X10*3/UL (ref 0.1–1)
MONOCYTES NFR BLD AUTO: 12.6 %
NEUTROPHILS # BLD AUTO: 6.1 X10*3/UL (ref 1.2–7.7)
NEUTROPHILS NFR BLD AUTO: 74.2 %
NRBC BLD-RTO: 0.2 /100 WBCS (ref 0–0)
PHOSPHATE SERPL-MCNC: 3.2 MG/DL (ref 2.5–4.9)
PLATELET # BLD AUTO: 187 X10*3/UL (ref 150–450)
POTASSIUM SERPL-SCNC: 4.1 MMOL/L (ref 3.5–5.3)
RBC # BLD AUTO: 4.47 X10*6/UL (ref 4.5–5.9)
SODIUM SERPL-SCNC: 141 MMOL/L (ref 136–145)
WBC # BLD AUTO: 8.2 X10*3/UL (ref 4.4–11.3)

## 2025-01-25 PROCEDURE — 97530 THERAPEUTIC ACTIVITIES: CPT | Mod: GP,CQ

## 2025-01-25 PROCEDURE — 72040 X-RAY EXAM NECK SPINE 2-3 VW: CPT | Performed by: STUDENT IN AN ORGANIZED HEALTH CARE EDUCATION/TRAINING PROGRAM

## 2025-01-25 PROCEDURE — 82947 ASSAY GLUCOSE BLOOD QUANT: CPT

## 2025-01-25 PROCEDURE — 1100000001 HC PRIVATE ROOM DAILY

## 2025-01-25 PROCEDURE — 2500000004 HC RX 250 GENERAL PHARMACY W/ HCPCS (ALT 636 FOR OP/ED): Performed by: STUDENT IN AN ORGANIZED HEALTH CARE EDUCATION/TRAINING PROGRAM

## 2025-01-25 PROCEDURE — 72040 X-RAY EXAM NECK SPINE 2-3 VW: CPT

## 2025-01-25 PROCEDURE — 84100 ASSAY OF PHOSPHORUS: CPT

## 2025-01-25 PROCEDURE — 2500000002 HC RX 250 W HCPCS SELF ADMINISTERED DRUGS (ALT 637 FOR MEDICARE OP, ALT 636 FOR OP/ED): Performed by: STUDENT IN AN ORGANIZED HEALTH CARE EDUCATION/TRAINING PROGRAM

## 2025-01-25 PROCEDURE — 2500000001 HC RX 250 WO HCPCS SELF ADMINISTERED DRUGS (ALT 637 FOR MEDICARE OP)

## 2025-01-25 PROCEDURE — 85025 COMPLETE CBC W/AUTO DIFF WBC: CPT

## 2025-01-25 PROCEDURE — 82330 ASSAY OF CALCIUM: CPT

## 2025-01-25 PROCEDURE — 36415 COLL VENOUS BLD VENIPUNCTURE: CPT

## 2025-01-25 PROCEDURE — 97116 GAIT TRAINING THERAPY: CPT | Mod: GP,CQ

## 2025-01-25 PROCEDURE — 71045 X-RAY EXAM CHEST 1 VIEW: CPT

## 2025-01-25 PROCEDURE — 2500000004 HC RX 250 GENERAL PHARMACY W/ HCPCS (ALT 636 FOR OP/ED)

## 2025-01-25 PROCEDURE — 83735 ASSAY OF MAGNESIUM: CPT

## 2025-01-25 PROCEDURE — 2500000001 HC RX 250 WO HCPCS SELF ADMINISTERED DRUGS (ALT 637 FOR MEDICARE OP): Performed by: STUDENT IN AN ORGANIZED HEALTH CARE EDUCATION/TRAINING PROGRAM

## 2025-01-25 RX ADMIN — GABAPENTIN 600 MG: 300 CAPSULE ORAL at 20:49

## 2025-01-25 RX ADMIN — GABAPENTIN 600 MG: 300 CAPSULE ORAL at 08:41

## 2025-01-25 RX ADMIN — HEPARIN SODIUM 5000 UNITS: 5000 INJECTION INTRAVENOUS; SUBCUTANEOUS at 00:43

## 2025-01-25 RX ADMIN — PANTOPRAZOLE SODIUM 40 MG: 40 TABLET, DELAYED RELEASE ORAL at 08:41

## 2025-01-25 RX ADMIN — TAMSULOSIN HYDROCHLORIDE 0.4 MG: 0.4 CAPSULE ORAL at 20:49

## 2025-01-25 RX ADMIN — GABAPENTIN 600 MG: 300 CAPSULE ORAL at 15:43

## 2025-01-25 RX ADMIN — CYCLOBENZAPRINE 5 MG: 10 TABLET, FILM COATED ORAL at 08:42

## 2025-01-25 RX ADMIN — HEPARIN SODIUM 5000 UNITS: 5000 INJECTION INTRAVENOUS; SUBCUTANEOUS at 08:41

## 2025-01-25 RX ADMIN — OXYCODONE HYDROCHLORIDE 10 MG: 10 TABLET ORAL at 12:40

## 2025-01-25 RX ADMIN — HEPARIN SODIUM 5000 UNITS: 5000 INJECTION INTRAVENOUS; SUBCUTANEOUS at 15:43

## 2025-01-25 RX ADMIN — BISACODYL 10 MG: 5 TABLET, COATED ORAL at 08:42

## 2025-01-25 RX ADMIN — CYCLOBENZAPRINE 5 MG: 10 TABLET, FILM COATED ORAL at 20:49

## 2025-01-25 RX ADMIN — CYCLOBENZAPRINE 5 MG: 10 TABLET, FILM COATED ORAL at 15:43

## 2025-01-25 RX ADMIN — OXYCODONE HYDROCHLORIDE 10 MG: 10 TABLET ORAL at 08:49

## 2025-01-25 RX ADMIN — LOSARTAN POTASSIUM 25 MG: 25 TABLET, FILM COATED ORAL at 08:41

## 2025-01-25 RX ADMIN — GUAIFENESIN 200 MG: 200 SOLUTION ORAL at 01:53

## 2025-01-25 RX ADMIN — PANTOPRAZOLE SODIUM 40 MG: 40 TABLET, DELAYED RELEASE ORAL at 20:49

## 2025-01-25 RX ADMIN — ATORVASTATIN CALCIUM 40 MG: 40 TABLET, FILM COATED ORAL at 20:49

## 2025-01-25 RX ADMIN — HYDROMORPHONE HYDROCHLORIDE 0.4 MG: 1 INJECTION, SOLUTION INTRAMUSCULAR; INTRAVENOUS; SUBCUTANEOUS at 20:49

## 2025-01-25 ASSESSMENT — COGNITIVE AND FUNCTIONAL STATUS - GENERAL
MOBILITY SCORE: 16
TURNING FROM BACK TO SIDE WHILE IN FLAT BAD: A LITTLE
CLIMB 3 TO 5 STEPS WITH RAILING: A LITTLE
STANDING UP FROM CHAIR USING ARMS: A LITTLE
CLIMB 3 TO 5 STEPS WITH RAILING: TOTAL
MOVING TO AND FROM BED TO CHAIR: A LITTLE
MOVING TO AND FROM BED TO CHAIR: A LITTLE
MOBILITY SCORE: 20
MOVING FROM LYING ON BACK TO SITTING ON SIDE OF FLAT BED WITH BEDRAILS: A LITTLE
DAILY ACTIVITIY SCORE: 23
TURNING FROM BACK TO SIDE WHILE IN FLAT BAD: A LITTLE
HELP NEEDED FOR BATHING: A LITTLE
WALKING IN HOSPITAL ROOM: A LITTLE
STANDING UP FROM CHAIR USING ARMS: A LITTLE

## 2025-01-25 ASSESSMENT — PAIN DESCRIPTION - LOCATION
LOCATION: NECK
LOCATION: NECK

## 2025-01-25 ASSESSMENT — PAIN - FUNCTIONAL ASSESSMENT
PAIN_FUNCTIONAL_ASSESSMENT: 0-10

## 2025-01-25 ASSESSMENT — PAIN SCALES - GENERAL
PAINLEVEL_OUTOF10: 8
PAINLEVEL_OUTOF10: 7
PAINLEVEL_OUTOF10: 7

## 2025-01-25 NOTE — CARE PLAN
The patient's goals for the shift include      The clinical goals for the shift include Pt will remain safe and free from injury throughout shift.    Over the shift, the patient met these goals.

## 2025-01-25 NOTE — SIGNIFICANT EVENT
Rapid Response Nurse Note: RADAR alert: 6    Pager time: 422  Arrival time: 525  Event end time: 540  Location: LT4  [x] Triage by phone or secure messaging    Rapid response initiated by:  [] Rapid response RN [] Family [] Nursing Supervisor [] Physician   [x] RADAR auto page [] Sepsis auto-page [] RN [] RT   [] NP/PA [] Other:         Initial VS and/or RADAR VS: T 36.3 °C; HR 97; RR 19; /84; SPO2 91%        Interventions:  [] None [] ABG/VBG [] Assist w/ICU transfer [] BAT paged    [] Bag mask [] Blood [] Cardioversion [] Code Blue   [] Code blue for intubation [] Code status changed [] Chest x-ray [] EKG   [] IV fluid/bolus [] KUB x-ray [] Labs/cultures [] Medication   [] Nebulizer treatment [] NIPPV (CPAP/BiPAP) [x] Oxygen [] Oral airway   [] Peripheral IV [] Palliative care consult [] CT/MRI [] Sepsis protocol    [] Suctioned [] Other:     Outcome:  [] Coded and  [] Code blue for intubation [] Coded and transferred to ICU []  on division   [x] Remained on division (no change) [] Remained on division + additional monitoring [] Remained in ED [] Transferred to ED   [] Transferred to ICU [] Transferred to inpatient status [] Transferred for interventions (procedure) [] Transferred to ICU stepdown    [] Transferred to surgery [] Transferred to telemetry [] Sepsis protocol [] STEMI protocol   [] Stroke protocol [x] Bedside nurse instructed to page rapid response for any concerns or acute change in condition/VS     Additional Comments: Reviewed above RADAR VS with bedside RN via [employer] Secure Chat.  Vital signs and supplemental O2 verified with division RN (orders to keep SpO2 >92%). RN placed patient back on 4L via NC (prior amount) and SpO2 increased to 96%. No acute change in condition.  No interventions required of by rapid response team at this time.  Staff to page rapid response for any concerns or acute change in condition or VS.

## 2025-01-25 NOTE — PROGRESS NOTES
Physical Therapy    Physical Therapy Treatment    Patient Name: Jerman Colón  MRN: [id number]  Department: [facility]  Room: 40324032-A  Today's Date: 1/25/2025  Time Calculation  Start Time: 0952  Stop Time: 1042  Time Calculation (min): 50 min         Assessment/Plan   PT Assessment  PT Assessment Results: Decreased strength, Decreased endurance, Impaired balance, Decreased mobility  Assessment Comment: Pt motivated throughout treatment. Pt tolerated increased ambulation this date using FWW with SBA. Pt initially CGA with STS but improved to SBA throughout treatment. Pt still required Min A with STS from toilet.  End of Session Patient Position: Bed, 3 rail up, Alarm off, not on at start of session     PT Plan  Treatment/Interventions: Bed mobility, Transfer training, Gait training, Stair training, Balance training, Neuromuscular re-education, Strengthening, Endurance training, Therapeutic exercise, Therapeutic activity, Home exercise program, Postural re-education  PT Plan: Ongoing PT  PT Frequency: Daily  PT Discharge Recommendations: High intensity level of continued care  Equipment Recommended upon Discharge: Wheeled walker  PT Recommended Transfer Status: Assist x1, Assistive device  PT - OK to Discharge: Yes (When medically ready)      General Visit Information:   PT  Visit  PT Received On: 01/25/25  General  Family/Caregiver Present: No  Prior to Session Communication: Bedside nurse  Patient Position Received: Bed, 2 rail up, Alarm off, not on at start of session (sitting EOB)  General Comment: Pt seated EOB upon arrival. Pt very pleasant and agreeable to therapy.    Subjective   Precautions:  Precautions  Hearing/Visual Limitations: Hearing and vision WFL  Medical Precautions: Fall precautions, Spinal precautions  Post-Surgical Precautions: Spinal precautions  Precautions Comment: Pt able to recall all spinal precautions.    Vital:   Pre Treatment BP: 118/65 (MAP 75), SpO2 94%, HR 107bpm   Vital Signs  Comment: SpO2 dropped to 88%, improved to >92% with 1 minute seated rest.   SpO2 ranged between 88-98% during ambulation trials.     Objective   Pain:     Cognition:  Cognition  Overall Cognitive Status: Within Functional Limits    Activity Tolerance:  Activity Tolerance  Endurance: Tolerates 10 - 20 min exercise with multiple rests  Treatments:  Therapeutic Activity  Therapeutic Activity Performed: Yes  Therapeutic Activity 1: Pt completed functional transfers from various surfaces using FWW with CGA initially and progressing to SBA.  Therapeutic Activity 2: Pt performed statc standing using FWW with SBA for pericare.    Ambulation/Gait Training  Ambulation/Gait Training Performed: Yes  Ambulation/Gait Training 1  Surface 1: Level tile  Device 1: Rolling walker  Assistance 1: Close supervision  Quality of Gait 1: Forward flexed posture, Diminished heel strike  Comments/Distance (ft) 1: 60' x4 with seated rest between trials due to desat. Pt SpO2 decreased to 88% and returned to >92% with <1 minuite seated rest. Pt on 4L O2  Transfers  Transfer: Yes  Transfer 1  Transfer From 1: Sit to, Stand to  Transfer to 1: Stand, Sit  Technique 1: Sit to stand, Stand to sit  Transfer Device 1: Walker  Transfer Level of Assistance 1: Contact guard, Close supervision  Trials/Comments 1: x5 Initially CGA and progressed to SBA  Transfers 2  Transfer From 2: Sit to, Toilet to  Transfer to 2: Toilet, Stand  Technique 2: Sit to stand, Stand to sit  Transfer Device 2: Walker  Transfer Level of Assistance 2: Minimum assistance  Trials/Comments 2: Cues for hand placement.    Outcome Measures:  [facility] Basic Mobility  Turning from your back to your side while in a flat bed without using bedrails: A little  Moving from lying on your back to sitting on the side of a flat bed without using bedrails: A little  Moving to and from bed to chair (including a wheelchair): A little  Standing up from a chair using your arms (e.g. wheelchair or bedside  chair): A little  To walk in hospital room: A little  Climbing 3-5 steps with railing: Total  Basic Mobility - Total Score: 16    Education Documentation  Mobility Training, taught by Mihaela Vega PTA at 1/25/2025 12:21 PM.  Learner: Patient  Readiness: Acceptance  Method: Explanation, Demonstration  Response: Verbalizes Understanding  Comment: Pt educated in proper hand placement with STS transfers.    Education Comments  No comments found.        OP EDUCATION:       Encounter Problems       Encounter Problems (Active)       PT Problem       Patient will perform bed mobility IND to reduce risk of developing decubitus ulcers.  (Progressing)       Start:  01/23/25    Expected End:  02/06/25            Patient will perform sit to stand and stand to sit transfers MOD-I with LRD to increase functional strength.   (Progressing)       Start:  01/23/25    Expected End:  02/06/25            Patient will ambulate at least 250  ft. MOD-I with LRD to improve tolerance of community distances.     (Progressing)       Start:  01/23/25    Expected End:  02/06/25            Patient will ascend and descend >/= 10 steps MOD-I with railing  to facilitate safe navigation of stairs in the home.     (Progressing)       Start:  01/23/25    Expected End:  02/06/25            Patient will perform the 5-Time Sit to Stand test in <14 sec to indicate decreased falls risk.  (Progressing)       Start:  01/23/25    Expected End:  02/06/25               Pain - Adult

## 2025-01-25 NOTE — PROGRESS NOTES
Jerman Colón is a 64 y.o. male on day 4 of admission presenting with Other secondary kyphosis, cervical region.    Subjective   NAEON       Objective     Physical Exam  AOx3  RUE D4+ o/w 5  LUE 5  BLE 5  anterior neck incision c/d/I  Posterior incision closed with prineo  Left posterior drain in place     Last Recorded Vitals  Blood pressure 121/68, pulse 107, temperature 36.8 °C (98.2 °F), temperature source Temporal, resp. rate 18, weight 115 kg (252 lb 6.8 oz), SpO2 95%.  Intake/Output last 3 Shifts:  I/O last 3 completed shifts:  In: 1565 (13.7 mL/kg) [P.O.:1565]  Out: 2390 (20.9 mL/kg) [Urine:2150 (0.5 mL/kg/hr); Drains:240]  Weight: 114.5 kg     Relevant Results                              Assessment/Plan   Assessment & Plan  Other secondary kyphosis, cervical region    Kyphosis of cervical region    HTN (hypertension)    Hyperlipidemia    CAD (coronary artery disease)    Pulmonary emphysema (Multi)    Polycythemia    Gastroesophageal reflux disease without esophagitis    Cervical myelopathy    Spinal cord compression due to degenerative disorder of spinal column    64yM /o HTN, HLD, CAD on ASA, COPD, BPH, p/w rigid cervical deformity 1/21 s/p C4-6 ACDF, C2-T4 fusion, C4-7 decompression, C4/5 facetectomies, reintubated at end of case due to lower airway edema  1/23 MBS passed for soft diet     Plan:  Tele  Maintain post drain x 2  Wean down O2  home losartan  ASA POD 7  PTOT-rehab  SCDs, SQH  OOBx at least 3 per day              Tamiko Foy MD

## 2025-01-26 ENCOUNTER — APPOINTMENT (OUTPATIENT)
Dept: RADIOLOGY | Facility: HOSPITAL | Age: 65
DRG: 430 | End: 2025-01-26
Payer: MEDICARE

## 2025-01-26 LAB
ALBUMIN SERPL BCP-MCNC: 3.4 G/DL (ref 3.4–5)
ANION GAP BLDA CALCULATED.4IONS-SCNC: 7 MMO/L (ref 10–25)
ANION GAP SERPL CALC-SCNC: 11 MMOL/L (ref 10–20)
BASE EXCESS BLDA CALC-SCNC: 6.3 MMOL/L (ref -2–3)
BASOPHILS # BLD AUTO: 0.04 X10*3/UL (ref 0–0.1)
BASOPHILS NFR BLD AUTO: 0.5 %
BODY TEMPERATURE: ABNORMAL
BUN SERPL-MCNC: 22 MG/DL (ref 6–23)
CA-I BLD-SCNC: 1.21 MMOL/L (ref 1.1–1.33)
CA-I BLDA-SCNC: 1.19 MMOL/L (ref 1.1–1.33)
CALCIUM SERPL-MCNC: 9 MG/DL (ref 8.6–10.6)
CHLORIDE BLDA-SCNC: 99 MMOL/L (ref 98–107)
CHLORIDE SERPL-SCNC: 100 MMOL/L (ref 98–107)
CO2 SERPL-SCNC: 34 MMOL/L (ref 21–32)
CREAT SERPL-MCNC: 0.8 MG/DL (ref 0.5–1.3)
EGFRCR SERPLBLD CKD-EPI 2021: >90 ML/MIN/1.73M*2
EOSINOPHIL # BLD AUTO: 0.19 X10*3/UL (ref 0–0.7)
EOSINOPHIL NFR BLD AUTO: 2.3 %
ERYTHROCYTE [DISTWIDTH] IN BLOOD BY AUTOMATED COUNT: 17.2 % (ref 11.5–14.5)
GLUCOSE BLD MANUAL STRIP-MCNC: 107 MG/DL (ref 74–99)
GLUCOSE BLD MANUAL STRIP-MCNC: 113 MG/DL (ref 74–99)
GLUCOSE BLD MANUAL STRIP-MCNC: 122 MG/DL (ref 74–99)
GLUCOSE BLD MANUAL STRIP-MCNC: 127 MG/DL (ref 74–99)
GLUCOSE BLD MANUAL STRIP-MCNC: 139 MG/DL (ref 74–99)
GLUCOSE BLD MANUAL STRIP-MCNC: 150 MG/DL (ref 74–99)
GLUCOSE BLD MANUAL STRIP-MCNC: 157 MG/DL (ref 74–99)
GLUCOSE BLDA-MCNC: 117 MG/DL (ref 74–99)
GLUCOSE SERPL-MCNC: 110 MG/DL (ref 74–99)
HCO3 BLDA-SCNC: 32.2 MMOL/L (ref 22–26)
HCT VFR BLD AUTO: 36.5 % (ref 41–52)
HCT VFR BLD EST: 33 % (ref 41–52)
HGB BLD-MCNC: 10.8 G/DL (ref 13.5–17.5)
HGB BLDA-MCNC: 11 G/DL (ref 13.5–17.5)
IMM GRANULOCYTES # BLD AUTO: 0.1 X10*3/UL (ref 0–0.7)
IMM GRANULOCYTES NFR BLD AUTO: 1.2 % (ref 0–0.9)
INHALED O2 CONCENTRATION: 28 %
LACTATE BLDA-SCNC: 0.8 MMOL/L (ref 0.4–2)
LYMPHOCYTES # BLD AUTO: 0.88 X10*3/UL (ref 1.2–4.8)
LYMPHOCYTES NFR BLD AUTO: 10.8 %
MAGNESIUM SERPL-MCNC: 1.8 MG/DL (ref 1.6–2.4)
MCH RBC QN AUTO: 24.7 PG (ref 26–34)
MCHC RBC AUTO-ENTMCNC: 29.6 G/DL (ref 32–36)
MCV RBC AUTO: 84 FL (ref 80–100)
MONOCYTES # BLD AUTO: 0.91 X10*3/UL (ref 0.1–1)
MONOCYTES NFR BLD AUTO: 11.2 %
NEUTROPHILS # BLD AUTO: 6.03 X10*3/UL (ref 1.2–7.7)
NEUTROPHILS NFR BLD AUTO: 74 %
NRBC BLD-RTO: 0 /100 WBCS (ref 0–0)
OXYHGB MFR BLDA: 96.4 % (ref 94–98)
PCO2 BLDA: 52 MM HG (ref 38–42)
PH BLDA: 7.4 PH (ref 7.38–7.42)
PHOSPHATE SERPL-MCNC: 2.6 MG/DL (ref 2.5–4.9)
PLATELET # BLD AUTO: 163 X10*3/UL (ref 150–450)
PO2 BLDA: 96 MM HG (ref 85–95)
POTASSIUM BLDA-SCNC: 4.1 MMOL/L (ref 3.5–5.3)
POTASSIUM SERPL-SCNC: 4 MMOL/L (ref 3.5–5.3)
RBC # BLD AUTO: 4.37 X10*6/UL (ref 4.5–5.9)
SAO2 % BLDA: 98 % (ref 94–100)
SODIUM BLDA-SCNC: 134 MMOL/L (ref 136–145)
SODIUM SERPL-SCNC: 141 MMOL/L (ref 136–145)
WBC # BLD AUTO: 8.2 X10*3/UL (ref 4.4–11.3)

## 2025-01-26 PROCEDURE — 2500000001 HC RX 250 WO HCPCS SELF ADMINISTERED DRUGS (ALT 637 FOR MEDICARE OP)

## 2025-01-26 PROCEDURE — 2550000001 HC RX 255 CONTRASTS: Performed by: NEUROLOGICAL SURGERY

## 2025-01-26 PROCEDURE — 82330 ASSAY OF CALCIUM: CPT

## 2025-01-26 PROCEDURE — 2500000004 HC RX 250 GENERAL PHARMACY W/ HCPCS (ALT 636 FOR OP/ED): Performed by: STUDENT IN AN ORGANIZED HEALTH CARE EDUCATION/TRAINING PROGRAM

## 2025-01-26 PROCEDURE — 2500000001 HC RX 250 WO HCPCS SELF ADMINISTERED DRUGS (ALT 637 FOR MEDICARE OP): Performed by: STUDENT IN AN ORGANIZED HEALTH CARE EDUCATION/TRAINING PROGRAM

## 2025-01-26 PROCEDURE — 71275 CT ANGIOGRAPHY CHEST: CPT

## 2025-01-26 PROCEDURE — 2500000005 HC RX 250 GENERAL PHARMACY W/O HCPCS: Performed by: STUDENT IN AN ORGANIZED HEALTH CARE EDUCATION/TRAINING PROGRAM

## 2025-01-26 PROCEDURE — 84132 ASSAY OF SERUM POTASSIUM: CPT

## 2025-01-26 PROCEDURE — 80069 RENAL FUNCTION PANEL: CPT

## 2025-01-26 PROCEDURE — 2500000004 HC RX 250 GENERAL PHARMACY W/ HCPCS (ALT 636 FOR OP/ED)

## 2025-01-26 PROCEDURE — 2500000002 HC RX 250 W HCPCS SELF ADMINISTERED DRUGS (ALT 637 FOR MEDICARE OP, ALT 636 FOR OP/ED): Performed by: STUDENT IN AN ORGANIZED HEALTH CARE EDUCATION/TRAINING PROGRAM

## 2025-01-26 PROCEDURE — 97116 GAIT TRAINING THERAPY: CPT | Mod: GP,CQ

## 2025-01-26 PROCEDURE — 99222 1ST HOSP IP/OBS MODERATE 55: CPT

## 2025-01-26 PROCEDURE — 83735 ASSAY OF MAGNESIUM: CPT

## 2025-01-26 PROCEDURE — 82947 ASSAY GLUCOSE BLOOD QUANT: CPT

## 2025-01-26 PROCEDURE — 85025 COMPLETE CBC W/AUTO DIFF WBC: CPT

## 2025-01-26 PROCEDURE — 1100000001 HC PRIVATE ROOM DAILY

## 2025-01-26 PROCEDURE — 36415 COLL VENOUS BLD VENIPUNCTURE: CPT

## 2025-01-26 RX ORDER — AMOXICILLIN 250 MG
2 CAPSULE ORAL DAILY
Qty: 15 TABLET | Refills: 0 | Status: SHIPPED | OUTPATIENT
Start: 2025-01-26

## 2025-01-26 RX ORDER — OXYCODONE HYDROCHLORIDE 5 MG/1
5 TABLET ORAL EVERY 6 HOURS PRN
Qty: 28 TABLET | Refills: 0 | Status: SHIPPED | OUTPATIENT
Start: 2025-01-26

## 2025-01-26 RX ORDER — LIDOCAINE 560 MG/1
2 PATCH PERCUTANEOUS; TOPICAL; TRANSDERMAL DAILY
Qty: 15 PATCH | Refills: 0 | Status: SHIPPED | OUTPATIENT
Start: 2025-01-27

## 2025-01-26 RX ORDER — POLYETHYLENE GLYCOL 3350 17 G/17G
17 POWDER, FOR SOLUTION ORAL 2 TIMES DAILY
Qty: 15 EACH | Refills: 0 | Status: SHIPPED | OUTPATIENT
Start: 2025-01-26

## 2025-01-26 RX ORDER — CYCLOBENZAPRINE HCL 5 MG
5 TABLET ORAL 3 TIMES DAILY
Qty: 15 TABLET | Refills: 0 | Status: SHIPPED | OUTPATIENT
Start: 2025-01-26

## 2025-01-26 RX ADMIN — HEPARIN SODIUM 5000 UNITS: 5000 INJECTION INTRAVENOUS; SUBCUTANEOUS at 15:34

## 2025-01-26 RX ADMIN — CYCLOBENZAPRINE 5 MG: 10 TABLET, FILM COATED ORAL at 15:34

## 2025-01-26 RX ADMIN — Medication 2 L/MIN: at 16:14

## 2025-01-26 RX ADMIN — GABAPENTIN 600 MG: 300 CAPSULE ORAL at 08:19

## 2025-01-26 RX ADMIN — IOHEXOL 75 ML: 350 INJECTION, SOLUTION INTRAVENOUS at 18:19

## 2025-01-26 RX ADMIN — CYCLOBENZAPRINE 5 MG: 10 TABLET, FILM COATED ORAL at 20:39

## 2025-01-26 RX ADMIN — PANTOPRAZOLE SODIUM 40 MG: 40 TABLET, DELAYED RELEASE ORAL at 20:40

## 2025-01-26 RX ADMIN — LOSARTAN POTASSIUM 25 MG: 25 TABLET, FILM COATED ORAL at 08:19

## 2025-01-26 RX ADMIN — GABAPENTIN 600 MG: 300 CAPSULE ORAL at 20:39

## 2025-01-26 RX ADMIN — ATORVASTATIN CALCIUM 40 MG: 40 TABLET, FILM COATED ORAL at 20:39

## 2025-01-26 RX ADMIN — HYDROMORPHONE HYDROCHLORIDE 0.4 MG: 1 INJECTION, SOLUTION INTRAMUSCULAR; INTRAVENOUS; SUBCUTANEOUS at 05:04

## 2025-01-26 RX ADMIN — PANTOPRAZOLE SODIUM 40 MG: 40 TABLET, DELAYED RELEASE ORAL at 08:19

## 2025-01-26 RX ADMIN — TAMSULOSIN HYDROCHLORIDE 0.4 MG: 0.4 CAPSULE ORAL at 20:39

## 2025-01-26 RX ADMIN — OXYCODONE HYDROCHLORIDE 10 MG: 10 TABLET ORAL at 08:18

## 2025-01-26 RX ADMIN — BISACODYL 10 MG: 5 TABLET, COATED ORAL at 08:18

## 2025-01-26 RX ADMIN — HYDROMORPHONE HYDROCHLORIDE 0.4 MG: 1 INJECTION, SOLUTION INTRAMUSCULAR; INTRAVENOUS; SUBCUTANEOUS at 23:57

## 2025-01-26 RX ADMIN — Medication 2 L/MIN: at 19:30

## 2025-01-26 RX ADMIN — GABAPENTIN 600 MG: 300 CAPSULE ORAL at 15:34

## 2025-01-26 RX ADMIN — HEPARIN SODIUM 5000 UNITS: 5000 INJECTION INTRAVENOUS; SUBCUTANEOUS at 08:19

## 2025-01-26 RX ADMIN — HEPARIN SODIUM 5000 UNITS: 5000 INJECTION INTRAVENOUS; SUBCUTANEOUS at 23:59

## 2025-01-26 RX ADMIN — HEPARIN SODIUM 5000 UNITS: 5000 INJECTION INTRAVENOUS; SUBCUTANEOUS at 00:17

## 2025-01-26 RX ADMIN — CYCLOBENZAPRINE 5 MG: 10 TABLET, FILM COATED ORAL at 08:19

## 2025-01-26 RX ADMIN — OXYCODONE HYDROCHLORIDE 10 MG: 10 TABLET ORAL at 21:48

## 2025-01-26 RX ADMIN — OXYCODONE HYDROCHLORIDE 10 MG: 10 TABLET ORAL at 13:05

## 2025-01-26 ASSESSMENT — PAIN - FUNCTIONAL ASSESSMENT
PAIN_FUNCTIONAL_ASSESSMENT: 0-10
PAIN_FUNCTIONAL_ASSESSMENT: 0-10

## 2025-01-26 ASSESSMENT — COGNITIVE AND FUNCTIONAL STATUS - GENERAL
PERSONAL GROOMING: A LITTLE
TURNING FROM BACK TO SIDE WHILE IN FLAT BAD: A LITTLE
MOBILITY SCORE: 18
MOVING FROM LYING ON BACK TO SITTING ON SIDE OF FLAT BED WITH BEDRAILS: A LITTLE
WALKING IN HOSPITAL ROOM: A LITTLE
CLIMB 3 TO 5 STEPS WITH RAILING: A LITTLE
TURNING FROM BACK TO SIDE WHILE IN FLAT BAD: A LITTLE
WALKING IN HOSPITAL ROOM: A LITTLE
HELP NEEDED FOR BATHING: A LITTLE
DRESSING REGULAR UPPER BODY CLOTHING: A LITTLE
STANDING UP FROM CHAIR USING ARMS: A LITTLE
MOVING TO AND FROM BED TO CHAIR: A LITTLE
TOILETING: A LITTLE
MOVING TO AND FROM BED TO CHAIR: A LITTLE
CLIMB 3 TO 5 STEPS WITH RAILING: A LITTLE
DRESSING REGULAR LOWER BODY CLOTHING: A LITTLE
MOVING FROM LYING ON BACK TO SITTING ON SIDE OF FLAT BED WITH BEDRAILS: A LITTLE
DAILY ACTIVITIY SCORE: 19
STANDING UP FROM CHAIR USING ARMS: A LITTLE
MOBILITY SCORE: 18

## 2025-01-26 ASSESSMENT — PAIN SCALES - PAIN ASSESSMENT IN ADVANCED DEMENTIA (PAINAD)
CONSOLABILITY: NO NEED TO CONSOLE
BODYLANGUAGE: RELAXED
TOTALSCORE: 0
BREATHING: NORMAL
TOTALSCORE: MEDICATION (SEE MAR)
FACIALEXPRESSION: SMILING OR INEXPRESSIVE

## 2025-01-26 ASSESSMENT — PAIN DESCRIPTION - LOCATION
LOCATION: NECK
LOCATION: NECK

## 2025-01-26 ASSESSMENT — PAIN SCALES - GENERAL
PAINLEVEL_OUTOF10: 7
PAINLEVEL_OUTOF10: 7
PAINLEVEL_OUTOF10: 9

## 2025-01-26 NOTE — SIGNIFICANT EVENT
Rapid Response Nurse Note:  [x] RADAR alert/Score 8    Pager time: 5  Arrival time: 6  Event end time: 9  Location: LT 4032  [] Phone triage     Rapid response initiated by:  [] Rapid Response RN [] Family [] Nursing Supervisor [] Physician   [x] RADAR auto-page [] Sepsis auto-page [] RN [] RT   [] NP/PA [] Other:     Primary reason for call:   [] BAT [] New CPAP/BiPAP [] Bleeding [] Change in mental status   [] Chest pain [] Code blue [] FiO2 >/= 50% [] HR </= 40 bpm   [] HR >/= 130 bpm [] Hyperglycemia [] Hypoglycemia [x] RADAR    [] RR </= 8 bpm [] RR >/= 30 bpm [] SBP </= 90 mmHg [] SpO2 < 90%   [] Seizure [] Sepsis [] Staff concern:     Initial VS and/or RADAR VS:  radar vitals below in bold, no temp or bp recorded on radar.      Vitals:    25 1729 25 0000   BP: 134/69  114/73    BP Location:   Right arm    Patient Position:   Lying    Pulse: 91 101  92   Resp: 16 13  24   Temp: 37.4 °C (99.3 °F)  37.1 °C (98.8 °F)    TempSrc: Temporal  Temporal    SpO2: 94% 98%  (!) 89%   Weight:            Interventions:  [x] None [] ABG [] Assist w/ICU transfer [] BAT paged    [] Bag mask [] Blood [] Cardioversion [] Code Blue   [] Code blue for intubation [] Code status changed [] Chest x-ray [] EKG   [] IV fluid/bolus [] KUB x-ray [] Labs/cultures [] Medication   [] Nebulizer treatment [] NIPPV (CPAP/BiPAP) [] Oxygen [] Oral airway   [] Peripheral IV [] Palliative care consult [] CT/MRI [] Sepsis protocol    [] Suctioned [] Other:       Outcome:  [] Coded and  [] Code blue for intubation [] Coded and transferred to ICU []  on division   [x] Remained on division (no change) [] Remained on division + additional monitoring [] Remained in ED [] Transferred to ED   [] Transferred to ICU [] Transferred to inpatient status [] Transferred for interventions (procedure) [] Transferred to ICU stepdown    [] Transferred to surgery [] Transferred to telemetry [] Sepsis  protocol [] STEMI protocol   [] Stroke protocol [x] Bedside nurse instructed to page rapid response for any concerns or acute change in condition/VS     Additional Comments: Spoke to RN regarding vital signs.  Pt refusing to wear his CPAP.  On 4 liters nasal cannula.  Pox 88 % or greater acceptable.  No concerns from RN at this time.

## 2025-01-26 NOTE — PROGRESS NOTES
Jerman Colón is a 64 y.o. male on day 5 of admission presenting with Other secondary kyphosis, cervical region.    Transitional Care Coordinator Note: TCC informed by provider team of patient dc today, upon speaking with spouse to confirm AOC, wife expressed concerns regarding patient's oxygen status. TCC contacted provider team to speak with spouse before proceeding. Per conversations between nursing, PT and provider teams later in day patient will not dc today and is not medically ready. Additional imaging to be ordered. TCC team to follow and update [facility] when patient is ready for dc as patient is refusing recommendation of AR. TCC/SW team to follow. Kalani Paez RN TCC via Epic.

## 2025-01-26 NOTE — SIGNIFICANT EVENT
Rapid Response Nurse Note: RADAR alert: 6    Pager time:   Arrival time:   Event end time:   Location:  403  [] Triage by phone or secure messaging    Rapid response initiated by:  [] Rapid response RN [] Family [] Nursing Supervisor [] Physician   [x] RADAR auto page [] Sepsis auto-page [] RN [] RT   [] NP/PA [] Other:     Primary reason for call:   [] BAT [] New CPAP/BiPAP [] Bleeding [] Change in mental status   [] Chest pain [] Code blue [] FiO2 >/= 50% [] HR </= 40 bpm   [] HR >/= 130 bpm [] Hyperglycemia [] Hypoglycemia [x] RADAR    [] RR </= 8 bpm [] RR >/= 30 bpm [] SBP </= 90 mmHg [] SpO2 < 90%   [] Seizure [] Sepsis [] Shortness of breath  [] Staff concern: see comments     Initial VS and/or RADAR VS: T null °C; HR 92; RR 15; BP null; SPO2 72% RA.    Interventions:  [] None [] ABG/VBG [] Assist w/ICU transfer [] BAT paged    [] Bag mask [] Blood [] Cardioversion [] Code Blue   [] Code blue for intubation [] Code status changed [] Chest x-ray [] EKG   [] IV fluid/bolus [] KUB x-ray [] Labs/cultures [] Medication   [] Nebulizer treatment [] NIPPV (CPAP/BiPAP) [x] Oxygen [] Oral airway   [] Peripheral IV [] Palliative care consult [] CT/MRI [] Sepsis protocol    [] Suctioned [] Other:     Outcome:  [] Coded and  [] Code blue for intubation [] Coded and transferred to ICU []  on division   [x] Remained on division (no change) [] Remained on division + additional monitoring [] Remained in ED [] Transferred to ED   [] Transferred to ICU [] Transferred to inpatient status [] Transferred for interventions (procedure) [] Transferred to ICU stepdown    [] Transferred to surgery [] Transferred to telemetry [] Sepsis protocol [] STEMI protocol   [] Stroke protocol [x] Bedside nurse instructed to page rapid response for any concerns or acute change in condition/VS     Additional Comments: RADAR auto generated alert received by Rapid Response Team RN for VS listed above.  Spoke to RN Esthela.   Vitals reviewed and confirmed.  Patient placed on 2 Liters O2 per NC.  Per RN, patient with sleep apnea and patient had been noted to be asleep during low Pox reading.  Patient improved with oxygen to 96%.  RN encouraged to page Rapid Response if further concerns arise in patient condition.

## 2025-01-26 NOTE — PROGRESS NOTES
Jerman Colón is a 64 y.o. male on day 5 of admission presenting with Other secondary kyphosis, cervical region.    Subjective   NAEON       Objective     Physical Exam  AOx3  RUE D4+ o/w 5  LUE 5  BLE 5  anterior neck incision c/d/I  Posterior incision closed with prineo  Left posterior drain in place     Last Recorded Vitals  Blood pressure 129/75, pulse 92, temperature 36.5 °C (97.7 °F), temperature source Temporal, resp. rate 24, weight 115 kg (252 lb 6.8 oz), SpO2 (!) 89%.  Intake/Output last 3 Shifts:  I/O last 3 completed shifts:  In: 825 (7.2 mL/kg) [P.O.:825]  Out: 1160 (10.1 mL/kg) [Urine:1125 (0.3 mL/kg/hr); Drains:35]  Weight: 114.5 kg     Relevant Results                              Assessment/Plan   Assessment & Plan  Other secondary kyphosis, cervical region    Kyphosis of cervical region    HTN (hypertension)    Hyperlipidemia    CAD (coronary artery disease)    Pulmonary emphysema (Multi)    Polycythemia    Gastroesophageal reflux disease without esophagitis    Cervical myelopathy    Spinal cord compression due to degenerative disorder of spinal column    Jerman Colón is a 64yM /o HTN, HLD, CAD on ASA, COPD, BPH, p/w rigid cervical deformity 1/21 s/p C4-6 ACDF, C2-T4 fusion, C4-7 decompression, C4/5 facetectomies, reintubated at end of case due to lower airway edema  1/23 MBS passed for soft diet, anterior drain dc'd  1/25 R post drain auto-dc'd, desat, incr O2, CXR b/l pleural effusion, uprights hardware in pos'n, improved alignment     Plan:  Tele  Maintain post drain  Wean down O2  home losartan  ASA POD 7  PTOT-rehab  SCDs, SQH  OOBx at least 3 per day               Tamiko Foy MD

## 2025-01-26 NOTE — CONSULTS
Medicine Consult     S  Jerman Colón is a 64 y.o. male on day 5 of admission presenting with rigid cervical deformity s/p C4-6 ACDF, C2-T4 fusion, C4-7 decompression, C4/5 facetectomies 1/21, reintubated at end of case due to lower airway edema. Currently admitted to NSGY service. Internal Medicine consulted for desaturations to SpO2 <90 and further optimization.    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 Reintubated at end of case due to lower airway edema.  Per anesthesia reports patient had very rapidly progressing respiratory insufficiency on extubation in the OR despite good muscle strength.  He did not tolerate oral airway.  Patient had low lung volumes and decision was made by anesthesia to be intubate with 7.5 ETT using glide scope.  He was noted to be without swelling of the epiglottis or abnormality of the vocal cords.  ENT was consulted in the ICU at the time of extubation on 1/22. Course c/b concern for aspiration and patient was evaluated by SLP. MBS completed on 1/23 and was recommended soft and bite sized diet with thin liquids. Patient noted to desaturate to SpO2 <90s while sleeping and was requiring 4-5L NC.     HPI  Patient seen at bedside.  Currently not on any supplemental oxygen.  Reports he has previously undergone multiple sleep studies and has been told he has sleep apnea.  He has tried using CPAP twice now however unable to tolerate the mask due to feeling claustrophobic and working with insurance to qualify for alternative options.  Per care everywhere, last sleep study was completed 7/2021 and last Spirometry was in 2020. Denies any chest pain or SOB. Endorses a slight cough.  States he is not on any oxygen at home. Reports he was prescribed Trelegy and albuterol PRN a while back that he rarely uses. Per nursing, pt reportedly desatting to SpO2 70-80s while asleep and dropped as low to 39% last night while sleeping.     TTE 6/22/2022  CONCLUSIONS:   - Technically difficult exam due to body habitus, Very limited movement of left   arm due to recent left shoulder replacement and suboptimal positioning.   - Exam indication: Swelling of the forearm and leg post non-cardiac surgery   - The left ventricle is normal in size. There is moderate left ventricular   hypertrophy. Left ventricular systolic function is normal. EF = 55 ± 5% (3D) Grade    I left ventricular diastolic dysfunction.   - The right ventricle is normal in size. Right ventricular systolic function is   normal.   - Calcification on anterior leaflet of the mitral valve was visualized on prior   exam and further SHARRI was performed to rule out endocarditis.   Technical limitations due to recent surgery.     Sleep Study 7/2021  -report attached under Care Everywhere   -per study results, severe degree of sleep apnea noted     Spirometry with Dilator 6/30/2020  IMPRESSION:   Spirometry is normal.   The TLC, RV and RV/TLC are normal.   The diffusing capacity is normal.     Sleep study 8/2/2016  IMPRESSION/RECOMMENDATIONS:   1. Mild overall obstructive sleep apnea exacerbated to the severe degree in   supine sleep. The severity of this sleep related breathing disorder may be   underestimated due to the presence of mild flow and effort decrements   associated with arousal and/or oxygen desaturation not meeting established   CMS respiratory event criteria.   2. Abnormal sleep architecture likely due to respiratory events and first   night effect.     PMH  -see HPI    ALL  No Known Allergies     MEDs  Current Outpatient Medications   Medication Instructions    aspirin 81 mg, Daily RT    atorvastatin (LIPITOR) 40 mg, oral, Nightly     chlorhexidine (Hibiclens) 4 % external liquid Topical, Daily PRN, Use for 5 days prior to surgery as body wash, do not use on face or genital region    cyclobenzaprine (FLEXERIL) 5 mg, oral, 3 times daily    gabapentin (NEURONTIN) 600 mg, oral, 3 times daily    [START ON 1/27/2025] lidocaine 4 % patch 2 patches, transdermal, Daily, Remove & discard patch within 12 hours or as directed by MD.    losartan (COZAAR) 25 mg, Daily    oxyCODONE (ROXICODONE) 5 mg, oral, Every 6 hours PRN    oxyCODONE-acetaminophen (Percocet) 5-325 mg tablet 1 tablet, 3 times daily    pantoprazole (PROTONIX) 40 mg, oral, 2 times daily    polyethylene glycol (GLYCOLAX, MIRALAX) 17 g, oral, 2 times daily    sennosides-docusate sodium (Mariia-Colace) 8.6-50 mg tablet 2 tablets, oral, Daily    tamsulosin (FLOMAX) 0.4 mg, oral, Nightly        PSH  -reviewed (non-contributory)    SOC  -tobacco: never smoker   -alcohol: denies alcohol use   -substance: denies recreational drug use       O  VT  Temp:  [36.1 °C (97 °F)-37.4 °C (99.3 °F)] 36.1 °C (97 °F)  Heart Rate:  [] 88  Resp:  [13-24] 19  BP: ()/(65-75) 98/65     PE  General: no acute distress  Cardio: normal rate, regular rhythm  Pulm: non-labored, no crackles  GI: non-distended, appropriately soft, no masses, non-tender  MSK: no joint swelling  HEENT: grossly normocephalic  Neuro: grossly oriented, CN grossly intact, extremities moving symmetrically  Psych: appropriate affect  Volume: no LE pitting edema    MIGUEL Colón is a 64yM w PMHx of HTN, HLD, CAD on ASA, asthma/COPD, BIANCA (not on CPAP), polycythemia, 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 Medicine consulted for desaturations in SpO2 <90 requiring 4-5L NC.  Pt with known BIANCA and likely etiology for desaturations. However, given pt is  tachycardic to 100s, post op from major surgery with desaturations as low as 39%, reasonable to rule out PE.     Recommendations:  -Completed walking pulse ox with PT -> patient able to walk with SpO2 > 92%, able to ascend/descend 8 stairs and completed 5 x STS.   -Order CTPE STAT     #Acute hypoxia   #BIANCA (not on CPAP at home)  ::requiring 4-5L NC while asleep, not on any oxygen at home   ::pt reports intolerance to CPAP at home   ::CXR 1/25 notable for pulmonary edema with new right-sided pleural effusion. Persistent left-sided pleural effusion  ::SLP recommendation: soft and bite sized diet with thin liquids  ::Most recent Spirometry - 2020 normal, Sleep study 2021 - with severe sleep apnea   -Order STAT CTPE  -Encourage CPAP use at night   -Encourage incentive pat use     -DVT ppx: heparin subcutaneous   -dispo: home with homecare   -medicine will continue to follow    Note not final until attested by attending physician - Dr. Mak.    Hortencia Daniels MD  IM, PGY3

## 2025-01-26 NOTE — PROGRESS NOTES
Physical Therapy    Physical Therapy Treatment    Patient Name: Jerman Colón  MRN: [id number]  Department: [facility] [address]  Room: [address] [place] [address]  Today's Date: 1/26/2025  Time Calculation  Start Time: 1139  Stop Time: 1216  Time Calculation (min): 37 min         Assessment/Plan   PT Assessment  PT Assessment Results: Decreased strength, Decreased endurance, Impaired balance, Decreased mobility  End of Session Communication: Bedside nurse  Assessment Comment: Pt progressed ambulation to 400' using FWW with SBA with SpO2 maintained at >92%. Pt able to ascend/descend 8 stairs using 1 HR with SBA. Pt completed 5xSTS test in 25 minutes, pt unable to complete test wihtout using B UEs.  End of Session Patient Position: Up in chair, Alarm off, not on at start of session     PT Plan  Treatment/Interventions: Bed mobility, Transfer training, Gait training, Stair training, Balance training, Neuromuscular re-education, Strengthening, Endurance training, Therapeutic exercise, Therapeutic activity, Home exercise program, Postural re-education  PT Plan: Ongoing PT  PT Frequency: Daily  PT Discharge Recommendations: High intensity level of continued care  Equipment Recommended upon Discharge: Wheeled walker  PT Recommended Transfer Status: Assist x1, Assistive device  PT - OK to Discharge: Yes (When medically ready)      General Visit Information:   PT  Visit  PT Received On: 01/26/25  General  Prior to Session Communication: Bedside nurse  Patient Position Received: Up in chair, Alarm off, not on at start of session  General Comment: Pt seated in chair upon arrival. Pt pleasant and motivated for therapy.    Subjective   Precautions:  Precautions  Hearing/Visual Limitations: Hearing and vision WFL  Medical Precautions: Fall precautions, Spinal precautions  Post-Surgical Precautions: Spinal precautions  Precautions Comment: on RA     Date/Time Vitals Session Patient Position Pulse Resp SpO2 BP MAP (mmHg)    01/26/25 1116 --  --  --  --  --   98/65  76     01/26/25 1139 Pre PT  --  --  --  94 %  --  --           Vital Signs Comment: SpO2 ranged from 92-96% throughout treatment.     Objective   Pain:     Cognition:  Cognition  Overall Cognitive Status: Within Functional Limits    Activity Tolerance:  Activity Tolerance  Endurance: Tolerates 10 - 20 min exercise with multiple rests  Treatments:  Therapeutic Activity  Therapeutic Activity Performed: Yes  Therapeutic Activity 1: Pt completed the 5xSTS test in 25 seconds, pt unable to stand wihtout using B UEs.    Ambulation/Gait Training  Ambulation/Gait Training Performed: Yes  Ambulation/Gait Training 1  Surface 1: Level tile  Device 1: Rolling walker  Assistance 1: Close supervision  Quality of Gait 1: Forward flexed posture, Diminished heel strike  Comments/Distance (ft) 1: 400' pt pushes FWW outside safe proximity to the body with directional changes and requires cuing to correct. SpO2 ranged 92-96% throughout ambulation.  Transfers  Transfer: Yes  Transfer 1  Transfer From 1: Sit to, Stand to  Transfer to 1: Stand, Sit  Technique 1: Sit to stand, Stand to sit  Transfer Device 1: Walker  Transfer Level of Assistance 1: Close supervision  Trials/Comments 1: Increased time to complete.    Stairs  Stairs: Yes  Stairs  Rails 1: Right  Curb Step 1: No  Device 1: Railing  Assistance 1: Close supervision  Comment/Number of Steps 1: 8 pt cued for correct movement sequencing.    Outcome Measures:  [facility] Basic Mobility  Turning from your back to your side while in a flat bed without using bedrails: A little  Moving from lying on your back to sitting on the side of a flat bed without using bedrails: A little  Moving to and from bed to chair (including a wheelchair): A little  Standing up from a chair using your arms (e.g. wheelchair or bedside chair): A little  To walk in hospital room: A little  Climbing 3-5 steps with railing: A little  Basic Mobility - Total Score: 18    Education Documentation  Mobility  Training, taught by Mihaela Vega PTA at 1/26/2025 12:29 PM.  Learner: Patient  Readiness: Acceptance  Method: Explanation  Response: Verbalizes Understanding, Demonstrated Understanding  Comment: Educated in proper FWW management.          OP EDUCATION:       Encounter Problems       Encounter Problems (Active)       PT Problem       Patient will perform bed mobility IND to reduce risk of developing decubitus ulcers.  (Progressing)       Start:  01/23/25    Expected End:  02/06/25            Patient will perform sit to stand and stand to sit transfers MOD-I with LRD to increase functional strength.   (Progressing)       Start:  01/23/25    Expected End:  02/06/25            Patient will ambulate at least 250  ft. MOD-I with LRD to improve tolerance of community distances.     (Progressing)       Start:  01/23/25    Expected End:  02/06/25            Patient will ascend and descend >/= 10 steps MOD-I with railing  to facilitate safe navigation of stairs in the home.     (Progressing)       Start:  01/23/25    Expected End:  02/06/25            Patient will perform the 5-Time Sit to Stand test in <14 sec to indicate decreased falls risk.  (Progressing)       Start:  01/23/25    Expected End:  02/06/25               Pain - Adult

## 2025-01-26 NOTE — DISCHARGE SUMMARY
Discharge Diagnosis  Other secondary kyphosis, cervical region    Issues Requiring Follow-Up  Patient needs an outpatient sleep medicine or PCP appointment to discuss is BIANCA (refuses CPAP at night)    Test Results Pending At Discharge  Pending Labs       No current pending labs.            Hospital Course  Jerman Colón is a 64 y.o. male with a past medical history of HTN, HLD, CAD on ASA, COPD, BPH, p/w rigid cervical deformity 1/21 s/p C4-6 ACDF, C2-T4 fusion, C4-7 decompression, C4/5 facetectomies, reintubated at end of case due to lower airway edema. He was later extubated with ENT at bedside. Bedside swallow eval with nursing failed and will need SLP eval.  1/23 failed SLP eval, MBS done and passed for bite size diet  1/24 anterior drain d/c'd       PT/OT evaluated patient and recommended acute rehab, family would like Home care     On the day of discharge, the patient was seen and evaluated by the neurosurgery team and deemed suitable for discharge. The patient was given detailed discharge instructions and were scheduled to follow up as an outpatient.            Pertinent Physical Exam At Time of Discharge  Physical Exam  AOx3  RUE D4+ o/w 5  LUE 5  BLE 5  anterior neck incision c/d/I  Posterior incision closed with prineo  Left posterior drain in place   Home Medications     Medication List      START taking these medications     cyclobenzaprine 5 mg tablet; Commonly known as: Flexeril; Take 1 tablet   (5 mg) by mouth 3 times a day.   lidocaine 4 % patch; Place 2 patches over 12 hours on the skin once   daily. Remove & discard patch within 12 hours or as directed by MD.; Start   taking on: January 27, 2025   oxyCODONE 5 mg immediate release tablet; Commonly known as: Roxicodone;   Take 1 tablet (5 mg) by mouth every 6 hours if needed for moderate pain (4   - 6).   polyethylene glycol 17 gram packet; Commonly known as: Glycolax,   Miralax; Take 17 g by mouth 2 times a day.   sennosides-docusate sodium 8.6-50  mg tablet; Commonly known as:   Mariia-Colace; Take 2 tablets by mouth once daily.     CONTINUE taking these medications     aspirin 81 mg EC tablet   atorvastatin 40 mg tablet; Commonly known as: Lipitor   gabapentin 600 mg tablet; Commonly known as: Neurontin   losartan 25 mg tablet; Commonly known as: Cozaar   pantoprazole 40 mg EC tablet; Commonly known as: ProtoNix   tamsulosin 0.4 mg 24 hr capsule; Commonly known as: Flomax     STOP taking these medications     chlorhexidine 0.12 % solution; Commonly known as: Peridex   chlorhexidine 4 % external liquid; Commonly known as: Hibiclens   oxyCODONE-acetaminophen 5-325 mg tablet; Commonly known as: Percocet       Outpatient Follow-Up  Future Appointments   Date Time Provider Department Center   2/12/2025 10:15 AM NEUROSURGERY [facility] NURSE [id number]   3/6/2025 10:40 AM MD [facility] [place]       Taylor Mehta MD

## 2025-01-27 ENCOUNTER — APPOINTMENT (OUTPATIENT)
Dept: RADIOLOGY | Facility: HOSPITAL | Age: 65
DRG: 430 | End: 2025-01-27
Payer: MEDICARE

## 2025-01-27 VITALS
HEART RATE: 88 BPM | TEMPERATURE: 97.7 F | OXYGEN SATURATION: 89 % | DIASTOLIC BLOOD PRESSURE: 71 MMHG | BODY MASS INDEX: 33.3 KG/M2 | WEIGHT: 252.43 LBS | SYSTOLIC BLOOD PRESSURE: 122 MMHG | RESPIRATION RATE: 23 BRPM

## 2025-01-27 LAB
ALBUMIN SERPL BCP-MCNC: 3.7 G/DL (ref 3.4–5)
ANION GAP SERPL CALC-SCNC: 11 MMOL/L (ref 10–20)
BUN SERPL-MCNC: 20 MG/DL (ref 6–23)
CALCIUM SERPL-MCNC: 9 MG/DL (ref 8.6–10.6)
CHLORIDE SERPL-SCNC: 99 MMOL/L (ref 98–107)
CO2 SERPL-SCNC: 32 MMOL/L (ref 21–32)
CREAT SERPL-MCNC: 0.78 MG/DL (ref 0.5–1.3)
EGFRCR SERPLBLD CKD-EPI 2021: >90 ML/MIN/1.73M*2
ERYTHROCYTE [DISTWIDTH] IN BLOOD BY AUTOMATED COUNT: 17.2 % (ref 11.5–14.5)
GLUCOSE BLD MANUAL STRIP-MCNC: 121 MG/DL (ref 74–99)
GLUCOSE BLD MANUAL STRIP-MCNC: 122 MG/DL (ref 74–99)
GLUCOSE BLD MANUAL STRIP-MCNC: 122 MG/DL (ref 74–99)
GLUCOSE BLD MANUAL STRIP-MCNC: 123 MG/DL (ref 74–99)
GLUCOSE BLD MANUAL STRIP-MCNC: 129 MG/DL (ref 74–99)
GLUCOSE BLD MANUAL STRIP-MCNC: 142 MG/DL (ref 74–99)
GLUCOSE BLD MANUAL STRIP-MCNC: 99 MG/DL (ref 74–99)
GLUCOSE SERPL-MCNC: 118 MG/DL (ref 74–99)
HCT VFR BLD AUTO: 38.5 % (ref 41–52)
HGB BLD-MCNC: 11.2 G/DL (ref 13.5–17.5)
MAGNESIUM SERPL-MCNC: 1.93 MG/DL (ref 1.6–2.4)
MCH RBC QN AUTO: 24.8 PG (ref 26–34)
MCHC RBC AUTO-ENTMCNC: 29.1 G/DL (ref 32–36)
MCV RBC AUTO: 85 FL (ref 80–100)
NRBC BLD-RTO: 0.2 /100 WBCS (ref 0–0)
PHOSPHATE SERPL-MCNC: 2.7 MG/DL (ref 2.5–4.9)
PLATELET # BLD AUTO: 220 X10*3/UL (ref 150–450)
POTASSIUM SERPL-SCNC: 4.5 MMOL/L (ref 3.5–5.3)
RBC # BLD AUTO: 4.52 X10*6/UL (ref 4.5–5.9)
SODIUM SERPL-SCNC: 137 MMOL/L (ref 136–145)
WBC # BLD AUTO: 9.8 X10*3/UL (ref 4.4–11.3)

## 2025-01-27 PROCEDURE — 71275 CT ANGIOGRAPHY CHEST: CPT | Performed by: RADIOLOGY

## 2025-01-27 PROCEDURE — 2500000001 HC RX 250 WO HCPCS SELF ADMINISTERED DRUGS (ALT 637 FOR MEDICARE OP)

## 2025-01-27 PROCEDURE — 2500000004 HC RX 250 GENERAL PHARMACY W/ HCPCS (ALT 636 FOR OP/ED): Performed by: STUDENT IN AN ORGANIZED HEALTH CARE EDUCATION/TRAINING PROGRAM

## 2025-01-27 PROCEDURE — 2500000001 HC RX 250 WO HCPCS SELF ADMINISTERED DRUGS (ALT 637 FOR MEDICARE OP): Performed by: STUDENT IN AN ORGANIZED HEALTH CARE EDUCATION/TRAINING PROGRAM

## 2025-01-27 PROCEDURE — 85027 COMPLETE CBC AUTOMATED: CPT

## 2025-01-27 PROCEDURE — 83735 ASSAY OF MAGNESIUM: CPT

## 2025-01-27 PROCEDURE — 36415 COLL VENOUS BLD VENIPUNCTURE: CPT

## 2025-01-27 PROCEDURE — 82947 ASSAY GLUCOSE BLOOD QUANT: CPT

## 2025-01-27 PROCEDURE — 1100000001 HC PRIVATE ROOM DAILY

## 2025-01-27 PROCEDURE — 99232 SBSQ HOSP IP/OBS MODERATE 35: CPT

## 2025-01-27 PROCEDURE — 97530 THERAPEUTIC ACTIVITIES: CPT | Mod: GP

## 2025-01-27 PROCEDURE — 71275 CT ANGIOGRAPHY CHEST: CPT

## 2025-01-27 PROCEDURE — 2550000001 HC RX 255 CONTRASTS: Performed by: NEUROLOGICAL SURGERY

## 2025-01-27 PROCEDURE — 2500000002 HC RX 250 W HCPCS SELF ADMINISTERED DRUGS (ALT 637 FOR MEDICARE OP, ALT 636 FOR OP/ED): Performed by: STUDENT IN AN ORGANIZED HEALTH CARE EDUCATION/TRAINING PROGRAM

## 2025-01-27 PROCEDURE — 97116 GAIT TRAINING THERAPY: CPT | Mod: GP

## 2025-01-27 PROCEDURE — 94762 N-INVAS EAR/PLS OXIMTRY CONT: CPT

## 2025-01-27 PROCEDURE — 80069 RENAL FUNCTION PANEL: CPT

## 2025-01-27 RX ORDER — ACETAMINOPHEN 325 MG/1
650 TABLET ORAL EVERY 4 HOURS PRN
Status: DISCONTINUED | OUTPATIENT
Start: 2025-01-27 | End: 2025-01-28 | Stop reason: HOSPADM

## 2025-01-27 RX ORDER — ACETAMINOPHEN 160 MG/5ML
650 SOLUTION ORAL EVERY 4 HOURS PRN
Status: DISCONTINUED | OUTPATIENT
Start: 2025-01-27 | End: 2025-01-28 | Stop reason: HOSPADM

## 2025-01-27 RX ADMIN — ATORVASTATIN CALCIUM 40 MG: 40 TABLET, FILM COATED ORAL at 20:24

## 2025-01-27 RX ADMIN — CYCLOBENZAPRINE 5 MG: 10 TABLET, FILM COATED ORAL at 16:06

## 2025-01-27 RX ADMIN — HEPARIN SODIUM 5000 UNITS: 5000 INJECTION INTRAVENOUS; SUBCUTANEOUS at 09:03

## 2025-01-27 RX ADMIN — TAMSULOSIN HYDROCHLORIDE 0.4 MG: 0.4 CAPSULE ORAL at 20:24

## 2025-01-27 RX ADMIN — PANTOPRAZOLE SODIUM 40 MG: 40 TABLET, DELAYED RELEASE ORAL at 09:03

## 2025-01-27 RX ADMIN — OXYCODONE HYDROCHLORIDE 10 MG: 10 TABLET ORAL at 09:03

## 2025-01-27 RX ADMIN — LOSARTAN POTASSIUM 25 MG: 25 TABLET, FILM COATED ORAL at 09:03

## 2025-01-27 RX ADMIN — BISACODYL 10 MG: 5 TABLET, COATED ORAL at 09:03

## 2025-01-27 RX ADMIN — CYCLOBENZAPRINE 5 MG: 10 TABLET, FILM COATED ORAL at 20:24

## 2025-01-27 RX ADMIN — PANTOPRAZOLE SODIUM 40 MG: 40 TABLET, DELAYED RELEASE ORAL at 20:24

## 2025-01-27 RX ADMIN — HEPARIN SODIUM 5000 UNITS: 5000 INJECTION INTRAVENOUS; SUBCUTANEOUS at 16:06

## 2025-01-27 RX ADMIN — GABAPENTIN 600 MG: 300 CAPSULE ORAL at 16:06

## 2025-01-27 RX ADMIN — GABAPENTIN 600 MG: 300 CAPSULE ORAL at 20:24

## 2025-01-27 RX ADMIN — ACETAMINOPHEN 650 MG: 325 TABLET ORAL at 04:24

## 2025-01-27 RX ADMIN — OXYCODONE HYDROCHLORIDE 10 MG: 10 TABLET ORAL at 04:24

## 2025-01-27 RX ADMIN — GABAPENTIN 600 MG: 300 CAPSULE ORAL at 09:03

## 2025-01-27 RX ADMIN — IOHEXOL 90 ML: 350 INJECTION, SOLUTION INTRAVENOUS at 19:47

## 2025-01-27 RX ADMIN — OXYCODONE HYDROCHLORIDE 10 MG: 10 TABLET ORAL at 20:24

## 2025-01-27 RX ADMIN — OXYCODONE HYDROCHLORIDE 10 MG: 10 TABLET ORAL at 16:20

## 2025-01-27 RX ADMIN — CYCLOBENZAPRINE 5 MG: 10 TABLET, FILM COATED ORAL at 09:03

## 2025-01-27 RX ADMIN — HEPARIN SODIUM 5000 UNITS: 5000 INJECTION INTRAVENOUS; SUBCUTANEOUS at 23:45

## 2025-01-27 ASSESSMENT — PAIN SCALES - GENERAL
PAINLEVEL_OUTOF10: 7
PAINLEVEL_OUTOF10: 5 - MODERATE PAIN

## 2025-01-27 ASSESSMENT — COGNITIVE AND FUNCTIONAL STATUS - GENERAL
MOBILITY SCORE: 16
WALKING IN HOSPITAL ROOM: A LITTLE
TURNING FROM BACK TO SIDE WHILE IN FLAT BAD: A LITTLE
DAILY ACTIVITIY SCORE: 23
MOVING TO AND FROM BED TO CHAIR: A LITTLE
MOVING FROM LYING ON BACK TO SITTING ON SIDE OF FLAT BED WITH BEDRAILS: A LITTLE
CLIMB 3 TO 5 STEPS WITH RAILING: A LITTLE
MOBILITY SCORE: 22
STANDING UP FROM CHAIR USING ARMS: A LITTLE
WALKING IN HOSPITAL ROOM: A LITTLE
CLIMB 3 TO 5 STEPS WITH RAILING: TOTAL
TOILETING: A LITTLE

## 2025-01-27 ASSESSMENT — PAIN DESCRIPTION - LOCATION: LOCATION: NECK

## 2025-01-27 ASSESSMENT — PAIN - FUNCTIONAL ASSESSMENT
PAIN_FUNCTIONAL_ASSESSMENT: 0-10
PAIN_FUNCTIONAL_ASSESSMENT: 0-10

## 2025-01-27 NOTE — CARE PLAN
The patient's goals for the shift include      The clinical goals for the shift include pt will remain free from falls and injury throughout the shift    Over the shift, the patient did not fall during shift. Pt up with walker to bathroom.

## 2025-01-27 NOTE — PROGRESS NOTES
Transitional Care Coordination Progress Note:  Plan per Medical/Surgical team: Pt to have an overnight pulse ox study for possible nocturnal O2 requirement.  Discharge Disposition: [facility]  Potential Barriers: medical  ADOD: 1/28    Telephone call to pts wife, advised of PT/OT recommendation for high intensity therapy. Per the pts wife she wants him to discharge to a facility, but pt was refusing. The plan is for home with [facility], wife to provide 24/7 assistance.    Lisbeth Monsivais RN, BSN  Transitional Care Coordinator  Office: [phone]  Secure chat via [employer]

## 2025-01-27 NOTE — PROGRESS NOTES
Physical Therapy    Physical Therapy Treatment    Patient Name: Jerman Colón  MRN: [id number]  Department: [place] [address]  Room: [address] [place] [address]  Today's Date: 1/27/2025  Time Calculation  Start Time: 1228  Stop Time: 1251  Time Calculation (min): 23 min      Assessment/Plan   PT Assessment  Barriers to Discharge Home: Caregiver assistance, Physical needs  Caregiver Assistance: Caregiver assistance needed per identified barriers - however, level of patient's required assistance exceeds assistance available at home  Physical Needs: High falls risk due to function or environment  End of Session Communication: Bedside nurse, Physician  Assessment Comment: Pt with dec mobility this date; gait appears more unsteady, lmtd gait as pt with desats overnight & with more testing pending (previously - CTPE though lmtd); Pt remains limited by dec balance, endurance, & strength.  End of Session Patient Position: Bed, 2 rail up, Alarm off, not on at start of session     PT Plan  Treatment/Interventions: Bed mobility, Transfer training, Gait training, Stair training, Balance training, Neuromuscular re-education, Strengthening, Endurance training, Therapeutic exercise, Therapeutic activity, Home exercise program, Postural re-education  PT Plan: Ongoing PT  PT Frequency: Daily  PT Discharge Recommendations: High intensity level of continued care (At this time, pt declining rehab - if pt returns home, would rec 24/7 assist with low intensity therapy)  Equipment Recommended upon Discharge: Wheeled walker  PT Recommended Transfer Status: Assist x1  PT - OK to Discharge: Yes (When medically ready)      General Visit Information:   PT  Visit  PT Received On: 01/27/25  Response to Previous Treatment: Patient with no complaints from previous session.  General  Reason for Referral: 1/21/25 s/p C4-5, C5-6 anterior cervical discectomy and fusion and posterior cervical C4-5 laminectomy and facetectomy for decompression along with C3-4 C6-7 C7-T1 T1-T2  posterior column osteotomy and a C2-T4 instrumented fusion.  Past Medical History Relevant to Rehab: PONV, Vocal Cord Mass, HTN, HLD, CAD, COPD, Asthma BIANCA (no CPAP), GERD, BPH, hypogonadism, tubular adenoma of colon, proteinuria Polcythemia, Fusion of lumbar spine, kyphosis of cervical region, cervical myelopathy, Spinal cord compression due to degenerative disorder of spinal column  Family/Caregiver Present: No  Prior to Session Communication: Bedside nurse, Physician (medicine attending) (MYAH Evans)  Patient Position Received: Up in chair, Alarm off, not on at start of session  Preferred Learning Style: verbal  General Comment: Pt sitting up in chair upon PT arrival - states has been mobilizing to bathroom & in room today.  Noted pt with O2 desats overnight per chart, however, pt currently on RA.  Pt had CTPE previously - negative with lmtd study.  Per primary & medical team (spoke to both prior to session) pt ok for mobility with PT.  Pt with O2 sats >/=92% on RA, denies dizziness or lightheadedness during session.  Lmtd session 2/2 further medical workup pending.    Subjective   Precautions:  Precautions  Medical Precautions: Fall precautions, Spinal precautions  Post-Surgical Precautions: Spinal precautions  Precautions Comment: reviewed spinal precautions & logrolling with good recall; on tele     Date/Time Vitals Session Patient Position Pulse Resp SpO2 BP MAP (mmHg)           --     01/27/25 1228 Pre PT  Sitting  99  17  97 %  --  --     01/27/25 1229 During PT  Standing  104  17  94 %  --  --      Objective   Pain:  Pain Assessment  Pain Assessment: 0-10  0-10 (Numeric) Pain Score: 5 - Moderate pain  Pain Type: Acute pain  Pain Location: Neck  Cognition:  Cognition  Overall Cognitive Status: Within Functional Limits  Insight: Mild    Postural Control:  Postural Control  Postural Control: Within Functional Limits  Static Sitting Balance  Static Sitting-Balance Support: Feet supported  Static  Sitting-Level of Assistance: Distant supervision  Static Standing Balance  Static Standing-Balance Support: Bilateral upper extremity supported  Static Standing-Level of Assistance: Contact guard  Dynamic Standing Balance  Dynamic Standing-Balance Support: Bilateral upper extremity supported  Dynamic Standing-Level of Assistance: Contact guard, Minimum assistance  Dynamic Standing-Balance: Turning    Activity Tolerance:  Activity Tolerance  Endurance: Tolerates 10 - 20 min exercise with multiple rests  Treatments:     Bed Mobility  Bed Mobility: Yes  Bed Mobility 1  Bed Mobility 1: Sitting to supine  Level of Assistance 1: Contact guard, Minimal verbal cues  Bed Mobility Comments 1: HOB elevated; cueing for logroll sequence    Ambulation/Gait Training  Ambulation/Gait Training Performed: Yes  Ambulation/Gait Training 1  Surface 1: Level tile  Device 1: Rolling walker  Assistance 1: Contact guard, Minimum assistance  Quality of Gait 1: Forward flexed posture, Diminished heel strike, Wide base of support (inc lateral sway; cueing for AD positioning)  Comments/Distance (ft) 1: 40' (VSS throughout with O2 sats 92% or greater)  Transfers  Transfer: Yes  Transfer 1  Technique 1: Sit to stand, Stand to sit  Transfer Device 1: Walker  Transfer Level of Assistance 1: Contact guard, Minimal verbal cues    Stairs  Stairs: No (deferred 2/2 dec endurance & per medical team request)    Outcome Measures:  [facility] Basic Mobility  Turning from your back to your side while in a flat bed without using bedrails: A little  Moving from lying on your back to sitting on the side of a flat bed without using bedrails: A little  Moving to and from bed to chair (including a wheelchair): A little  Standing up from a chair using your arms (e.g. wheelchair or bedside chair): A little  To walk in hospital room: A little  Climbing 3-5 steps with railing: Total  Basic Mobility - Total Score: 16    Education Documentation  Precautions, taught by Shahnaz  EDSON Sharma PT at 1/27/2025  1:48 PM.  Learner: Patient  Readiness: Acceptance  Method: Explanation  Response: Verbalizes Understanding, Needs Reinforcement  Comment: safe mobility, spine prec    Body Mechanics, taught by Shahnaz Sharma PT at 1/27/2025  1:48 PM.  Learner: Patient  Readiness: Acceptance  Method: Explanation  Response: Verbalizes Understanding, Needs Reinforcement  Comment: safe mobility, spine prec    Mobility Training, taught by Shahnaz Sharma PT at 1/27/2025  1:48 PM.  Learner: Patient  Readiness: Acceptance  Method: Explanation  Response: Verbalizes Understanding, Needs Reinforcement  Comment: safe mobility, spine prec    Education Comments  No comments found.    OP EDUCATION:       Encounter Problems       Encounter Problems (Active)       PT Problem       Patient will perform bed mobility IND to reduce risk of developing decubitus ulcers.  (Progressing)       Start:  01/23/25    Expected End:  02/06/25            Patient will perform sit to stand and stand to sit transfers MOD-I with LRD to increase functional strength.   (Progressing)       Start:  01/23/25    Expected End:  02/06/25            Patient will ambulate at least 250  ft. MOD-I with LRD to improve tolerance of community distances.     (Progressing)       Start:  01/23/25    Expected End:  02/06/25            Patient will ascend and descend >/= 10 steps MOD-I with railing  to facilitate safe navigation of stairs in the home.     (Progressing)       Start:  01/23/25    Expected End:  02/06/25            Patient will perform the 5-Time Sit to Stand test in <14 sec to indicate decreased falls risk.  (Progressing)       Start:  01/23/25    Expected End:  02/06/25               Pain - Adult              01/27/25 at 1:50 PM - Shahnaz Sharma, PT

## 2025-01-27 NOTE — PROGRESS NOTES
Jerman Colón is a 64 y.o. male on day 6 of admission presenting with Other secondary kyphosis, cervical region.    Subjective   NAEON       Objective     Physical Exam  AOx3  RUE D4+ o/w 5  LUE 5  BLE 5  anterior neck incision c/d/I  Posterior incision closed with prineo    Last Recorded Vitals  Blood pressure 149/67, pulse 88, temperature 36.6 °C (97.9 °F), temperature source Temporal, resp. rate 23, weight 115 kg (252 lb 6.8 oz), SpO2 (!) 89%.  Intake/Output last 3 Shifts:  I/O last 3 completed shifts:  In: 240 (2.1 mL/kg) [P.O.:240]  Out: 13 (0.1 mL/kg) [Drains:13]  Weight: 114.5 kg     Relevant Results                              Assessment/Plan   Assessment & Plan  Other secondary kyphosis, cervical region    Kyphosis of cervical region    HTN (hypertension)    Hyperlipidemia    CAD (coronary artery disease)    Pulmonary emphysema (Multi)    Polycythemia    Gastroesophageal reflux disease without esophagitis    Cervical myelopathy    Spinal cord compression due to degenerative disorder of spinal column    Jerman Colón is a 64yM /o HTN, HLD, CAD on ASA, COPD, BPH, p/w rigid cervical deformity 1/21 s/p C4-6 ACDF, C2-T4 fusion, C4-7 decompression, C4/5 facetectomies, reintubated at end of case due to lower airway edema  1/23 MBS passed for soft diet, anterior drain dc'd  1/25 R post drain auto-dc'd, desat, incr O2, CXR b/l pleural effusion, uprights hardware in pos'n, improved alignment    1/26 CTPE negative but limited study     Plan:  Tele  Appreciate medicine recs-will discuss need for rCTPE in AM  ASA POD 7  PTOT-rehab  SCDs, SQH  OOBx at least 3 per day               Leandra Rodrigues MD

## 2025-01-27 NOTE — PROGRESS NOTES
Jerman Colón is a 64 y.o. male on day 6 of admission presenting with Other secondary kyphosis, cervical region.      Subjective   No acute events overnight.  Patient on RA this a.m. and tolerating soft diet without difficulty.  Cervical drain removed by neurosurgery this a.m.  Anterior cervical incision clean, dry, intact.  Patient was on Ventimask with 5.5 L requirement on 1/26 with interval desaturation to 89% SpO2.       Objective     Last Recorded Vitals  /71   Pulse 104   Temp 36 °C (96.8 °F)   Resp 17   Wt 115 kg (252 lb 6.8 oz)   SpO2 90%   Intake/Output last 3 Shifts:  No intake or output data in the 24 hours ending 01/27/25 1349    Admission Weight  Weight: 115 kg (252 lb 6.8 oz) (01/24/25 0000)    Daily Weight  01/24/25 : 115 kg (252 lb 6.8 oz)    Image Results  CT angio chest for pulmonary embolism  Narrative: Interpreted By:  Casimiro Ross  and Anuradha Miller   STUDY:  CT ANGIO CHEST FOR PULMONARY EMBOLISM;  1/26/2025 6:11 pm      INDICATION:  Signs/Symptoms:O2  desaturation. Pt is s/p spine surgery.          COMPARISON:  None      ACCESSION NUMBER(S):  [id number]      ORDERING CLINICIAN:  JERMAIN LONG      TECHNIQUE:  Helical data acquisition of the chest was obtained after intravenous  administration of 75 ML Omnipaque 350, as per PE protocol. Images  were reformatted in coronal and sagittal planes. Axial and coronal  maximum intensity projection (MIP) images were created and reviewed.      FINDINGS:  POTENTIAL LIMITATIONS OF THE STUDY: The assessment is significantly  limited by respiratory motion and suboptimal contrast opacification  of pulmonary arteries.      HEART AND VESSELS:  Very limited and non- diagnostic study for the assessment of  pulmonary embolism. Main pulmonary artery and its branches are normal  in caliber.      The thoracic aorta is normal in course and caliber. Mild scattered  calcified atherosclerosis of the thoracic aorta. Although, the study  is not tailored  for evaluation of aorta, there is no definite  evidence of acute aortic pathology. Mild coronary artery  calcifications are seen. Please note,the study is not optimized for  evaluation of coronary arteries.      The cardiac chambers are not enlarged.      There is no pericardial effusion seen.      MEDIASTINUM AND JAMILAH, LOWER NECK AND AXILLA:  The visualized thyroid gland is within normal limits.  No evidence of thoracic lymphadenopathy by CT criteria.  Esophagus appears within normal limits as seen.      LUNGS AND AIRWAYS:  The trachea and central airways are patent. No endobronchial lesion  is seen.      Mild atelectasis of the bilateral lung bases. No pleural effusion or  pneumothorax. No discrete suspicious pulmonary nodules.      UPPER ABDOMEN:  There is a splenic artery aneurysm measuring up to 1.9 cm (image 265  of series 501). Hyperdense material throughout the colon with mild  associated beam hardening artifact. Few cystic lesions of the  bilateral kidneys, incompletely included in the field of view,  however the largest measuring at least 4.5 cm.      CHEST WALL AND OSSEOUS STRUCTURES:  Postsurgical changes of the cervicothoracic junction. Mildly  heterogeneous appearance of the marrow. Status post left reverse  shoulder arthroplasty, incompletely included in the field of view. No  evidence of acute osseous abnormality. Vertebral body heights are  intact. The soft tissues of the chest wall are unremarkable.      Impression: 1. Very limited and essentially non-diagnostic study for the  assessment of pulmonary embolism. If clinically warranted, a repeat  CT pulmonary artery angiogram versus a nuclear perfusion scan can be  obtained.  2. No significant abnormality of the bilateral lungs.  3. Splenic artery aneurysm measuring up to 1.9 cm.      I personally reviewed the image(s)/study and resident interpretation  as stated by Dr. Angela Ling MD. I agree with the findings as  stated. This study was  interpreted at [facility], [place], [place].      MACRO:  None      Signed by: Casimiro Ross 1/27/2025 9:12 AM  Dictation workstation:   [id number]    Physical Exam  General: no acute distress, anterior cervical incision clean, dry, intact  Cardio: normal rate, regular rhythm  Pulm: non-labored, no crackles  GI: non-distended, appropriately soft, no masses, non-tender  MSK: no joint swelling  HEENT: grossly normocephalic  Neuro: grossly oriented, CN grossly intact, extremities moving symmetrically  Psych: appropriate affect  Volume: no LE pitting edema     Relevant Results               Assessment/Plan      Jerman Colón is a 64 y.o. male on day 5 of admission presenting with rigid cervical deformity s/p C4-6 ACDF, C2-T4 fusion, C4-7 decompression, C4/5 facetectomies 1/21, reintubated at end of case due to lower airway edema. Currently admitted to NSGY service. Internal Medicine consulted for desaturations to SpO2 <90 and further optimization.     Recommendations on 1/27/2025  - Order overnight pulse oximetry [coordinate with respiratory therapist for prescription of nighttime oxygen] for BIANCA.  Patient has history of severe BIANCA requiring CPAP with noncompliance secondary to insurance versus inability to tolerate mask [claustrophobia].  Previous sleep study over 3 years ago.  Patient will need nighttime oxygen in outpatient setting to bridge prior to outpatient sleep study.  - Referral for outpatient sleep study for reconsideration of CPAP.  - Repeat CT PE pending.  CT PE on 1/26 inconclusive.  Will follow results.  Low suspicion for PE.        #Acute hypoxia  #BIANCA, not on CPAP at home, requiring 4 to 5 L NC nightly  :: CXR 1/25--pulmonary edema with new right-sided pleural effusion.  Persistent left-sided pleural effusion  :: SLP recommendation--soft and bite-size diet with thin liquids  :: Most recent spirometry--2020 normal, sleep 2021 severe sleep apnea  - Order overnight pulse  oximetry.  Plan to discharge patient with nighttime oxygen depending on results.  - Repeat CT PE pending.  CT PE on 1/26 inconclusive.  - Encourage CPAP use at night  - Encourage incentive spirometry use    DVT prophylaxis: Heparin subcu  Diet: Regular diet, soft and bite sized with thin liquids  Disposition: Home with home care  Disability: Ambulates with walker  CODE STATUS: Full code, discussed patient  Assessment & Plan  Other secondary kyphosis, cervical region    Kyphosis of cervical region    HTN (hypertension)    Hyperlipidemia    CAD (coronary artery disease)    Pulmonary emphysema (Multi)    Polycythemia    Gastroesophageal reflux disease without esophagitis    Cervical myelopathy    Spinal cord compression due to degenerative disorder of spinal column    Jerman Colón is a 64 y.o. male on day 5 of admission presenting with rigid cervical deformity s/p C4-6 ACDF, C2-T4 fusion, C4-7 decompression, C4/5 facetectomies 1/21, reintubated at end of case due to lower airway edema. Currently admitted to NSGY service. Internal Medicine consulted for desaturations to SpO2 <90 and further optimization.     Recommendations on 1/27/2025  - Order overnight pulse oximetry [coordinate with respiratory therapist for prescription of nighttime oxygen] for BIANCA.  Patient has history of severe BIANCA requiring CPAP with noncompliance secondary to insurance versus inability to tolerate mask [claustrophobia].  Previous sleep study over 3 years ago.  Patient will need nighttime oxygen in outpatient setting to bridge prior to outpatient sleep study.  - Referral for outpatient sleep study for reconsideration of CPAP.  - Repeat CT PE pending.  CT PE on 1/26 inconclusive.  Will follow results.  Low suspicion for PE.        #Acute hypoxia  #BIANCA, not on CPAP at home, requiring 4 to 5 L NC nightly  :: CXR 1/25--pulmonary edema with new right-sided pleural effusion.  Persistent left-sided pleural effusion  :: SLP recommendation--soft and  bite-size diet with thin liquids  :: Most recent spirometry--2020 normal, sleep 2021 severe sleep apnea  - Order overnight pulse oximetry.  Plan to discharge patient with nighttime oxygen depending on results.  - Repeat CT PE pending.  CT PE on 1/26 inconclusive.  - Encourage CPAP use at night  - Encourage incentive spirometry use    DVT prophylaxis: Heparin subcu  Diet: Regular diet, soft and bite sized with thin liquids  Disposition: Home with home care  Disability: Ambulates with walker  CODE STATUS: Full code, discussed patient              Debbie Khanna MD

## 2025-01-28 VITALS
HEART RATE: 86 BPM | OXYGEN SATURATION: 97 % | DIASTOLIC BLOOD PRESSURE: 71 MMHG | RESPIRATION RATE: 18 BRPM | TEMPERATURE: 97.9 F | BODY MASS INDEX: 33.3 KG/M2 | WEIGHT: 252.43 LBS | SYSTOLIC BLOOD PRESSURE: 137 MMHG

## 2025-01-28 LAB
GLUCOSE BLD MANUAL STRIP-MCNC: 105 MG/DL (ref 74–99)
GLUCOSE BLD MANUAL STRIP-MCNC: 106 MG/DL (ref 74–99)
GLUCOSE BLD MANUAL STRIP-MCNC: 125 MG/DL (ref 74–99)
GLUCOSE BLD MANUAL STRIP-MCNC: 148 MG/DL (ref 74–99)

## 2025-01-28 PROCEDURE — 2500000001 HC RX 250 WO HCPCS SELF ADMINISTERED DRUGS (ALT 637 FOR MEDICARE OP)

## 2025-01-28 PROCEDURE — 82947 ASSAY GLUCOSE BLOOD QUANT: CPT

## 2025-01-28 PROCEDURE — 2500000001 HC RX 250 WO HCPCS SELF ADMINISTERED DRUGS (ALT 637 FOR MEDICARE OP): Performed by: STUDENT IN AN ORGANIZED HEALTH CARE EDUCATION/TRAINING PROGRAM

## 2025-01-28 PROCEDURE — 99232 SBSQ HOSP IP/OBS MODERATE 35: CPT

## 2025-01-28 PROCEDURE — 97530 THERAPEUTIC ACTIVITIES: CPT | Mod: GP

## 2025-01-28 PROCEDURE — 2500000004 HC RX 250 GENERAL PHARMACY W/ HCPCS (ALT 636 FOR OP/ED): Performed by: STUDENT IN AN ORGANIZED HEALTH CARE EDUCATION/TRAINING PROGRAM

## 2025-01-28 PROCEDURE — 97116 GAIT TRAINING THERAPY: CPT | Mod: GP

## 2025-01-28 PROCEDURE — 97535 SELF CARE MNGMENT TRAINING: CPT | Mod: GO

## 2025-01-28 PROCEDURE — 97530 THERAPEUTIC ACTIVITIES: CPT | Mod: GO

## 2025-01-28 RX ORDER — NAPROXEN SODIUM 220 MG/1
81 TABLET, FILM COATED ORAL DAILY
Status: DISCONTINUED | OUTPATIENT
Start: 2025-01-28 | End: 2025-01-28 | Stop reason: HOSPADM

## 2025-01-28 RX ADMIN — CYCLOBENZAPRINE 5 MG: 10 TABLET, FILM COATED ORAL at 15:39

## 2025-01-28 RX ADMIN — OXYCODONE HYDROCHLORIDE 10 MG: 10 TABLET ORAL at 09:35

## 2025-01-28 RX ADMIN — LOSARTAN POTASSIUM 25 MG: 25 TABLET, FILM COATED ORAL at 09:35

## 2025-01-28 RX ADMIN — CYCLOBENZAPRINE 5 MG: 10 TABLET, FILM COATED ORAL at 09:35

## 2025-01-28 RX ADMIN — OXYCODONE HYDROCHLORIDE 10 MG: 10 TABLET ORAL at 02:31

## 2025-01-28 RX ADMIN — OXYCODONE HYDROCHLORIDE 10 MG: 10 TABLET ORAL at 18:26

## 2025-01-28 RX ADMIN — HEPARIN SODIUM 5000 UNITS: 5000 INJECTION INTRAVENOUS; SUBCUTANEOUS at 09:35

## 2025-01-28 RX ADMIN — GABAPENTIN 600 MG: 300 CAPSULE ORAL at 09:35

## 2025-01-28 RX ADMIN — ASPIRIN 81 MG: 81 TABLET, CHEWABLE ORAL at 09:35

## 2025-01-28 RX ADMIN — GABAPENTIN 600 MG: 300 CAPSULE ORAL at 15:36

## 2025-01-28 RX ADMIN — HEPARIN SODIUM 5000 UNITS: 5000 INJECTION INTRAVENOUS; SUBCUTANEOUS at 15:35

## 2025-01-28 RX ADMIN — PANTOPRAZOLE SODIUM 40 MG: 40 TABLET, DELAYED RELEASE ORAL at 09:35

## 2025-01-28 ASSESSMENT — PAIN SCALES - GENERAL
PAINLEVEL_OUTOF10: 7
PAINLEVEL_OUTOF10: 6
PAINLEVEL_OUTOF10: 7

## 2025-01-28 ASSESSMENT — COGNITIVE AND FUNCTIONAL STATUS - GENERAL
DRESSING REGULAR UPPER BODY CLOTHING: A LITTLE
MOVING TO AND FROM BED TO CHAIR: A LITTLE
DAILY ACTIVITIY SCORE: 19
CLIMB 3 TO 5 STEPS WITH RAILING: A LITTLE
STANDING UP FROM CHAIR USING ARMS: A LITTLE
DRESSING REGULAR LOWER BODY CLOTHING: A LITTLE
TURNING FROM BACK TO SIDE WHILE IN FLAT BAD: A LITTLE
PERSONAL GROOMING: A LITTLE
HELP NEEDED FOR BATHING: A LITTLE
MOBILITY SCORE: 19
WALKING IN HOSPITAL ROOM: A LITTLE
TOILETING: A LITTLE

## 2025-01-28 ASSESSMENT — PAIN - FUNCTIONAL ASSESSMENT: PAIN_FUNCTIONAL_ASSESSMENT: 0-10

## 2025-01-28 ASSESSMENT — PAIN DESCRIPTION - LOCATION
LOCATION: NECK
LOCATION: NECK

## 2025-01-28 ASSESSMENT — ACTIVITIES OF DAILY LIVING (ADL): HOME_MANAGEMENT_TIME_ENTRY: 12

## 2025-01-28 NOTE — PROGRESS NOTES
Physical Therapy    Physical Therapy Treatment    Patient Name: Jerman Colón  MRN: [id number]  Department: [place] [address]  Room: [address] [place] [address]  Today's Date: 1/28/2025  Time Calculation  Start Time: 1235  Stop Time: 1314  Time Calculation (min): 39 min    Assessment/Plan   PT Assessment  Barriers to Discharge Home: No anticipated barriers  Evaluation/Treatment Tolerance: Patient tolerated treatment well  Medical Staff Made Aware: Yes  End of Session Communication: Bedside nurse  Assessment Comment: Pt motivated with excellent effort.  Pt able to ambulate with FWW and close supv and navigate stairs with 1 HR and close supv.  Update d/c recc from High to Low intensity PT at time of d/c.     PT Plan  Treatment/Interventions: Bed mobility, Transfer training, Gait training, Stair training, Balance training, Neuromuscular re-education, Strengthening, Endurance training, Therapeutic exercise, Therapeutic activity, Home exercise program, Postural re-education  PT Plan: Ongoing PT  PT Frequency: Daily  PT Discharge Recommendations: Low intensity level of continued care  Equipment Recommended upon Discharge: Wheeled walker  PT Recommended Transfer Status: Assist x1, Assistive device, Stand by assist  PT - OK to Discharge: Yes (When medically ready)    General Visit Information:   PT  Visit  PT Received On: 01/28/25  Response to Previous Treatment: Patient with no complaints from previous session.  General  Prior to Session Communication: Bedside nurse  Patient Position Received: Bed, 3 rail up, Alarm off, not on at start of session  General Comment: Supine.  Sleeping but awakens quickly to voice.  Pt very pleasant, cooperative and agreeable to PT.  Able to ambulate in chi and navigate stairs with Close supv.  Tolerated well.    Subjective   Precautions:  Precautions  Medical Precautions: Fall precautions  Post-Surgical Precautions: Spinal precautions    Objective   Pain:  Pain Assessment  Pain Assessment:  (no reports of pain during  visit)  Cognition:  Cognition  Overall Cognitive Status: Within Functional Limits  Orientation Level: Oriented X4  Following Commands: Follows all commands and directions without difficulty    Activity Tolerance:  Activity Tolerance  Endurance: Endurance does not limit participation in activity  Treatments:    Bed Mobility 1  Bed Mobility 1: Supine to sitting, Sitting to supine, Log roll  Level of Assistance 1: Close supervision, Minimal verbal cues    Ambulation/Gait Training 1  Surface 1: Level tile  Device 1: Rolling walker  Assistance 1: Close supervision, Minimum assistance  Quality of Gait 1: Forward flexed posture, Diminished heel strike, Wide base of support  Comments/Distance (ft) 1: 15 feet, 150 feet x2  Transfers  Transfer: Yes  Transfer 1  Transfer From 1: Sit to, Stand to  Transfer to 1: Sit  Transfer Device 1: Walker  Transfer Level of Assistance 1: Close supervision, Minimal verbal cues  Transfers 2  Transfer From 2: Stand to, Toilet to  Transfer to 2: Stand  Transfer Device 2: Walker  Transfer Level of Assistance 2: Close supervision, Minimal verbal cues    Stairs  Rails 1: Bilateral  Device 1: Railing (L rail ascending to simulate home)  Assistance 1: Close supervision, Minimal verbal cues  Comment/Number of Steps 1: 2 trials.  Up/dn 4 steps, both feet same step descending, reciprocal ascending.    Outcome Measures:    [facility] Basic Mobility  Turning from your back to your side while in a flat bed without using bedrails: None  Moving from lying on your back to sitting on the side of a flat bed without using bedrails: A little  Moving to and from bed to chair (including a wheelchair): A little  Standing up from a chair using your arms (e.g. wheelchair or bedside chair): A little  To walk in hospital room: A little  Climbing 3-5 steps with railing: A little  Basic Mobility - Total Score: 19    Education Documentation  Precautions, taught by Franc Harrell, PT at 1/28/2025  1:40 PM.  Learner:  Patient  Readiness: Eager  Method: Explanation  Response: Verbalizes Understanding    Body Mechanics, taught by Franc Harrell PT at 1/28/2025  1:40 PM.  Learner: Patient  Readiness: Eager  Method: Explanation  Response: Verbalizes Understanding    Mobility Training, taught by Franc Harrell PT at 1/28/2025  1:40 PM.  Learner: Patient  Readiness: Eager  Method: Explanation  Response: Verbalizes Understanding    Education Comments  No comments found.        OP EDUCATION:       Encounter Problems       Encounter Problems (Active)       PT Problem       Patient will perform bed mobility IND to reduce risk of developing decubitus ulcers.  (Progressing)       Start:  01/23/25    Expected End:  02/06/25            Patient will perform sit to stand and stand to sit transfers MOD-I with LRD to increase functional strength.   (Progressing)       Start:  01/23/25    Expected End:  02/06/25            Patient will ambulate at least 250  ft. MOD-I with LRD to improve tolerance of community distances.     (Progressing)       Start:  01/23/25    Expected End:  02/06/25            Patient will ascend and descend >/= 10 steps MOD-I with railing  to facilitate safe navigation of stairs in the home.     (Progressing)       Start:  01/23/25    Expected End:  02/06/25            Patient will perform the 5-Time Sit to Stand test in <14 sec to indicate decreased falls risk.  (Progressing)       Start:  01/23/25    Expected End:  02/06/25               Pain - Adult

## 2025-01-28 NOTE — DISCHARGE INSTRUCTIONS
Follow up with Sleep Medicine to have a sleep study done  Continue wearing Oxygen at night until you follow up with sleep medicine

## 2025-01-28 NOTE — DISCHARGE SUMMARY
Discharge Diagnosis  Other secondary kyphosis, cervical region    Issues Requiring Follow-Up  Standard post op care  Follow up with Sleep medicine for sleep study     Test Results Pending At Discharge  Pending Labs       No current pending labs.            Hospital Course  Jerman Colón is a 64 y.o. male with a past medical history of HTN, HLD, CAD on ASA, COPD, BPH, p/w rigid cervical deformity 1/21 s/p C4-6 ACDF, C2-T4 fusion, C4-7 decompression, C4/5 facetectomies, reintubated at end of case due to lower airway edema. He was later extubated with ENT at bedside. Bedside swallow eval with nursing failed and will need SLP eval.  1/23 failed SLP eval, MBS done and passed for bite size diet  1/24 anterior drain d/c'd   1/25 R post drain auto-dc'd, desat, incr O2, CXR b/l pleural effusion, uprights hardware in pos'n, improved alignment  1/26 CT PE negative but limited study  1/27 repeat CT PE negative     PT/OT evaluated patient and recommended acute rehab, patient would like Home care in which a home care order was placed.    On the day of discharge, the patient was seen and evaluated by the neurosurgery team and deemed suitable for discharge. The patient was given detailed discharge instructions and were scheduled to follow up as an outpatient.            Pertinent Physical Exam At Time of Discharge  Physical Exam  HENT:      Head: Normocephalic.   Eyes:      Pupils: Pupils are equal, round, and reactive to light.   Cardiovascular:      Pulses: Normal pulses.   Pulmonary:      Effort: Pulmonary effort is normal.   Abdominal:      Palpations: Abdomen is soft.   Musculoskeletal:         General: Normal range of motion.   Skin:     General: Skin is warm.      Comments: Incision C/D/I   Neurological:      Mental Status: He is alert and oriented to person, place, and time.      Comments: RUE D4+ (chronic) o/w5         Home Medications     Medication List      START taking these medications     cyclobenzaprine 5 mg  tablet; Commonly known as: Flexeril; Take 1 tablet   (5 mg) by mouth 3 times a day.   lidocaine 4 % patch; Place 2 patches over 12 hours on the skin once   daily. Remove & discard patch within 12 hours or as directed by MD.   oxyCODONE 5 mg immediate release tablet; Commonly known as: Roxicodone;   Take 1 tablet (5 mg) by mouth every 6 hours if needed for moderate pain (4   - 6).   polyethylene glycol 17 gram packet; Commonly known as: Glycolax,   Miralax; Take 17 g by mouth 2 times a day.   sennosides-docusate sodium 8.6-50 mg tablet; Commonly known as:   Mariia-Colace; Take 2 tablets by mouth once daily.     CONTINUE taking these medications     aspirin 81 mg EC tablet   atorvastatin 40 mg tablet; Commonly known as: Lipitor   gabapentin 600 mg tablet; Commonly known as: Neurontin   losartan 25 mg tablet; Commonly known as: Cozaar   pantoprazole 40 mg EC tablet; Commonly known as: ProtoNix   tamsulosin 0.4 mg 24 hr capsule; Commonly known as: Flomax     STOP taking these medications     chlorhexidine 0.12 % solution; Commonly known as: Peridex   chlorhexidine 4 % external liquid; Commonly known as: Hibiclens   oxyCODONE-acetaminophen 5-325 mg tablet; Commonly known as: Percocet       Outpatient Follow-Up  Future Appointments   Date Time Provider Department Center   2/12/2025 10:15 AM NEUROSURGERY [facility] NURSE [id number]   3/6/2025 10:40 AM Paul Patel MD [id number] [place]       Corrie Evans APRN-CNP  Total face to face time spent with patient/family of 30 minutes, with >50% of the time spent discussing plan of care/management, counseling/educating on disease processes, explaining results of diagnostic testing.

## 2025-01-28 NOTE — NURSING NOTE
Pt discharged home. Homegoing instructions gone over with patient and wife. IV removed tip intact. All questions answered.

## 2025-01-28 NOTE — PROGRESS NOTES
Occupational Therapy    Occupational Therapy Treatment    Name: Jerman Colón  MRN: [id number]  : 1960  Date: 25  Room: [address] [place] [address]      Time Calculation  Start Time: 1019  Stop Time: 1046  Time Calculation (min): 27 min    Assessment:  Barriers to Discharge Home: No anticipated barriers  Evaluation/Treatment Tolerance: Patient tolerated treatment well  Medical Staff Made Aware: Yes  End of Session Communication: Bedside nurse  End of Session Patient Position: Up in chair, Alarm off, not on at start of session  Plan:  Treatment Interventions: ADL retraining, Functional transfer training, UE strengthening/ROM, Endurance training, Patient/family training, Equipment evaluation/education, Neuromuscular reeducation, Compensatory technique education  OT Frequency: 2 times per week  OT Discharge Recommendations: Low intensity level of continued care  Equipment Recommended upon Discharge: Wheeled walker  OT Recommended Transfer Status: Minimal assist  OT - OK to Discharge: Yes    Subjective   General:  OT Last Visit  OT Received On: 25  Prior to Session Communication: Bedside nurse  Patient Position Received: Up in chair, Alarm off, not on at start of session  Family/Caregiver Present: No  General Comment: Pt pleasant and agreeable to OT session - motivated throughout   Precautions:  Medical Precautions: Fall precautions  Post-Surgical Precautions: Spinal precautions  Vitals:   Date/Time Vitals Session Patient Position Pulse Resp SpO2 BP MAP (mmHg)    25 1019 During OT  Sitting  --  --  97 %  --  --     25 10:38:25 --  --  --  --  97 %  --  --           Lines/Tubes/Drains:       Cognition:  Overall Cognitive Status: Within Functional Limits  Orientation Level: Oriented X4    Pain Assessment:  Pain Assessment  Pain Assessment: 0-10  0-10 (Numeric) Pain Score: 6  Pain Type: Surgical pain  Pain Location: Neck  Pain Orientation: Right  Pain Interventions: Relaxation technique,  Repositioned  Response to Interventions: Content/relaxed     Objective   Bed Mobility/Transfers:      Transfers  Transfer: Yes  Transfer 1  Transfer From 1: Sit to, Stand to  Transfer to 1: Stand, Sit  Technique 1: Sit to stand, Stand to sit  Transfer Device 1: Walker  Transfer Level of Assistance 1: Contact guard  Trials/Comments 1: Cues for improved safety  ADL/Home Management:      01/28/25 1019   Light Housekeeping   Light Housekeeping Level of Assistance Contact guard   Light Housekeeping Level Walker   Light Housekeeping Pt performed functional mobility within room using FWW to gather several clothing items from a variety of heights/locations using a reacher for improved safety. Demonstration provided on safe walker and body positioning to prevent LOB. Min cues provided throughout activity for locating items and improving safety.  CGA provided throughout for balance and safety. Pt able to retrieve all items with good follow through to education.         Therapy/Activity:      Therapeutic Activity  Therapeutic Activity Performed: Yes  Therapeutic Activity 1: Pt performed functional mobility within room and hallway using FWW to increase standing balance, activity tolerance, and safety awareness. Cues provided throughout for safety and increased IND. Pt able to ambulate appprox 150 feet before taking a seated rest break. Pt demos mild fatigue upon completion, however O2 remains >93%. Education throughout for safe home navigation and recommendations for increased safety.  Therapeutic Activity 2: Extensive education provided on spinal precautions and modified techniques for increasing IND and safety upon d/c home. Discussed use of DME and AE to maintain precautions along with discussed home setup and modifications. Pt receptive and asks several questions. All questions answered at this time.    Outcome Measures:  [facility] Daily Activity  Putting on and taking off regular lower body clothing: A little  Bathing  (including washing, rinsing, drying): A little  Putting on and taking off regular upper body clothing: A little  Toileting, which includes using toilet, bedpan or urinal: A little  Taking care of personal grooming such as brushing teeth: A little  Eating Meals: None  Daily Activity - Total Score: 19     Education Documentation  Body Mechanics, taught by Nj Blackmon OT at 1/28/2025 11:36 AM.  Learner: Patient  Readiness: Acceptance  Method: Explanation, Demonstration  Response: Verbalizes Understanding, Demonstrated Understanding    Precautions, taught by Nj Blackmon OT at 1/28/2025 11:36 AM.  Learner: Patient  Readiness: Acceptance  Method: Explanation, Demonstration  Response: Verbalizes Understanding, Demonstrated Understanding    ADL Training, taught by Nj Blackmon OT at 1/28/2025 11:36 AM.  Learner: Patient  Readiness: Acceptance  Method: Explanation, Demonstration  Response: Verbalizes Understanding, Demonstrated Understanding    Education Comments  No comments found.    Goals:  Encounter Problems       Encounter Problems (Active)       ADLs       Patient will perform UB and LB bathing with Mod I using AE PRN. (Progressing)       Start:  01/22/25    Expected End:  02/05/25            Patient with complete upper body dressing with IND. (Progressing)       Start:  01/22/25    Expected End:  02/05/25            Patient with complete lower body dressing with Mod I using AE PRN. (Progressing)       Start:  01/22/25    Expected End:  02/05/25            Patient will complete daily grooming tasks with Mod I.  (Met)       Start:  01/22/25    Expected End:  02/05/25    Resolved:  01/23/25         Patient will complete toileting including hygiene clothing management/hygiene with Mod I. (Progressing)       Start:  01/22/25    Expected End:  02/05/25            Pt will perform simulated IADL tasks with Mod I using EC/WS principles as needed and demonstrating good safety awareness for safe return home to prior living  environment.   (Progressing)       Start:  01/22/25    Expected End:  02/05/25               COGNITION/SAFETY       Patient will recall and adhere to spine precautions with 100% carryover during all functional mobility/ADL tasks in order to demonstrate improved understanding and promote healing post op (Progressing)       Start:  01/22/25    Expected End:  02/05/25               MOBILITY       Pt will perform functional mobility household distances with Mod I using LRAD without LOB and demonstrating good safety awareness.  (Progressing)       Start:  01/22/25    Expected End:  02/05/25               TRANSFERS       Patient will perform bed mobility with Mod I in simulated home environment utilizing log roll technique.  (Progressing)       Start:  01/22/25    Expected End:  02/05/25            Pt will perform functional transfers on various surfaces with Mod I using LRAD with good safety awareness.  (Progressing)       Start:  01/22/25    Expected End:  02/05/25 01/28/25 at 11:37 AM   Nj Blackmon, OT   [phone]

## 2025-01-28 NOTE — SIGNIFICANT EVENT
Continuous pulse ox study overnight:    1/27/25  2100: Spo2 90% on RA  2115: Spo2 73% on RA  2130: Spo2 86% on RA  2145: Spo2 96% on 5L NC  2230: Spo2 99% on 5L NC  2359: Spo2 88% on 5L NC    1/28/25  0000: Spo2 92% on 5L NC  0118: Spo2 82% on 5L NC (per nursing patient desat to 57% with good wave form, patient woken up and sats increasing into 80's, was then placed on venti mask)  0145: Spo2 98% venti mask   0403: Spo2 86% on RA (per nursing patient removed venti mask while sleeping and desat to 76%, then placed back on 5L NC and Spo2 increase into the 90's)  0430: Spo2 99% on 5L NC  0800: Spo2 100% on RA, awake

## 2025-01-28 NOTE — PROGRESS NOTES
Jerman Colón is a 64 y.o. male on day 7 of admission presenting with Other secondary kyphosis, cervical region.    Subjective   NAEON, had desatting episodes overnight, placed on 5L NC, no oxygen needed when awake       Objective     Physical Exam  AOx3  RUE D4+ o/w 5  LUE 5  BLE 5  anterior neck incision c/d/I  Posterior incision closed with prineo    Last Recorded Vitals  Blood pressure 150/77, pulse 80, temperature 36.6 °C (97.9 °F), temperature source Temporal, resp. rate 12, weight 115 kg (252 lb 6.8 oz), SpO2 96%.  Intake/Output last 3 Shifts:  I/O last 3 completed shifts:  In: 1480 (12.9 mL/kg) [P.O.:1480]  Out: 2 (0 mL/kg) [Stool:2]  Weight: 114.5 kg     Relevant Results                              Assessment/Plan   Assessment & Plan  Other secondary kyphosis, cervical region    Kyphosis of cervical region    HTN (hypertension)    Hyperlipidemia    CAD (coronary artery disease)    Pulmonary emphysema (Multi)    Polycythemia    Gastroesophageal reflux disease without esophagitis    Cervical myelopathy    Spinal cord compression due to degenerative disorder of spinal column    Jerman Colón is a 64yM /o HTN, HLD, CAD on ASA, COPD, BPH, p/w rigid cervical deformity 1/21 s/p C4-6 ACDF, C2-T4 fusion, C4-7 decompression, C4/5 facetectomies, reintubated at end of case due to lower airway edema  1/23 MBS passed for soft diet, anterior drain dc'd  1/25 R post drain auto-dc'd, desat, incr O2, CXR b/l pleural effusion, uprights hardware in pos'n, improved alignment    1/26 CTPE negative but limited study     Plan:  Tele  Appreciate medicine recs  FU final CTPE  Discharge  Restart ASA  PTOT-rehab  SCDs, SQH  OOBx at least 3 per day               Franc Javier MD

## 2025-01-28 NOTE — PROGRESS NOTES
Transitional Care Coordination Progress Note:  Plan per Medical/Surgical team: Overnight pulse ox study completed, pending final recommendations form Medicine team.  Discharge Disposition: [facility], new nocturnal 5L O2 requirement.  Potential Barriers:medical  ADOD: 1/28 vs 1/29    Referral submitted to [employer] for overnight O2 requirement.  Pending acceptance.    Addendum 1336: [facility] accepted, pending verification for equipment delivery.  Telephone call to pts wife, advised of the above  [facility] updated on discharge today.    Addendum 1440: Telephone call to [facility] confirmed that they can accept patient for nocturnal O2, stated equipment will be delivered to the pts home today. This nurse provided the DME company with pts wifes contact information, company to schedule delivery.    Telephone call to pts wife who confirmed she spoke with DME company, stated equipment will be delivered to her home within the next hour and she'll be providing pt with transport home at the time of discharge  ANGELITO Evans notified.     Lisbeth Monsivais, RN, BSN  Transitional Care Coordinator  Office: [phone]  Secure chat via [employer]

## 2025-01-28 NOTE — PROGRESS NOTES
Jerman Colón is a 64 y.o. male on day 7 of admission presenting with Other secondary kyphosis, cervical region.      Subjective   NAEO. Overnight sleep study completed with desaturation to 57% requiring 5L NC for SpO2 greater than 90.  Pain well-controlled.       Objective     Last Recorded Vitals  /74   Pulse 88   Temp 35.9 °C (96.6 °F)   Resp 18   Wt 115 kg (252 lb 6.8 oz)   SpO2 96%   Intake/Output last 3 Shifts:    Intake/Output Summary (Last 24 hours) at 1/28/2025 1344  Last data filed at 1/28/2025 0400  Gross per 24 hour   Intake 1240 ml   Output 1 ml   Net 1239 ml       Admission Weight  Weight: 115 kg (252 lb 6.8 oz) (01/24/25 0000)    Daily Weight  01/24/25 : 115 kg (252 lb 6.8 oz)    Image Results  CT angio chest for pulmonary embolism  Narrative: Interpreted By:  Kady Deleon,  and Anuradha Miller   STUDY:  CT ANGIO CHEST FOR PULMONARY EMBOLISM;  1/27/2025 7:47 pm      INDICATION:  Signs/Symptoms:Repeat CTPE for poor visualization.          COMPARISON:  CTA chest 01/26/2025      ACCESSION NUMBER(S):  [id number]      ORDERING CLINICIAN:  JERMAIN LONG      TECHNIQUE:  Helical data acquisition of the chest was obtained after intravenous  administration of 90 ML Omnipaque 350, as per PE protocol. Images  were reformatted in coronal and sagittal planes. Axial and coronal  maximum intensity projection (MIP) images were created and reviewed.      FINDINGS:  POTENTIAL LIMITATIONS OF THE STUDY: The assessment is limited by beam  hardening artifact from upper extremities, respiratory motion and  suboptimal contrast opacification of pulmonary arteries. There is  also beam hardening artifact caused by metallic hardware in the spine.      HEART AND VESSELS:  No discrete filling defects within the main pulmonary artery or its  branches to  proximal segmental level. Please note that, assessment  of distal segmental and subsegmental branches is limited and small  peripheral emboli are not  entirely excluded. Main pulmonary artery  and its branches are normal in caliber.      The thoracic aorta normal in course and caliber. There is moderate  calcified atherosclerosis. Although, the study is not tailored for  evaluation of aorta, there is no definite evidence of acute aortic  pathology. Mild-to-moderate coronary arterial calcifications.      The cardiac chambers are not enlarged. No evidence of right heart  strain.      There is no pericardial effusion seen.      MEDIASTINUM AND JAMILAH, LOWER NECK AND AXILLA:  The visualized thyroid gland is within normal limits.  No evidence of thoracic lymphadenopathy by CT criteria.  Esophagus appears within normal limits as seen.      LUNGS AND AIRWAYS:  The trachea and central airways are patent. No endobronchial lesion  is seen.      Mildly improved aeration of the bilateral lung bases with persistent  scattered streaky and dependent atelectasis. No focal airspace  opacity, effusion, or pneumothorax. No discrete suspicious pulmonary  nodule.      UPPER ABDOMEN:  The visualized subdiaphragmatic structures demonstrate no remarkable  findings.      CHEST WALL AND OSSEOUS STRUCTURES:  Chest wall is within normal limits.  Stable cervicothoracic surgical changes and left shoulder  arthroplasty. No acute osseous abnormality. Mildly heterogeneous  appearance of the bone marrow.      Impression: 1. No evidence of acute pulmonary embolism to the proximal segmental  level. No evidence of right heart strain. Please note that,  assessment of distal segmental and subsegmental branches is limited  and small peripheral emboli are not entirely excluded.  2. Overall similar but mildly improved bibasilar pulmonary  atelectasis without additional acute pulmonary abnormality.      I personally reviewed the image(s)/study and resident interpretation  as stated by Dr. Angela Ling MD. I agree with the findings as  stated. This study was interpreted at [facility], [place], [place].      MACRO:  None      Signed by: Kady Garza 1/28/2025 6:37 AM  Dictation workstation:   [id number]    Physical Exam  General: no acute distress, anterior cervical incision clean, dry, intact  Cardio: normal rate, regular rhythm  Pulm: non-labored, no crackles  GI: non-distended, appropriately soft, no masses, non-tender  MSK: no joint swelling  HEENT: grossly normocephalic  Neuro: grossly oriented, CN grossly intact, extremities moving symmetrically  Psych: appropriate affect  Volume: no LE pitting edema     Relevant Results               Assessment/Plan      Jerman Colón is a 64 y.o. male on day 5 of admission presenting with rigid cervical deformity s/p C4-6 ACDF, C2-T4 fusion, C4-7 decompression, C4/5 facetectomies 1/21, reintubated at end of case due to lower airway edema. Currently admitted to NSGY service. Internal Medicine consulted for desaturations to SpO2 <90 and further optimization.     Recommendations on 1/28/2025  - CT PE unremarkable for pulmonary embolism, no evidence of right heart strain.  - Patient will need outpatient referral for supplemental oxygen.  Overnight sleep study O2 saturation dropped down to 57% requiring 5L NC for SpO2 greater than 90.  - Patient will need outpatient referral for sleep study.  Patient last sleep study in 2021.  - Medicine will sign off.      #Acute hypoxia  #BIANCA, not on CPAP at home, requiring 4 to 5 L NC nightly  :: CXR 1/25--pulmonary edema with new right-sided pleural effusion.  Persistent left-sided pleural effusion  :: SLP recommendation--soft and bite-size diet with thin liquids  :: Most recent spirometry--2020 normal, sleep 2021 severe sleep apnea  - CT PE unremarkable for pulmonary embolism, no evidence of right heart strain.  - Overnight sleep study O2 saturation dropped down to 57% requiring 5L NC for SpO2 greater than 90.  - Encourage CPAP use at night  - Encourage incentive spirometry use    DVT  prophylaxis: Heparin subcu  Diet: Regular diet, soft and bite sized with thin liquids  Disposition: Home with home care  Disability: Ambulates with walker  CODE STATUS: Full code, discussed patient  Assessment & Plan  Other secondary kyphosis, cervical region    Kyphosis of cervical region    HTN (hypertension)    Hyperlipidemia    CAD (coronary artery disease)    Pulmonary emphysema (Multi)    Polycythemia    Gastroesophageal reflux disease without esophagitis    Cervical myelopathy    Spinal cord compression due to degenerative disorder of spinal column    Jerman Colón is a 64 y.o. male on day 5 of admission presenting with rigid cervical deformity s/p C4-6 ACDF, C2-T4 fusion, C4-7 decompression, C4/5 facetectomies 1/21, reintubated at end of case due to lower airway edema. Currently admitted to NSGY service. Internal Medicine consulted for desaturations to SpO2 <90 and further optimization.     Recommendations on 1/27/2025  - Order overnight pulse oximetry [coordinate with respiratory therapist for prescription of nighttime oxygen] for BIANCA.  Patient has history of severe BIANCA requiring CPAP with noncompliance secondary to insurance versus inability to tolerate mask [claustrophobia].  Previous sleep study over 3 years ago.  Patient will need nighttime oxygen in outpatient setting to bridge prior to outpatient sleep study.  - Referral for outpatient sleep study for reconsideration of CPAP.  - Repeat CT PE pending.  CT PE on 1/26 inconclusive.  Will follow results.  Low suspicion for PE.        #Acute hypoxia  #BIANCA, not on CPAP at home, requiring 4 to 5 L NC nightly  :: CXR 1/25--pulmonary edema with new right-sided pleural effusion.  Persistent left-sided pleural effusion  :: SLP recommendation--soft and bite-size diet with thin liquids  :: Most recent spirometry--2020 normal, sleep 2021 severe sleep apnea  - Order overnight pulse oximetry.  Plan to discharge patient with nighttime oxygen depending on results.  -  Repeat CT PE pending.  CT PE on 1/26 inconclusive.  - Encourage CPAP use at night  - Encourage incentive spirometry use    DVT prophylaxis: Heparin subcu  Diet: Regular diet, soft and bite sized with thin liquids  Disposition: Home with home care  Disability: Ambulates with walker  CODE STATUS: Full code, discussed patient              Debbie Khanna MD

## 2025-01-28 NOTE — CARE PLAN
The clinical goals for the shift include patient will maintain neuro exam through the shift, will be monitored on cont pulse ox overnight    Patients neuro exam unchanged throughout the night.  Has been on continuous pulse ox through the shift.  Oxygen saturations and oxygen delivery methods documented on vital signs flow sheet through the night.        Problem: Pain - Adult  Goal: Verbalizes/displays adequate comfort level or baseline comfort level  Outcome: Progressing     Problem: Safety - Adult  Goal: Free from fall injury  Outcome: Progressing     Problem: Fall/Injury  Goal: Not fall by end of shift  Outcome: Progressing  Goal: Use assistive devices by end of the shift  Outcome: Progressing     Problem: Pain  Goal: Takes deep breaths with improved pain control throughout the shift  Outcome: Progressing  Goal: Turns in bed with improved pain control throughout the shift  Outcome: Progressing  Goal: Free from acute confusion related to pain meds throughout the shift  Outcome: Progressing

## 2025-01-28 NOTE — CARE PLAN
The patient's goals for the shift include      The clinical goals for the shift include Pt will get oob at least 3 times during the day.    Over the shift, the patient got out of bed multiple times throughout the day. Pt discharged home.

## 2025-01-30 DIAGNOSIS — M54.12 CERVICAL RADICULOPATHY AT C7: ICD-10-CM

## 2025-01-30 DIAGNOSIS — M43.26 FUSION OF LUMBAR SPINE: ICD-10-CM

## 2025-01-30 DIAGNOSIS — M43.16 LUMBAR ADJACENT SEGMENT DISEASE WITH SPONDYLOLISTHESIS: ICD-10-CM

## 2025-01-30 DIAGNOSIS — M51.369 LUMBAR ADJACENT SEGMENT DISEASE WITH SPONDYLOLISTHESIS: ICD-10-CM

## 2025-01-30 DIAGNOSIS — Z98.1 S/P CERVICAL SPINAL FUSION: ICD-10-CM

## 2025-01-30 DIAGNOSIS — Z98.1 S/P FUSION OF THORACIC SPINE: ICD-10-CM

## 2025-02-05 ENCOUNTER — TELEPHONE (OUTPATIENT)
Dept: NEUROSURGERY | Facility: CLINIC | Age: 65
End: 2025-02-05
Payer: MEDICARE

## 2025-02-05 DIAGNOSIS — G89.18 POST-OP PAIN: ICD-10-CM

## 2025-02-05 RX ORDER — CYCLOBENZAPRINE HCL 5 MG
5 TABLET ORAL 3 TIMES DAILY
Qty: 15 TABLET | Refills: 0 | Status: SHIPPED | OUTPATIENT
Start: 2025-02-05

## 2025-02-05 NOTE — TELEPHONE ENCOUNTER
Good Afternoon.  Patient's spouse called and said she left a couple messages.  She and the patient are requesting a refill on the Flexeril, and also a call back once the medication is filled.  States patient is in a significant amount of pain.  She can be reached at [phone].  Thank you.

## 2025-02-11 ENCOUNTER — APPOINTMENT (OUTPATIENT)
Dept: NEUROSURGERY | Facility: HOSPITAL | Age: 65
End: 2025-02-11
Payer: MEDICARE

## 2025-02-11 ENCOUNTER — TELEPHONE (OUTPATIENT)
Dept: NEUROSURGERY | Facility: HOSPITAL | Age: 65
End: 2025-02-11
Payer: MEDICARE

## 2025-02-11 NOTE — TELEPHONE ENCOUNTER
I called and spoke to patient (and wife) to discuss progress since surgery. Patient is doing very well post operatively with muscle spasms but Flexiril is helping. His incisions are healing well. He denies fevers, chills or falls. They denies redness, swelling or drainage. Patient and wife aware of pov with Dr. Patel.

## 2025-02-12 ENCOUNTER — APPOINTMENT (OUTPATIENT)
Dept: NEUROSURGERY | Facility: HOSPITAL | Age: 65
End: 2025-02-12
Payer: MEDICARE

## 2025-03-04 ENCOUNTER — TELEPHONE (OUTPATIENT)
Dept: NEUROSURGERY | Facility: HOSPITAL | Age: 65
End: 2025-03-04
Payer: MEDICARE

## 2025-03-04 ENCOUNTER — HOSPITAL ENCOUNTER (OUTPATIENT)
Dept: RADIOLOGY | Facility: CLINIC | Age: 65
Discharge: HOME | End: 2025-03-04
Payer: MEDICARE

## 2025-03-04 DIAGNOSIS — M54.12 CERVICAL RADICULOPATHY AT C7: ICD-10-CM

## 2025-03-04 DIAGNOSIS — Z98.1 S/P FUSION OF THORACIC SPINE: ICD-10-CM

## 2025-03-04 DIAGNOSIS — M43.16 LUMBAR ADJACENT SEGMENT DISEASE WITH SPONDYLOLISTHESIS: ICD-10-CM

## 2025-03-04 DIAGNOSIS — Z98.1 S/P CERVICAL SPINAL FUSION: ICD-10-CM

## 2025-03-04 DIAGNOSIS — M43.26 FUSION OF LUMBAR SPINE: ICD-10-CM

## 2025-03-04 DIAGNOSIS — M51.369 LUMBAR ADJACENT SEGMENT DISEASE WITH SPONDYLOLISTHESIS: ICD-10-CM

## 2025-03-04 PROCEDURE — 72082 X-RAY EXAM ENTIRE SPI 2/3 VW: CPT

## 2025-03-04 NOTE — PROGRESS NOTES
I just had the pleasure of seeing Mr. Colón back in the Neurosurgery Spine Clinic at the [facility]. He is a very pleasant 64 -year-old male, who recently underwent a C2-T4 Fusion with me on 1/21/2025 and is status post 6 weeks out from his surgery. Today is better than before surgery. Can look up. Just has some soreness in the neck. Continues with the back stabbing and burning pain and going down both leges, right greater than left.    Mr. Colón is doing well from his surgery.  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  At this point of time he remains significantly symptomatic from his lower back with low back and bilateral lower extremity pain.  He underwent an in situ fusion for his L5-S1 spondylolisthesis [facility] with Dr. Vitale.  At this point he wants to talk about surgery for his lower back.    Alert and oriented  Motor strength baseline with no new weakness.   Sensory exam grossly intact  Gait Stable.   Incision well healed.    AP and lateral x rays of the whole spine shows well placed instrumentation with no evidence of any instrumentation failure.    There are no concerning neurological issues at this point.   Discussed with him various things he should and should not do as far as his neck is concerned.  At this point of time given the presence of signs and symptoms of lumbar radiculopathy and neurogenic claudication from stenosis involving the lumbar spine I gave him a referral for physical therapy for the same.  I do believe he is a good candidate for surgical intervention for his lower back but as he has not tried any nonoperative treatment in the recent past and is very fresh from his cervical spine surgery I believe we would wait for few months before considering surgical intervention his lower back.  He is going to try physical therapy over the next several weeks and I will  see him back in clinic in 3 months from now with an AP and lateral local scoliosis film.  If he continues to be symptomatic at that point we will talk to him about his surgery for his lower back at that point.    It was a pleasure to participate in Mr. Colón care.   All questions were answered to the patients satisfaction and he explained understanding of the further treatment plan.      Paul Patel MD, [facility], DABNS, FCNS  Active Scoliosis Research Society (SRS) Fellow    of  Neurological Surgery   Attending Surgeon / Director, Minimally Invasive Spine Surgery   [facility]  / [facility] [employer]  [address] [place] [address]   Email: [email or web address]   Phone: [phone]        ---Some of this note was completed using Dragon voice recognition technology and sometimes the software misinterprets words. This may include unintended errors with respect to translation of words, typographical errors or grammar errors which may not have been identified prior to finalization of the chart note. Please take this into account when reading this note---

## 2025-03-06 ENCOUNTER — OFFICE VISIT (OUTPATIENT)
Dept: NEUROSURGERY | Facility: CLINIC | Age: 65
End: 2025-03-06
Payer: MEDICARE

## 2025-03-06 ENCOUNTER — APPOINTMENT (OUTPATIENT)
Dept: NEUROSURGERY | Facility: CLINIC | Age: 65
End: 2025-03-06
Payer: MEDICARE

## 2025-03-06 VITALS
SYSTOLIC BLOOD PRESSURE: 151 MMHG | BODY MASS INDEX: 33.13 KG/M2 | RESPIRATION RATE: 20 BRPM | HEART RATE: 95 BPM | HEIGHT: 73 IN | DIASTOLIC BLOOD PRESSURE: 82 MMHG | WEIGHT: 250 LBS

## 2025-03-06 DIAGNOSIS — M43.26 FUSION OF LUMBAR SPINE: ICD-10-CM

## 2025-03-06 DIAGNOSIS — M48.062 LUMBAR STENOSIS WITH NEUROGENIC CLAUDICATION: Primary | ICD-10-CM

## 2025-03-06 DIAGNOSIS — M54.12 CERVICAL RADICULOPATHY AT C7: ICD-10-CM

## 2025-03-06 DIAGNOSIS — M54.16 LUMBAR RADICULOPATHY, CHRONIC: ICD-10-CM

## 2025-03-06 PROCEDURE — 99211 OFF/OP EST MAY X REQ PHY/QHP: CPT | Performed by: NEUROLOGICAL SURGERY

## 2025-03-06 PROCEDURE — 3008F BODY MASS INDEX DOCD: CPT | Performed by: NEUROLOGICAL SURGERY

## 2025-03-06 PROCEDURE — 3079F DIAST BP 80-89 MM HG: CPT | Performed by: NEUROLOGICAL SURGERY

## 2025-03-06 PROCEDURE — 1036F TOBACCO NON-USER: CPT | Performed by: NEUROLOGICAL SURGERY

## 2025-03-06 PROCEDURE — 3077F SYST BP >= 140 MM HG: CPT | Performed by: NEUROLOGICAL SURGERY

## 2025-03-06 ASSESSMENT — PAIN SCALES - GENERAL: PAINLEVEL_OUTOF10: 4

## 2025-03-13 ENCOUNTER — APPOINTMENT (OUTPATIENT)
Dept: NEUROSURGERY | Facility: CLINIC | Age: 65
End: 2025-03-13
Payer: MEDICARE

## 2025-03-28 ENCOUNTER — OFFICE VISIT (OUTPATIENT)
Dept: URGENT CARE | Age: 65
End: 2025-03-28
Payer: MEDICARE

## 2025-03-28 VITALS
OXYGEN SATURATION: 98 % | TEMPERATURE: 97.3 F | RESPIRATION RATE: 16 BRPM | BODY MASS INDEX: 33.51 KG/M2 | DIASTOLIC BLOOD PRESSURE: 77 MMHG | SYSTOLIC BLOOD PRESSURE: 138 MMHG | HEART RATE: 93 BPM | WEIGHT: 254 LBS

## 2025-03-28 DIAGNOSIS — J06.9 VIRAL URI: ICD-10-CM

## 2025-03-28 DIAGNOSIS — J40 BRONCHITIS: Primary | ICD-10-CM

## 2025-03-28 DIAGNOSIS — J02.9 SORE THROAT: ICD-10-CM

## 2025-03-28 LAB
POC HUMAN RHINOVIRUS PCR: POSITIVE
POC INFLUENZA A VIRUS PCR: NEGATIVE
POC INFLUENZA B VIRUS PCR: NEGATIVE
POC RESPIRATORY SYNCYTIAL VIRUS PCR: NEGATIVE
POC STREPTOCOCCUS PYOGENES (GROUP A STREP) PCR: NEGATIVE

## 2025-03-28 RX ORDER — PREDNISONE 20 MG/1
20 TABLET ORAL 2 TIMES DAILY
Qty: 10 TABLET | Refills: 0 | Status: SHIPPED | OUTPATIENT
Start: 2025-03-28 | End: 2025-04-02

## 2025-03-28 RX ORDER — BENZONATATE 200 MG/1
200 CAPSULE ORAL 3 TIMES DAILY PRN
Qty: 21 CAPSULE | Refills: 0 | Status: SHIPPED | OUTPATIENT
Start: 2025-03-28 | End: 2025-04-04

## 2025-03-28 ASSESSMENT — ENCOUNTER SYMPTOMS
SINUS PAIN: 0
JOINT SWELLING: 0
EYE DISCHARGE: 0
RHINORRHEA: 1
ARTHRALGIAS: 0
EYE ITCHING: 0
EYE PAIN: 0
DIARRHEA: 0
CHEST TIGHTNESS: 0
ABDOMINAL PAIN: 0
CHILLS: 1
HEADACHES: 1
EYE REDNESS: 0
SORE THROAT: 1
NAUSEA: 0
FATIGUE: 0
COUGH: 1
VOMITING: 0
SHORTNESS OF BREATH: 0
FEVER: 1
DIZZINESS: 0

## 2025-03-28 NOTE — PROGRESS NOTES
Subjective   Patient ID: Jerman Colón is a 64 y.o. male. They present today with a chief complaint of Cough (Fever x 2 days, body aches, sore throat, HA, runny nose, congestion x 6 days ).    History of Present Illness  Pt states symptoms started on last Thursday. Concerned for lingering cough and congestion. Cough most of time is dry not productive.       Cough  Associated symptoms include chills, a fever, headaches, rhinorrhea and a sore throat. Pertinent negatives include no chest pain, ear pain, eye redness, rash or shortness of breath.       Past Medical History  Allergies as of 03/28/2025    (No Known Allergies)       (Not in a hospital admission)       Past Medical History:   Diagnosis Date    Anemia     Asthma     BPH (benign prostatic hyperplasia)     Cervical myelopathy     COPD (chronic obstructive pulmonary disease) (Multi)     Coronary artery disease     GERD (gastroesophageal reflux disease)     controlled    Hyperlipidemia     Hypertension     Polycythemia     PONV (postoperative nausea and vomiting)     Proteinuria     Sepsis (Multi)     Sleep apnea     Vocal cord mass        Past Surgical History:   Procedure Laterality Date    CARDIAC CATHETERIZATION Left 09/2023    COLONOSCOPY      DENTAL SURGERY      HERNIA REPAIR      LUMBAR FUSION  2018    L5-S1 Fusion    SKIN BIOPSY      TONSILLECTOMY      TOTAL SHOULDER ARTHROPLASTY Left         reports that he has never smoked. He has never been exposed to tobacco smoke. He has never used smokeless tobacco. He reports that he does not currently use alcohol. He reports that he does not use drugs.    Review of Systems  Review of Systems   Constitutional:  Positive for chills and fever. Negative for fatigue.   HENT:  Positive for congestion, rhinorrhea and sore throat. Negative for ear discharge, ear pain, sinus pain and sneezing.    Eyes:  Negative for pain, discharge, redness and itching.   Respiratory:  Positive for cough. Negative for chest tightness and  shortness of breath.    Cardiovascular:  Negative for chest pain.   Gastrointestinal:  Negative for abdominal pain, diarrhea, nausea and vomiting.   Musculoskeletal:  Negative for arthralgias and joint swelling.   Skin:  Negative for rash.   Neurological:  Positive for headaches. Negative for dizziness.                                  Objective    Vitals:    03/28/25 1127   BP: 138/77   Pulse: 93   Resp: 16   Temp: 36.3 °C (97.3 °F)   SpO2: 98%   Weight: 115 kg (254 lb)     No LMP for male patient.    Physical Exam  Constitutional:       Appearance: Normal appearance.   HENT:      Head: Normocephalic and atraumatic.      Right Ear: Tympanic membrane, ear canal and external ear normal.      Left Ear: Tympanic membrane, ear canal and external ear normal.      Nose: Congestion and rhinorrhea present.      Mouth/Throat:      Pharynx: No oropharyngeal exudate or posterior oropharyngeal erythema.   Cardiovascular:      Rate and Rhythm: Normal rate and regular rhythm.      Heart sounds: No murmur heard.  Pulmonary:      Effort: Pulmonary effort is normal. No respiratory distress.      Breath sounds: No stridor. No wheezing, rhonchi or rales.   Musculoskeletal:         General: No swelling or tenderness.   Lymphadenopathy:      Cervical: No cervical adenopathy.   Skin:     General: Skin is warm and dry.      Findings: No bruising, erythema, lesion or rash.   Neurological:      Mental Status: He is alert and oriented to person, place, and time.   Psychiatric:         Mood and Affect: Mood normal.         Procedures    Point of Care Test & Imaging Results from this visit  Results for orders placed or performed in visit on 03/28/25   POCT SPOTFIRE R/ST Panel Mini w/Strep A ([facility]) manually resulted   Result Value Ref Range    POC Group A Strep, PCR Negative Negative    POC Respiratory Syncytial Virus PCR Negative Negative    POC Influenza A Virus PCR Negative Negative    POC Influenza B Virus PCR Negative Negative    POC  Human Rhinovirus PCR Positive (A) Negative      Imaging  No results found.    Cardiology, Vascular, and Other Imaging  No other imaging results found for the past 2 days      Diagnostic study results (if any) were reviewed by Aleena Lake PA-C.    Assessment/Plan   Allergies, medications, history, and pertinent labs/EKGs/Imaging reviewed by Aleena Lake PA-C.     Medical Decision Making  VSS, lung CTAB, tested positive for rhinovirus  Suspicious for lingering viral syndrome/bronchitis  Low suspicion for pneumonia     Orders and Diagnoses  Diagnoses and all orders for this visit:  Bronchitis  -     predniSONE (Deltasone) 20 mg tablet; Take 1 tablet (20 mg) by mouth 2 times a day for 5 days.  -     benzonatate (Tessalon) 200 mg capsule; Take 1 capsule (200 mg) by mouth 3 times a day as needed for cough for up to 7 days. Do not crush or chew.  Sore throat  -     POCT SPOTFIRE R/ST Panel Mini w/Strep A ([facility]) manually resulted  Viral URI      Medical Admin Record      Patient disposition: Home    Electronically signed by Aleena Lake PA-C  11:57 AM

## 2025-04-05 ENCOUNTER — OFFICE VISIT (OUTPATIENT)
Dept: URGENT CARE | Age: 65
End: 2025-04-05
Payer: MEDICARE

## 2025-04-05 VITALS
OXYGEN SATURATION: 94 % | WEIGHT: 250 LBS | SYSTOLIC BLOOD PRESSURE: 156 MMHG | TEMPERATURE: 98.2 F | BODY MASS INDEX: 32.98 KG/M2 | RESPIRATION RATE: 16 BRPM | HEART RATE: 100 BPM | DIASTOLIC BLOOD PRESSURE: 89 MMHG

## 2025-04-05 DIAGNOSIS — J20.8 ACUTE BRONCHITIS DUE TO OTHER SPECIFIED ORGANISMS: ICD-10-CM

## 2025-04-05 DIAGNOSIS — B34.8 RHINOVIRUS INFECTION: Primary | ICD-10-CM

## 2025-04-05 DIAGNOSIS — Z20.822 CONTACT WITH AND (SUSPECTED) EXPOSURE TO COVID-19: ICD-10-CM

## 2025-04-05 LAB
POC CORONAVIRUS SARS-COV-2 PCR: NEGATIVE
POC HUMAN RHINOVIRUS PCR: POSITIVE
POC INFLUENZA A VIRUS PCR: NEGATIVE
POC INFLUENZA B VIRUS PCR: NEGATIVE
POC RESPIRATORY SYNCYTIAL VIRUS PCR: NEGATIVE

## 2025-04-05 PROCEDURE — 3077F SYST BP >= 140 MM HG: CPT | Performed by: PHYSICIAN ASSISTANT

## 2025-04-05 PROCEDURE — 99214 OFFICE O/P EST MOD 30 MIN: CPT | Performed by: PHYSICIAN ASSISTANT

## 2025-04-05 PROCEDURE — 87631 RESP VIRUS 3-5 TARGETS: CPT | Performed by: PHYSICIAN ASSISTANT

## 2025-04-05 PROCEDURE — 3079F DIAST BP 80-89 MM HG: CPT | Performed by: PHYSICIAN ASSISTANT

## 2025-04-05 PROCEDURE — 1036F TOBACCO NON-USER: CPT | Performed by: PHYSICIAN ASSISTANT

## 2025-04-05 RX ORDER — BROMPHENIRAMINE MALEATE, PSEUDOEPHEDRINE HYDROCHLORIDE, AND DEXTROMETHORPHAN HYDROBROMIDE 2; 30; 10 MG/5ML; MG/5ML; MG/5ML
SYRUP ORAL
COMMUNITY
Start: 2025-04-01

## 2025-04-05 RX ORDER — PREDNISONE 20 MG/1
TABLET ORAL
Qty: 18 TABLET | Refills: 0 | Status: SHIPPED | OUTPATIENT
Start: 2025-04-05 | End: 2025-04-14

## 2025-04-05 RX ORDER — ALBUTEROL SULFATE 90 UG/1
2 INHALANT RESPIRATORY (INHALATION) EVERY 6 HOURS PRN
Qty: 18 G | Refills: 0 | Status: SHIPPED | OUTPATIENT
Start: 2025-04-05 | End: 2026-04-05

## 2025-04-05 NOTE — PROGRESS NOTES
Subjective   Patient ID: Jerman Colón is a 64 y.o. male. They present today with a chief complaint of Cough and Fatigue.    Patient disposition: Home    HISTORY OF PRESENT ILLNESS:    This is an adult male nonsmoker presenting for 2nd visit for rhinovirus and bronchitis. Was seen here 1 wk ago for same by a different provider. Total duration of sx 1.5 wks with dry cough, SOB on exertion. Denies CP, f/c/s, sinus sx. Was given 5d course of prednisone last visit and states felt great while on that, but now worse since ran out of prednisone. No cough relief from Tessalon nor syrup Rxd by his PCP.      Past Medical History  Allergies as of 04/05/2025    (No Known Allergies)       (Not in a hospital admission)       No past medical history on file.    No past surgical history on file.     reports that he has never smoked. He has never used smokeless tobacco. He reports that he does not use drugs.    Review of Systems    Negative except as documented in the History of Present Illness.                             Objective    Vitals:    04/05/25 1703   BP: 130/79   Pulse: 57   Resp: 16   Temp: 36.5 °C (97.7 °F)   SpO2: 97%   Weight: 77.1 kg (170 lb)     No LMP for male patient.      PHYSICAL EXAMINATION:    CONSTITUTIONAL: well-appearing, nontoxic         ENT:  Head and face are unremarkable and atraumatic. Mucous membranes moist.    * Oropharynx nl. Airway patent.    * No uvular deviation. No visible abscess.    * Lymphadenopathy absent.    * TMs nl bl.         LUNGS:  Coarse sounds diffusely, no r/r/w. No conversational dyspnea, no increased WOB.    CARDIOVASCULAR:   RRR, no m/r/g. Nl S1/S2.    ABDOMEN:  Nontender including left upper quadrant, nondistended, no acute abdomen.     MUSCULOSKELETAL: No obvious deformities. MOORE with equal strength. Gait normal.    SKIN:   Warm and dry with no rashes.    NEURO:  Normal baseline mental status.    PSYCH: Appropriate mood and affect.          ------------------------------------------         MDM: POCT again today positive rhinovirus, negative others. No clinical concern for concomitant bacterial process based on the exam and history. Reassurance given and new Rx for prednisone taper and albuterol. Will fu with his PCP, or here as needed if unresolved or worsening.      Procedures    Diagnostic study results (if any) were reviewed by Lars Mathias PA-C.    No results found for this visit on 04/05/25.     Assessment/Plan   Allergies, medications, history, and pertinent labs/EKGs/Imaging reviewed by Lars Mathias PA-C.     Orders and Diagnoses  Diagnoses and all orders for this visit:  Cough, unspecified type  -     POCT SPOTFIRE R/ST Panel Mini w/COVID ([facility]) manually resulted      Medical Admin Record      Follow Up Instructions  No follow-ups on file.    Electronically signed by Lars Mathias PA-C  4:57 PM

## 2025-06-03 ENCOUNTER — HOSPITAL ENCOUNTER (OUTPATIENT)
Dept: RADIOLOGY | Facility: CLINIC | Age: 65
Discharge: HOME | End: 2025-06-03
Payer: MEDICARE

## 2025-06-03 ENCOUNTER — TELEPHONE (OUTPATIENT)
Dept: NEUROSURGERY | Facility: HOSPITAL | Age: 65
End: 2025-06-03
Payer: MEDICARE

## 2025-06-03 DIAGNOSIS — M54.12 CERVICAL RADICULOPATHY AT C7: ICD-10-CM

## 2025-06-03 DIAGNOSIS — M54.16 LUMBAR RADICULOPATHY, CHRONIC: ICD-10-CM

## 2025-06-03 PROCEDURE — 72082 X-RAY EXAM ENTIRE SPI 2/3 VW: CPT | Performed by: STUDENT IN AN ORGANIZED HEALTH CARE EDUCATION/TRAINING PROGRAM

## 2025-06-03 PROCEDURE — 72082 X-RAY EXAM ENTIRE SPI 2/3 VW: CPT

## 2025-06-03 NOTE — TELEPHONE ENCOUNTER
Pt had a fall and his wife asked if he can have an x-ray. I told her that there is an x-ray order in and he can have it done. To let me know and I will have Dr. Patel look at it. He will see Dr. Patel in a few weeks.

## 2025-06-04 NOTE — PROGRESS NOTES
I just had the pleasure of seeing Mr. Katarzyna loera in the Neurosurgery Spine Clinic at the [facility]. He is a very pleasant 64 -year-old male, who recently underwent a C2-T4 Fusion with me on 1/21/2025 and is status post 4 months out from his surgery. Back ache, stabbing and burning pain going down left leg to his foot. Has numbness and tingling.  He has had a history of previous lumbar spine surgery and initially presented to me with lower back issues as of cervical stenosis cervical deformity HPI decided to proceed with his cervical spine surgery first therapy performed in January 2025 from which she is overall doing reasonably well at this point of time seems to be continuously symptomatic with his lumbar spine and is here to discuss further treatment options about the same.  We did discuss the option of spine surgery during the past clinic visits but wanted him to heal from his cervical spine surgery first.    Patient has a history of lumbar fusion at [facility] which largely did not resolve his lumbar symptoms.    He still continues to have back pain along with bilateral lower extremity pain.  He also has numbness and tingling in bilateral lower extremities and has difficulty walking any significant distance whatsoever.  More importantly he has also lately developed an pain involving his left foot along with numbness and tingling.  The pain has been extremely severe and he rates his almost 8-9 out of 10 on a scale of 0-10.  He denies any bowel bladder symptoms.    He otherwise is doing very well as far as his neck is concerned and is able to hold his head up very well which he could not do before his previous surgery.    He has tried physical therapy in the past with no significant relief.  He is going to see therapist again to see if he could benefit from that.    Constitutional: No fever or chills  Respiratory: No shortness of breath or wheezing  Musculoskeletal: see HPI above   Neurologic: See  HPI above      EXAM:   GENERAL: no apparent distress  RESPIRATORY: normal respiratory effort; no audible wheezes/rhonchi  EXTREMITIES: normal pulses, no edema  SKIN: Normal temperature; no rash, ulcers or subcutaneous nodules      MUSCULOSKELETAL:   He has a very good upright neck posture at this point   paraspinal muscle spasm/tenderness absent.   Gait: Wide based staggering gait .  He still uses a cane to ambulate mainly secondary to the severe pain that he has been experiencing now at this point.     NEUROLOGIC: Patient is awake, alert and oriented to name, place and time.  No obvious cranial nerve deficit.    MOTOR   RUE Delt 4- Bi 4 Tri 5 HG 4- IO 5 WF 5 WE 5 finger ect 5  LUE Delt 4+ Bi 4 Tri 5 HG 4 IO 5 WF/WE 5 finger ext 5            BLE 5/5                          Bulk: Decreased in right deltoid  Tone: Normal  No Velasco  No pronator drift  Reflexes are symmetric throughout.   Sensory exam shows no gross dermatomal sensory deficits to light touch and pain   Well-healed scar present in the neck from the cervical spine surgery along with scar present in the lower back from the lumbar spine surgery that he had.    IMAGING:  CT lumbar spine was personally reviewed and shows evidence of prior L5-S1 fusion with screws in good position and intact fusion across the disc base at L5-S1, vacuum disc phenomenon at L4-5 and L3-4 with sclerotic endplate changes at L3-4     MRI L-spine showing signs of adjacent segment disease evident by L4-5 disc herniation causing severe lateral recess and central canal stenosis, as well as L3-4 disc herniation causing moderate to severe lateral recess as well as severe central canal stenosis with L3-4 joint effusions    EOS films that were done in October 2024 were reviewed personally and shows evidence of a previous L5-S1 instrumented fusion with well aligned spine in the lumbar spine and absence of any regional lumbar deformity.  There is evidence of cervical spine deformity that  has since been treated.    Most recent full spine scoliosis film done on Zoila 3, 2025 was reviewed personally and shows evidence of well-placed instrumentation in the cervical spine with no evidence of instrumentation failure.  Is global in the alignment seems to be within normal limits with evidence of mild positive cranial hip axis measuring about 4 cm.      PLAN:    Mr. Colón is a really nice 64 y.o. male who presented to me initially with his lower back and cervical deformity and given the more symptomatic nature of his cervical deformity and the importance of cervical decompression before pursuing any lumbar spine surgery we performed his anterior posterior cervical spine surgery from which he is almost 6 months out and is doing very well otherwise.  He did sustain a fall recently again secondary to his ongoing lower back symptoms and the x-rays that were done recently does not shows any evidence of instrumentation failure.     At this point of time he remains symptomatic with the signs and symptoms of chronic lumbar radiculopathy and neurogenic claudication with history of previous L5-S1 in situ fusion for spondylolisthesis and is here to discuss treatment for his lumbar spine at this point.    I have reviewed imaging and diagnosis with the patient, discussed the natural history of the disease and both non-operative and operative treatments available and rationale vs risks for both.     The patient's clinical symptoms correlates with the radiological findings.      He has been having significant functional impairment with decreased ability to perform his ADL secondary to pain, numbness and weakness    The pain has been debilitating on a daily basis with a score of more than 5 on scale of 0 - 10.    He has tried various conservative treatment options such as PAIN MEDICATIONS.  He has tried physical therapy in the past and is going to see a therapist again to see if that would help him with his symptoms.      He  also underwent various steroid injections with interventional PAIN MANAGEMENT and continues to be symptomatic.  There are no noncompliance issues.     Considering the degree and severity of symptoms affecting the patients ADL despite a trial of non-operative management, surgery at this point is indicated and is medically necessary to improve the quality of life and day to day functioning and prevent continued neurological decline.  He has a previous L5-S1 fusion which was performed in situ for his L5-S1 spondylolisthesis but he still has ongoing foraminal stenosis especially on the left side at L5-S1 level and on top of that has significant stenosis at L3-4 and L4-5 level involving the central canal and foraminal region and hence is not a candidate for any decompressive surgery at this point given the presence of stenosis adjacent to her previous fusion and the necessity of performing extensive bony decompression that would require concomitant stabilization.      I offered him the option of surgery that would consist of Removal of previous L5-S1 instrumentation, Exploration of previous L5-S1 Fusion,  L3-4 L4-5 and L5-S1 laminectomy and facetectomy for decompression with L2-S1 instrumentation and pelvic fixation.    I have explained the surgical procedure in detail with expected duration and extent of recovery along risks of surgery that include, but is not limited to bleeding, infection, blood vessel injury or damage,loss of sensation, loss of bladder, bowel or sexual function, nerve injury/damage resulting in weakness/paralysis, malunion, nonunion, CSF leak, brachial plexus injury, peripheral vision blindness, injury to the abdominal contents, failure of implants/fusion, failure to relieve symptoms, recurrent disease, adjacent segment disease, need to reoperate for any reason and general anesthesia reaction such as stroke, coma, heart attack, delirium, confusion, death as well as worsening of preexisted medical  conditions and any other unforeseen complication related to unrelated to the spinal procedure per se.    A Shared decision was made to proceed with surgery after involving the patient and his family in the treatment decision-making process.  They clearly expressed understanding of possible risks and benefits of surgery and the realistic expectations of surgery along with the fact that the goal of the surgery is to improve the  overall functioning and quality of life by improvement of the current level of function,  possible improvement and/or prevent progression of preexisting neurological deficits and not necessarily 100 % pain relief. There is no guarantee that the prexisiting deficits or symptoms will improve definitely after surgery. Also discussed with the patient a small but possible chances of worsening of his her symptoms or development of new symptoms despite a successful surgery that may be worse than what he has now. The option of continued non-operative management was very clearly discussed as well with its associated risks and benefits and the patient was clearly provided that option.     It was a pleasure to participate in Mr. Colón clinical care. All questions were answered to him satisfaction and he explained understanding of the further treatment plan.     Paul Patel MD, [facility]   of Neurological Surgery  [employer]  Attending Surgeon  Director - Minimally Invasive Spine Surgery  [place], [place]      ---Some of this note was completed using Dragon voice recognition technology and sometimes the software misinterprets words. This may include unintended errors with respect to translation of words, typographical errors or grammar errors which may not have been identified prior to finalization of the chart note. Please take this into account when reading this note---

## 2025-06-05 ENCOUNTER — OFFICE VISIT (OUTPATIENT)
Dept: NEUROSURGERY | Facility: CLINIC | Age: 65
End: 2025-06-05
Payer: MEDICARE

## 2025-06-05 VITALS
HEIGHT: 73 IN | WEIGHT: 254 LBS | RESPIRATION RATE: 20 BRPM | SYSTOLIC BLOOD PRESSURE: 158 MMHG | BODY MASS INDEX: 33.66 KG/M2 | HEART RATE: 103 BPM | DIASTOLIC BLOOD PRESSURE: 83 MMHG

## 2025-06-05 DIAGNOSIS — M53.2X2 SPINAL INSTABILITY OF CERVICAL REGION: ICD-10-CM

## 2025-06-05 DIAGNOSIS — Z98.1 LUMBAR SPINAL STENOSIS DUE TO ADJACENT SEGMENT DISEASE AFTER FUSION PROCEDURE: ICD-10-CM

## 2025-06-05 DIAGNOSIS — M51.369 LUMBAR SPINAL STENOSIS DUE TO ADJACENT SEGMENT DISEASE AFTER FUSION PROCEDURE: ICD-10-CM

## 2025-06-05 DIAGNOSIS — M48.061 LUMBAR SPINAL STENOSIS DUE TO ADJACENT SEGMENT DISEASE AFTER FUSION PROCEDURE: ICD-10-CM

## 2025-06-05 DIAGNOSIS — M48.061 LUMBAR FORAMINAL STENOSIS: Primary | ICD-10-CM

## 2025-06-05 DIAGNOSIS — M54.16 LUMBAR RADICULOPATHY, CHRONIC: ICD-10-CM

## 2025-06-05 DIAGNOSIS — Z98.1 S/P LUMBAR SPINAL FUSION: ICD-10-CM

## 2025-06-05 DIAGNOSIS — M48.062 LUMBAR STENOSIS WITH NEUROGENIC CLAUDICATION: ICD-10-CM

## 2025-06-05 PROCEDURE — 99215 OFFICE O/P EST HI 40 MIN: CPT | Performed by: NEUROLOGICAL SURGERY

## 2025-06-05 PROCEDURE — 3079F DIAST BP 80-89 MM HG: CPT | Performed by: NEUROLOGICAL SURGERY

## 2025-06-05 PROCEDURE — 3077F SYST BP >= 140 MM HG: CPT | Performed by: NEUROLOGICAL SURGERY

## 2025-06-05 PROCEDURE — 3008F BODY MASS INDEX DOCD: CPT | Performed by: NEUROLOGICAL SURGERY

## 2025-06-05 PROCEDURE — 1036F TOBACCO NON-USER: CPT | Performed by: NEUROLOGICAL SURGERY

## 2025-06-05 ASSESSMENT — PAIN SCALES - GENERAL: PAINLEVEL_OUTOF10: 8

## 2025-06-08 PROBLEM — M48.061 LUMBAR SPINAL STENOSIS DUE TO ADJACENT SEGMENT DISEASE AFTER FUSION PROCEDURE: Status: ACTIVE | Noted: 2025-06-05

## 2025-06-08 PROBLEM — M51.369 LUMBAR SPINAL STENOSIS DUE TO ADJACENT SEGMENT DISEASE AFTER FUSION PROCEDURE: Status: ACTIVE | Noted: 2025-06-05

## 2025-06-08 PROBLEM — M48.061 LUMBAR FORAMINAL STENOSIS: Status: ACTIVE | Noted: 2025-06-05

## 2025-06-08 PROBLEM — Z98.1 LUMBAR SPINAL STENOSIS DUE TO ADJACENT SEGMENT DISEASE AFTER FUSION PROCEDURE: Status: ACTIVE | Noted: 2025-06-05

## 2025-06-08 PROBLEM — M54.16 LUMBAR RADICULOPATHY, CHRONIC: Status: ACTIVE | Noted: 2025-06-05

## 2025-06-09 ENCOUNTER — DOCUMENTATION (OUTPATIENT)
Dept: PHYSICAL THERAPY | Facility: CLINIC | Age: 65
End: 2025-06-09
Payer: MEDICARE

## 2025-06-09 NOTE — PROGRESS NOTES
Physical Therapy                 Therapy Communication Note    Patient Name: Jerman Colón Jr.  MRN: [id number]  Department:   Room: Room/bed info not found  Today's Date: 6/9/2025     Discipline: Physical Therapy    Missed Time: Pt arrived late to appt today. He was scheduled at 9:15 am and was asked to arrive early for paper work. He was lost in the building and did not arrive until about 9:25. Due to the complexity of his symptoms we rescheduled his appt for next Monday 9/16/25. He is scheduled for surgery 7/14/25 and needs to have this PT prior to sx for insurance

## 2025-06-16 ENCOUNTER — EVALUATION (OUTPATIENT)
Dept: PHYSICAL THERAPY | Facility: CLINIC | Age: 65
End: 2025-06-16
Payer: MEDICARE

## 2025-06-16 DIAGNOSIS — M54.16 LUMBAR RADICULOPATHY, CHRONIC: Primary | ICD-10-CM

## 2025-06-16 DIAGNOSIS — M43.26 FUSION OF LUMBAR SPINE: ICD-10-CM

## 2025-06-16 DIAGNOSIS — M48.062 LUMBAR STENOSIS WITH NEUROGENIC CLAUDICATION: ICD-10-CM

## 2025-06-16 PROCEDURE — 97161 PT EVAL LOW COMPLEX 20 MIN: CPT | Mod: GP | Performed by: PHYSICAL THERAPIST

## 2025-06-16 PROCEDURE — 97110 THERAPEUTIC EXERCISES: CPT | Mod: GP | Performed by: PHYSICAL THERAPIST

## 2025-06-16 ASSESSMENT — ENCOUNTER SYMPTOMS
OCCASIONAL FEELINGS OF UNSTEADINESS: 1
LOSS OF SENSATION IN FEET: 1
DEPRESSION: 0

## 2025-06-16 ASSESSMENT — PAIN - FUNCTIONAL ASSESSMENT: PAIN_FUNCTIONAL_ASSESSMENT: 0-10

## 2025-06-16 ASSESSMENT — PATIENT HEALTH QUESTIONNAIRE - PHQ9
1. LITTLE INTEREST OR PLEASURE IN DOING THINGS: NOT AT ALL
SUM OF ALL RESPONSES TO PHQ9 QUESTIONS 1 AND 2: 0
2. FEELING DOWN, DEPRESSED OR HOPELESS: NOT AT ALL

## 2025-06-16 ASSESSMENT — PAIN SCALES - GENERAL: PAINLEVEL_OUTOF10: 7

## 2025-06-16 NOTE — PROGRESS NOTES
Physical Therapy    Physical Therapy Lumbar Spine Evaluation    Patient Name: Jerman Colón Jr.  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    Lumbar radiculopathy, chronic - Primary M54.16    Relevant Orders    Follow Up In Physical Therapy    Lumbar stenosis with neurogenic claudication M48.062    Relevant Orders    Follow Up In Physical Therapy        Precautions  Precautions  STEADI Fall Risk Score (The score of 4 or more indicates an increased risk of falling): Fall risk  Precautions Comment: Hx of spine sx, skin ca       Pain  Pain Assessment: 0-10  0-10 (Numeric) Pain Score: 7  Pain at worst: 9/10      SUBJECTIVE:   Pain location: back pain, L radic to foot, and R radic to calf      Onset: 2008  Hx of C2-T4 Fusion with me on 1/21/2025   Hx of L5-S1 fusion per MD note 2023  He was offered the option of removal of previous L5-S1 instrumentation, L3-4 L4-5 and L5-S1 laminectomy and facetectomy for decompression with L2-S1 instrumentation and pelvic fixation 7/14/25    Hx of injections and ablation   Was prescribed short term prednisone     +N/T in L foot  -B/B  +Cough/sneeze/strain    Reviewed medical hx form     Imaging:  Per MD note:  CT lumbar spine was personally reviewed and shows evidence of prior L5-S1 fusion with screws in good position and intact fusion across the disc base at L5-S1, vacuum disc phenomenon at L4-5 and L3-4 with sclerotic endplate changes at L3-4     MRI L-spine  "showing signs of adjacent segment disease evident by L4-5 disc herniation causing severe lateral recess and central canal stenosis, as well as L3-4 disc herniation causing moderate to severe lateral recess as well as severe central canal stenosis with L3-4 joint effusions     EOS films that were done in October 2024 were reviewed personally and shows evidence of a previous L5-S1 instrumented fusion with well aligned spine in the lumbar spine and absence of any regional lumbar deformity.  There is evidence of cervical spine deformity that has since been treated.     Most recent full spine scoliosis film done on Zoila 3, 2025 was reviewed personally and shows evidence of well-placed instrumentation in the cervical spine with no evidence of instrumentation failure.  Is global in the alignment seems to be within normal limits with evidence of mild positive cranial hip axis measuring about 4 cm.    Aggravating factors:  Anything too long-sitting, walking, standing     Alleviating factors:  Takes pain meds with little to no relief     Prior level of function:  Previously independent with all functional activity    Functional limitations:  Uses cane     Home setup:  Reviewed and no concern    Work:  Retired-   He was a      Patient stated goal:  Pt states on his medical hx form that \"don't expect any improvement. I can't really move. Surgery is only fix\"     Prior tx:  No recent PT     Objective:    Lower Extremity Strength: (WFL unless documented below) (p=pain)  Measured supine and sitting   MMT 5/5 max RIGHT LEFT   Hip Flexion 4- 4-   Hip Extension 4- 4-   Hip Abduction 4- 4-   Hip Adduction  4- 4-   Knee Extension 4 4   Knee Flexion 4 4     Lower Extremity Flexibility: (WNL unless documented below) (p=pain)  Flexibility  RIGHT LEFT   Quad NT NT   Hamstring  Significant loss due to pain  Mod loss    Hip flexor  Mod loss  Mod loss    Piriformis  Mod loss Mod loss    Gastroc  Mod loss Mod loss   Soleus "  Mod loss Mod loss      Lower Extremity ROM: (WFL unless documented below) (p=pain) *Not able to fully test due to pain and limitation     Lumbar ROM:   Flexion 75% loss, pain, +Gowers   Extension 100% loss     RIGHT LEFT   Side Bend NT NT       Static Testing: NT     Special tests: (WNL unless documented below)    RIGHT LEFT   Slump      SLR +    Mara       Myotomes: (WNL unless documented below)    RIGHT LEFT   L2 Hip Flexion     L3 Knee Extension     L4 Ankle DF  +   L5 Great toe Ext     S1 Ankle PF/Ever  +     Dermatomes: (WNL unless documented below)    RIGHT LEFT   L2 Anterior to Medial Thigh     L3 Medial Knee     L4 Medial Ankle Great Toe     L5 Dorsum of Foot      S1 Lateral Side of Foot     S2 Posterior Medial Thigh          Joint mobility: NT  Palpation: NT    Outcome Measure:   Oswestry: 32        TREATMENT:  Initial evaluation completed. Issued and reviewed HEP with pt that included:  Access Code: [email or web address]  URL: [email or web address]  Date: 06/16/2025  Prepared by: Ela Arce    Exercises  - Standing Shoulder Row with Anchored Resistance  - 1 x daily - 7 x weekly - 2-3 sets - 10 reps  - Shoulder External Rotation and Scapular Retraction  - 1 x daily - 7 x weekly - 2-3 sets - 10 reps  - Seated Hamstring Stretch  - 1 x daily - 7 x weekly - 2 reps - 30 seconds hold  - Hooklying Single Knee to Chest Stretch  - 1 x daily - 7 x weekly - 2 sets - 10 reps - 5 seconds hold    ASSESSEMENT  The pt presents with signs and symptoms consistent with the Physical Therapy diagnosis of long standing LBP with radic to L foot and R calf. He is scheduled for revision of lumbar fusion 7/14/25 -removal of previous L5-S1 instrumentation, L3-4 L4-5 and L5-S1 laminectomy and facetectomy for decompression with L2-S1 instrumentation and pelvic fixation.   Pt demonstrates significant loss of ROM and strength in BLE and lumbar spine. He struggles with ambulation and testing. He is not able tolerate much  in terms of testing and follow ups will be challenging due to pain and functional limitations  Currently follow up visits are not appropriate however post-operatively, pt will benefit from skilled physical therapy to reduce impairments in order to return to prior level of function, reduce pain, increase strength and ROM and improve overall posture.    The physical therapy prognosis is poor for the patient to achieve their goals.   The pt tolerated therapy evaluation with a lot of difficulty today.     Personal factors/barriers to learning that impact therapy include:  Chronicity  Clinical presentation: Evolving with changing characteristics  Level of clinical decision making is Medium    PLAN    Revised 6/19/25.  Pt is not appropriate for PT at this time due to signficant functional limitations and inability to tolerate exercises/tx regardless of modification.   Discharge from PT       Goals:  Resolved       PT Problem       Reduce pain at worst to 2-3/10 with all functional and recreational activity.  (Not met)       Start:  06/16/25    Expected End:  09/14/25    Resolved:  06/19/25         Increase by > or = 25% without increased pain to perform dressing/bathing/grooming tasks and other functional tasks   (Not met)       Start:  06/16/25    Expected End:  09/14/25    Resolved:  06/19/25         Increase by > or = 1/2 mm grade to improve postural awareness and body mechanics to perform daily tasks without increased pain/compensation    (Not met)       Start:  06/16/25    Expected End:  09/14/25    Resolved:  06/19/25         Pt to have NDI score decreased by 10% to display increased overall function.  (Not met)       Start:  06/16/25    Expected End:  09/14/25    Resolved:  06/19/25         Sit with proper posture > or = 15 minutes without VC's or increased pain to drive, eat meals, read   (Not met)       Start:  06/16/25    Expected End:  08/11/25    Resolved:  06/19/25         Patient will demonstrate  independence in home program for support of progression (Not met)       Start:  06/16/25    Expected End:  09/14/25    Resolved:  06/19/25

## 2025-06-17 ENCOUNTER — TELEPHONE (OUTPATIENT)
Dept: NEUROSURGERY | Facility: HOSPITAL | Age: 65
End: 2025-06-17
Payer: MEDICARE

## 2025-06-17 NOTE — TELEPHONE ENCOUNTER
Called patient back and educated prescription for Prednisone can be taken. Dr. Patel aware.     Margarita Etienne RN

## 2025-06-19 ENCOUNTER — APPOINTMENT (OUTPATIENT)
Dept: NEUROSURGERY | Facility: CLINIC | Age: 65
End: 2025-06-19
Payer: MEDICARE

## 2025-06-26 ENCOUNTER — APPOINTMENT (OUTPATIENT)
Dept: NEUROSURGERY | Facility: CLINIC | Age: 65
End: 2025-06-26
Payer: MEDICARE

## 2025-06-27 ENCOUNTER — PRE-ADMISSION TESTING (OUTPATIENT)
Dept: PREADMISSION TESTING | Facility: HOSPITAL | Age: 65
End: 2025-06-27
Payer: MEDICARE

## 2025-06-27 VITALS
SYSTOLIC BLOOD PRESSURE: 138 MMHG | HEIGHT: 73 IN | TEMPERATURE: 97.8 F | BODY MASS INDEX: 35.56 KG/M2 | DIASTOLIC BLOOD PRESSURE: 72 MMHG | WEIGHT: 268.3 LBS | OXYGEN SATURATION: 95 % | HEART RATE: 96 BPM

## 2025-06-27 DIAGNOSIS — M48.061 LUMBAR FORAMINAL STENOSIS: ICD-10-CM

## 2025-06-27 DIAGNOSIS — M51.369 LUMBAR SPINAL STENOSIS DUE TO ADJACENT SEGMENT DISEASE AFTER FUSION PROCEDURE: ICD-10-CM

## 2025-06-27 DIAGNOSIS — M48.061 LUMBAR SPINAL STENOSIS DUE TO ADJACENT SEGMENT DISEASE AFTER FUSION PROCEDURE: ICD-10-CM

## 2025-06-27 DIAGNOSIS — R94.5 ABNORMAL RESULTS OF LIVER FUNCTION STUDIES: ICD-10-CM

## 2025-06-27 DIAGNOSIS — I10 PRIMARY HYPERTENSION: ICD-10-CM

## 2025-06-27 DIAGNOSIS — R00.2 PALPITATIONS: ICD-10-CM

## 2025-06-27 DIAGNOSIS — Z98.1 LUMBAR SPINAL STENOSIS DUE TO ADJACENT SEGMENT DISEASE AFTER FUSION PROCEDURE: ICD-10-CM

## 2025-06-27 DIAGNOSIS — Z01.818 PREOPERATIVE EXAMINATION: Primary | ICD-10-CM

## 2025-06-27 DIAGNOSIS — M54.16 LUMBAR RADICULOPATHY, CHRONIC: ICD-10-CM

## 2025-06-27 DIAGNOSIS — I10 HYPERTENSION, UNSPECIFIED TYPE: ICD-10-CM

## 2025-06-27 DIAGNOSIS — R01.1 MURMUR: ICD-10-CM

## 2025-06-27 DIAGNOSIS — Z01.810 PREOPERATIVE CARDIOVASCULAR EXAMINATION: ICD-10-CM

## 2025-06-27 DIAGNOSIS — R79.1 ABNORMAL COAGULATION PROFILE: ICD-10-CM

## 2025-06-27 LAB
ABO GROUP (TYPE) IN BLOOD: NORMAL
ANTIBODY SCREEN: NORMAL
APTT PPP: 21 SECONDS (ref 26–36)
INR PPP: 1 (ref 0.9–1.1)
PROTHROMBIN TIME: 11.1 SECONDS (ref 9.8–12.4)
RH FACTOR (ANTIGEN D): NORMAL

## 2025-06-27 PROCEDURE — 36415 COLL VENOUS BLD VENIPUNCTURE: CPT

## 2025-06-27 PROCEDURE — 93010 ELECTROCARDIOGRAM REPORT: CPT | Performed by: INTERNAL MEDICINE

## 2025-06-27 PROCEDURE — 85610 PROTHROMBIN TIME: CPT

## 2025-06-27 PROCEDURE — 87081 CULTURE SCREEN ONLY: CPT

## 2025-06-27 PROCEDURE — 86900 BLOOD TYPING SEROLOGIC ABO: CPT

## 2025-06-27 PROCEDURE — 99205 OFFICE O/P NEW HI 60 MIN: CPT | Performed by: FAMILY MEDICINE

## 2025-06-27 RX ORDER — PREDNISONE 20 MG/1
20 TABLET ORAL DAILY
COMMUNITY
Start: 2025-06-09

## 2025-06-27 RX ORDER — CHLORHEXIDINE GLUCONATE 40 MG/ML
SOLUTION TOPICAL
Qty: 473 ML | Refills: 0 | Status: SHIPPED | OUTPATIENT
Start: 2025-06-27

## 2025-06-27 RX ORDER — CHLORHEXIDINE GLUCONATE ORAL RINSE 1.2 MG/ML
SOLUTION DENTAL
Qty: 15 ML | Refills: 0 | Status: SHIPPED | OUTPATIENT
Start: 2025-06-27

## 2025-06-27 ASSESSMENT — ENCOUNTER SYMPTOMS
CONSTITUTIONAL NEGATIVE: 1
NECK NEGATIVE: 1
GASTROINTESTINAL NEGATIVE: 1
RESPIRATORY NEGATIVE: 1
PALPITATIONS: 1
EYES NEGATIVE: 1
NUMBNESS: 1
MUSCULOSKELETAL NEGATIVE: 1
ENDOCRINE NEGATIVE: 1
WEAKNESS: 1
DYSPNEA WITH EXERTION: 1

## 2025-06-27 ASSESSMENT — DUKE ACTIVITY SCORE INDEX (DASI)
TOTAL_SCORE: 9.75
CAN YOU CLIMB A FLIGHT OF STAIRS OR WALK UP A HILL: NO
CAN YOU TAKE CARE OF YOURSELF (EAT, DRESS, BATHE, OR USE TOILET): YES
CAN YOU HAVE SEXUAL RELATIONS: YES
CAN YOU RUN A SHORT DISTANCE: NO
CAN YOU DO LIGHT WORK AROUND THE HOUSE LIKE DUSTING OR WASHING DISHES: NO
CAN YOU WALK INDOORS, SUCH AS AROUND YOUR HOUSE: YES
DASI METS SCORE: 3.9
CAN YOU PARTICIPATE IN STRENOUS SPORTS LIKE SWIMMING, SINGLES TENNIS, FOOTBALL, BASKETBALL, OR SKIING: NO
CAN YOU DO HEAVY WORK AROUND THE HOUSE LIKE SCRUBBING FLOORS OR LIFTING AND MOVING HEAVY FURNITURE: NO
CAN YOU DO YARD WORK LIKE RAKING LEAVES, WEEDING OR PUSHING A MOWER: NO
CAN YOU PARTICIPATE IN MODERATE RECREATIONAL ACTIVITIES LIKE GOLF, BOWLING, DANCING, DOUBLES TENNIS OR THROWING A BASEBALL OR FOOTBALL: NO
CAN YOU WALK A BLOCK OR TWO ON LEVEL GROUND: NO
CAN YOU DO MODERATE WORK AROUND THE HOUSE LIKE VACUUMING, SWEEPING FLOORS OR CARRYING GROCERIES: NO

## 2025-06-27 ASSESSMENT — LIFESTYLE VARIABLES: SMOKING_STATUS: NONSMOKER

## 2025-06-27 NOTE — NURSING NOTE
Patient seen in PAT. Orders reviewed with PAT provider.    Following labs obtained by documenting RN: COAG, T/S, MRSA SWAB.     EKG: COMPLETED TODAY    Patient discharged with: Pre-procedure instructions/AVS, Incentive spirometer,  And ERAS Kit.        Ermelinda Boyle BSN, RN  [facility]  [facility]    Lm notifying pt I will put in lab order and she need to call back and schedule f/u appt with call center and have them add her to wait list.

## 2025-06-27 NOTE — H&P (VIEW-ONLY)
CPM/PAT Evaluation       Name: Jerman Colón Jr. (Jerman Colón Jr.)  /Age: 1960/64 y.o.       Visit Type:   In-Person       Chief Complaint: perioperative evaluation    HPI    Jerman Colón Jr. is a 64 y.o. male scheduled for Removal of previous L5-S1 instrumentation, Exploration of previous L5-S1 Fusion,  L3-4 L4-5 and L5-S1 laminectomy and facetectomy for decompression with L2-S1 instrumentation and pelvic fixation. - Bilateral on 2025 secondary to Lumbar foraminal stenosis, Lumbar radiculopathy, chronic, Lumbar spinal stenosis due to adjacent segment disease after fusion procedure  with Dr. Patel  who referred to CPM.  Presents to CPM today for perioperative risk stratification and optimization. PMHx includes Postop nausea vomiting, hyperlipidemia, hypertension, CAD, sleep apnea, asthma, GERD, CKD, BPH, anemia, arthritis, lumbar disc disease, spinal stenosis.      Medical History[1]    Surgical History[2]    Patient Sexual activity questions deferred to the physician.    Family History[3]    Allergies[4]    Prior to Admission medications    Medication Sig Start Date End Date Taking? Authorizing Provider   albuterol 90 mcg/actuation inhaler Inhale 2 puffs every 6 hours if needed for wheezing. 25  Lars Mathias PA-C   aspirin 81 mg EC tablet Take 1 tablet (81 mg) by mouth once daily. 12   Historical Provider, MD   atorvastatin (Lipitor) 40 mg tablet Take 1 tablet (40 mg) by mouth once daily at bedtime. 23   Historical Provider, MD   brompheniramine-pseudoeph-DM 2-30-10 mg/5 mL syrup  25   Historical Provider, MD   cyclobenzaprine (Flexeril) 5 mg tablet Take 1 tablet (5 mg) by mouth 3 times a day. 25   Paul Patel MD   gabapentin (Neurontin) 600 mg tablet Take 1 tablet (600 mg) by mouth 3 times a day. 6/10/24   Historical Provider, MD   lidocaine 4 % patch Place 2 patches over 12 hours on the skin once daily. Remove & discard patch within  12 hours or as directed by MD. 1/27/25   Taylor Mehta MD   losartan (Cozaar) 25 mg tablet Take 1 tablet (25 mg) by mouth once daily. 3/10/23   Historical Provider, MD   oxyCODONE (Roxicodone) 5 mg immediate release tablet Take 1 tablet (5 mg) by mouth every 6 hours if needed for moderate pain (4 - 6). 1/26/25   Taylor Mehta MD   pantoprazole (ProtoNix) 40 mg EC tablet Take 1 tablet (40 mg) by mouth 2 times a day. 4/11/22   Historical Provider, MD   polyethylene glycol (Glycolax, Miralax) 17 gram packet Take 17 g by mouth 2 times a day. 1/26/25   Taylor Mehta MD   sennosides-docusate sodium (Mariia-Colace) 8.6-50 mg tablet Take 2 tablets by mouth once daily. 1/26/25   Taylor Mehta MD   tamsulosin (Flomax) 0.4 mg 24 hr capsule Take 1 capsule (0.4 mg) by mouth once daily at bedtime. 11/7/22   Historical Provider, MD WISEMAN ROS:   Constitutional:   neg    Neuro/Psych:    numbness   weakness  Eyes:   neg    Ears:   neg    Nose:   neg    Mouth:   neg    Throat:   neg    Neck:   neg    Cardio:    palpitations   FONG  Respiratory:   neg    Endocrine:   neg    GI:   neg    :   neg    Musculoskeletal:   neg    Hematologic:   neg    Skin:  neg        Physical Exam  Vitals reviewed. Physical exam within normal limits.   Constitutional:       General: He is not in acute distress.     Appearance: Normal appearance. He is normal weight.   HENT:      Head: Normocephalic.   Cardiovascular:      Rate and Rhythm: Normal rate.      Heart sounds: Normal heart sounds.   Pulmonary:      Effort: Pulmonary effort is normal.      Breath sounds: Normal breath sounds.   Musculoskeletal:         General: Normal range of motion.      Cervical back: Normal range of motion.   Skin:     General: Skin is warm and dry.   Neurological:      General: No focal deficit present.      Mental Status: He is alert and oriented to person, place, and time. Mental status is at baseline.   Psychiatric:         Behavior: Behavior normal.  "         PAT AIRWAY:   Airway:      CERVICAL FUSION      Mallampati::  III    TM distance::  >3 FB    Neck ROM::  Limited  normal     implants             Visit Vitals  /72   Pulse 96   Temp 36.6 °C (97.8 °F) (Temporal)   Ht 1.854 m (6' 1\")   Wt 122 kg (268 lb 4.8 oz)   SpO2 95%   BMI 35.40 kg/m²   Smoking Status Never   BSA 2.51 m²       DASI Risk Score      Flowsheet Row Pre-Admission Testing from 6/27/2025 in [facility] Pre-Admission Testing from 1/16/2025 in [facility]   Can you take care of yourself (eat, dress, bathe, or use toilet)?  2.75 filed at 06/27/2025 1020 2.75 filed at 01/16/2025 0954   Can you walk indoors, such as around your house? 1.75 filed at 06/27/2025 1020 1.75 filed at 01/16/2025 0954   Can you walk a block or two on level ground?  0 filed at 06/27/2025 1020 0 filed at 01/16/2025 0954   Can you climb a flight of stairs or walk up a hill? 0 filed at 06/27/2025 1020 0 filed at 01/16/2025 0954   Can you run a short distance? 0 filed at 06/27/2025 1020 0 filed at 01/16/2025 0954   Can you do light work around the house like dusting or washing dishes? 0 filed at 06/27/2025 1020 2.7 filed at 01/16/2025 0954   Can you do moderate work around the house like vacuuming, sweeping floors or carrying groceries? 0 filed at 06/27/2025 1020 0 filed at 01/16/2025 0954   Can you do heavy work around the house like scrubbing floors or lifting and moving heavy furniture?  0 filed at 06/27/2025 1020 0 filed at 01/16/2025 0954   Can you do yard work like raking leaves, weeding or pushing a mower? 0 filed at 06/27/2025 1020 0 filed at 01/16/2025 0954   Can you have sexual relations? 5.25 filed at 06/27/2025 1020 0 filed at 01/16/2025 0954   Can you participate in moderate recreational activities like golf, bowling, dancing, doubles tennis or throwing a baseball or football? 0 filed at 06/27/2025 1020 0 filed at 01/16/2025 0954   Can you participate in strenous sports like " swimming, singles tennis, football, basketball, or skiing? 0 filed at 06/27/2025 1020 0 filed at 01/16/2025 0954   DASI SCORE 9.75 filed at 06/27/2025 1020 7.2 filed at 01/16/2025 0954   METS Score (Will be calculated only when all the questions are answered) 3.9 filed at 06/27/2025 1020 3.6 filed at 01/16/2025 0954          Caprini DVT Assessment      Flowsheet Row Pre-Admission Testing from 6/27/2025 in [facility]Dis[facility] Paul Patel [facility]   DVT Score (IF A SCORE IS NOT CALCULATING, MUST SELECT A BMI TO COMPLETE) 11 filed at 06/27/2025 1126 8 filed at 01/21/2025 1710   Medical Factors Swollen legs filed at 06/27/2025 1126 --   Surgical Factors Major surgery planned, lasting over 3 hours filed at 06/27/2025 1126 Major surgery planned, lasting 2-3 hours filed at 01/21/2025 1710   BMI (BMI MUST BE CHOSEN) 31-40 (Obesity) filed at 06/27/2025 1126 31-40 (Obesity) filed at 01/21/2025 1710          Modified Frailty Index    No data to display       [id number] Stroke Risk Points         N/A 0 to 9 Points:      Last Change: N/A          The [id number] risk score (Lip GH, et al. 2009. © 2010 [employer]) quantifies the risk of stroke for a patient with atrial fibrillation. For patients without atrial fibrillation or under the age of 18 this score appears as N/A. Higher score values generally indicate higher risk of stroke.        This score is not applicable to this patient. Components are not calculated.          Revised Cardiac Risk Index      Flowsheet Row Pre-Admission Testing from 6/27/2025 in [facility] Pre-Admission Testing from 1/16/2025 in [facility]   High-Risk Surgery (Intraperitoneal, Intrathoracic,Suprainguinal vascular) 0 filed at 06/27/2025 1127 0 filed at 01/16/2025 1319   History of ischemic heart disease (History of MI, History of positive  exercuse test, Current chest paint considered due to myocardial ischemia, Use of nitrate therapy, ECG with pathological Q Waves) 0 filed at 06/27/2025 1127 0 filed at 01/16/2025 1319   History of congestive heart failure (pulmonary edemia, bilateral rales or S3 gallop, Paroxysmal nocturnal dyspnea, CXR showing pulmonary vascular redistribution) 0 filed at 06/27/2025 1127 0 filed at 01/16/2025 1319   History of cerebrovascular disease (Prior TIA or stroke) 0 filed at 06/27/2025 1127 0 filed at 01/16/2025 1319   Pre-operative insulin treatment 0 filed at 06/27/2025 1127 0 filed at 01/16/2025 1319   Pre-operative creatinine>2 mg/dl 0 filed at 06/27/2025 1127 0 filed at 01/16/2025 1319   Revised Cardiac Risk Calculator 0 filed at 06/27/2025 1127 0 filed at 01/16/2025 1319          Apfel Simplified Score      Flowsheet Row Pre-Admission Testing from 6/27/2025 in [facility] Pre-Admission Testing from 1/16/2025 in [facility]   Smoking status 1 filed at 06/27/2025 1127 1 filed at 01/16/2025 1356   History of motion sickness or PONV  0 filed at 06/27/2025 1127 0 filed at 01/16/2025 1356   Use of postoperative opioids 1 filed at 06/27/2025 1127 1 filed at 01/16/2025 1356   Gender - Female 0=No filed at 06/27/2025 1127 0=No filed at 01/16/2025 1356   Apfel Simplified Score Calculator 2 filed at 06/27/2025 1127 2 filed at 01/16/2025 1356          Risk Analysis Index Results This Encounter    No data found in the last 10 encounters.       Stop Bang Score      Flowsheet Row Pre-Admission Testing from 6/27/2025 in [facility] Pre-Admission Testing from 1/16/2025 in [facility]   Do you snore loudly? 1 filed at 06/27/2025 1019 1 filed at 01/16/2025 0954   Do you often feel tired or fatigued after your sleep? 1 filed at 06/27/2025 1019 1 filed at 01/16/2025 0954   Has anyone ever observed you stop breathing in your sleep? 1 filed at 06/27/2025 1019 1 filed at  01/16/2025 0954   Do you have or are you being treated for high blood pressure? 1 filed at 06/27/2025 1019 1 filed at 01/16/2025 0954   Recent BMI (Calculated) 33.5 filed at 06/27/2025 1019 33.7 filed at 01/16/2025 0954   Is BMI greater than 35 kg/m2? 0=No filed at 06/27/2025 1019 0=No filed at 01/16/2025 0954   Age older than 50 years old? 1=Yes filed at 06/27/2025 1019 1=Yes filed at 01/16/2025 0954   Is your neck circumference greater than 17 inches (Male) or 16 inches (Female)? 0 filed at 06/27/2025 1019 1 filed at 01/16/2025 0954   Gender - Male 1=Yes filed at 06/27/2025 1019 1=Yes filed at 01/16/2025 0954   STOP-BANG Total Score 6 filed at 06/27/2025 1019 7 filed at 01/16/2025 0954          Prodigy: High Risk  Total Score: 19              Prodigy Age Score      Prodigy Gender Score     Prodigy Previous Opioid Use Score           ARISCAT Score for Postoperative Pulmonary Complications      Flowsheet Row Pre-Admission Testing from 6/27/2025 in [facility] Pre-Admission Testing from 1/16/2025 in [facility]   Age Calculated Score 3 filed at 06/27/2025 1127 3 filed at 01/16/2025 1357   Preoperative SpO2 8 filed at 06/27/2025 1127 8 filed at 01/16/2025 1357   Respiratory infection in the last month Either upper or lower (i.e., URI, bronchitis, pneumonia), with fever and antibiotic treatment 0 filed at 06/27/2025 1127 0 filed at 01/16/2025 1357   Preoperative anemia (Hgb less than 10 g/dl) 0 filed at 06/27/2025 1127 0 filed at 01/16/2025 1357   Surgical incision  0 filed at 06/27/2025 1127 0 filed at 01/16/2025 1357   Duration of surgery  23 filed at 06/27/2025 1127 16 filed at 01/16/2025 1357   Emergency Procedure  0 filed at 06/27/2025 1127 0 filed at 01/16/2025 1357   ARISCAT Total Score  34 filed at 06/27/2025 1127 27 filed at 01/16/2025 1357          Heena Perioperative Risk for Myocardial Infarction or Cardiac Arrest (CRISTI)      Flowsheet Row Pre-Admission Testing from 6/27/2025  "in [facility]   Calculated Age Score 1.28 filed at 06/27/2025 1127   Functional Status  0 filed at 06/27/2025 1127   ASA Class  -3.29 filed at 06/27/2025 1127   Creatinine 0 filed at 06/27/2025 1127   Type of Procedure  0.21 filed at 06/27/2025 1127   CRISTI Total Score  -7.05 filed at 06/27/2025 1127   CRISTI % 0.09 filed at 06/27/2025 1127            Assessment and Plan:    Anesthesia/airway:  PONV    Neuro:  No Neuro diagnosis or significant findings on chart review or clinical presentation and evaluation. No further preoperative testing/intervention indicated at this time.    Alert and oriented x 3.  No cognitive decline.     The patient is at increased risk for perioperative delirium secondary to  decreased functional status, type and duration of surgery, Patient instructed on and provided cognitive exercises  --Brain exercise packet given to patient including word find, cross word, etc puzzles to be completed over the week leading up to the surgery.    Patient is at increased risk for perioperative CVA secondary to  cardiac disease, HTN, operative time > 2.5 hours    HEENT  No HEENT diagnosis or significant findings on chart review or clinical presentation and evaluation. No further preoperative testing/intervention indicated at this time.    Cardiovascular  #Hypertension, hyperlipidemia, CAD   - medicated with aspirin, atorvastatin, losartan and follows with PCP. Pt denies cardiovascular symptoms.  Patient endorses Exercise intolerance due to back pain.  Hypertension is well-controlled. Physical exam is benign.      Vital signs today are:  /72   Pulse 96   Temp 36.6 °C (97.8 °F) (Temporal)   Ht 1.854 m (6' 1\")   Wt 122 kg (268 lb 4.8 oz)   SpO2 95%   BMI 35.40 kg/m²     METS: 3.9  RCRI: 0 points, 3.9%  30 day risk of MACE (risk for cardiac death, nonfatal myocardial infarction, and nonfactal cardiac arrest)  CRISTI: 0.09% risk for 30-day postoperative MACE  EKG -June 27, 2025  Normal " "sinus rhythm  Junctional ST depression, probably normal  Borderline ECG  Pending cardiologist review     ECHO 6-30-25  CONCLUSIONS:   1. Left ventricular ejection fraction is normal calculated by Lynch's biplane at 61%.   2. Spectral Doppler shows a Grade I (impaired relaxation pattern) of left ventricular diastolic filling with normal left atrial filling pressure.   3. There is normal right ventricular global systolic function.    The recommendations section mentioned the machine's algorithm suggesting HFpEF and to corrolate clinically. Pt was not SOB, FONG. Pt does have BLE edema but cardiologist saw him on 5-23-25 and thinks it is lymphedema due to \"Bilateral lower extremity dependent edema, likely lymphedema (up to lower thigh, off loop diuretic. No evidence of heart congestion, no JVP, IVC is normal size and collapses on bedside echo. NT proBNP 44).\"    Pt has been instructed to:  - Initiated use of high compression stockings to facilitate venous return and reduce edema.  - Referred to lymphedema clinic for specialized management.  - Advised patient to elevate legs above heart level when sitting to aid in fluid reduction.  - Advised patient to let me know if palpitation recurs again for ambulatory heart monitor order  - Scheduled follow-up in 6 months to assess effectiveness of lymphedema management.        Stress Test 5-15-23  CONCLUSIONS:    1. SPECT Perfusion Study: Abnormal.    2. There is no scintigraphic evidence for inducible ischemia.    3. There is a small (<10%) fixed perfusion defect in the RCA territory.    4. Left ventricle is normal in size. The left ventricle systolic   function is mildly decreased.    5. Right ventricle is normal in size. The right ventricle systolic   function is normal.    6. This is a low risk scan.      Pulmonary  #Asthma, sleep apnea - reports is well-controlled and does not use any respiratory treatments.  patient was diagnosed with sleep apnea, uses oxygen at bedtime but " does not need CPAP.  Denies any exacerbations or hospitalizations in the past year.  Physical exam is benign, denies respiratory symptoms.  No further perioperative intervention.     Preoperative deep breathing educational handout provided to patient.    Patient is at increased risk of perioperative complications secondary to  age > 60, obesity, site of surgery, major surgery, duration of surgery > 2 hours, types of anesthetic    Stop Bang 6 point(s) which is a high risk for moderate to severe BIANCA   Known sleep apnea diagnosis      ARISCAT: 26-44 points, 13.3% risk of in-hospital postoperative pulmonary complication         PRODIGY: 21 points which is a High risk for opioid-induced respiratory depression          Pumonary toilet education discussed, patient also provided deep breathing exercises and incentive spirometry educational handout    Renal  No Renal diagnosis or significant findings on chart review or clinical presentation and evaluation. No further preoperative testing or intervention is indicated at this time.    No renal diagnosis, however patient is at increase risk for perioperative renal complications secondary to  Age equal to or greater than 56, BMI equal to or greater than 30, HTN, use of an ace, arb, or NSAID    Endocrine  #diabetes type 2 -currently controlled with diet.  A1c was 5.7  In March with no medications on board.  Patient is prescribed metformin but does not take it follows with PCP.        Hemoglobin A1C   Date Value Ref Range Status   03/22/2025 5.7 (H) 4.3 - 5.6 % Final     Comment:     American Diabetes Association guidelines indicate that patients with HgbA1c in the range 5.7-6.4% are at increased risk for development of diabetes, and intervention by lifestyle modification may be beneficial. HgbA1c greater or equal to 6.5% is considered diagnostic of diabetes.      Hematologic  #Anemia, remote hx of polycythemia  - hemoglobin tends to run around 10-11.  At his baseline today.       Patient confirms that they will accept blood should they need it.     Caprini Score 11, patient at High risk for perioperative DVT.                      Patient provided VTE education/handout.      Gastrointestinal  #GERD - patient reports well-controlled GERD.   Patient is able to lie flat, no heartburn, no globus feeling in throat.  Denies GI/ symptoms.  Patient is medicated with pantoprazole (continue).     Eat-10 score 0: self-perceived oropharyngeal dysphagia scale (0-40)     Apfel 2 points 39% risk for post operative N/V    Infectious disease  No infectious diagnosis or significant findings on chart review or clinical presentation and evaluation.     Prescription provided for CHG body wash and dental rinse. CHG use instructions reviewed and provided to patient.  Staph screen collected    Patient denies use of antibiotics in the past 3 months.    Musculoskeletal  #Arthritis - patient has history of arthritis.  In multiple joints.  Instructed to avoid NSAIDs 7 days prior to surgery.  Expresses understanding.  No further workup needed.    Orders  Labs & Imaging ordered and review:  CBC, BMP, MRSA, T&S, Coags, EKG, ECHO    Laboratory results  Recent Results (from the past 16 weeks)   HEMOGLOBIN A1C    Collection Time: 03/22/25  9:17 AM   Result Value Ref Range    Hemoglobin A1C 5.7 (H) 4.3 - 5.6 %    Estimated Average Glucose 117 mg/dL   POCT SPOTFIRE R/ST Panel Mini w/Strep A ([employer]) manually resulted    Collection Time: 03/28/25 11:51 AM   Result Value Ref Range    POC Group A Strep, PCR Negative Negative    POC Respiratory Syncytial Virus PCR Negative Negative    POC Influenza A Virus PCR Negative Negative    POC Influenza B Virus PCR Negative Negative    POC Human Rhinovirus PCR Positive (A) Negative   POCT SPOTFIRE R/ST Panel Mini w/COVID ([employer]) manually resulted    Collection Time: 04/05/25  5:15 PM    Specimen: Swab   Result Value Ref Range    POC Sars-Cov-2 PCR Negative Negative    POC  Respiratory Syncytial Virus PCR Negative Negative    POC Influenza A Virus PCR Negative Negative    POC Influenza B Virus PCR Negative Negative    POC Human Rhinovirus PCR Positive (A) Negative   Type And Screen Is this order related to pregnancy or an upcoming surgery? Yes; Where will this surgery/delivery be performed? [facility]; What is the date of the surgery? 7/14/2025; Has this patient ever had a transfusion? Unknown; ...    Collection Time: 06/27/25 10:51 AM   Result Value Ref Range    ABO TYPE A     Rh TYPE POS     ANTIBODY SCREEN NEG    Staphylococcus aureus/MRSA colonization, Culture    Collection Time: 06/27/25 10:51 AM    Specimen: Anterior Nares; Swab   Result Value Ref Range    Staph/MRSA Screen Culture No Staphylococcus aureus isolated    Coagulation Screen    Collection Time: 06/27/25 10:51 AM   Result Value Ref Range    Protime 11.1 9.8 - 12.4 seconds    INR 1.0 0.9 - 1.1    aPTT 21 (L) 26 - 36 seconds   Transthoracic Echo Complete    Collection Time: 06/30/25  1:56 PM   Result Value Ref Range    AV pk elsie 1.37 m/s    AV mn grad 6 mmHg    LVOT diam 2.29 cm    MV E/A ratio 0.71     LA vol index A/L 24.8 ml/m2    Tricuspid annular plane systolic excursion 2.2 cm    LV EF 61 %    RV free wall pk S' 11.92 cm/s    LVIDd 4.55 cm    Aortic Valve Area by Continuity of VTI 3.53 cm2    Aortic Valve Area by Continuity of Peak Velocity 3.43 cm2    AV pk grad 8 mmHg    LV A4C EF 61.2         Other  Hold all vitamins and supplements 7 days prior to surgery  Tylenol okay to continue, please hold Aleve/naproxen/ibuprofen/Excedrin/Motrin/Mobic (NSAIDs) for 7 days prior to surgery  No lotion/moisturizers or Deoderant after last shower prior to surgery    Medication, NPO, and all other CPM instructions were reviewed with the patient.  All patient questions were addressed.    CAROLINA Latham-CNP             [1]   Past Medical History:  Diagnosis Date    Anemia     Arthritis     Asthma     BPH  (benign prostatic hyperplasia)     Cervical myelopathy     COPD (chronic obstructive pulmonary disease) (Multi)     Coronary artery disease     GERD (gastroesophageal reflux disease)     controlled    History of recent steroid use     Hyperlipidemia     Hypertension     Joint pain     Lumbar disc disease     Nephrolithiasis     Polycythemia     PONV (postoperative nausea and vomiting)     Proteinuria     Sepsis (Multi)     Skin disorder     BASAL MULTIPLE TIMES    Sleep apnea     Spinal stenosis     Vocal cord mass    [2]   Past Surgical History:  Procedure Laterality Date    CARDIAC CATHETERIZATION Left 09/2023    CERVICAL FUSION      COLONOSCOPY      DENTAL SURGERY      HERNIA REPAIR      LUMBAR FUSION  2018    L5-S1 Fusion    SKIN BIOPSY      TONSILLECTOMY      TOTAL SHOULDER ARTHROPLASTY Bilateral    [3]   Family History  Problem Relation Name Age of Onset    Other (htn) Mother      Diabetes Mother      Other (htn) Father      Heart attack Father      Lung cancer Mother's Sister     [4] No Known Allergies

## 2025-06-27 NOTE — PREPROCEDURE INSTRUCTIONS
Thank you for visiting [facility] ([facility]) today for your pre-procedure evaluation, you were seen by     NIKI Latham  Department of Anesthesiology and Perioperative Medicine  Main phone [phone]  Fax [phone]    This summary includes instructions and information to aid you during your perioperative period.  Please read carefully. If you have any questions about your visit today, please call the number listed above.  If you become ill or have any changes to your health before your surgery, please contact your primary care provider and alert your surgeon.    General Medications Instructions (see back for further medication instructions)  Hold all vitamins and supplements 7 days prior to surgery  Tylenol okay to continue, please HOLD Aleve/naproxen/ibuprofen (NSAIDs) for 7 days prior to surgery  No lotion/moisturizers or Deoderant after last shower on day of surgery  [facility] will call you the business day prior to your surgery between 3-5 PM and let you know what time to present to the hospital before your surgery (For Monday surgeries, they will call on Friday afternoon)    You will be called business day prior to surgery to confirm arrival time.     Preparing for Surgery       Preoperative Fasting Guidelines  Why must I stop eating and drinking near surgery time?  With sedation, food or liquid in your stomach can enter your lungs causing serious complications  Food can increase nausea and vomiting  When do I need to stop eating and drinking before my surgery?  Do not eat any food after midnight the night before your surgery/procedure.  You may have up to 13.5 ounces of clear liquid until TWO hours before your instructed arrival time to the hospital.  This includes water, black tea/coffee, (no milk or cream) apple juice, and electrolyte drinks (Gatorade)  You may chew gum until TWO hours before your surgery/procedure   Do not eat any food after midnight the night  before your surgery/procedure. You may have up to 13.5 ounces of clear liquid until TWO hours before your instructed arrival time to the hospital.  This includes water, black tea/coffee, (no milk or cream) apple juice, and electrolyte drinks (Gatorade). You may chew gum until TWO hours before your surgery/procedure       Tobacco and Alcohol;  Do not drink alcohol or smoke within 24 hours of surgery.  It is best to quit smoking for as long as possible before any surgery or procedure.    Home Preoperative Antibacterial Shower     What is a home preoperative antibacterial shower?  This shower is a way of cleaning the skin with a germ killing soap before surgery.  The soap contains chlorhexidine, commonly known as CHG.  CHG is a soap for your skin with germ killing ability.  Let your doctor know if you are allergic to chlorhexidine.    Why do I need to take a preoperative antibacterial shower?  Skin is not sterile.  It is best to try to make your skin as free of germs as possible before surgery.  Proper cleansing with a germ killing soap before surgery can lower the number of germs on your skin.  This helps to reduce the risk of infection at the surgical site.  Following the instructions listed below will help you prepare your skin for surgery.      How do I use the CHG skin cleanser?  Steps:  Begin using your CHG soap five days before your scheduled surgery on ________________________.    Days 1-4 Shower before bed:  Wash your face and genitals with your normal soap and rinse.           2.    Apply the CHG soap to a clean wet washcloth.  Turn the water off or move away                From the water spray to avoid premature rinsing of the CHG soap as you are applying.     3.   Lather your entire body from the neck down.  Do not use on your face or genitals.  4. Pay special attention to the area(s) where your incision(s) will be located unless they are on your face.  Avoid scrubbing your skin too hard.  The important  point is to have the CHG soap sit on your skin for 3 minutes.    When the 3 minutes are up, turn on the water and rinse the CHG soap off your body completely.   Pat yourself dry with a clean, freshly-laundered towel.  Dress in clean, freshly laundered night clothes.    Be sure to change bed sheets and blankets at least on the first night of CHG body wash use. May change linens every night of the above protocol for maximum benefit.   Day 5:  Last shower is the morning of surgery: Follow above Instructions.    NOTE:        *Keep CHG soap out of eyes and ear canals   *DO NOT wash with regular soap on your body after you have used the CHG soap solution  *DO NOT apply powders, lotions, or perfume.  *Deodorant may be used days 1-4, BUT NOT the day of surgery          Patient Information: Pre-Operative Infection Prevention Measures     Why did I have my nose, under my arms and groin swabbed?  The purpose of the swab is to identify Staphylococcus aureus inside your nose or on your skin.  The swab was sent to the laboratory for culture.  A positive swab/culture for Staphylococcus aureus is called colonization or carriage.      What is Staphylococcus aureus?  Staphylococcus aureus, also known as “staph”, is a germ found on the skin or in the nose of healthy people.  Sometimes Staphylococcus aureus can get into the body and cause an infection.  This can be minor (such as pimples, boils or other skin problems).  It might also be serious (such as blood infection, pneumonia or a surgical site infection).    What is Staphylococcus aureus colonization or carriage?  Colonization or carriage means that a person has the germ but is not sick from it.  These bacteria can be spread on the hands or when breathing or sneezing.    How is Staphylococcus aureus spread?  It is most often spread by close contact with a person or item that carries it.    What happens if my culture is positive for Staphylococcus aureus?  Your doctor/medical team  will use this information to guide any antibiotic treatment which may be necessary.  Regardless of the culture results, we will clean the inside of your nose with a betadine swab just before you have your surgery.      Will I get an infection if I have Staphylococcus aureus in my nose or on my skin?  Anyone can get an infection with Staphylococcus aureus.  However, the best way to reduce your risk of infection is to follow the instructions provided to you for the use of your CHG soap and dental rinse.            Patient Information: Oral/Dental Rinse  **This is a prescription; pick it up at your preferred local pharmacy **  What is oral/dental rinse?   It is a mouthwash. It is a way of cleaning the mouth with a germ killing solution before your surgery.  The solution contains chlorhexidine, commonly known as CHG.   It is used inside the mouth to kill a bacteria known as Staphylococcus aureus.  Let your doctor know if you are allergic to Chlorhexidine.    Why do I need to use CHG oral/dental rinse?  The CHG oral/dental rinse helps to kill a bacteria in your mouth known a Staphylococcus aureus.     This reduces the risk of infection at the surgical site.      Using your CHG oral/dental rinse  STEPS:  Use your CHG oral/dental rinse after you brush your teeth the night before (at bedtime) and the morning of your surgery.  Follow all directions on your prescription label.    Use the cap on the container to measure 15ml (fill cap to fill line)  Swish (gargle if you can) the mouthwash in your mouth for at least 30 seconds, (do not to swallow) spit out  After you use your CHG rinse, do not rinse your mouth with water, drink or eat.  Please refer to prescription label for the appropriate time to resume oral intake  Dental rinse comes in one size bottle: 473ml ~16oz.  You will have leftover    rinse, discard after this use.    What side effects might I have using the CHG oral/dental rinse?  CHG rinse will stick to plaque on  the teeth.  Brush and floss just before use.  Teeth brushing will help avoid staining of plaque during use.           Ensure Surgery Immuno-Nutrition Shake; This shake provides specialized nutrients to help prepare your body for surgery. Your surgeon's office may send it to your home, or you may receive it from [facility] if you are scheduled for an appointment.  If your surgeon has instructed you to begin the shakes and you have not received them at least 7 days before your scheduled surgery, please contact your surgeon's office. You should begin drinking your shakes 6 days before your surgery date, start on _______.  You should drink 1 carton three times a day, you can have one carton with breakfast, lunch, and dinner.  If your surgeon has given you instructions to drink clear liquids the day before surgery, you can continue to drink your Ensure Surgery immune-nutrition shakes.     The Week before Surgery        Seven days before Surgery  Check your [facility] medication instructions  Do the exercises provided to you by [facility]   Arrange for a responsible, adult licensed  to take you home after surgery and stay with you for 24 hours.  You will not be permitted to drive yourself home if you have received any anesthetic/sedation  Five days before surgery  Check your [facility] medication instructions  Do the exercises provided to you by [facility]   Start using Chlorhexidene (CHG) body wash if prescribed (Continue till day of surgery)      The Day before Surgery       Check your [facility] medication and all other [facility] instructions including when to stop eating and drinking  You will be called with your arrival time for surgery in the late afternoon.  If you do not receive a call please reach out to your surgeon's office.  Do not smoke or drink 24 hours before surgery  Prepare items to bring with you to the hospital  Shower with your chlorhexidine wash if prescribed  Brush your teeth and use your chlorhexidine  dental rinse if prescribed    The Day of Surgery       Check your [facility] medication instructions  Ensure you follow the instructions for when to stop eating and drinking  Shower, if prescribed use CHG.  Do not apply any lotions, creams, moisturizers, perfume or deodorant  Brush your teeth and use your CHG dental rinse if prescribed  Wear loose comfortable clothing  Avoid make-up  Remove  jewelry and piercings, consider professional piercing removal with a plastic spacer if needed  Bring photo ID and Insurance card  Bring an accurate medication list that includes medication dose, frequency and allergies  Bring a copy of your advanced directives (will, health care power of )  Bring any devices and controllers as well as medical devices you have been provided with for surgery (CPAP, slings, braces, etc.)  Dentures, eyeglasses, and contacts will be removed before surgery, please bring cases for contacts or glasses    Preoperative Deep Breathing Exercises    Why it is important to do deep breathing exercises before my surgery?  Deep breathing exercises strengthen your breathing muscles.  This helps you to recover after your surgery and decreases the chance of breathing complications.    How are the deep breathing exercises done?  Sit straight with your back supported.  Breathe in deeply and slowly through your nose. Your lower rib cage should expand and your abdomen may move forward.  Hold that breath for 3 to 5 seconds.  Breathe out through pursed lips, slowly and completely.  Rest and repeat 10 times every hour while awake.  Rest longer if you become dizzy or lightheaded.         Preoperative Brain Exercises    What are brain exercises?  A brain exercise is any activity that engages your thinking (cognitive) skills.    What types of activities are considered brain exercises?  Jigsaw puzzles, crossword puzzles, word jumble, memory games, word search, and many more.  Many can be found free online or on your phone  via a mobile satya.    Why should I do brain exercises before my surgery?  More recent research has shown brain exercise before surgery can lower the risk of postoperative delirium (confusion) which can be especially important for older adults.  Patients who did brain exercises for 5 to 10 hours the days before surgery, cut their risk of postoperative delirium in half up to 1 week after surgery.    Sit-to-Stand Exercise    What is the sit-to-stand exercise?  The sit-to-stand exercise strengthens the muscles of your lower body and muscles in the center of your body (core muscles for stability) helping to maintain and improve your strength and mobility.  How do I do the sit-to-stand exercise?  The goal is to do this exercise without using your arms or hands.  If this is too difficult, use your arms and hands or a chair with armrests to help slowly push yourself to the standing position and lower yourself back to the sitting position. As the movement becomes easier use your arms and hands less.    Steps to the sit-to-stand exercise  Sit up tall in a sturdy chair, knees bent, feet flat on the floor shoulder-width apart.  Shift your hips/pelvis forward in the chair to correctly position yourself for the next movement.  Lean forward at your hips.  Stand up straight putting equal weight on both feet.  Check to be sure you are properly aligned with the chair, in a slow controlled movement sit back down.  Repeat this exercise 10-15 times.  If needed you can do it fewer times until your strength improves.  Rest for 1 minute.  Do another 10-15 sit-to-stand exercises.  Try to do this in the morning and evening.       Simple things you can do to help prevent blood clots     Blood clots are blockages that can form in the body's veins. When a blood clot forms in your deep veins, it may be called a deep vein thrombosis, or DVT for short. Blood clots can happen in any part of the body where blood flows, but they are most common in the  arms and legs. If a piece of a blood clot breaks free and travels to the lungs, it is called a pulmonary embolus (PE). A PE can be a very serious problem.      Being in the hospital or having surgery can raise your chances of getting a blood clot because you may not be well enough to move around as much as you normally do.         Ways you can help prevent blood clots in the hospital       Wearing SCDs  SCDs stands for Sequential Compression Devices.   SCDs are special sleeves that wrap around your legs. They attach to a pump that fills them with air to gently squeeze your legs every few minutes.  This helps return the blood in your legs to your heart.   SCDs should only be taken off when walking or bathing. SCDs may not be comfortable, but they can help save your life.              Pump SCD leg sleeves  Wearing compression stockings - if your doctor orders them. These special snug-fitting stockings gently squeeze your legs to help blood flow.       Walking. Walking helps move the blood in your legs.   If your doctor says it is ok, try walking the halls at least   5 times a day. Ask us to help you get up, so you don't fall.      Taking any blood-thinning medicines your doctor orders.              Ways you can help prevent blood clots at home         Wearing compression stockings - if your doctor orders them.   Walking - to help move the blood in your legs.    Taking any blood-thinning medicines your doctor orders.      Signs of a blood clot or PE    Tell your doctor or nurse right away if you have any of the problems listed below.         If you are at home, seek medical care right away. Call 911 for chest pain or problems breathing.            Signs of a blood clot (DVT) - such as pain, swelling, redness, or warmth in your arm or legs.  Signs of a pulmonary embolism (PE) - such as chest pain or feeling short of breath

## 2025-06-27 NOTE — NURSING NOTE
Contacted [facility] re: stat order for patient.  Pt unable to stay today until 1 p for first available appointment.  Pt and wife agreeable to Monday, June 30 at 1p in [place].    Provided patient /wife with address.      Provider Shiloh Beauchamp NP, updated.     Valeria Escobar BSN, RN  [facility]   [facility]

## 2025-06-27 NOTE — CPM/PAT H&P
CPM/PAT Evaluation       Name: Jerman Colón Jr. (Jerman Colón Jr.)  /Age: 1960/64 y.o.       Visit Type:   In-Person       Chief Complaint: perioperative evaluation    HPI    Jerman Colón Jr. is a 64 y.o. male scheduled for Removal of previous L5-S1 instrumentation, Exploration of previous L5-S1 Fusion,  L3-4 L4-5 and L5-S1 laminectomy and facetectomy for decompression with L2-S1 instrumentation and pelvic fixation. - Bilateral on 2025 secondary to Lumbar foraminal stenosis, Lumbar radiculopathy, chronic, Lumbar spinal stenosis due to adjacent segment disease after fusion procedure  with Dr. Patel  who referred to CPM.  Presents to CPM today for perioperative risk stratification and optimization. PMHx includes Postop nausea vomiting, hyperlipidemia, hypertension, CAD, sleep apnea, asthma, GERD, CKD, BPH, anemia, arthritis, lumbar disc disease, spinal stenosis.      Medical History[1]    Surgical History[2]    Patient Sexual activity questions deferred to the physician.    Family History[3]    Allergies[4]    Prior to Admission medications    Medication Sig Start Date End Date Taking? Authorizing Provider   albuterol 90 mcg/actuation inhaler Inhale 2 puffs every 6 hours if needed for wheezing. 25  Lars Mathias PA-C   aspirin 81 mg EC tablet Take 1 tablet (81 mg) by mouth once daily. 12   Historical Provider, MD   atorvastatin (Lipitor) 40 mg tablet Take 1 tablet (40 mg) by mouth once daily at bedtime. 23   Historical Provider, MD   brompheniramine-pseudoeph-DM 2-30-10 mg/5 mL syrup  25   Historical Provider, MD   cyclobenzaprine (Flexeril) 5 mg tablet Take 1 tablet (5 mg) by mouth 3 times a day. 25   Paul Patel MD   gabapentin (Neurontin) 600 mg tablet Take 1 tablet (600 mg) by mouth 3 times a day. 6/10/24   Historical Provider, MD   lidocaine 4 % patch Place 2 patches over 12 hours on the skin once daily. Remove & discard patch within  "12 hours or as directed by MD. 1/27/25   Taylor Mehta MD   losartan (Cozaar) 25 mg tablet Take 1 tablet (25 mg) by mouth once daily. 3/10/23   Historical Provider, MD   oxyCODONE (Roxicodone) 5 mg immediate release tablet Take 1 tablet (5 mg) by mouth every 6 hours if needed for moderate pain (4 - 6). 1/26/25   Taylor Mehta MD   pantoprazole (ProtoNix) 40 mg EC tablet Take 1 tablet (40 mg) by mouth 2 times a day. 4/11/22   Historical Provider, MD   polyethylene glycol (Glycolax, Miralax) 17 gram packet Take 17 g by mouth 2 times a day. 1/26/25   Taylor Mehta MD   sennosides-docusate sodium (Mariia-Colace) 8.6-50 mg tablet Take 2 tablets by mouth once daily. 1/26/25   Taylor Mehta MD   tamsulosin (Flomax) 0.4 mg 24 hr capsule Take 1 capsule (0.4 mg) by mouth once daily at bedtime. 11/7/22   Historical Provider, MD        PAT ROS:   Constitutional:   neg    Neuro/Psych:    numbness   weakness  Eyes:   neg    Ears:   neg    Nose:   neg    Mouth:   neg    Throat:   neg    Neck:   neg    Cardio:    palpitations   FONG  Respiratory:   neg    Endocrine:   neg    GI:   neg    :   neg    Musculoskeletal:   neg    Hematologic:   neg    Skin:  neg        PAT Physical Exam     PAT AIRWAY:   Airway:      CERVICAL FUSION      Mallampati::  III    TM distance::  >3 FB    Neck ROM::  Limited  normal     implants             Visit Vitals  /72   Pulse 96   Temp 36.6 °C (97.8 °F) (Temporal)   Ht 1.854 m (6' 1\")   Wt 122 kg (268 lb 4.8 oz)   SpO2 95%   BMI 35.40 kg/m²   Smoking Status Never   BSA 2.51 m²       DASI Risk Score      Flowsheet Row Pre-Admission Testing from 6/27/2025 in [facility] Pre-Admission Testing from 1/16/2025 in [facility]   Can you take care of yourself (eat, dress, bathe, or use toilet)?  2.75 filed at 06/27/2025 1020 2.75 filed at 01/16/2025 0954   Can you walk indoors, such as around your house? 1.75 filed at 06/27/2025 1020 1.75 filed at " 01/16/2025 0954   Can you walk a block or two on level ground?  0 filed at 06/27/2025 1020 0 filed at 01/16/2025 0954   Can you climb a flight of stairs or walk up a hill? 0 filed at 06/27/2025 1020 0 filed at 01/16/2025 0954   Can you run a short distance? 0 filed at 06/27/2025 1020 0 filed at 01/16/2025 0954   Can you do light work around the house like dusting or washing dishes? 0 filed at 06/27/2025 1020 2.7 filed at 01/16/2025 0954   Can you do moderate work around the house like vacuuming, sweeping floors or carrying groceries? 0 filed at 06/27/2025 1020 0 filed at 01/16/2025 0954   Can you do heavy work around the house like scrubbing floors or lifting and moving heavy furniture?  0 filed at 06/27/2025 1020 0 filed at 01/16/2025 0954   Can you do yard work like raking leaves, weeding or pushing a mower? 0 filed at 06/27/2025 1020 0 filed at 01/16/2025 0954   Can you have sexual relations? 5.25 filed at 06/27/2025 1020 0 filed at 01/16/2025 0954   Can you participate in moderate recreational activities like golf, bowling, dancing, doubles tennis or throwing a baseball or football? 0 filed at 06/27/2025 1020 0 filed at 01/16/2025 0954   Can you participate in strenous sports like swimming, singles tennis, football, basketball, or skiing? 0 filed at 06/27/2025 1020 0 filed at 01/16/2025 0954   DASI SCORE 9.75 filed at 06/27/2025 1020 7.2 filed at 01/16/2025 0954   METS Score (Will be calculated only when all the questions are answered) 3.9 filed at 06/27/2025 1020 3.6 filed at 01/16/2025 0954          [place] DVT Assessment      Flowsheet Row Pre-Admission Testing from 6/27/2025 in [facility] Admission (Discharged) from 1/21/2025 in [facility] 4 with Paul Patel MD   DVT Score (IF A SCORE IS NOT CALCULATING, MUST SELECT A BMI TO COMPLETE) 11 filed at 06/27/2025 1126 8 filed at 01/21/2025 1710   Medical Factors Swollen legs filed at 06/27/2025 1126 --    Surgical Factors Major surgery planned, lasting over 3 hours filed at 06/27/2025 1126 Major surgery planned, lasting 2-3 hours filed at 01/21/2025 1710   BMI (BMI MUST BE CHOSEN) 31-40 (Obesity) filed at 06/27/2025 1126 31-40 (Obesity) filed at 01/21/2025 1710          Modified Frailty Index    No data to display       [id number] Stroke Risk Points  Current as of just now        N/A 0 to 9 Points:      Last Change: N/A          The [id number] risk score (Lip SIXTO, et al. 2009. © 2010 [employer]) quantifies the risk of stroke for a patient with atrial fibrillation. For patients without atrial fibrillation or under the age of 18 this score appears as N/A. Higher score values generally indicate higher risk of stroke.        This score is not applicable to this patient. Components are not calculated.          Revised Cardiac Risk Index      Flowsheet Row Pre-Admission Testing from 6/27/2025 in [facility] Pre-Admission Testing from 1/16/2025 in [facility]   High-Risk Surgery (Intraperitoneal, Intrathoracic,Suprainguinal vascular) 0 filed at 06/27/2025 1127 0 filed at 01/16/2025 1319   History of ischemic heart disease (History of MI, History of positive exercuse test, Current chest paint considered due to myocardial ischemia, Use of nitrate therapy, ECG with pathological Q Waves) 0 filed at 06/27/2025 1127 0 filed at 01/16/2025 1319   History of congestive heart failure (pulmonary edemia, bilateral rales or S3 gallop, Paroxysmal nocturnal dyspnea, CXR showing pulmonary vascular redistribution) 0 filed at 06/27/2025 1127 0 filed at 01/16/2025 1319   History of cerebrovascular disease (Prior TIA or stroke) 0 filed at 06/27/2025 1127 0 filed at 01/16/2025 1319   Pre-operative insulin treatment 0 filed at 06/27/2025 1127 0 filed at 01/16/2025 1319   Pre-operative creatinine>2 mg/dl 0 filed at 06/27/2025 1127 0 filed at 01/16/2025 1319   Revised Cardiac Risk  Calculator 0 filed at 06/27/2025 1127 0 filed at 01/16/2025 1319          Apfel Simplified Score      Flowsheet Row Pre-Admission Testing from 6/27/2025 in [facility] Pre-Admission Testing from 1/16/2025 in [facility]   Smoking status 1 filed at 06/27/2025 1127 1 filed at 01/16/2025 1356   History of motion sickness or PONV  0 filed at 06/27/2025 1127 0 filed at 01/16/2025 1356   Use of postoperative opioids 1 filed at 06/27/2025 1127 1 filed at 01/16/2025 1356   Gender - Female 0=No filed at 06/27/2025 1127 0=No filed at 01/16/2025 1356   Apfel Simplified Score Calculator 2 filed at 06/27/2025 1127 2 filed at 01/16/2025 1356          Risk Analysis Index Results This Encounter    No data found in the last 10 encounters.       Stop Bang Score      Flowsheet Row Pre-Admission Testing from 6/27/2025 in [facility] Pre-Admission Testing from 1/16/2025 in [facility]   Do you snore loudly? 1 filed at 06/27/2025 1019 1 filed at 01/16/2025 0954   Do you often feel tired or fatigued after your sleep? 1 filed at 06/27/2025 1019 1 filed at 01/16/2025 0954   Has anyone ever observed you stop breathing in your sleep? 1 filed at 06/27/2025 1019 1 filed at 01/16/2025 0954   Do you have or are you being treated for high blood pressure? 1 filed at 06/27/2025 1019 1 filed at 01/16/2025 0954   Recent BMI (Calculated) 33.5 filed at 06/27/2025 1019 33.7 filed at 01/16/2025 0954   Is BMI greater than 35 kg/m2? 0=No filed at 06/27/2025 1019 0=No filed at 01/16/2025 0954   Age older than 50 years old? 1=Yes filed at 06/27/2025 1019 1=Yes filed at 01/16/2025 0954   Is your neck circumference greater than 17 inches (Male) or 16 inches (Female)? 0 filed at 06/27/2025 1019 1 filed at 01/16/2025 0954   Gender - Male 1=Yes filed at 06/27/2025 1019 1=Yes filed at 01/16/2025 0954   STOP-BANG Total Score 6 filed at 06/27/2025 1019 7 filed at 01/16/2025 0954          Prodigy: High  Risk  Total Score: 19              Prodigy Age Score      Prodigy Gender Score     Prodigy Previous Opioid Use Score           ARISCAT Score for Postoperative Pulmonary Complications      Flowsheet Row Pre-Admission Testing from 6/27/2025 in [facility] Pre-Admission Testing from 1/16/2025 in [facility]   Age Calculated Score 3 filed at 06/27/2025 1127 3 filed at 01/16/2025 1357   Preoperative SpO2 8 filed at 06/27/2025 1127 8 filed at 01/16/2025 1357   Respiratory infection in the last month Either upper or lower (i.e., URI, bronchitis, pneumonia), with fever and antibiotic treatment 0 filed at 06/27/2025 1127 0 filed at 01/16/2025 1357   Preoperative anemia (Hgb less than 10 g/dl) 0 filed at 06/27/2025 1127 0 filed at 01/16/2025 1357   Surgical incision  0 filed at 06/27/2025 1127 0 filed at 01/16/2025 1357   Duration of surgery  23 filed at 06/27/2025 1127 16 filed at 01/16/2025 1357   Emergency Procedure  0 filed at 06/27/2025 1127 0 filed at 01/16/2025 1357   ARISCAT Total Score  34 filed at 06/27/2025 1127 27 filed at 01/16/2025 1357          Heena Perioperative Risk for Myocardial Infarction or Cardiac Arrest (CRISTI)      Flowsheet Row Pre-Admission Testing from 6/27/2025 in [facility]   Calculated Age Score 1.28 filed at 06/27/2025 1127   Functional Status  0 filed at 06/27/2025 1127   ASA Class  -3.29 filed at 06/27/2025 1127   Creatinine 0 filed at 06/27/2025 1127   Type of Procedure  0.21 filed at 06/27/2025 1127   CRISTI Total Score  -7.05 filed at 06/27/2025 1127   CRISTI % 0.09 filed at 06/27/2025 1127            Assessment and Plan:    Anesthesia/airway:  PONV    Neuro:  No Neuro diagnosis or significant findings on chart review or clinical presentation and evaluation. No further preoperative testing/intervention indicated at this time.    Alert and oriented x 3.  No cognitive decline.     The patient is at increased risk for perioperative delirium  "secondary to  decreased functional status, type and duration of surgery, Patient instructed on and provided cognitive exercises  --Brain exercise packet given to patient including word find, cross word, etc puzzles to be completed over the week leading up to the surgery.    Patient is at increased risk for perioperative CVA secondary to  cardiac disease, HTN, operative time > 2.5 hours    HEENT  No HEENT diagnosis or significant findings on chart review or clinical presentation and evaluation. No further preoperative testing/intervention indicated at this time.    Cardiovascular  #Hypertension, hyperlipidemia, CAD   - medicated with aspirin, atorvastatin, losartan and follows with PCP. Pt denies cardiovascular symptoms.  Patient endorses Exercise intolerance due to back pain.  Hypertension is well-controlled. Physical exam is benign.      Vital signs today are:  /72   Pulse 96   Temp 36.6 °C (97.8 °F) (Temporal)   Ht 1.854 m (6' 1\")   Wt 122 kg (268 lb 4.8 oz)   SpO2 95%   BMI 35.40 kg/m²     METS: 3.9  RCRI: 0 points, 3.9%  30 day risk of MACE (risk for cardiac death, nonfatal myocardial infarction, and nonfactal cardiac arrest)  CRISTI: 0.09% risk for 30-day postoperative MACE  EKG -June 27, 2025  Normal sinus rhythm  Junctional ST depression, probably normal  Borderline ECG  Pending cardiologist review     ECHO 6-30-25 PENDING        Pulmonary  #Asthma, sleep apnea - reports is well-controlled and does not use any respiratory treatments.  patient was diagnosed with sleep apnea, uses oxygen at bedtime but does not need CPAP.  Denies any exacerbations or hospitalizations in the past year.  Physical exam is benign, denies respiratory symptoms.  No further perioperative intervention.     Preoperative deep breathing educational handout provided to patient.    Patient is at increased risk of perioperative complications secondary to  age > 60, obesity, site of surgery, major surgery, duration of surgery > 2 " hours, types of anesthetic    Stop Bang 6 point(s) which is a high risk for moderate to severe BIANCA   Known sleep apnea diagnosis      ARISCAT: 26-44 points, 13.3% risk of in-hospital postoperative pulmonary complication         PRODIGY: 21 points which is a High risk for opioid-induced respiratory depression          Pumonary toilet education discussed, patient also provided deep breathing exercises and incentive spirometry educational handout    Renal  No Renal diagnosis or significant findings on chart review or clinical presentation and evaluation. No further preoperative testing or intervention is indicated at this time.    No renal diagnosis, however patient is at increase risk for perioperative renal complications secondary to  Age equal to or greater than 56, BMI equal to or greater than 30, HTN, use of an ace, arb, or NSAID    Endocrine  #diabetes type 2 -currently controlled with diet.  A1c was 5.7  In March with no medications on board.  Patient is prescribed metformin but does not take it follows with PCP.   Patient    Hemoglobin A1C   Date Value Ref Range Status   03/22/2025 5.7 (H) 4.3 - 5.6 % Final     Comment:     American Diabetes Association guidelines indicate that patients with HgbA1c in the range 5.7-6.4% are at increased risk for development of diabetes, and intervention by lifestyle modification may be beneficial. HgbA1c greater or equal to 6.5% is considered diagnostic of diabetes.        Hematologic  #Anemia - hemoglobin tends to run around 10-11.  At his baseline today.        Patient confirms that they will accept blood should they need it.     Caprini Score 11, patient at High risk for perioperative DVT.                      Patient provided VTE education/handout.      Gastrointestinal  #GERD - patient reports well-controlled GERD.   Patient is able to lie flat, no heartburn, no globus feeling in throat.  Denies GI/ symptoms.  Patient is medicated with pantoprazole (continue).     Eat-10  score 0: self-perceived oropharyngeal dysphagia scale (0-40)     Apfel 2 points 39% risk for post operative N/V    Infectious disease  No infectious diagnosis or significant findings on chart review or clinical presentation and evaluation.     Prescription provided for CHG body wash and dental rinse. CHG use instructions reviewed and provided to patient.  Staph screen collected    Patient denies use of antibiotics in the past 3 months.    Musculoskeletal  #Arthritis - patient has history of arthritis.  In multiple joints.  Instructed to avoid NSAIDs 7 days prior to surgery.  Expresses understanding.  No further workup needed.    Orders  Labs & Imaging ordered and review:  CBC, BMP, MRSA, T&S, Coags, EKG, ECHO    Laboratory results  Recent Results (from the past 16 weeks)   HEMOGLOBIN A1C    Collection Time: 03/22/25  9:17 AM   Result Value Ref Range    Hemoglobin A1C 5.7 (H) 4.3 - 5.6 %    Estimated Average Glucose 117 mg/dL   POCT SPOTFIRE R/ST Panel Mini w/Strep A ([employer]) manually resulted    Collection Time: 03/28/25 11:51 AM   Result Value Ref Range    POC Group A Strep, PCR Negative Negative    POC Respiratory Syncytial Virus PCR Negative Negative    POC Influenza A Virus PCR Negative Negative    POC Influenza B Virus PCR Negative Negative    POC Human Rhinovirus PCR Positive (A) Negative   POCT SPOTFIRE R/ST Panel Mini w/COVID ([employer]) manually resulted    Collection Time: 04/05/25  5:15 PM    Specimen: Swab   Result Value Ref Range    POC Sars-Cov-2 PCR Negative Negative    POC Respiratory Syncytial Virus PCR Negative Negative    POC Influenza A Virus PCR Negative Negative    POC Influenza B Virus PCR Negative Negative    POC Human Rhinovirus PCR Positive (A) Negative        Other  Hold all vitamins and supplements 7 days prior to surgery  Tylenol okay to continue, please hold Aleve/naproxen/ibuprofen/Excedrin/Motrin/Mobic (NSAIDs) for 7 days prior to surgery  No lotion/moisturizers or Deoderant  after last shower prior to surgery    Medication, NPO, and all other CPM instructions were reviewed with the patient.  All patient questions were addressed.    Shiloh Beauchamp, CAROLINA-MYAH             [1]   Past Medical History:  Diagnosis Date    Anemia     Arthritis     Asthma     BPH (benign prostatic hyperplasia)     Cervical myelopathy     COPD (chronic obstructive pulmonary disease) (Multi)     Coronary artery disease     GERD (gastroesophageal reflux disease)     controlled    History of recent steroid use     Hyperlipidemia     Hypertension     Joint pain     Lumbar disc disease     Nephrolithiasis     Polycythemia     PONV (postoperative nausea and vomiting)     Proteinuria     Sepsis (Multi)     Skin disorder     BASAL MULTIPLE TIMES    Sleep apnea     Spinal stenosis     Vocal cord mass    [2]   Past Surgical History:  Procedure Laterality Date    CARDIAC CATHETERIZATION Left 09/2023    CERVICAL FUSION      COLONOSCOPY      DENTAL SURGERY      HERNIA REPAIR      LUMBAR FUSION  2018    L5-S1 Fusion    SKIN BIOPSY      TONSILLECTOMY      TOTAL SHOULDER ARTHROPLASTY Bilateral    [3]   Family History  Problem Relation Name Age of Onset    Other (htn) Mother      Diabetes Mother      Other (htn) Father      Heart attack Father      Lung cancer Mother's Sister     [4] No Known Allergies     Respiratory Syncytial Virus PCR Negative Negative    POC Influenza A Virus PCR Negative Negative    POC Influenza B Virus PCR Negative Negative    POC Human Rhinovirus PCR Positive (A) Negative   Type And Screen Is this order related to pregnancy or an upcoming surgery? Yes; Where will this surgery/delivery be performed? [facility] What is the date of the surgery? 7/14/2025; Has this patient ever had a transfusion? Unknown; ...    Collection Time: 06/27/25 10:51 AM   Result Value Ref Range    ABO TYPE A     Rh TYPE POS     ANTIBODY SCREEN NEG    Staphylococcus aureus/MRSA colonization, Culture    Collection Time: 06/27/25 10:51 AM    Specimen: Anterior Nares; Swab   Result Value Ref Range    Staph/MRSA Screen Culture No Staphylococcus aureus isolated    Coagulation Screen    Collection Time: 06/27/25 10:51 AM   Result Value Ref Range    Protime 11.1 9.8 - 12.4 seconds    INR 1.0 0.9 - 1.1    aPTT 21 (L) 26 - 36 seconds   Transthoracic Echo Complete    Collection Time: 06/30/25  1:56 PM   Result Value Ref Range    AV pk elsie 1.37 m/s    AV mn grad 6 mmHg    LVOT diam 2.29 cm    MV E/A ratio 0.71     LA vol index A/L 24.8 ml/m2    Tricuspid annular plane systolic excursion 2.2 cm    LV EF 61 %    RV free wall pk S' 11.92 cm/s    LVIDd 4.55 cm    Aortic Valve Area by Continuity of VTI 3.53 cm2    Aortic Valve Area by Continuity of Peak Velocity 3.43 cm2    AV pk grad 8 mmHg    LV A4C EF 61.2         Other  Hold all vitamins and supplements 7 days prior to surgery  Tylenol okay to continue, please hold Aleve/naproxen/ibuprofen/Excedrin/Motrin/Mobic (NSAIDs) for 7 days prior to surgery  No lotion/moisturizers or Deoderant after last shower prior to surgery    Medication, NPO, and all other CPM instructions were reviewed with the patient.  All patient questions were addressed.    CAROLINA Latham-CNP             [1]   Past Medical History:  Diagnosis Date    Anemia     Arthritis     Asthma     BPH  (benign prostatic hyperplasia)     Cervical myelopathy     COPD (chronic obstructive pulmonary disease) (Multi)     Coronary artery disease     GERD (gastroesophageal reflux disease)     controlled    History of recent steroid use     Hyperlipidemia     Hypertension     Joint pain     Lumbar disc disease     Nephrolithiasis     Polycythemia     PONV (postoperative nausea and vomiting)     Proteinuria     Sepsis (Multi)     Skin disorder     BASAL MULTIPLE TIMES    Sleep apnea     Spinal stenosis     Vocal cord mass    [2]   Past Surgical History:  Procedure Laterality Date    CARDIAC CATHETERIZATION Left 09/2023    CERVICAL FUSION      COLONOSCOPY      DENTAL SURGERY      HERNIA REPAIR      LUMBAR FUSION  2018    L5-S1 Fusion    SKIN BIOPSY      TONSILLECTOMY      TOTAL SHOULDER ARTHROPLASTY Bilateral    [3]   Family History  Problem Relation Name Age of Onset    Other (htn) Mother      Diabetes Mother      Other (htn) Father      Heart attack Father      Lung cancer Mother's Sister     [4] No Known Allergies

## 2025-06-29 LAB — STAPHYLOCOCCUS SPEC CULT: NORMAL

## 2025-06-30 ENCOUNTER — HOSPITAL ENCOUNTER (OUTPATIENT)
Dept: CARDIOLOGY | Facility: CLINIC | Age: 65
Discharge: HOME | End: 2025-06-30
Payer: MEDICARE

## 2025-06-30 DIAGNOSIS — R00.2 PALPITATIONS: ICD-10-CM

## 2025-06-30 DIAGNOSIS — M54.16 LUMBAR RADICULOPATHY, CHRONIC: ICD-10-CM

## 2025-06-30 DIAGNOSIS — Z01.818 PREOPERATIVE EXAMINATION: ICD-10-CM

## 2025-06-30 DIAGNOSIS — I10 HYPERTENSION, UNSPECIFIED TYPE: ICD-10-CM

## 2025-06-30 DIAGNOSIS — M51.369 LUMBAR SPINAL STENOSIS DUE TO ADJACENT SEGMENT DISEASE AFTER FUSION PROCEDURE: ICD-10-CM

## 2025-06-30 DIAGNOSIS — R79.1 ABNORMAL COAGULATION PROFILE: ICD-10-CM

## 2025-06-30 DIAGNOSIS — Z98.1 LUMBAR SPINAL STENOSIS DUE TO ADJACENT SEGMENT DISEASE AFTER FUSION PROCEDURE: ICD-10-CM

## 2025-06-30 DIAGNOSIS — M48.061 LUMBAR SPINAL STENOSIS DUE TO ADJACENT SEGMENT DISEASE AFTER FUSION PROCEDURE: ICD-10-CM

## 2025-06-30 DIAGNOSIS — R01.1 MURMUR: ICD-10-CM

## 2025-06-30 DIAGNOSIS — M48.061 LUMBAR FORAMINAL STENOSIS: ICD-10-CM

## 2025-06-30 DIAGNOSIS — R94.5 ABNORMAL RESULTS OF LIVER FUNCTION STUDIES: ICD-10-CM

## 2025-06-30 DIAGNOSIS — I10 PRIMARY HYPERTENSION: ICD-10-CM

## 2025-06-30 DIAGNOSIS — Z01.810 PREOPERATIVE CARDIOVASCULAR EXAMINATION: ICD-10-CM

## 2025-06-30 LAB
AORTIC VALVE MEAN GRADIENT: 6 MMHG
AORTIC VALVE PEAK VELOCITY: 1.37 M/S
AV PEAK GRADIENT: 8 MMHG
AVA (PEAK VEL): 3.43 CM2
AVA (VTI): 3.53 CM2
EJECTION FRACTION APICAL 4 CHAMBER: 61.2
EJECTION FRACTION: 61 %
LEFT ATRIUM VOLUME AREA LENGTH INDEX BSA: 24.8 ML/M2
LEFT VENTRICLE INTERNAL DIMENSION DIASTOLE: 4.55 CM (ref 3.5–6)
LEFT VENTRICULAR OUTFLOW TRACT DIAMETER: 2.29 CM
MITRAL VALVE E/A RATIO: 0.71
RIGHT VENTRICLE FREE WALL PEAK S': 11.92 CM/S
TRICUSPID ANNULAR PLANE SYSTOLIC EXCURSION: 2.2 CM

## 2025-06-30 PROCEDURE — 0932T N-INVS DET HRT FAIL AUG ECHO: CPT | Performed by: STUDENT IN AN ORGANIZED HEALTH CARE EDUCATION/TRAINING PROGRAM

## 2025-06-30 PROCEDURE — 93306 TTE W/DOPPLER COMPLETE: CPT | Performed by: STUDENT IN AN ORGANIZED HEALTH CARE EDUCATION/TRAINING PROGRAM

## 2025-06-30 PROCEDURE — 93306 TTE W/DOPPLER COMPLETE: CPT

## 2025-07-11 ENCOUNTER — ANESTHESIA EVENT (OUTPATIENT)
Dept: OPERATING ROOM | Facility: HOSPITAL | Age: 65
End: 2025-07-11
Payer: MEDICARE

## 2025-07-11 RX ORDER — OXYCODONE AND ACETAMINOPHEN 5; 325 MG/1; MG/1
1 TABLET ORAL EVERY 6 HOURS
COMMUNITY

## 2025-07-11 NOTE — PROGRESS NOTES
Pharmacy Medication History Review    Jerman Colón Jr. is a 64 y.o. male who is planned to be admitted for No Principal Problem: There is no principal problem currently on the Problem List. Please update the Problem List and refresh.. Pharmacy called the patient prior to their scheduled procedure and reviewed the patient's lybdu-pm-xomgkgdok medications for accuracy.    Medications ADDED:  Oxycodone/acetaminophen 5-325mg  Medications CHANGED:  Cyclobenzaprine 5mg directions from #1TID to #1TIDprn mostly #1HS on most days  Medications REMOVED:   Oxycodone 5mg  Miralax  Prednisone 20mg    Please review updated prior to admission medication list and comments regarding how patient may be taking medications differently by going to Admission tab --> Admission Orders --> Admit Orders / Review prior to admission medications.     Preferred pharmacy, last doses of medications, and allergies to be confirmed with patient by nursing the day of procedure.     Sources used to complete the med history include:  [facility]  Pharmacy dispense history  Patient Interview Good historian  Chart Review  Care Everywhere     Below are additional concerns with the patient's PTA list.  Patient states they are taking #1 tablet of oxycodone/apap 5-325mg every 6 hours. L.F. 07/08/25 #120/30d  Patient states they are taking #1 tablet of cyclobenzaprine 5mg UP to three times a day if needed. Takes #1 tablet at bedtime on most days L.F. 06/10/25 #90/30d    Mai Foster CPhT  Please reach out via Secure Chat for questions

## 2025-07-14 ENCOUNTER — HOSPITAL ENCOUNTER (INPATIENT)
Facility: HOSPITAL | Age: 65
DRG: 447 | End: 2025-07-14
Attending: NEUROLOGICAL SURGERY | Admitting: NEUROLOGICAL SURGERY
Payer: MEDICARE

## 2025-07-14 ENCOUNTER — ANESTHESIA (OUTPATIENT)
Dept: OPERATING ROOM | Facility: HOSPITAL | Age: 65
End: 2025-07-14
Payer: MEDICARE

## 2025-07-14 ENCOUNTER — APPOINTMENT (OUTPATIENT)
Dept: RADIOLOGY | Facility: HOSPITAL | Age: 65
DRG: 447 | End: 2025-07-14
Payer: MEDICARE

## 2025-07-14 DIAGNOSIS — I26.99 PULMONARY EMBOLI: ICD-10-CM

## 2025-07-14 DIAGNOSIS — M43.26 FUSION OF LUMBAR SPINE: ICD-10-CM

## 2025-07-14 DIAGNOSIS — M54.16 LUMBAR RADICULAR PAIN: Primary | ICD-10-CM

## 2025-07-14 DIAGNOSIS — M48.061 LUMBAR SPINAL STENOSIS DUE TO ADJACENT SEGMENT DISEASE AFTER FUSION PROCEDURE: ICD-10-CM

## 2025-07-14 DIAGNOSIS — M54.16 LUMBAR RADICULOPATHY, CHRONIC: ICD-10-CM

## 2025-07-14 DIAGNOSIS — Z98.1 LUMBAR SPINAL STENOSIS DUE TO ADJACENT SEGMENT DISEASE AFTER FUSION PROCEDURE: ICD-10-CM

## 2025-07-14 DIAGNOSIS — I15.9 SECONDARY HYPERTENSION: ICD-10-CM

## 2025-07-14 DIAGNOSIS — R50.9 FEVER OF UNKNOWN ORIGIN: ICD-10-CM

## 2025-07-14 DIAGNOSIS — R94.31 ABNORMAL ELECTROCARDIOGRAM (ECG) (EKG): ICD-10-CM

## 2025-07-14 DIAGNOSIS — G89.18 ACUTE POST-OPERATIVE PAIN: ICD-10-CM

## 2025-07-14 DIAGNOSIS — N30.00 ACUTE CYSTITIS WITHOUT HEMATURIA: ICD-10-CM

## 2025-07-14 DIAGNOSIS — G47.33 OSA (OBSTRUCTIVE SLEEP APNEA): ICD-10-CM

## 2025-07-14 DIAGNOSIS — M48.061 LUMBAR FORAMINAL STENOSIS: ICD-10-CM

## 2025-07-14 DIAGNOSIS — M51.369 LUMBAR SPINAL STENOSIS DUE TO ADJACENT SEGMENT DISEASE AFTER FUSION PROCEDURE: ICD-10-CM

## 2025-07-14 DIAGNOSIS — R97.20 ELEVATED PSA: ICD-10-CM

## 2025-07-14 DIAGNOSIS — I26.94 MULTIPLE SUBSEGMENTAL PULMONARY EMBOLI WITHOUT ACUTE COR PULMONALE: ICD-10-CM

## 2025-07-14 DIAGNOSIS — I95.89 OTHER SPECIFIED HYPOTENSION: ICD-10-CM

## 2025-07-14 DIAGNOSIS — I48.91 ATRIAL FIBRILLATION WITH RAPID VENTRICULAR RESPONSE (MULTI): ICD-10-CM

## 2025-07-14 LAB
ALBUMIN SERPL BCP-MCNC: 3.2 G/DL (ref 3.4–5)
ANION GAP BLDA CALCULATED.4IONS-SCNC: 10 MMO/L (ref 10–25)
ANION GAP BLDA CALCULATED.4IONS-SCNC: 14 MMO/L (ref 10–25)
ANION GAP BLDA CALCULATED.4IONS-SCNC: 5 MMO/L (ref 10–25)
ANION GAP BLDA CALCULATED.4IONS-SCNC: 8 MMO/L (ref 10–25)
ANION GAP BLDA CALCULATED.4IONS-SCNC: 8 MMO/L (ref 10–25)
ANION GAP SERPL CALC-SCNC: 16 MMOL/L (ref 10–20)
BASE EXCESS BLDA CALC-SCNC: -1.2 MMOL/L (ref -2–3)
BASE EXCESS BLDA CALC-SCNC: -1.3 MMOL/L (ref -2–3)
BASE EXCESS BLDA CALC-SCNC: -1.4 MMOL/L (ref -2–3)
BASE EXCESS BLDA CALC-SCNC: -1.9 MMOL/L (ref -2–3)
BASE EXCESS BLDA CALC-SCNC: 3.2 MMOL/L (ref -2–3)
BODY TEMPERATURE: 37 DEGREES CELSIUS
BUN SERPL-MCNC: 18 MG/DL (ref 6–23)
CA-I BLDA-SCNC: 1.07 MMOL/L (ref 1.1–1.33)
CA-I BLDA-SCNC: 1.11 MMOL/L (ref 1.1–1.33)
CA-I BLDA-SCNC: 1.17 MMOL/L (ref 1.1–1.33)
CA-I BLDA-SCNC: 1.18 MMOL/L (ref 1.1–1.33)
CA-I BLDA-SCNC: 1.2 MMOL/L (ref 1.1–1.33)
CALCIUM SERPL-MCNC: 8.3 MG/DL (ref 8.6–10.6)
CHLORIDE BLDA-SCNC: 106 MMOL/L (ref 98–107)
CHLORIDE BLDA-SCNC: 107 MMOL/L (ref 98–107)
CHLORIDE BLDA-SCNC: 108 MMOL/L (ref 98–107)
CHLORIDE SERPL-SCNC: 105 MMOL/L (ref 98–107)
CO2 SERPL-SCNC: 24 MMOL/L (ref 21–32)
CREAT SERPL-MCNC: 1.22 MG/DL (ref 0.5–1.3)
EGFRCR SERPLBLD CKD-EPI 2021: 66 ML/MIN/1.73M*2
ERYTHROCYTE [DISTWIDTH] IN BLOOD BY AUTOMATED COUNT: 19.8 % (ref 11.5–14.5)
GLUCOSE BLD MANUAL STRIP-MCNC: 165 MG/DL (ref 74–99)
GLUCOSE BLDA-MCNC: 103 MG/DL (ref 74–99)
GLUCOSE BLDA-MCNC: 159 MG/DL (ref 74–99)
GLUCOSE BLDA-MCNC: 167 MG/DL (ref 74–99)
GLUCOSE BLDA-MCNC: 176 MG/DL (ref 74–99)
GLUCOSE BLDA-MCNC: 179 MG/DL (ref 74–99)
GLUCOSE SERPL-MCNC: 181 MG/DL (ref 74–99)
HCO3 BLDA-SCNC: 22.1 MMOL/L (ref 22–26)
HCO3 BLDA-SCNC: 23.6 MMOL/L (ref 22–26)
HCO3 BLDA-SCNC: 24.3 MMOL/L (ref 22–26)
HCO3 BLDA-SCNC: 26.2 MMOL/L (ref 22–26)
HCO3 BLDA-SCNC: 28.5 MMOL/L (ref 22–26)
HCT VFR BLD AUTO: 34.7 % (ref 41–52)
HCT VFR BLD EST: 29 % (ref 41–52)
HCT VFR BLD EST: 31 % (ref 41–52)
HGB BLD-MCNC: 10.1 G/DL (ref 13.5–17.5)
HGB BLDA-MCNC: 10.3 G/DL (ref 13.5–17.5)
HGB BLDA-MCNC: 10.4 G/DL (ref 13.5–17.5)
HGB BLDA-MCNC: 9.6 G/DL (ref 13.5–17.5)
INHALED O2 CONCENTRATION: 100 %
INHALED O2 CONCENTRATION: 100 %
INHALED O2 CONCENTRATION: 21 %
INHALED O2 CONCENTRATION: 40 %
INHALED O2 CONCENTRATION: 50 %
LACTATE BLDA-SCNC: 1.3 MMOL/L (ref 0.4–2)
LACTATE BLDA-SCNC: 2 MMOL/L (ref 0.4–2)
LACTATE BLDA-SCNC: 2.3 MMOL/L (ref 0.4–2)
LACTATE BLDA-SCNC: 2.5 MMOL/L (ref 0.4–2)
LACTATE BLDA-SCNC: 2.9 MMOL/L (ref 0.4–2)
MCH RBC QN AUTO: 20.6 PG (ref 26–34)
MCHC RBC AUTO-ENTMCNC: 29.1 G/DL (ref 32–36)
MCV RBC AUTO: 71 FL (ref 80–100)
NRBC BLD-RTO: 0 /100 WBCS (ref 0–0)
OXYHGB MFR BLDA: 96.7 % (ref 94–98)
OXYHGB MFR BLDA: 97 % (ref 94–98)
OXYHGB MFR BLDA: 97.3 % (ref 94–98)
OXYHGB MFR BLDA: 97.5 % (ref 94–98)
OXYHGB MFR BLDA: 97.8 % (ref 94–98)
PCO2 BLDA: 34 MM HG (ref 38–42)
PCO2 BLDA: 39 MM HG (ref 38–42)
PCO2 BLDA: 44 MM HG (ref 38–42)
PCO2 BLDA: 46 MM HG (ref 38–42)
PCO2 BLDA: 57 MM HG (ref 38–42)
PH BLDA: 7.27 PH (ref 7.38–7.42)
PH BLDA: 7.35 PH (ref 7.38–7.42)
PH BLDA: 7.39 PH (ref 7.38–7.42)
PH BLDA: 7.4 PH (ref 7.38–7.42)
PH BLDA: 7.42 PH (ref 7.38–7.42)
PHOSPHATE SERPL-MCNC: 4.9 MG/DL (ref 2.5–4.9)
PLATELET # BLD AUTO: 254 X10*3/UL (ref 150–450)
PO2 BLDA: 103 MM HG (ref 85–95)
PO2 BLDA: 127 MM HG (ref 85–95)
PO2 BLDA: 159 MM HG (ref 85–95)
PO2 BLDA: 196 MM HG (ref 85–95)
PO2 BLDA: 229 MM HG (ref 85–95)
POTASSIUM BLDA-SCNC: 3.9 MMOL/L (ref 3.5–5.3)
POTASSIUM BLDA-SCNC: 4.8 MMOL/L (ref 3.5–5.3)
POTASSIUM BLDA-SCNC: 5.8 MMOL/L (ref 3.5–5.3)
POTASSIUM SERPL-SCNC: 4.9 MMOL/L (ref 3.5–5.3)
RBC # BLD AUTO: 4.91 X10*6/UL (ref 4.5–5.9)
SAO2 % BLDA: 97 % (ref 94–100)
SAO2 % BLDA: 97 % (ref 94–100)
SAO2 % BLDA: 98 % (ref 94–100)
SODIUM BLDA-SCNC: 135 MMOL/L (ref 136–145)
SODIUM BLDA-SCNC: 135 MMOL/L (ref 136–145)
SODIUM BLDA-SCNC: 136 MMOL/L (ref 136–145)
SODIUM SERPL-SCNC: 140 MMOL/L (ref 136–145)
WBC # BLD AUTO: 11.2 X10*3/UL (ref 4.4–11.3)

## 2025-07-14 PROCEDURE — 7100000002 HC RECOVERY ROOM TIME - EACH INCREMENTAL 1 MINUTE: Performed by: NEUROLOGICAL SURGERY

## 2025-07-14 PROCEDURE — 63047 LAM FACETEC & FORAMOT LUMBAR: CPT | Performed by: NEUROLOGICAL SURGERY

## 2025-07-14 PROCEDURE — 3600000017 HC OR TIME - EACH INCREMENTAL 1 MINUTE - PROCEDURE LEVEL SIX: Performed by: NEUROLOGICAL SURGERY

## 2025-07-14 PROCEDURE — 2500000004 HC RX 250 GENERAL PHARMACY W/ HCPCS (ALT 636 FOR OP/ED): Performed by: NEUROLOGICAL SURGERY

## 2025-07-14 PROCEDURE — 61783 SCAN PROC SPINAL: CPT | Performed by: NEUROLOGICAL SURGERY

## 2025-07-14 PROCEDURE — 37799 UNLISTED PX VASCULAR SURGERY: CPT | Performed by: STUDENT IN AN ORGANIZED HEALTH CARE EDUCATION/TRAINING PROGRAM

## 2025-07-14 PROCEDURE — 22614 ARTHRD PST TQ 1NTRSPC EA ADD: CPT | Performed by: NEUROLOGICAL SURGERY

## 2025-07-14 PROCEDURE — 86923 COMPATIBILITY TEST ELECTRIC: CPT

## 2025-07-14 PROCEDURE — 2780000003 HC OR 278 NO HCPCS: Performed by: NEUROLOGICAL SURGERY

## 2025-07-14 PROCEDURE — 22612 ARTHRD PST TQ 1NTRSPC LUMBAR: CPT | Performed by: NEUROLOGICAL SURGERY

## 2025-07-14 PROCEDURE — 0QP004Z REMOVAL OF INTERNAL FIXATION DEVICE FROM LUMBAR VERTEBRA, OPEN APPROACH: ICD-10-PCS | Performed by: NEUROLOGICAL SURGERY

## 2025-07-14 PROCEDURE — 0SG1071 FUSION OF 2 OR MORE LUMBAR VERTEBRAL JOINTS WITH AUTOLOGOUS TISSUE SUBSTITUTE, POSTERIOR APPROACH, POSTERIOR COLUMN, OPEN APPROACH: ICD-10-PCS | Performed by: NEUROLOGICAL SURGERY

## 2025-07-14 PROCEDURE — 5A09357 ASSISTANCE WITH RESPIRATORY VENTILATION, LESS THAN 24 CONSECUTIVE HOURS, CONTINUOUS POSITIVE AIRWAY PRESSURE: ICD-10-PCS | Performed by: REGISTERED NURSE

## 2025-07-14 PROCEDURE — 0SP304Z REMOVAL OF INTERNAL FIXATION DEVICE FROM LUMBOSACRAL JOINT, OPEN APPROACH: ICD-10-PCS | Performed by: NEUROLOGICAL SURGERY

## 2025-07-14 PROCEDURE — 99291 CRITICAL CARE FIRST HOUR: CPT | Performed by: REGISTERED NURSE

## 2025-07-14 PROCEDURE — 3700000002 HC GENERAL ANESTHESIA TIME - EACH INCREMENTAL 1 MINUTE: Performed by: NEUROLOGICAL SURGERY

## 2025-07-14 PROCEDURE — A22630 PR ARTHRODESIS POSTERIOR INTERBODY LUMBAR: Performed by: ANESTHESIOLOGY

## 2025-07-14 PROCEDURE — 84132 ASSAY OF SERUM POTASSIUM: CPT | Performed by: NEUROLOGICAL SURGERY

## 2025-07-14 PROCEDURE — 7100000001 HC RECOVERY ROOM TIME - INITIAL BASE CHARGE: Performed by: NEUROLOGICAL SURGERY

## 2025-07-14 PROCEDURE — 2500000005 HC RX 250 GENERAL PHARMACY W/O HCPCS

## 2025-07-14 PROCEDURE — 2500000004 HC RX 250 GENERAL PHARMACY W/ HCPCS (ALT 636 FOR OP/ED): Mod: JW

## 2025-07-14 PROCEDURE — 0QP104Z REMOVAL OF INTERNAL FIXATION DEVICE FROM SACRUM, OPEN APPROACH: ICD-10-PCS | Performed by: NEUROLOGICAL SURGERY

## 2025-07-14 PROCEDURE — 3600000018 HC OR TIME - INITIAL BASE CHARGE - PROCEDURE LEVEL SIX: Performed by: NEUROLOGICAL SURGERY

## 2025-07-14 PROCEDURE — 2500000001 HC RX 250 WO HCPCS SELF ADMINISTERED DRUGS (ALT 637 FOR MEDICARE OP): Performed by: STUDENT IN AN ORGANIZED HEALTH CARE EDUCATION/TRAINING PROGRAM

## 2025-07-14 PROCEDURE — 0SG3071 FUSION OF LUMBOSACRAL JOINT WITH AUTOLOGOUS TISSUE SUBSTITUTE, POSTERIOR APPROACH, POSTERIOR COLUMN, OPEN APPROACH: ICD-10-PCS | Performed by: NEUROLOGICAL SURGERY

## 2025-07-14 PROCEDURE — 01NR0ZZ RELEASE SACRAL NERVE, OPEN APPROACH: ICD-10-PCS | Performed by: NEUROLOGICAL SURGERY

## 2025-07-14 PROCEDURE — 2720000007 HC OR 272 NO HCPCS: Performed by: NEUROLOGICAL SURGERY

## 2025-07-14 PROCEDURE — P9045 ALBUMIN (HUMAN), 5%, 250 ML: HCPCS | Mod: JZ,TB

## 2025-07-14 PROCEDURE — 22842 INSERT SPINE FIXATION DEVICE: CPT | Performed by: NEUROLOGICAL SURGERY

## 2025-07-14 PROCEDURE — 84132 ASSAY OF SERUM POTASSIUM: CPT | Performed by: STUDENT IN AN ORGANIZED HEALTH CARE EDUCATION/TRAINING PROGRAM

## 2025-07-14 PROCEDURE — C1889 IMPLANT/INSERT DEVICE, NOC: HCPCS | Performed by: NEUROLOGICAL SURGERY

## 2025-07-14 PROCEDURE — 2500000005 HC RX 250 GENERAL PHARMACY W/O HCPCS: Performed by: NEUROLOGICAL SURGERY

## 2025-07-14 PROCEDURE — 2500000004 HC RX 250 GENERAL PHARMACY W/ HCPCS (ALT 636 FOR OP/ED): Mod: JZ,TB | Performed by: ANESTHESIOLOGY

## 2025-07-14 PROCEDURE — 2500000002 HC RX 250 W HCPCS SELF ADMINISTERED DRUGS (ALT 637 FOR MEDICARE OP, ALT 636 FOR OP/ED)

## 2025-07-14 PROCEDURE — 2500000002 HC RX 250 W HCPCS SELF ADMINISTERED DRUGS (ALT 637 FOR MEDICARE OP, ALT 636 FOR OP/ED): Performed by: STUDENT IN AN ORGANIZED HEALTH CARE EDUCATION/TRAINING PROGRAM

## 2025-07-14 PROCEDURE — C1713 ANCHOR/SCREW BN/BN,TIS/BN: HCPCS | Performed by: NEUROLOGICAL SURGERY

## 2025-07-14 PROCEDURE — 85027 COMPLETE CBC AUTOMATED: CPT | Performed by: STUDENT IN AN ORGANIZED HEALTH CARE EDUCATION/TRAINING PROGRAM

## 2025-07-14 PROCEDURE — 82947 ASSAY GLUCOSE BLOOD QUANT: CPT

## 2025-07-14 PROCEDURE — 84132 ASSAY OF SERUM POTASSIUM: CPT

## 2025-07-14 PROCEDURE — 94660 CPAP INITIATION&MGMT: CPT

## 2025-07-14 PROCEDURE — 2020000001 HC ICU ROOM DAILY

## 2025-07-14 PROCEDURE — 36620 INSERTION CATHETER ARTERY: CPT

## 2025-07-14 PROCEDURE — 01NB0ZZ RELEASE LUMBAR NERVE, OPEN APPROACH: ICD-10-PCS | Performed by: NEUROLOGICAL SURGERY

## 2025-07-14 PROCEDURE — 8E0WXBZ COMPUTER ASSISTED PROCEDURE OF TRUNK REGION: ICD-10-PCS | Performed by: NEUROLOGICAL SURGERY

## 2025-07-14 PROCEDURE — 3700000001 HC GENERAL ANESTHESIA TIME - INITIAL BASE CHARGE: Performed by: NEUROLOGICAL SURGERY

## 2025-07-14 PROCEDURE — 63048 LAM FACETEC &FORAMOT EA ADDL: CPT | Performed by: NEUROLOGICAL SURGERY

## 2025-07-14 DEVICE — IMPLANTABLE DEVICE: Type: IMPLANTABLE DEVICE | Site: SPINE LUMBAR | Status: FUNCTIONAL

## 2025-07-14 DEVICE — SCREW, RELINE LOCK, 5.5MM OPEN TULIP: Type: IMPLANTABLE DEVICE | Site: SPINE LUMBAR | Status: FUNCTIONAL

## 2025-07-14 DEVICE — SCREW, RELINE-O, 7.5X55MM 2S POLYAXIAL: Type: IMPLANTABLE DEVICE | Site: SPINE LUMBAR | Status: FUNCTIONAL

## 2025-07-14 DEVICE — BONE GRAFT PUTTY OSSIFUSE FLOWABLE FIBER 10CC: Type: IMPLANTABLE DEVICE | Site: SPINE LUMBAR | Status: FUNCTIONAL

## 2025-07-14 RX ORDER — HYDRALAZINE HYDROCHLORIDE 20 MG/ML
5 INJECTION INTRAMUSCULAR; INTRAVENOUS EVERY 30 MIN PRN
Status: DISCONTINUED | OUTPATIENT
Start: 2025-07-14 | End: 2025-07-14 | Stop reason: HOSPADM

## 2025-07-14 RX ORDER — DEXTROSE 50 % IN WATER (D50W) INTRAVENOUS SYRINGE
25
Status: DISCONTINUED | OUTPATIENT
Start: 2025-07-14 | End: 2025-07-29 | Stop reason: HOSPADM

## 2025-07-14 RX ORDER — LIDOCAINE HYDROCHLORIDE AND EPINEPHRINE 5; 5 MG/ML; UG/ML
INJECTION, SOLUTION INFILTRATION; PERINEURAL AS NEEDED
Status: DISCONTINUED | OUTPATIENT
Start: 2025-07-14 | End: 2025-07-14 | Stop reason: HOSPADM

## 2025-07-14 RX ORDER — FENTANYL CITRATE 50 UG/ML
INJECTION, SOLUTION INTRAMUSCULAR; INTRAVENOUS AS NEEDED
Status: DISCONTINUED | OUTPATIENT
Start: 2025-07-14 | End: 2025-07-14

## 2025-07-14 RX ORDER — ONDANSETRON HYDROCHLORIDE 2 MG/ML
INJECTION, SOLUTION INTRAVENOUS AS NEEDED
Status: DISCONTINUED | OUTPATIENT
Start: 2025-07-14 | End: 2025-07-14

## 2025-07-14 RX ORDER — ATORVASTATIN CALCIUM 40 MG/1
40 TABLET, FILM COATED ORAL NIGHTLY
Status: DISCONTINUED | OUTPATIENT
Start: 2025-07-14 | End: 2025-07-29 | Stop reason: HOSPADM

## 2025-07-14 RX ORDER — ONDANSETRON HYDROCHLORIDE 2 MG/ML
4 INJECTION, SOLUTION INTRAVENOUS ONCE AS NEEDED
Status: DISCONTINUED | OUTPATIENT
Start: 2025-07-14 | End: 2025-07-14 | Stop reason: HOSPADM

## 2025-07-14 RX ORDER — ROCURONIUM BROMIDE 10 MG/ML
INJECTION, SOLUTION INTRAVENOUS AS NEEDED
Status: DISCONTINUED | OUTPATIENT
Start: 2025-07-14 | End: 2025-07-14

## 2025-07-14 RX ORDER — PHENYLEPHRINE 10 MG/250 ML(40 MCG/ML)IN 0.9 % SOD.CHLORIDE INTRAVENOUS
CONTINUOUS PRN
Status: DISCONTINUED | OUTPATIENT
Start: 2025-07-14 | End: 2025-07-14

## 2025-07-14 RX ORDER — HYDROMORPHONE HYDROCHLORIDE 1 MG/ML
INJECTION, SOLUTION INTRAMUSCULAR; INTRAVENOUS; SUBCUTANEOUS AS NEEDED
Status: DISCONTINUED | OUTPATIENT
Start: 2025-07-14 | End: 2025-07-14

## 2025-07-14 RX ORDER — SODIUM CHLORIDE, SODIUM LACTATE, POTASSIUM CHLORIDE, CALCIUM CHLORIDE 600; 310; 30; 20 MG/100ML; MG/100ML; MG/100ML; MG/100ML
INJECTION, SOLUTION INTRAVENOUS CONTINUOUS PRN
Status: DISCONTINUED | OUTPATIENT
Start: 2025-07-14 | End: 2025-07-14

## 2025-07-14 RX ORDER — VANCOMYCIN HYDROCHLORIDE 1 G/20ML
INJECTION, POWDER, LYOPHILIZED, FOR SOLUTION INTRAVENOUS AS NEEDED
Status: DISCONTINUED | OUTPATIENT
Start: 2025-07-14 | End: 2025-07-14 | Stop reason: HOSPADM

## 2025-07-14 RX ORDER — ONDANSETRON 4 MG/1
4 TABLET, FILM COATED ORAL EVERY 8 HOURS PRN
Status: DISCONTINUED | OUTPATIENT
Start: 2025-07-14 | End: 2025-07-29 | Stop reason: HOSPADM

## 2025-07-14 RX ORDER — LIDOCAINE HYDROCHLORIDE 20 MG/ML
INJECTION, SOLUTION INFILTRATION; PERINEURAL AS NEEDED
Status: DISCONTINUED | OUTPATIENT
Start: 2025-07-14 | End: 2025-07-14

## 2025-07-14 RX ORDER — HYDROMORPHONE HYDROCHLORIDE 0.2 MG/ML
0.2 INJECTION INTRAMUSCULAR; INTRAVENOUS; SUBCUTANEOUS EVERY 5 MIN PRN
Status: DISCONTINUED | OUTPATIENT
Start: 2025-07-14 | End: 2025-07-14 | Stop reason: HOSPADM

## 2025-07-14 RX ORDER — OXYCODONE HYDROCHLORIDE 10 MG/1
10 TABLET ORAL EVERY 4 HOURS PRN
Status: DISCONTINUED | OUTPATIENT
Start: 2025-07-14 | End: 2025-07-29 | Stop reason: HOSPADM

## 2025-07-14 RX ORDER — NALOXONE HYDROCHLORIDE 0.4 MG/ML
0.2 INJECTION, SOLUTION INTRAMUSCULAR; INTRAVENOUS; SUBCUTANEOUS EVERY 5 MIN PRN
Status: DISCONTINUED | OUTPATIENT
Start: 2025-07-14 | End: 2025-07-29 | Stop reason: HOSPADM

## 2025-07-14 RX ORDER — POLYETHYLENE GLYCOL 3350 17 G/17G
17 POWDER, FOR SOLUTION ORAL 2 TIMES DAILY
Status: DISCONTINUED | OUTPATIENT
Start: 2025-07-14 | End: 2025-07-29 | Stop reason: HOSPADM

## 2025-07-14 RX ORDER — LOSARTAN POTASSIUM 25 MG/1
25 TABLET ORAL DAILY
Status: DISCONTINUED | OUTPATIENT
Start: 2025-07-15 | End: 2025-07-24

## 2025-07-14 RX ORDER — POLYMYXIN B 500000 [USP'U]/1
INJECTION, POWDER, LYOPHILIZED, FOR SOLUTION INTRAMUSCULAR; INTRATHECAL; INTRAVENOUS; OPHTHALMIC AS NEEDED
Status: DISCONTINUED | OUTPATIENT
Start: 2025-07-14 | End: 2025-07-14 | Stop reason: HOSPADM

## 2025-07-14 RX ORDER — LABETALOL HYDROCHLORIDE 5 MG/ML
5 INJECTION, SOLUTION INTRAVENOUS ONCE AS NEEDED
Status: DISCONTINUED | OUTPATIENT
Start: 2025-07-14 | End: 2025-07-14 | Stop reason: HOSPADM

## 2025-07-14 RX ORDER — LIDOCAINE HYDROCHLORIDE 10 MG/ML
0.1 INJECTION, SOLUTION INFILTRATION; PERINEURAL ONCE
Status: DISCONTINUED | OUTPATIENT
Start: 2025-07-14 | End: 2025-07-14 | Stop reason: HOSPADM

## 2025-07-14 RX ORDER — APREPITANT 40 MG/1
CAPSULE ORAL AS NEEDED
Status: DISCONTINUED | OUTPATIENT
Start: 2025-07-14 | End: 2025-07-14

## 2025-07-14 RX ORDER — INSULIN LISPRO 100 [IU]/ML
0-5 INJECTION, SOLUTION INTRAVENOUS; SUBCUTANEOUS
Status: DISCONTINUED | OUTPATIENT
Start: 2025-07-14 | End: 2025-07-29 | Stop reason: HOSPADM

## 2025-07-14 RX ORDER — HEPARIN SODIUM 5000 [USP'U]/ML
5000 INJECTION, SOLUTION INTRAVENOUS; SUBCUTANEOUS EVERY 8 HOURS
Status: DISCONTINUED | OUTPATIENT
Start: 2025-07-15 | End: 2025-07-21

## 2025-07-14 RX ORDER — CEFAZOLIN 1 G/1
INJECTION, POWDER, FOR SOLUTION INTRAVENOUS AS NEEDED
Status: DISCONTINUED | OUTPATIENT
Start: 2025-07-14 | End: 2025-07-14

## 2025-07-14 RX ORDER — OXYCODONE HYDROCHLORIDE 5 MG/1
2.5 TABLET ORAL EVERY 4 HOURS PRN
Status: DISCONTINUED | OUTPATIENT
Start: 2025-07-14 | End: 2025-07-29 | Stop reason: HOSPADM

## 2025-07-14 RX ORDER — KETOROLAC TROMETHAMINE 30 MG/ML
30 INJECTION, SOLUTION INTRAMUSCULAR; INTRAVENOUS EVERY 6 HOURS PRN
Status: DISPENSED | OUTPATIENT
Start: 2025-07-14 | End: 2025-07-19

## 2025-07-14 RX ORDER — SODIUM CHLORIDE 0.9 G/100ML
INJECTION, SOLUTION IRRIGATION AS NEEDED
Status: DISCONTINUED | OUTPATIENT
Start: 2025-07-14 | End: 2025-07-14 | Stop reason: HOSPADM

## 2025-07-14 RX ORDER — HYDROMORPHONE HYDROCHLORIDE 1 MG/ML
0.2 INJECTION, SOLUTION INTRAMUSCULAR; INTRAVENOUS; SUBCUTANEOUS EVERY 4 HOURS PRN
Status: DISCONTINUED | OUTPATIENT
Start: 2025-07-14 | End: 2025-07-24

## 2025-07-14 RX ORDER — GABAPENTIN 300 MG/1
600 CAPSULE ORAL 3 TIMES DAILY
Status: DISCONTINUED | OUTPATIENT
Start: 2025-07-14 | End: 2025-07-29 | Stop reason: HOSPADM

## 2025-07-14 RX ORDER — DEXMEDETOMIDINE HYDROCHLORIDE 4 UG/ML
INJECTION, SOLUTION INTRAVENOUS CONTINUOUS PRN
Status: DISCONTINUED | OUTPATIENT
Start: 2025-07-14 | End: 2025-07-14

## 2025-07-14 RX ORDER — AMOXICILLIN 250 MG
2 CAPSULE ORAL 2 TIMES DAILY
Status: DISCONTINUED | OUTPATIENT
Start: 2025-07-14 | End: 2025-07-29 | Stop reason: HOSPADM

## 2025-07-14 RX ORDER — DIPHENHYDRAMINE HYDROCHLORIDE 50 MG/ML
INJECTION, SOLUTION INTRAMUSCULAR; INTRAVENOUS AS NEEDED
Status: DISCONTINUED | OUTPATIENT
Start: 2025-07-14 | End: 2025-07-14

## 2025-07-14 RX ORDER — PROPOFOL 10 MG/ML
INJECTION, EMULSION INTRAVENOUS AS NEEDED
Status: DISCONTINUED | OUTPATIENT
Start: 2025-07-14 | End: 2025-07-14

## 2025-07-14 RX ORDER — CEFAZOLIN 1 G/1
INJECTION, POWDER, FOR SOLUTION INTRAVENOUS AS NEEDED
Status: DISCONTINUED | OUTPATIENT
Start: 2025-07-14 | End: 2025-07-14 | Stop reason: HOSPADM

## 2025-07-14 RX ORDER — ONDANSETRON HYDROCHLORIDE 2 MG/ML
4 INJECTION, SOLUTION INTRAVENOUS EVERY 8 HOURS PRN
Status: DISCONTINUED | OUTPATIENT
Start: 2025-07-14 | End: 2025-07-29 | Stop reason: HOSPADM

## 2025-07-14 RX ORDER — CYCLOBENZAPRINE HCL 10 MG
10 TABLET ORAL 3 TIMES DAILY
Status: DISCONTINUED | OUTPATIENT
Start: 2025-07-14 | End: 2025-07-29 | Stop reason: HOSPADM

## 2025-07-14 RX ORDER — DEXTROSE 50 % IN WATER (D50W) INTRAVENOUS SYRINGE
12.5
Status: DISCONTINUED | OUTPATIENT
Start: 2025-07-14 | End: 2025-07-29 | Stop reason: HOSPADM

## 2025-07-14 RX ORDER — TAMSULOSIN HYDROCHLORIDE 0.4 MG/1
0.4 CAPSULE ORAL NIGHTLY
Status: DISCONTINUED | OUTPATIENT
Start: 2025-07-14 | End: 2025-07-15

## 2025-07-14 RX ORDER — PHENYLEPHRINE HCL IN 0.9% NACL 0.4MG/10ML
SYRINGE (ML) INTRAVENOUS AS NEEDED
Status: DISCONTINUED | OUTPATIENT
Start: 2025-07-14 | End: 2025-07-14

## 2025-07-14 RX ORDER — MIDAZOLAM HYDROCHLORIDE 2 MG/2ML
INJECTION, SOLUTION INTRAMUSCULAR; INTRAVENOUS AS NEEDED
Status: DISCONTINUED | OUTPATIENT
Start: 2025-07-14 | End: 2025-07-14

## 2025-07-14 RX ORDER — ACETAMINOPHEN 325 MG/1
650 TABLET ORAL EVERY 6 HOURS
Status: DISCONTINUED | OUTPATIENT
Start: 2025-07-14 | End: 2025-07-29 | Stop reason: HOSPADM

## 2025-07-14 RX ORDER — SCOPOLAMINE 1 MG/3D
PATCH, EXTENDED RELEASE TRANSDERMAL AS NEEDED
Status: DISCONTINUED | OUTPATIENT
Start: 2025-07-14 | End: 2025-07-14

## 2025-07-14 RX ORDER — PANTOPRAZOLE SODIUM 40 MG/1
40 TABLET, DELAYED RELEASE ORAL 2 TIMES DAILY
Status: DISCONTINUED | OUTPATIENT
Start: 2025-07-14 | End: 2025-07-29 | Stop reason: HOSPADM

## 2025-07-14 RX ORDER — OXYCODONE HYDROCHLORIDE 5 MG/1
5 TABLET ORAL EVERY 4 HOURS PRN
Status: DISCONTINUED | OUTPATIENT
Start: 2025-07-14 | End: 2025-07-29 | Stop reason: HOSPADM

## 2025-07-14 RX ORDER — ALBUMIN HUMAN 50 G/1000ML
SOLUTION INTRAVENOUS AS NEEDED
Status: DISCONTINUED | OUTPATIENT
Start: 2025-07-14 | End: 2025-07-14

## 2025-07-14 RX ORDER — ASPIRIN 81 MG/1
81 TABLET ORAL
Status: DISCONTINUED | OUTPATIENT
Start: 2025-07-15 | End: 2025-07-29 | Stop reason: HOSPADM

## 2025-07-14 RX ORDER — BACITRACIN 500 [USP'U]/G
OINTMENT TOPICAL AS NEEDED
Status: DISCONTINUED | OUTPATIENT
Start: 2025-07-14 | End: 2025-07-14 | Stop reason: HOSPADM

## 2025-07-14 RX ADMIN — ROCURONIUM BROMIDE 20 MG: 10 INJECTION INTRAVENOUS at 11:26

## 2025-07-14 RX ADMIN — Medication 50 MG: at 07:54

## 2025-07-14 RX ADMIN — Medication 15 MG: at 10:01

## 2025-07-14 RX ADMIN — TRANEXAMIC ACID 1220 MG: 100 INJECTION INTRAVENOUS at 09:05

## 2025-07-14 RX ADMIN — DEXAMETHASONE SODIUM PHOSPHATE 8 MG: 4 INJECTION INTRA-ARTICULAR; INTRALESIONAL; INTRAMUSCULAR; INTRAVENOUS; SOFT TISSUE at 10:40

## 2025-07-14 RX ADMIN — HYDROMORPHONE HYDROCHLORIDE 0.5 MG: 1 INJECTION, SOLUTION INTRAMUSCULAR; INTRAVENOUS; SUBCUTANEOUS at 17:17

## 2025-07-14 RX ADMIN — Medication 120 MCG: at 12:08

## 2025-07-14 RX ADMIN — SUGAMMADEX 100 MG: 100 INJECTION, SOLUTION INTRAVENOUS at 14:07

## 2025-07-14 RX ADMIN — TAMSULOSIN HYDROCHLORIDE 0.4 MG: 0.4 CAPSULE ORAL at 21:47

## 2025-07-14 RX ADMIN — Medication 160 MCG: at 08:47

## 2025-07-14 RX ADMIN — ROCURONIUM BROMIDE 30 MG: 10 INJECTION INTRAVENOUS at 09:44

## 2025-07-14 RX ADMIN — Medication 15 MG: at 10:52

## 2025-07-14 RX ADMIN — ONDANSETRON 4 MG: 2 INJECTION INTRAMUSCULAR; INTRAVENOUS at 13:13

## 2025-07-14 RX ADMIN — ALBUMIN HUMAN 250 ML: 0.05 INJECTION, SOLUTION INTRAVENOUS at 08:07

## 2025-07-14 RX ADMIN — ROCURONIUM BROMIDE 10 MG: 10 INJECTION INTRAVENOUS at 09:48

## 2025-07-14 RX ADMIN — Medication 160 MCG: at 09:07

## 2025-07-14 RX ADMIN — SENNOSIDES AND DOCUSATE SODIUM 2 TABLET: 50; 8.6 TABLET ORAL at 21:47

## 2025-07-14 RX ADMIN — CYCLOBENZAPRINE 10 MG: 10 TABLET, FILM COATED ORAL at 21:47

## 2025-07-14 RX ADMIN — SCOPOLAMINE 1 PATCH: 1.5 PATCH, EXTENDED RELEASE TRANSDERMAL at 07:23

## 2025-07-14 RX ADMIN — HYDROCORTISONE SODIUM SUCCINATE 100 MG: 100 INJECTION, POWDER, FOR SOLUTION INTRAMUSCULAR; INTRAVENOUS at 14:25

## 2025-07-14 RX ADMIN — GABAPENTIN 600 MG: 300 CAPSULE ORAL at 21:47

## 2025-07-14 RX ADMIN — Medication 120 MCG: at 08:45

## 2025-07-14 RX ADMIN — MIDAZOLAM HYDROCHLORIDE 2 MG: 2 INJECTION, SOLUTION INTRAMUSCULAR; INTRAVENOUS at 07:32

## 2025-07-14 RX ADMIN — SODIUM CHLORIDE, POTASSIUM CHLORIDE, SODIUM LACTATE AND CALCIUM CHLORIDE: 600; 310; 30; 20 INJECTION, SOLUTION INTRAVENOUS at 07:33

## 2025-07-14 RX ADMIN — PROPOFOL 200 MG: 10 INJECTION, EMULSION INTRAVENOUS at 07:54

## 2025-07-14 RX ADMIN — Medication 15 MG: at 08:57

## 2025-07-14 RX ADMIN — ATORVASTATIN CALCIUM 40 MG: 40 TABLET, FILM COATED ORAL at 21:47

## 2025-07-14 RX ADMIN — PROPOFOL 25 MCG/KG/MIN: 10 INJECTION, EMULSION INTRAVENOUS at 10:05

## 2025-07-14 RX ADMIN — PHENYLEPHRINE-NACL IV SOLUTION 10 MG/250ML-0.9% 0.4 MCG/KG/MIN: 10-0.9/25 SOLUTION at 09:04

## 2025-07-14 RX ADMIN — ROCURONIUM BROMIDE 5 MG: 10 INJECTION INTRAVENOUS at 13:16

## 2025-07-14 RX ADMIN — SUGAMMADEX 300 MG: 100 INJECTION, SOLUTION INTRAVENOUS at 13:46

## 2025-07-14 RX ADMIN — Medication 120 MCG: at 08:01

## 2025-07-14 RX ADMIN — HYDROMORPHONE HYDROCHLORIDE 0.2 MG: 1 INJECTION, SOLUTION INTRAMUSCULAR; INTRAVENOUS; SUBCUTANEOUS at 13:44

## 2025-07-14 RX ADMIN — ROCURONIUM BROMIDE 100 MG: 10 INJECTION INTRAVENOUS at 07:54

## 2025-07-14 RX ADMIN — Medication 160 MCG: at 12:16

## 2025-07-14 RX ADMIN — ROCURONIUM BROMIDE 20 MG: 10 INJECTION INTRAVENOUS at 12:37

## 2025-07-14 RX ADMIN — Medication 120 MCG: at 12:43

## 2025-07-14 RX ADMIN — Medication 55 MG: at 11:50

## 2025-07-14 RX ADMIN — CEFAZOLIN 3 G: 330 INJECTION, POWDER, FOR SOLUTION INTRAMUSCULAR; INTRAVENOUS at 12:49

## 2025-07-14 RX ADMIN — Medication 160 MCG: at 09:04

## 2025-07-14 RX ADMIN — LIDOCAINE HYDROCHLORIDE 40 MG: 20 INJECTION, SOLUTION INFILTRATION; PERINEURAL at 09:36

## 2025-07-14 RX ADMIN — ACETAMINOPHEN 650 MG: 325 TABLET, FILM COATED ORAL at 21:47

## 2025-07-14 RX ADMIN — DEXMEDETOMIDINE HYDROCHLORIDE 0.2 MCG/KG/HR: 400 INJECTION INTRAVENOUS at 09:40

## 2025-07-14 RX ADMIN — CEFAZOLIN 3 G: 330 INJECTION, POWDER, FOR SOLUTION INTRAMUSCULAR; INTRAVENOUS at 08:45

## 2025-07-14 RX ADMIN — OXYCODONE HYDROCHLORIDE 10 MG: 5 TABLET ORAL at 22:22

## 2025-07-14 RX ADMIN — LIDOCAINE HYDROCHLORIDE 60 MG: 20 INJECTION, SOLUTION INFILTRATION; PERINEURAL at 07:54

## 2025-07-14 RX ADMIN — FENTANYL CITRATE 100 MCG: 50 INJECTION, SOLUTION INTRAMUSCULAR; INTRAVENOUS at 07:54

## 2025-07-14 RX ADMIN — DIPHENHYDRAMINE HYDROCHLORIDE 25 MG: 50 INJECTION INTRAMUSCULAR; INTRAVENOUS at 14:24

## 2025-07-14 RX ADMIN — SODIUM CHLORIDE, SODIUM LACTATE, POTASSIUM CHLORIDE, CALCIUM CHLORIDE: 600; 310; 30; 20 INJECTION, SOLUTION INTRAVENOUS at 08:39

## 2025-07-14 RX ADMIN — HYDROMORPHONE HYDROCHLORIDE 0.5 MG: 1 INJECTION, SOLUTION INTRAMUSCULAR; INTRAVENOUS; SUBCUTANEOUS at 17:43

## 2025-07-14 RX ADMIN — PANTOPRAZOLE SODIUM 40 MG: 40 TABLET, DELAYED RELEASE ORAL at 21:47

## 2025-07-14 RX ADMIN — APREPITANT 40 MG: 40 CAPSULE ORAL at 07:23

## 2025-07-14 SDOH — SOCIAL STABILITY: SOCIAL INSECURITY: WERE YOU ABLE TO COMPLETE ALL THE BEHAVIORAL HEALTH SCREENINGS?: YES

## 2025-07-14 SDOH — SOCIAL STABILITY: SOCIAL INSECURITY: ABUSE: ADULT

## 2025-07-14 SDOH — SOCIAL STABILITY: SOCIAL INSECURITY: DO YOU FEEL UNSAFE GOING BACK TO THE PLACE WHERE YOU ARE LIVING?: NO

## 2025-07-14 SDOH — SOCIAL STABILITY: SOCIAL INSECURITY: DO YOU FEEL ANYONE HAS EXPLOITED OR TAKEN ADVANTAGE OF YOU FINANCIALLY OR OF YOUR PERSONAL PROPERTY?: NO

## 2025-07-14 SDOH — SOCIAL STABILITY: SOCIAL INSECURITY: DOES ANYONE TRY TO KEEP YOU FROM HAVING/CONTACTING OTHER FRIENDS OR DOING THINGS OUTSIDE YOUR HOME?: NO

## 2025-07-14 SDOH — SOCIAL STABILITY: SOCIAL INSECURITY: HAS ANYONE EVER THREATENED TO HURT YOUR FAMILY OR YOUR PETS?: NO

## 2025-07-14 SDOH — HEALTH STABILITY: MENTAL HEALTH: CURRENT SMOKER: 0

## 2025-07-14 SDOH — SOCIAL STABILITY: SOCIAL INSECURITY: ARE YOU OR HAVE YOU BEEN THREATENED OR ABUSED PHYSICALLY, EMOTIONALLY, OR SEXUALLY BY ANYONE?: NO

## 2025-07-14 SDOH — SOCIAL STABILITY: SOCIAL INSECURITY: ARE THERE ANY APPARENT SIGNS OF INJURIES/BEHAVIORS THAT COULD BE RELATED TO ABUSE/NEGLECT?: NO

## 2025-07-14 ASSESSMENT — COGNITIVE AND FUNCTIONAL STATUS - GENERAL
PATIENT BASELINE BEDBOUND: NO
DAILY ACTIVITIY SCORE: 24
MOBILITY SCORE: 24

## 2025-07-14 ASSESSMENT — COLUMBIA-SUICIDE SEVERITY RATING SCALE - C-SSRS
6. HAVE YOU EVER DONE ANYTHING, STARTED TO DO ANYTHING, OR PREPARED TO DO ANYTHING TO END YOUR LIFE?: NO
2. HAVE YOU ACTUALLY HAD ANY THOUGHTS OF KILLING YOURSELF?: NO
1. IN THE PAST MONTH, HAVE YOU WISHED YOU WERE DEAD OR WISHED YOU COULD GO TO SLEEP AND NOT WAKE UP?: NO

## 2025-07-14 ASSESSMENT — PAIN - FUNCTIONAL ASSESSMENT
PAIN_FUNCTIONAL_ASSESSMENT: 0-10
PAIN_FUNCTIONAL_ASSESSMENT: UNABLE TO SELF-REPORT
PAIN_FUNCTIONAL_ASSESSMENT: 0-10
PAIN_FUNCTIONAL_ASSESSMENT: UNABLE TO SELF-REPORT
PAIN_FUNCTIONAL_ASSESSMENT: UNABLE TO SELF-REPORT
PAIN_FUNCTIONAL_ASSESSMENT: 0-10
PAIN_FUNCTIONAL_ASSESSMENT: UNABLE TO SELF-REPORT
PAIN_FUNCTIONAL_ASSESSMENT: UNABLE TO SELF-REPORT
PAIN_FUNCTIONAL_ASSESSMENT: 0-10
PAIN_FUNCTIONAL_ASSESSMENT: UNABLE TO SELF-REPORT

## 2025-07-14 ASSESSMENT — ACTIVITIES OF DAILY LIVING (ADL)
BATHING: INDEPENDENT
DRESSING YOURSELF: INDEPENDENT
TOILETING: INDEPENDENT
WALKS IN HOME: INDEPENDENT
HEARING - RIGHT EAR: FUNCTIONAL
ASSISTIVE_DEVICE: WALKER
PATIENT'S MEMORY ADEQUATE TO SAFELY COMPLETE DAILY ACTIVITIES?: YES
HEARING - LEFT EAR: FUNCTIONAL
ADEQUATE_TO_COMPLETE_ADL: YES
FEEDING YOURSELF: INDEPENDENT
JUDGMENT_ADEQUATE_SAFELY_COMPLETE_DAILY_ACTIVITIES: YES
GROOMING: INDEPENDENT

## 2025-07-14 ASSESSMENT — PAIN SCALES - GENERAL
PAIN_LEVEL: 0
PAINLEVEL_OUTOF10: 0 - NO PAIN
PAINLEVEL_OUTOF10: 9
PAINLEVEL_OUTOF10: 4
PAINLEVEL_OUTOF10: 7
PAINLEVEL_OUTOF10: 0 - NO PAIN
PAINLEVEL_OUTOF10: 7
PAINLEVEL_OUTOF10: 0 - NO PAIN
PAINLEVEL_OUTOF10: 8
PAINLEVEL_OUTOF10: 8
PAINLEVEL_OUTOF10: 4
PAINLEVEL_OUTOF10: 0 - NO PAIN
PAINLEVEL_OUTOF10: 0 - NO PAIN
PAINLEVEL_OUTOF10: 8
PAINLEVEL_OUTOF10: 0 - NO PAIN
PAINLEVEL_OUTOF10: 8
PAINLEVEL_OUTOF10: 0 - NO PAIN
PAINLEVEL_OUTOF10: 0 - NO PAIN

## 2025-07-14 ASSESSMENT — PAIN DESCRIPTION - ORIENTATION
ORIENTATION: LOWER
ORIENTATION: MID

## 2025-07-14 ASSESSMENT — PAIN DESCRIPTION - LOCATION
LOCATION: BACK
LOCATION: BACK

## 2025-07-14 NOTE — ANESTHESIA POSTPROCEDURE EVALUATION
Patient: Jerman Colón Jr.    Procedure Summary       Date: 07/14/25 Room / Location: [facility] OR 23 / [facility]    Anesthesia Start: 0736 Anesthesia Stop: 1454    Procedure: OPEN SURGERY - Removal of previous L5-S1 instrumentation, Exploration of previous L5-S1 Fusion,  L3-4 L4-5 and L5-S1 laminectomy and facetectomy for decompression with L3-S1 instrumentation and pelvic fixation. (Bilateral: Spine Lumbar) Diagnosis:       Lumbar foraminal stenosis      Lumbar radiculopathy, chronic      Lumbar spinal stenosis due to adjacent segment disease after fusion procedure      (Lumbar foraminal stenosis [M48.061])      (Lumbar radiculopathy, chronic [M54.16])      (Lumbar spinal stenosis due to adjacent segment disease after fusion procedure [M48.061, M51.369, Z98.1])    Surgeons: Paul Patel MD Responsible Provider: Farhad Dykes MD    Anesthesia Type: general ASA Status: 2            Anesthesia Type: general    Vitals Value Taken Time   /62 07/14/25 14:45   Temp 36.1 07/14/25 14:55   Pulse 105 07/14/25 14:50   Resp 25 07/14/25 14:50   SpO2 100 % 07/14/25 14:50   Vitals shown include unfiled device data.    Anesthesia Post Evaluation    Patient location during evaluation: PACU  Patient participation: complete - patient participated  Level of consciousness: awake and alert  Pain score: 0  Pain management: adequate  Multimodal analgesia pain management approach  Airway patency: patent (Patent on BiPAP)  Two or more strategies used to mitigate risk of obstructive sleep apnea  Cardiovascular status: acceptable  Respiratory status: BIPAP and acceptable  Hydration status: acceptable  Postoperative Nausea and Vomiting: none        There were no known notable events for this encounter.

## 2025-07-14 NOTE — ANESTHESIA PREPROCEDURE EVALUATION
Patient: Jerman Colón Jr.    Procedure Information       Date/Time: 07/14/25 0705    Procedure: OPEN SURGERY - Removal of previous L5-S1 instrumentation, Exploration of previous L5-S1 Fusion,  L3-4 L4-5 and L5-S1 laminectomy and facetectomy for decompression with L2-S1 instrumentation and pelvic fixation. (Bilateral: Spine Lumbar)    Location: [facility] OR 23 / [facility] OR    Surgeons: Paul Patel MD            Relevant Problems   Anesthesia   (+) PONV (postoperative nausea and vomiting)      Cardiac   (+) CAD (coronary artery disease)   (+) HTN (hypertension)   (+) Hyperlipidemia      Pulmonary   (+) BIANCA (obstructive sleep apnea)   (+) Pulmonary emphysema (Multi)      Neuro   (+) Lumbar radiculopathy, chronic      GI   (+) Gastroesophageal reflux disease without esophagitis      Hematology   (+) Polycythemia      Musculoskeletal   (+) Lumbar foraminal stenosis   (+) Lumbar spinal stenosis due to adjacent segment disease after fusion procedure   (+) Lumbar stenosis with neurogenic claudication       Clinical information reviewed:   Tobacco  Allergies  Meds   Med Hx  Surg Hx   Fam Hx  Soc Hx        NPO Detail:  NPO/Void Status  Date of Last Liquid: 07/13/25  Time of Last Liquid: 2359  Date of Last Solid: 07/13/25  Time of Last Solid: 2359         Physical Exam    Airway  Mallampati: IV  TM distance: <3 FB  Neck ROM: limited  Mouth opening: 3 or more finger widths     Cardiovascular - normal exam   Dental - normal exam     Pulmonary - normal exam   Abdominal - normal exam           Anesthesia Plan    History of general anesthesia?: yes  History of complications of general anesthesia?: no    ASA 2     general     The patient is not a current smoker.    intravenous induction   Trial extubation is planned.  Anesthetic plan and risks discussed with patient.  Use of blood products discussed with patient who consented to blood products.

## 2025-07-14 NOTE — BRIEF OP NOTE
Date: 2025  OR Location: [facility] OR    Name: Jerman Colón Jr., : 1960, Age: 64 y.o., MRN: [id number], Sex: male    Diagnosis  Pre-op Diagnosis      * Lumbar foraminal stenosis [M48.061]     * Lumbar radiculopathy, chronic [M54.16]     * Lumbar spinal stenosis due to adjacent segment disease after fusion procedure [M48.061, M51.369, Z98.1] Post-op Diagnosis     * Lumbar foraminal stenosis [M48.061]     * Lumbar radiculopathy, chronic [M54.16]     * Lumbar spinal stenosis due to adjacent segment disease after fusion procedure [M48.061, M51.369, Z98.1]     Procedures  OPEN SURGERY - Removal of previous L5-S1 instrumentation, Exploration of previous L5-S1 Fusion,  L3-4 L4-5 and L5-S1 laminectomy and facetectomy for decompression with L3-S1 instrumentation and pelvic fixation.  87701 - MD ARTHRODESIS POSTERIOR/PSTLAT TQ 1NTRSPC LUMBAR    MD CASH FACETECTOMY & FORAMOTOMY 1 VRT SGM LUMBAR [65819]  MD CASH FACETECTOMY&FORAMOT 1 VRT SGM EA ADDL SGM [85898]  MD POSTERIOR SEGMENTAL INSTRUMENTATION 3-6 VRT SEG [52203]  MD PELVIC FIXATION OTHER THAN SACRUM [94681]  MD ALLOGRAFT FOR SPINE SURGERY ONLY MORSELIZED []  MD AUTOGRAFT SPINE SURGERY LOCAL FROM SAME INCISION [73946]  MD STEREOTACTIC COMPUTER ASSISTED PX SPINAL [31930]  MD ARTHRODESIS PST/PSTLAT TQ 1NTRSPC EA ADDL NTRSPC [22797]  MD EXPLORATION SPINAL FUSION [85775]  Surgeons      * Paul Patel - Primary    Resident/Fellow/Other Assistant:  Surgeons and Role:     * Taylor Mehta MD - Resident - Assisting    Staff:   Circulator: Edyta Dennis Person: Arielle Dennis Person: Jesus Simmons Circulator: Maye Simmons Scrub: Jonelle    Anesthesia Staff: Anesthesiologist: Farhad Dykes MD  C-AA: WILFRIDO Anne  GALEN: Pannonica Lalita; Camila Abad  Frontline Breaker: WILFRIDO Galvan    Procedure Summary  Anesthesia: General  ASA: II  Estimated Blood Loss: 800mL  Intra-op Medications:   Administrations occurring from 705 to 1245 on  07/14/25:   Medication Name Total Dose   lidocaine-epinephrine (Xylocaine W/EPI) 0.5 %-1:200,000 injection 10 mL   thrombin-recombinant (Recothrom) 5,000 unit topical solution 5,000 Units   vancomycin (Vancocin) vial for injection 1 g   ceFAZolin (Ancef) injection 1 g   sodium chloride 0.9 % irrigation solution 1,000 mL   albumin human bottle 5% 250 mL   aprepitant (Emend) capsule 40 mg   ceFAZolin (Ancef) vial 1 g 3 g   dexAMETHasone (Decadron) 4 mg/mL 8 mg   dexmedeTOMIDine 4 mcg/mL in 100 mL NS infusion 72.97 mcg   fentaNYL (Sublimaze) injection 50 mcg/mL 100 mcg   ketamine injection 50 mg/ 5 mL (10 mg/mL) 150 mg   LR infusion Cannot be calculated   LR infusion Cannot be calculated   lidocaine (Xylocaine) injection 2 % 100 mg   midazolam PF (Versed) injection 1 mg/mL 2 mg   phenylephrine (Tony-Synephrine) 10 mg in sodium chloride 0.9% 250 mL (0.04 mg/mL) infusion (premix) 13.71 mg   phenylephrine 40 mcg/mL syringe 10 mL 1,120 mcg   propofol (Diprivan) injection 10 mg/mL 626.83 mg   rocuronium (ZeMuron) 50 mg/5 mL injection 180 mg   scopolamine (Transderm-Scop) 1 mg/3 days patch 1 patch   tranexamic acid (Cyklokapron) 5,000 mg in sodium chloride 0.9% 250 mL (20 mg/mL) infusion 3,329.68 mg              Anesthesia Record               Intraprocedure I/O Totals          Intake    Dexmedetomidine 0.00 mL    The total shown is the total volume documented since Anesthesia Start was filed.    Tranexamic Acid 0.00 mL    The total shown is the total volume documented since Anesthesia Start was filed.    Phenylephrine Drip 0.00 mL    The total shown is the total volume documented since Anesthesia Start was filed.    LR infusion 1900.00 mL    Total Intake 1900 mL       Output    Urine 380 mL    Est. Blood Loss 800 mL    Total Output 1180 mL       Net    Net Volume 720 mL          Specimen: No specimens collected               Findings: good placement of hardware    Complications:  None; patient tolerated the procedure well.      Disposition: PACU - hemodynamically stable.  Condition: stable  Specimens Collected: No specimens collected  Attending Attestation: I was present and scrubbed for the key portions of the procedure.    Paul Patel  Phone Number: [phone]

## 2025-07-14 NOTE — ANESTHESIA PROCEDURE NOTES
Airway  Date/Time: 7/14/2025 8:00 AM  Reason: elective    Airway not difficult    Staffing  Performed: WILFRIDO   Authorized by: Farhad Dykes MD    Performed by: WILFRIDO Anne  Patient location during procedure: OR    Patient Condition  Indications for airway management: anesthesia  Patient position: sniffing  MILS maintained throughout  Sedation level: deep     Final Airway Details   Preoxygenated: yes  Final airway type: endotracheal airway  Successful airway: ETT  Cuffed: yes   Successful intubation technique: direct laryngoscopy  Adjuncts used in placement: intubating stylet  Endotracheal tube insertion site: oral  Blade type: Ambu Hyperangulated.  Blade size: #4  ETT size (mm): 7.5  Cormack-Lehane Classification: grade I - full view of glottis  Placement verified by: chest auscultation and capnometry   Measured from: lips  ETT to lips (cm): 23  Number of attempts at approach: 1

## 2025-07-14 NOTE — ANESTHESIA PROCEDURE NOTES
Arterial Line:    Date/Time: 7/14/2025 8:23 AM    Staffing  Performed: WILFRIDO   Authorized by: Farhad Dykes MD    Performed by: WILFRIDO Anne    An arterial line was placed. Procedure performed using surface landmarks.in the OR for the following indication(s): continuous blood pressure monitoring.    A 20 gauge (size), 5 cm (length), Angiocath (type) catheter was placed into the Left radial artery, secured by Tegaderm,   Seldinger technique not used.  Events:  patient tolerated procedure well with no complications.

## 2025-07-14 NOTE — OP NOTE
OPEN SURGERY - Removal of previous L5-S1 instrumentation,  L3-4 L4-5 and L5-S1 laminectomy and facetectomy for decompression with L3-S1 instrumentation and pelvic fixation. (B) Operative Note     Date: 2025  OR Location: [facility] OR    Name: Jerman Colón Jr., : 1960, Age: 64 y.o., MRN: [id number], Sex: male    Diagnosis  Pre-op Diagnosis      * Lumbar foraminal stenosis [M48.061]     * Lumbar radiculopathy, chronic [M54.16]     * Lumbar spinal stenosis due to adjacent segment disease after fusion procedure [M48.061, M51.369, Z98.1] Post-op Diagnosis     * Lumbar foraminal stenosis [M48.061]     * Lumbar radiculopathy, chronic [M54.16]     * Lumbar spinal stenosis due to adjacent segment disease after fusion procedure [M48.061, M51.369, Z98.1]     Procedures  OPEN SURGERY - Removal of previous L5-S1 instrumentation,  L3-4 L4-5 and L5-S1 laminectomy and facetectomy for decompression with L3-S1 instrumentation and Fusion.    70091 - [place] ARTHRODESIS POSTERIOR/PSTLAT TQ 1NTRSPC LUMBAR    [place] CASH FACETECTOMY & FORAMOTOMY 1 VRT SGM LUMBAR [32201]  [place] CASH FACETECTOMY&FORAMOT 1 VRT SGM EA ADDL SGM [60825]  [place] POSTERIOR SEGMENTAL INSTRUMENTATION 3-6 VRT SEG [55542]  [place] ALLOGRAFT FOR SPINE SURGERY ONLY MORSELIZED [55207]  [place] AUTOGRAFT SPINE SURGERY LOCAL FROM SAME INCISION [90430]  [place] STEREOTACTIC COMPUTER ASSISTED PX SPINAL [72113]  [place] ARTHRODESIS PST/PSTLAT TQ 1NTRSPC EA ADDL NTRSPC [97959]      Surgeons      * Paul Patel - Primary    Resident/Fellow/Other Assistant:  Surgeons and Role:     * Taylor Mehta MD - Resident - Assisting    Staff:   Circulator: Edyta Dennis Person: Arielle Dennis Person: Jesus Simmons Circulator: Maye Simmons Scrub: Jonelle    Anesthesia Staff: Anesthesiologist: Farhad Dykes MD  C-AA: WILFRIDO Anne  GALEN: Pansaloni Douglasvestri; Camila Abad  Frontline Breaker: WILFRIDO Galvan    MEDICATIONS:    Antibiotic prophylaxis given within one hour prior to skin  incision.    PROPHYLAXIS:    Venous thromboembolism prophylaxis was ordered at the time of surgery in the form of mechanical prophylaxis using sequential compression devices.    INDICATION:  Mr. Colón is a 64 y.o. male  who presented with low back and lower extremity pain.  He has a previous history of a cervical deformity that was treated by myself via anterior posterior approach with cervical thoracic fusion from which he did very well.  Imaging showed presence of lumbar stenosis adjacent to a previous L5-S1 fusion with good correlation between imaging findings and clinical symptoms.  The patient failed all conservative treatment. In view of the presence of significant symptoms affecting the activities of daily living to a significant extent,  surgical treatment was discussed with the goals, risk and benefits of surgery and the fact that surgery may or may not alleviate the symptoms completely with small chances of even worsening of the symptoms despite a successful surgery. The patient chose to proceed with surgery after clear understanding of all the risk and benefits and goals of surgery.     TECHNIQUE:    After induction of general anesthesia, the patient was carefully turned prone on a Open Emmanuel Table. All dependent portions carefully padded.   The thoracic and lumbar region was scrubbed, prepped, and draped in usual sterile fashion.     A midline incision was marked out from L3-S1 using  intraoperative fluoroscopy and injected with local anesthesia.  The previous midline incision was utilized that was large enough to incorporate all the intended levels.  The incision was made and carried down to the muscle and fascia.  Subperiosteal dissection was utilized with cautery to expose the lamina and facet joints from L3-S1.  Great care was taken not to violate the cranial zygoappophyseal joint or interspinous ligament.  Significant scarring was clearly evident during exposure along with the alteration in the  spinal anatomy given absence of various posterior elements of the spine from the previous spinal surgery.  The previous spinal instrumentation was clearly visualized and exposed after careful dissection.  Appropriate instruments were then utilized to remove the previous spinal instrumentation..  Distraction was placed across the L5-S1 segment that demonstrated solid fusion at L5-S1 level from the previous surgery that he had.    Spinous process clamp was then attached to the spinous process of the cranial most exposed level and the dynamic reference array was attached to this. The O-arm was  brought into the field and intraoperative CT scan performed and the images  transferred to the [employer] system for use in intraoperative  image-guided computer-assisted navigation. Screw trajectories were palnned using the navigation and the navigation system was then used  to place pedicle screws from L3 to S1  using FINXI OPEN instrumentation system.  A ball-tip feeler was used to confirm no cortical breach once the screw trajectory was tapped under navigation before placement of the screw at every level.  The patient said bilateral L5 pedicles where eroded from the previous instrumentation that was placed with a very medial trajectory and no further instrumentation could be placed at the L5 level.  Also given the presence of significant obesity appropriate trajectory could not be obtained for the L4 screw on the left side that was scheduled.  Also given the presence of a solid arthrodesis across the L5-S1 segment the placement of iliac fixation was not deemed necessary..  Also given the presence of autofusion at L2-3 level and the absence of any stenosis at that level instrumentation was not extended up to the L2.    At this point of time attention was then directed to performance of decompression.  L3-4, L4-5, L5-S1 laminectomies and facetectomies were  then performed using high speed drill, Trung and Kerrison  zane following by resection of ligamentum flavum for decompression of L3-L4-L5 and S1 nerve roots bilaterally.    The surgical procedure included substantial work with much more increased intensity, time ( about double the time it would normally take), technical difficulty and physical with mental effort secondary to the presence of severe scarring from previous surgeries and morbid obesity.     The posterolateral elements of L3-S1 were decorticated using a high-speed drill. Patient specific 5.5 mm titanium transition UNiD rods manufactured using [employer] preoperative planning software were then seated in the heads of the screws followed by the set caps that were final tightened using the  torque  and counter-torque after ensuring appropriate sagittal and coronal alignment.  The patient did not had any evidence of obvious deformity and hence did not required significant manipulation during the surgery and placement of the instrumentation.      Final AP and lateral fluoroscopic images were taken to confirm satisfactory implant  placement. The posterolateral arthrodesis from L3-S1 was completed by  laying down morselized autograft that was obtained from the bony decompression and Demineralized Bone Matrix (DBM).  Given the fact that bilateral facetectomies were also performed L5-S1 level for decompression of the nerve roots arthrodesis was extended all the way from L3-S1.    Vancomycin and ancef powder was scattered in the wound and Hemovac drain tunneled out percutaneously from the wound. The wound was then closed in layers in a standard fashion. Skin glue was used for the final skin layer. The drapes were removed, sterile dressing applied. The patient was  turned back supine.    The EBL was about 800 ml     Complications:  None; patient tolerated the procedure well.    Disposition: PACU - hemodynamically stable.  Condition: stable       Attending Attestation: I was present for the entire  procedure.    Paul Patel  Phone Number: [phone]

## 2025-07-14 NOTE — ANESTHESIA PROCEDURE NOTES
Peripheral IV  Date/Time: 7/14/2025 8:15 AM  Inserted by: Farhad Dykes MD    Placement  Needle size: 18 G  Laterality: right  Location: hand  Site prep: alcohol  Technique: anatomical landmarks  Attempts: 1

## 2025-07-15 ENCOUNTER — APPOINTMENT (OUTPATIENT)
Dept: CARDIOLOGY | Facility: HOSPITAL | Age: 65
DRG: 447 | End: 2025-07-15
Payer: MEDICARE

## 2025-07-15 ENCOUNTER — APPOINTMENT (OUTPATIENT)
Dept: CARDIOLOGY | Facility: HOSPITAL | Age: 65
End: 2025-07-15
Payer: MEDICARE

## 2025-07-15 ENCOUNTER — APPOINTMENT (OUTPATIENT)
Dept: RADIOLOGY | Facility: HOSPITAL | Age: 65
DRG: 447 | End: 2025-07-15
Payer: MEDICARE

## 2025-07-15 PROBLEM — M51.369 LUMBAR SPINAL STENOSIS DUE TO ADJACENT SEGMENT DISEASE AFTER FUSION PROCEDURE: Status: RESOLVED | Noted: 2025-06-05 | Resolved: 2025-07-15

## 2025-07-15 PROBLEM — M48.061 LUMBAR SPINAL STENOSIS DUE TO ADJACENT SEGMENT DISEASE AFTER FUSION PROCEDURE: Status: RESOLVED | Noted: 2025-06-05 | Resolved: 2025-07-15

## 2025-07-15 PROBLEM — Z98.1 LUMBAR SPINAL STENOSIS DUE TO ADJACENT SEGMENT DISEASE AFTER FUSION PROCEDURE: Status: RESOLVED | Noted: 2025-06-05 | Resolved: 2025-07-15

## 2025-07-15 LAB
ABO GROUP (TYPE) IN BLOOD: NORMAL
ALBUMIN SERPL BCP-MCNC: 2.9 G/DL (ref 3.4–5)
ALBUMIN SERPL BCP-MCNC: 3.5 G/DL (ref 3.4–5)
ANION GAP BLDA CALCULATED.4IONS-SCNC: 6 MMO/L (ref 10–25)
ANION GAP BLDA CALCULATED.4IONS-SCNC: 8 MMO/L (ref 10–25)
ANION GAP SERPL CALC-SCNC: 12 MMOL/L (ref 10–20)
ANION GAP SERPL CALC-SCNC: 14 MMOL/L (ref 10–20)
ANION GAP SERPL CALC-SCNC: 9 MMOL/L (ref 10–20)
ANION GAP SERPL CALC-SCNC: 9 MMOL/L (ref 10–20)
ANTIBODY SCREEN: NORMAL
ATRIAL RATE: 95 BPM
BASE EXCESS BLDA CALC-SCNC: 1.2 MMOL/L (ref -2–3)
BASE EXCESS BLDA CALC-SCNC: 1.9 MMOL/L (ref -2–3)
BASE EXCESS BLDA CALC-SCNC: 2.8 MMOL/L (ref -2–3)
BASE EXCESS BLDA CALC-SCNC: 3.5 MMOL/L (ref -2–3)
BODY TEMPERATURE: ABNORMAL
BUN SERPL-MCNC: 25 MG/DL (ref 6–23)
BUN SERPL-MCNC: 25 MG/DL (ref 6–23)
BUN SERPL-MCNC: 28 MG/DL (ref 6–23)
BUN SERPL-MCNC: 28 MG/DL (ref 6–23)
CA-I BLD-SCNC: 1.11 MMOL/L (ref 1.1–1.33)
CA-I BLDA-SCNC: 1.15 MMOL/L (ref 1.1–1.33)
CA-I BLDA-SCNC: 1.18 MMOL/L (ref 1.1–1.33)
CA-I BLDA-SCNC: 1.19 MMOL/L (ref 1.1–1.33)
CA-I BLDA-SCNC: 1.19 MMOL/L (ref 1.1–1.33)
CALCIUM SERPL-MCNC: 7.9 MG/DL (ref 8.6–10.6)
CALCIUM SERPL-MCNC: 7.9 MG/DL (ref 8.6–10.6)
CALCIUM SERPL-MCNC: 8.2 MG/DL (ref 8.6–10.6)
CALCIUM SERPL-MCNC: 8.5 MG/DL (ref 8.6–10.6)
CARDIAC TROPONIN I PNL SERPL HS: 29 NG/L (ref 0–53)
CHLORIDE BLDA-SCNC: 105 MMOL/L (ref 98–107)
CHLORIDE BLDA-SCNC: 106 MMOL/L (ref 98–107)
CHLORIDE BLDA-SCNC: 106 MMOL/L (ref 98–107)
CHLORIDE BLDA-SCNC: 108 MMOL/L (ref 98–107)
CHLORIDE SERPL-SCNC: 102 MMOL/L (ref 98–107)
CHLORIDE SERPL-SCNC: 105 MMOL/L (ref 98–107)
CHLORIDE SERPL-SCNC: 108 MMOL/L (ref 98–107)
CHLORIDE SERPL-SCNC: 108 MMOL/L (ref 98–107)
CO2 SERPL-SCNC: 27 MMOL/L (ref 21–32)
CO2 SERPL-SCNC: 29 MMOL/L (ref 21–32)
CREAT SERPL-MCNC: 1.16 MG/DL (ref 0.5–1.3)
CREAT SERPL-MCNC: 1.26 MG/DL (ref 0.5–1.3)
CREAT SERPL-MCNC: 1.52 MG/DL (ref 0.5–1.3)
CREAT SERPL-MCNC: 1.52 MG/DL (ref 0.5–1.3)
EGFRCR SERPLBLD CKD-EPI 2021: 51 ML/MIN/1.73M*2
EGFRCR SERPLBLD CKD-EPI 2021: 51 ML/MIN/1.73M*2
EGFRCR SERPLBLD CKD-EPI 2021: 64 ML/MIN/1.73M*2
EGFRCR SERPLBLD CKD-EPI 2021: 70 ML/MIN/1.73M*2
ERYTHROCYTE [DISTWIDTH] IN BLOOD BY AUTOMATED COUNT: 19.3 % (ref 11.5–14.5)
ERYTHROCYTE [DISTWIDTH] IN BLOOD BY AUTOMATED COUNT: 19.7 % (ref 11.5–14.5)
ERYTHROCYTE [DISTWIDTH] IN BLOOD BY AUTOMATED COUNT: 19.7 % (ref 11.5–14.5)
GLUCOSE BLD MANUAL STRIP-MCNC: 125 MG/DL (ref 74–99)
GLUCOSE BLD MANUAL STRIP-MCNC: 153 MG/DL (ref 74–99)
GLUCOSE BLD MANUAL STRIP-MCNC: 156 MG/DL (ref 74–99)
GLUCOSE BLD MANUAL STRIP-MCNC: 174 MG/DL (ref 74–99)
GLUCOSE BLD MANUAL STRIP-MCNC: 185 MG/DL (ref 74–99)
GLUCOSE BLDA-MCNC: 144 MG/DL (ref 74–99)
GLUCOSE BLDA-MCNC: 159 MG/DL (ref 74–99)
GLUCOSE BLDA-MCNC: 166 MG/DL (ref 74–99)
GLUCOSE BLDA-MCNC: 173 MG/DL (ref 74–99)
GLUCOSE SERPL-MCNC: 143 MG/DL (ref 74–99)
GLUCOSE SERPL-MCNC: 143 MG/DL (ref 74–99)
GLUCOSE SERPL-MCNC: 151 MG/DL (ref 74–99)
GLUCOSE SERPL-MCNC: 182 MG/DL (ref 74–99)
HCO3 BLDA-SCNC: 27.8 MMOL/L (ref 22–26)
HCO3 BLDA-SCNC: 29.2 MMOL/L (ref 22–26)
HCO3 BLDA-SCNC: 30.3 MMOL/L (ref 22–26)
HCO3 BLDA-SCNC: 30.6 MMOL/L (ref 22–26)
HCT VFR BLD AUTO: 26.9 % (ref 41–52)
HCT VFR BLD AUTO: 27.7 % (ref 41–52)
HCT VFR BLD AUTO: 33 % (ref 41–52)
HCT VFR BLD EST: 24 % (ref 41–52)
HCT VFR BLD EST: 28 % (ref 41–52)
HCT VFR BLD EST: 28 % (ref 41–52)
HCT VFR BLD EST: 29 % (ref 41–52)
HGB BLD-MCNC: 7.1 G/DL (ref 13.5–17.5)
HGB BLD-MCNC: 7.5 G/DL (ref 13.5–17.5)
HGB BLD-MCNC: 9.4 G/DL (ref 13.5–17.5)
HGB BLDA-MCNC: 7.9 G/DL (ref 13.5–17.5)
HGB BLDA-MCNC: 9.4 G/DL (ref 13.5–17.5)
HGB BLDA-MCNC: 9.4 G/DL (ref 13.5–17.5)
HGB BLDA-MCNC: 9.5 G/DL (ref 13.5–17.5)
INHALED O2 CONCENTRATION: 21 %
INHALED O2 CONCENTRATION: 40 %
INHALED O2 CONCENTRATION: 50 %
INHALED O2 CONCENTRATION: 50 %
LACTATE BLDA-SCNC: 1.3 MMOL/L (ref 0.4–2)
LACTATE BLDA-SCNC: 1.5 MMOL/L (ref 0.4–2)
LACTATE BLDA-SCNC: 1.9 MMOL/L (ref 0.4–2)
LACTATE BLDA-SCNC: 2.3 MMOL/L (ref 0.4–2)
LACTATE BLDV-SCNC: 1.6 MMOL/L (ref 0.4–2)
MAGNESIUM SERPL-MCNC: 1.66 MG/DL (ref 1.6–2.4)
MCH RBC QN AUTO: 20 PG (ref 26–34)
MCH RBC QN AUTO: 20.1 PG (ref 26–34)
MCH RBC QN AUTO: 20.3 PG (ref 26–34)
MCHC RBC AUTO-ENTMCNC: 26.4 G/DL (ref 32–36)
MCHC RBC AUTO-ENTMCNC: 27.1 G/DL (ref 32–36)
MCHC RBC AUTO-ENTMCNC: 28.5 G/DL (ref 32–36)
MCV RBC AUTO: 71 FL (ref 80–100)
MCV RBC AUTO: 74 FL (ref 80–100)
MCV RBC AUTO: 76 FL (ref 80–100)
NRBC BLD-RTO: 0 /100 WBCS (ref 0–0)
OXYHGB MFR BLDA: 90.8 % (ref 94–98)
OXYHGB MFR BLDA: 96.7 % (ref 94–98)
OXYHGB MFR BLDA: 97.1 % (ref 94–98)
OXYHGB MFR BLDA: 97.5 % (ref 94–98)
P AXIS: 27 DEGREES
P OFFSET: 201 MS
P ONSET: 152 MS
PCO2 BLDA: 40 MM HG (ref 38–42)
PCO2 BLDA: 63 MM HG (ref 38–42)
PCO2 BLDA: 65 MM HG (ref 38–42)
PCO2 BLDA: 73 MM HG (ref 38–42)
PH BLDA: 7.23 PH (ref 7.38–7.42)
PH BLDA: 7.26 PH (ref 7.38–7.42)
PH BLDA: 7.29 PH (ref 7.38–7.42)
PH BLDA: 7.45 PH (ref 7.38–7.42)
PHOSPHATE SERPL-MCNC: 3.1 MG/DL (ref 2.5–4.9)
PHOSPHATE SERPL-MCNC: 5 MG/DL (ref 2.5–4.9)
PLATELET # BLD AUTO: 180 X10*3/UL (ref 150–450)
PLATELET # BLD AUTO: 202 X10*3/UL (ref 150–450)
PLATELET # BLD AUTO: 259 X10*3/UL (ref 150–450)
PO2 BLDA: 126 MM HG (ref 85–95)
PO2 BLDA: 130 MM HG (ref 85–95)
PO2 BLDA: 169 MM HG (ref 85–95)
PO2 BLDA: 75 MM HG (ref 85–95)
POTASSIUM BLDA-SCNC: 4.6 MMOL/L (ref 3.5–5.3)
POTASSIUM BLDA-SCNC: 5.3 MMOL/L (ref 3.5–5.3)
POTASSIUM BLDA-SCNC: 5.9 MMOL/L (ref 3.5–5.3)
POTASSIUM BLDA-SCNC: 6.3 MMOL/L (ref 3.5–5.3)
POTASSIUM SERPL-SCNC: 4.4 MMOL/L (ref 3.5–5.3)
POTASSIUM SERPL-SCNC: 4.4 MMOL/L (ref 3.5–5.3)
POTASSIUM SERPL-SCNC: 5 MMOL/L (ref 3.5–5.3)
POTASSIUM SERPL-SCNC: 5.9 MMOL/L (ref 3.5–5.3)
PR INTERVAL: 148 MS
Q ONSET: 226 MS
QRS COUNT: 16 BEATS
QRS DURATION: 88 MS
QT INTERVAL: 322 MS
QTC CALCULATION(BAZETT): 404 MS
QTC FREDERICIA: 375 MS
R AXIS: -5 DEGREES
RBC # BLD AUTO: 3.55 X10*6/UL (ref 4.5–5.9)
RBC # BLD AUTO: 3.74 X10*6/UL (ref 4.5–5.9)
RBC # BLD AUTO: 4.63 X10*6/UL (ref 4.5–5.9)
RH FACTOR (ANTIGEN D): NORMAL
SAO2 % BLDA: 92 % (ref 94–100)
SAO2 % BLDA: 97 % (ref 94–100)
SAO2 % BLDA: 97 % (ref 94–100)
SAO2 % BLDA: 98 % (ref 94–100)
SODIUM BLDA-SCNC: 136 MMOL/L (ref 136–145)
SODIUM BLDA-SCNC: 136 MMOL/L (ref 136–145)
SODIUM BLDA-SCNC: 137 MMOL/L (ref 136–145)
SODIUM BLDA-SCNC: 137 MMOL/L (ref 136–145)
SODIUM SERPL-SCNC: 138 MMOL/L (ref 136–145)
SODIUM SERPL-SCNC: 140 MMOL/L (ref 136–145)
T AXIS: -1 DEGREES
T OFFSET: 387 MS
VENTRICULAR RATE: 95 BPM
WBC # BLD AUTO: 11.3 X10*3/UL (ref 4.4–11.3)
WBC # BLD AUTO: 9 X10*3/UL (ref 4.4–11.3)
WBC # BLD AUTO: 9.8 X10*3/UL (ref 4.4–11.3)

## 2025-07-15 PROCEDURE — P9047 ALBUMIN (HUMAN), 25%, 50ML: HCPCS

## 2025-07-15 PROCEDURE — 97110 THERAPEUTIC EXERCISES: CPT | Mod: GP

## 2025-07-15 PROCEDURE — 84484 ASSAY OF TROPONIN QUANT: CPT

## 2025-07-15 PROCEDURE — 80069 RENAL FUNCTION PANEL: CPT | Performed by: STUDENT IN AN ORGANIZED HEALTH CARE EDUCATION/TRAINING PROGRAM

## 2025-07-15 PROCEDURE — 36430 TRANSFUSION BLD/BLD COMPNT: CPT

## 2025-07-15 PROCEDURE — 82947 ASSAY GLUCOSE BLOOD QUANT: CPT

## 2025-07-15 PROCEDURE — 82374 ASSAY BLOOD CARBON DIOXIDE: CPT | Performed by: STUDENT IN AN ORGANIZED HEALTH CARE EDUCATION/TRAINING PROGRAM

## 2025-07-15 PROCEDURE — 93005 ELECTROCARDIOGRAM TRACING: CPT

## 2025-07-15 PROCEDURE — P9045 ALBUMIN (HUMAN), 5%, 250 ML: HCPCS | Mod: JZ,TB

## 2025-07-15 PROCEDURE — 82330 ASSAY OF CALCIUM: CPT

## 2025-07-15 PROCEDURE — 2500000004 HC RX 250 GENERAL PHARMACY W/ HCPCS (ALT 636 FOR OP/ED): Mod: JW,TB | Performed by: STUDENT IN AN ORGANIZED HEALTH CARE EDUCATION/TRAINING PROGRAM

## 2025-07-15 PROCEDURE — 83735 ASSAY OF MAGNESIUM: CPT

## 2025-07-15 PROCEDURE — 84132 ASSAY OF SERUM POTASSIUM: CPT

## 2025-07-15 PROCEDURE — 2500000004 HC RX 250 GENERAL PHARMACY W/ HCPCS (ALT 636 FOR OP/ED)

## 2025-07-15 PROCEDURE — 86900 BLOOD TYPING SEROLOGIC ABO: CPT

## 2025-07-15 PROCEDURE — 85027 COMPLETE CBC AUTOMATED: CPT

## 2025-07-15 PROCEDURE — 97535 SELF CARE MNGMENT TRAINING: CPT | Mod: GO

## 2025-07-15 PROCEDURE — 3E033XZ INTRODUCTION OF VASOPRESSOR INTO PERIPHERAL VEIN, PERCUTANEOUS APPROACH: ICD-10-PCS | Performed by: PSYCHIATRY & NEUROLOGY

## 2025-07-15 PROCEDURE — 84132 ASSAY OF SERUM POTASSIUM: CPT | Performed by: REGISTERED NURSE

## 2025-07-15 PROCEDURE — 97166 OT EVAL MOD COMPLEX 45 MIN: CPT | Mod: GO

## 2025-07-15 PROCEDURE — P9016 RBC LEUKOCYTES REDUCED: HCPCS

## 2025-07-15 PROCEDURE — 37799 UNLISTED PX VASCULAR SURGERY: CPT | Performed by: STUDENT IN AN ORGANIZED HEALTH CARE EDUCATION/TRAINING PROGRAM

## 2025-07-15 PROCEDURE — 2500000004 HC RX 250 GENERAL PHARMACY W/ HCPCS (ALT 636 FOR OP/ED): Performed by: REGISTERED NURSE

## 2025-07-15 PROCEDURE — 85027 COMPLETE CBC AUTOMATED: CPT | Performed by: STUDENT IN AN ORGANIZED HEALTH CARE EDUCATION/TRAINING PROGRAM

## 2025-07-15 PROCEDURE — 80048 BASIC METABOLIC PNL TOTAL CA: CPT | Mod: CCI | Performed by: REGISTERED NURSE

## 2025-07-15 PROCEDURE — 94660 CPAP INITIATION&MGMT: CPT

## 2025-07-15 PROCEDURE — 2500000005 HC RX 250 GENERAL PHARMACY W/O HCPCS: Performed by: REGISTERED NURSE

## 2025-07-15 PROCEDURE — 37799 UNLISTED PX VASCULAR SURGERY: CPT

## 2025-07-15 PROCEDURE — 99291 CRITICAL CARE FIRST HOUR: CPT

## 2025-07-15 PROCEDURE — 83605 ASSAY OF LACTIC ACID: CPT

## 2025-07-15 PROCEDURE — 97162 PT EVAL MOD COMPLEX 30 MIN: CPT | Mod: GP

## 2025-07-15 PROCEDURE — 84132 ASSAY OF SERUM POTASSIUM: CPT | Performed by: NURSE PRACTITIONER

## 2025-07-15 PROCEDURE — 2500000005 HC RX 250 GENERAL PHARMACY W/O HCPCS

## 2025-07-15 PROCEDURE — 2500000004 HC RX 250 GENERAL PHARMACY W/ HCPCS (ALT 636 FOR OP/ED): Mod: JZ,TB

## 2025-07-15 PROCEDURE — 93010 ELECTROCARDIOGRAM REPORT: CPT | Performed by: INTERNAL MEDICINE

## 2025-07-15 PROCEDURE — 5A0945A ASSISTANCE WITH RESPIRATORY VENTILATION, 24-96 CONSECUTIVE HOURS, HIGH NASAL FLOW/VELOCITY: ICD-10-PCS | Performed by: NEUROLOGICAL SURGERY

## 2025-07-15 PROCEDURE — 2500000002 HC RX 250 W HCPCS SELF ADMINISTERED DRUGS (ALT 637 FOR MEDICARE OP, ALT 636 FOR OP/ED): Performed by: REGISTERED NURSE

## 2025-07-15 PROCEDURE — 86901 BLOOD TYPING SEROLOGIC RH(D): CPT

## 2025-07-15 PROCEDURE — 5A0935A ASSISTANCE WITH RESPIRATORY VENTILATION, LESS THAN 24 CONSECUTIVE HOURS, HIGH NASAL FLOW/VELOCITY: ICD-10-PCS | Performed by: NEUROLOGICAL SURGERY

## 2025-07-15 PROCEDURE — 2020000001 HC ICU ROOM DAILY

## 2025-07-15 PROCEDURE — 97530 THERAPEUTIC ACTIVITIES: CPT | Mod: GO

## 2025-07-15 PROCEDURE — 2500000001 HC RX 250 WO HCPCS SELF ADMINISTERED DRUGS (ALT 637 FOR MEDICARE OP): Performed by: STUDENT IN AN ORGANIZED HEALTH CARE EDUCATION/TRAINING PROGRAM

## 2025-07-15 RX ORDER — CALCIUM GLUCONATE 20 MG/ML
2 INJECTION, SOLUTION INTRAVENOUS EVERY 6 HOURS PRN
Status: DISCONTINUED | OUTPATIENT
Start: 2025-07-15 | End: 2025-07-23

## 2025-07-15 RX ORDER — NOREPINEPHRINE BITARTRATE/D5W 8 MG/250ML
PLASTIC BAG, INJECTION (ML) INTRAVENOUS
Status: COMPLETED
Start: 2025-07-15 | End: 2025-07-15

## 2025-07-15 RX ORDER — POTASSIUM CHLORIDE 1.5 G/1.58G
40 POWDER, FOR SOLUTION ORAL EVERY 6 HOURS PRN
Status: DISCONTINUED | OUTPATIENT
Start: 2025-07-15 | End: 2025-07-23

## 2025-07-15 RX ORDER — SODIUM CHLORIDE 9 MG/ML
125 INJECTION, SOLUTION INTRAVENOUS CONTINUOUS
Status: DISCONTINUED | OUTPATIENT
Start: 2025-07-15 | End: 2025-07-16

## 2025-07-15 RX ORDER — MAGNESIUM SULFATE HEPTAHYDRATE 40 MG/ML
2 INJECTION, SOLUTION INTRAVENOUS EVERY 6 HOURS PRN
Status: DISCONTINUED | OUTPATIENT
Start: 2025-07-15 | End: 2025-07-23

## 2025-07-15 RX ORDER — POTASSIUM CHLORIDE 20 MEQ/1
20 TABLET, EXTENDED RELEASE ORAL EVERY 6 HOURS PRN
Status: DISCONTINUED | OUTPATIENT
Start: 2025-07-15 | End: 2025-07-23

## 2025-07-15 RX ORDER — CALCIUM GLUCONATE 20 MG/ML
1 INJECTION, SOLUTION INTRAVENOUS EVERY 6 HOURS PRN
Status: DISCONTINUED | OUTPATIENT
Start: 2025-07-15 | End: 2025-07-23

## 2025-07-15 RX ORDER — ALBUMIN HUMAN 50 G/1000ML
SOLUTION INTRAVENOUS
Status: COMPLETED
Start: 2025-07-15 | End: 2025-07-15

## 2025-07-15 RX ORDER — MAGNESIUM SULFATE HEPTAHYDRATE 40 MG/ML
4 INJECTION, SOLUTION INTRAVENOUS EVERY 6 HOURS PRN
Status: DISCONTINUED | OUTPATIENT
Start: 2025-07-15 | End: 2025-07-23

## 2025-07-15 RX ORDER — SODIUM CHLORIDE 9 MG/ML
75 INJECTION, SOLUTION INTRAVENOUS CONTINUOUS
Status: DISCONTINUED | OUTPATIENT
Start: 2025-07-15 | End: 2025-07-15

## 2025-07-15 RX ORDER — POTASSIUM CHLORIDE 1.5 G/1.58G
20 POWDER, FOR SOLUTION ORAL EVERY 6 HOURS PRN
Status: DISCONTINUED | OUTPATIENT
Start: 2025-07-15 | End: 2025-07-23

## 2025-07-15 RX ORDER — NOREPINEPHRINE BITARTRATE/D5W 8 MG/250ML
.01-1 PLASTIC BAG, INJECTION (ML) INTRAVENOUS CONTINUOUS
Status: DISCONTINUED | OUTPATIENT
Start: 2025-07-15 | End: 2025-07-17

## 2025-07-15 RX ORDER — ALBUMIN HUMAN 250 G/1000ML
25 SOLUTION INTRAVENOUS ONCE
Status: COMPLETED | OUTPATIENT
Start: 2025-07-15 | End: 2025-07-15

## 2025-07-15 RX ORDER — POTASSIUM CHLORIDE 20 MEQ/1
40 TABLET, EXTENDED RELEASE ORAL EVERY 6 HOURS PRN
Status: DISCONTINUED | OUTPATIENT
Start: 2025-07-15 | End: 2025-07-23

## 2025-07-15 RX ADMIN — ACETAMINOPHEN 650 MG: 325 TABLET, FILM COATED ORAL at 15:28

## 2025-07-15 RX ADMIN — ALBUMIN HUMAN 25 G: 0.05 INJECTION, SOLUTION INTRAVENOUS at 12:39

## 2025-07-15 RX ADMIN — SODIUM CHLORIDE 75 ML/HR: 9 INJECTION, SOLUTION INTRAVENOUS at 12:53

## 2025-07-15 RX ADMIN — Medication 0.06 MCG/KG/MIN: at 12:17

## 2025-07-15 RX ADMIN — CYCLOBENZAPRINE 10 MG: 10 TABLET, FILM COATED ORAL at 08:09

## 2025-07-15 RX ADMIN — CYCLOBENZAPRINE 10 MG: 10 TABLET, FILM COATED ORAL at 20:27

## 2025-07-15 RX ADMIN — PANTOPRAZOLE SODIUM 40 MG: 40 TABLET, DELAYED RELEASE ORAL at 20:28

## 2025-07-15 RX ADMIN — SENNOSIDES AND DOCUSATE SODIUM 2 TABLET: 50; 8.6 TABLET ORAL at 08:09

## 2025-07-15 RX ADMIN — SODIUM CHLORIDE 1000 ML: 0.9 INJECTION, SOLUTION INTRAVENOUS at 01:10

## 2025-07-15 RX ADMIN — INSULIN LISPRO 1 UNITS: 100 INJECTION, SOLUTION INTRAVENOUS; SUBCUTANEOUS at 20:29

## 2025-07-15 RX ADMIN — SODIUM CHLORIDE 75 ML/HR: 9 INJECTION, SOLUTION INTRAVENOUS at 04:06

## 2025-07-15 RX ADMIN — HEPARIN SODIUM 5000 UNITS: 5000 INJECTION INTRAVENOUS; SUBCUTANEOUS at 15:28

## 2025-07-15 RX ADMIN — SODIUM CHLORIDE 1000 ML: 0.9 INJECTION, SOLUTION INTRAVENOUS at 15:29

## 2025-07-15 RX ADMIN — HEPARIN SODIUM 5000 UNITS: 5000 INJECTION INTRAVENOUS; SUBCUTANEOUS at 01:10

## 2025-07-15 RX ADMIN — Medication 50 L/MIN: at 13:40

## 2025-07-15 RX ADMIN — ALBUMIN HUMAN 25 G: 0.25 SOLUTION INTRAVENOUS at 12:42

## 2025-07-15 RX ADMIN — ATORVASTATIN CALCIUM 40 MG: 40 TABLET, FILM COATED ORAL at 20:27

## 2025-07-15 RX ADMIN — ACETAMINOPHEN 650 MG: 325 TABLET, FILM COATED ORAL at 20:28

## 2025-07-15 RX ADMIN — HEPARIN SODIUM 5000 UNITS: 5000 INJECTION INTRAVENOUS; SUBCUTANEOUS at 08:09

## 2025-07-15 RX ADMIN — HYDROMORPHONE HYDROCHLORIDE 0.2 MG: 1 INJECTION, SOLUTION INTRAMUSCULAR; INTRAVENOUS; SUBCUTANEOUS at 01:55

## 2025-07-15 RX ADMIN — PANTOPRAZOLE SODIUM 40 MG: 40 TABLET, DELAYED RELEASE ORAL at 08:09

## 2025-07-15 RX ADMIN — INSULIN LISPRO 1 UNITS: 100 INJECTION, SOLUTION INTRAVENOUS; SUBCUTANEOUS at 11:38

## 2025-07-15 RX ADMIN — GABAPENTIN 600 MG: 300 CAPSULE ORAL at 20:27

## 2025-07-15 RX ADMIN — GABAPENTIN 600 MG: 300 CAPSULE ORAL at 15:28

## 2025-07-15 RX ADMIN — ACETAMINOPHEN 650 MG: 325 TABLET, FILM COATED ORAL at 08:38

## 2025-07-15 RX ADMIN — ASPIRIN 81 MG: 81 TABLET, COATED ORAL at 08:09

## 2025-07-15 RX ADMIN — SODIUM CHLORIDE 1000 ML: 0.9 INJECTION, SOLUTION INTRAVENOUS at 10:52

## 2025-07-15 RX ADMIN — LOSARTAN POTASSIUM 25 MG: 25 TABLET, FILM COATED ORAL at 08:09

## 2025-07-15 RX ADMIN — CYCLOBENZAPRINE 10 MG: 10 TABLET, FILM COATED ORAL at 15:28

## 2025-07-15 RX ADMIN — INSULIN LISPRO 1 UNITS: 100 INJECTION, SOLUTION INTRAVENOUS; SUBCUTANEOUS at 08:09

## 2025-07-15 RX ADMIN — GABAPENTIN 600 MG: 300 CAPSULE ORAL at 08:08

## 2025-07-15 ASSESSMENT — COGNITIVE AND FUNCTIONAL STATUS - GENERAL
CLIMB 3 TO 5 STEPS WITH RAILING: TOTAL
DRESSING REGULAR UPPER BODY CLOTHING: A LOT
DRESSING REGULAR LOWER BODY CLOTHING: A LOT
TOILETING: A LOT
STANDING UP FROM CHAIR USING ARMS: TOTAL
MOVING TO AND FROM BED TO CHAIR: TOTAL
WALKING IN HOSPITAL ROOM: TOTAL
PERSONAL GROOMING: A LITTLE
MOBILITY SCORE: 7
HELP NEEDED FOR BATHING: A LOT
DAILY ACTIVITIY SCORE: 14
MOVING FROM LYING ON BACK TO SITTING ON SIDE OF FLAT BED WITH BEDRAILS: A LOT
TURNING FROM BACK TO SIDE WHILE IN FLAT BAD: TOTAL
EATING MEALS: A LITTLE

## 2025-07-15 ASSESSMENT — ACTIVITIES OF DAILY LIVING (ADL)
HOME_MANAGEMENT_TIME_ENTRY: 10
BATHING_ASSISTANCE: MAXIMAL
ADL_ASSISTANCE: INDEPENDENT
ADLS_ADDRESSED: NO
HOME_MANAGEMENT_TIME_ENTRY: 10

## 2025-07-15 ASSESSMENT — PAIN - FUNCTIONAL ASSESSMENT
PAIN_FUNCTIONAL_ASSESSMENT: 0-10

## 2025-07-15 ASSESSMENT — PAIN SCALES - GENERAL
PAINLEVEL_OUTOF10: 6
PAINLEVEL_OUTOF10: 7
PAINLEVEL_OUTOF10: 7
PAINLEVEL_OUTOF10: 0 - NO PAIN
PAINLEVEL_OUTOF10: 0 - NO PAIN

## 2025-07-15 NOTE — PROCEDURES
[facility]  NEUROSCIENCE INTENSIVE CARE UNIT  DAILY PROGRESS NOTE       Patient Name: Jerman Colón Jr.   MRN: [id number]     Admit Date: 2025     : 1960 AGE: 64 y.o. GENDER: male        Subjective    Jerman Colón Jr. is a 65yo M with PMH PONV, HTN, HLD, CAD, BIACNA, asthma, GERD, CKD, BPH, anemia, lumbar disc disease, spinal stenosis. p/w BLE radiculopathy,  s/p L3-S1 lami and facetectomies/extension/revision.        Objective   VITALS (24H):  Temp:  [36.1 °C (97 °F)-37.2 °C (99 °F)] 37 °C (98.6 °F)  Heart Rate:  [] 104  Resp:  [11-26] 15  BP: (119-157)/(56-93) 146/83  Arterial Line BP 1: ()/(46-73) 124/65  INTAKE/OUTPUT:  Intake/Output Summary (Last 24 hours) at 2025  Last data filed at 2025  Gross per 24 hour   Intake 2100 ml   Output 2195 ml   Net -95 ml     VENT SETTINGS:  S RR:  [16] 16     PHYSICAL EXAM:  NEURO:  - Awake, AOx4, follows commands  - EOS, PERRL, EOMI, VFF  - MOORE 5/5 strength, no drift, no ataxia  - posterior surgical incision c/d/I   - Drain to full suction   CV:  - RRR on telemetry, NSR  RESP:  - No signs of resp distress  - On 3L NC   :  - Alex draining clear yellow urine to gravity   GI:  - Abdomen NT/ND, soft  SKIN:  - Intact    MEDICATIONS:  Scheduled: PRN: Continuous:   Scheduled Medications[1] PRN Medications[2] Continuous Medications[3]     LAB RESULTS:  Results from last 72 hours   Lab Units 25  1457   GLUCOSE mg/dL 181*   SODIUM mmol/L 140   POTASSIUM mmol/L 4.9   CHLORIDE mmol/L 105   CO2 mmol/L 24   ANION GAP mmol/L 16   BUN mg/dL 18   CREATININE mg/dL 1.22   EGFR mL/min/1.73m*2 66   CALCIUM mg/dL 8.3*   PHOSPHORUS mg/dL 4.9   ALBUMIN g/dL 3.2*      Results from last 72 hours   Lab Units 25  1457   WBC AUTO x10*3/uL 11.2   NRBC AUTO /100 WBCs 0.0   RBC AUTO x10*6/uL 4.91   HEMOGLOBIN g/dL 10.1*   HEMATOCRIT % 34.7*   MCV fL 71*   MCH pg 20.6*   MCHC g/dL 29.1*   RDW % 19.8*   PLATELETS AUTO  x10*3/uL 254      Results from last 72 hours   Lab Units 07/14/25  1457   WBC AUTO x10*3/uL 11.2   NRBC AUTO /100 WBCs 0.0   RBC AUTO x10*6/uL 4.91   HEMOGLOBIN g/dL 10.1*   HEMATOCRIT % 34.7*   MCV fL 71*   MCH pg 20.6*   MCHC g/dL 29.1*   RDW % 19.8*   PLATELETS AUTO x10*3/uL 254             IMAGING RESULTS:  FL fluoro images no charge   Final Result      FL fluoro images no charge   Final Result             Assessment/Plan    64 y.o. male with PMH PMH PONV, HTN, HLD, CAD, BIANCA, asthma, GERD, CKD, BPH, anemia, lumbar disc disease, spinal stenosis. Admitted 7/14/2025 with Lumbar radicular pain after presenting with BLE radiculopathy, 7/14 s/p L3-S1 lami and facetectomies/extension/revision.    NEURO:  #s/p L3-S1 lami and facetectomies/extension/revision  #PONV, prior L5-S1 fusion  Assessment:  - Neurologically: see above   - EBL 800cc  Plan:  - NSU  - NSGY primary   - Drain per NSGY   - Neuro Checks: Q1H  - Pain: acetaminophen PRN, oxycodone PRN, and hydromorphone PRN, Toradol 30mg q6h PRN  - Flexeril 10mg TID   - Gabapentin 600mg TID   - Nausea: ondansetron  - PT/OT/SLP    CARDIOVASCULAR:  #HTN, HLD, CAD  Assessment:  - SR on tele  - 6/30/25 ECHO: EF 60%     Plan:  - Continue to monitor on telemetry  - MAPs>65  - ASA 81mg daily   - Atorvastatin 40mg daily   - Losartan 25mg daily    RESPIRATORY:  #BIANCA, asthma  Assessment:  - ON 3L NC (home baseline)   Plan:  - Continuous pulse oximetry   - O2 PRN to maintain SpO2 > 94%, wean as tolerated  - Incentive spirometry while awake  - CPAP at night     RENAL/:  # CKD, BPH  Assessment:  - Baseline BUN/Cr: 20/0.85  Results from last 72 hours   Lab Units 07/14/25  1457   BUN mg/dL 18   CREATININE mg/dL 1.22   Plan:  - Monitor with daily RFP  - Remove catheter by POD#1  - c/w home tamsulosin 0.4,g qHS    FEN/GI:  #GERD  Assessment:  - Last BM: PTA   Plan:  - Monitor and replace electrolytes per protocol  - IVF: PRN  - Diet: Adult diet Regular    - Bowel Regimen: Docusate-Senna  BID and Miralax QD    ENDOCRINE:  #Hyperglycemia   Assessment:  Results from last 7 days   Lab Units 25  1457   GLUCOSE mg/dL 181*   SODIUM mmol/L 140   Plan:  - Accuchecks & ISS AC&HS   - Hypoglycemia protocol     HEMATOLOGY:  Assessment:  Results from last 7 days   Lab Units 25  1457   HEMOGLOBIN g/dL 10.1*   HEMATOCRIT % 34.7*   PLATELETS AUTO x10*3/uL 254   Plan:  - Continue to monitor with daily CBC and Coag panel    INFECTIOUS DISEASE:  Assessment:  Results from last 7 days   Lab Units 25  1457   WBC AUTO x10*3/uL 11.2    - Temp (24hrs), Av.8 °C (98.2 °F), Min:36.1 °C (97 °F), Max:37.2 °C (99 °F)  Plan:  - Continue to monitor for s/sx of infection  - Pan culture for temperature > 38.4 C    MUSCULOSKELETAL:  - No acute issues    SKIN:  - No acute issues  - Turns and skin care per NSU protocol    ACCESS:  - PIVs  - L radial arterial line (--)    PROPHYLAXIS:  - DVT Ppx: SCDs and Hold SQH until POD #1  - GI Ppx: Pantoprazole    RESTRAINTS:  Not indicated/Patient does not meet criteria for restraints    Daily ICU Checklist:  - Restraint order/note  [] Yes [] No [] N/A   - NIHSS Frequency appropriate? [] Yes [] No   - Daily Labs Ordered? [] Yes [] No [] N/A   - Medication Route? [] Yes [] No  - (PO, NG, OG, G Tube)   - PPI ordered/needed? [] Yes [] No [] N/A   - DVT PPx [] Yes [] No   - Antibiotic stop date? [] Yes [] No [] N/A   - Alex? [] Yes [] No  DC? [] Y [] N [] N/A   - Central Line? [] Yes [] No  LOC:   DC? [] Y [] N [] N/A   - Central  orders [] Yes [] No [] N/A   - Vent weaning plan? [] Yes [] No [] N/A               CAROLINA Rodriguez-CNP  Neuroscience Intensive Care       Total critical care time of 45 minutes, with > 50% of time spent in direct contact with patient/family for education, counseling and coordination of care.         [1] [2] [3]

## 2025-07-15 NOTE — PROGRESS NOTES
[facility]  NEUROSCIENCE INTENSIVE CARE UNIT  DAILY PROGRESS NOTE       Patient Name: Jerman Colón Jr.   MRN: [id number]     Admit Date: 2025     : 1960 AGE: 64 y.o. GENDER: male        Subjective    Jerman Colón Jr. is a 63yo M with PMH PONV, HTN, HLD, CAD, BIANCA, asthma, GERD, CKD, BPH, anemia, lumbar disc disease, spinal stenosis. p/w BLE radiculopathy,  s/p L3-S1 lami and facetectomies/extension/revision.     Interval Events:   - Was planning to downgrade to NURA on morning on 7/15, but pt became hypotensive   - Pt states he has been able to stand after his surgery, but when he tried to walk his legs became numb      Objective   VITALS (24H):  Temp:  [36.1 °C (97 °F)-37 °C (98.6 °F)] 37 °C (98.6 °F)  Heart Rate:  [] 104  Resp:  [11-30] 20  BP: ()/(45-93) 111/58  Arterial Line BP 1: ()/(37-73) 110/47  FiO2 (%):  [40 %-50 %] 40 %  INTAKE/OUTPUT:  Intake/Output Summary (Last 24 hours) at 7/15/2025 1339  Last data filed at 7/15/2025 1300  Gross per 24 hour   Intake 3893.68 ml   Output 2235 ml   Net 1658.68 ml     VENT SETTINGS:  FiO2 (%):  [40 %-50 %] 40 %  S RR:  [14-16] 14     PHYSICAL EXAM:  NEURO:  - Awake, AOx4, follows commands  - EOS, PERRL, EOMI, VFF  - All muscle groups 5/5 except L hip flexor 5- and L knee extender 5-  - posterior surgical incision c/d/I   - Drain to full suction   CV:  - RRR on telemetry, NSR  RESP:  - No signs of resp distress  - On HFNC  :  - Alex draining clear yellow urine to gravity   GI:  - Abdomen NT/ND, soft  SKIN:  - Intact    MEDICATIONS:  Scheduled: PRN: Continuous:   Scheduled Medications[1] PRN Medications[2] Continuous Medications[3]     LAB RESULTS:  Results for orders placed or performed during the hospital encounter of 25 (from the past 24 hours)   Blood Gas Arterial Full Panel Unsolicited   Result Value Ref Range    POCT pH, Arterial 7.27 (L) 7.38 - 7.42 pH    POCT pCO2, Arterial 57 (H) 38 - 42 mm Hg     POCT pO2, Arterial 196 (H) 85 - 95 mm Hg    POCT SO2, Arterial 98 94 - 100 %    POCT Oxy Hemoglobin, Arterial 97.5 94.0 - 98.0 %    POCT Hematocrit Calculated, Arterial 31.0 (L) 41.0 - 52.0 %    POCT Sodium, Arterial 136 136 - 145 mmol/L    POCT Potassium, Arterial 4.8 3.5 - 5.3 mmol/L    POCT Chloride, Arterial 107 98 - 107 mmol/L    POCT Ionized Calcium, Arterial 1.17 1.10 - 1.33 mmol/L    POCT Glucose, Arterial 167 (H) 74 - 99 mg/dL    POCT Lactate, Arterial 2.9 (H) 0.4 - 2.0 mmol/L    POCT Base Excess, Arterial -1.3 -2.0 - 3.0 mmol/L    POCT HCO3 Calculated, Arterial 26.2 (H) 22.0 - 26.0 mmol/L    POCT Hemoglobin, Arterial 10.3 (L) 13.5 - 17.5 g/dL    POCT Anion Gap, Arterial 8 (L) 10 - 25 mmo/L    Patient Temperature 37.0 degrees Celsius    FiO2 100 %   Blood Gas Arterial Full Panel Unsolicited   Result Value Ref Range    POCT pH, Arterial 7.35 (L) 7.38 - 7.42 pH    POCT pCO2, Arterial 44 (H) 38 - 42 mm Hg    POCT pO2, Arterial 229 (H) 85 - 95 mm Hg    POCT SO2, Arterial 98 94 - 100 %    POCT Oxy Hemoglobin, Arterial 97.8 94.0 - 98.0 %    POCT Hematocrit Calculated, Arterial 31.0 (L) 41.0 - 52.0 %    POCT Sodium, Arterial 135 (L) 136 - 145 mmol/L    POCT Potassium, Arterial 4.8 3.5 - 5.3 mmol/L    POCT Chloride, Arterial 106 98 - 107 mmol/L    POCT Ionized Calcium, Arterial 1.18 1.10 - 1.33 mmol/L    POCT Glucose, Arterial 179 (H) 74 - 99 mg/dL    POCT Lactate, Arterial 2.5 (H) 0.4 - 2.0 mmol/L    POCT Base Excess, Arterial -1.4 -2.0 - 3.0 mmol/L    POCT HCO3 Calculated, Arterial 24.3 22.0 - 26.0 mmol/L    POCT Hemoglobin, Arterial 10.3 (L) 13.5 - 17.5 g/dL    POCT Anion Gap, Arterial 10 10 - 25 mmo/L    Patient Temperature 37.0 degrees Celsius    FiO2 100 %   CBC   Result Value Ref Range    WBC 11.2 4.4 - 11.3 x10*3/uL    nRBC 0.0 0.0 - 0.0 /100 WBCs    RBC 4.91 4.50 - 5.90 x10*6/uL    Hemoglobin 10.1 (L) 13.5 - 17.5 g/dL    Hematocrit 34.7 (L) 41.0 - 52.0 %    MCV 71 (L) 80 - 100 fL    MCH 20.6 (L) 26.0 -  34.0 pg    MCHC 29.1 (L) 32.0 - 36.0 g/dL    RDW 19.8 (H) 11.5 - 14.5 %    Platelets 254 150 - 450 x10*3/uL   Renal Function Panel   Result Value Ref Range    Glucose 181 (H) 74 - 99 mg/dL    Sodium 140 136 - 145 mmol/L    Potassium 4.9 3.5 - 5.3 mmol/L    Chloride 105 98 - 107 mmol/L    Bicarbonate 24 21 - 32 mmol/L    Anion Gap 16 10 - 20 mmol/L    Urea Nitrogen 18 6 - 23 mg/dL    Creatinine 1.22 0.50 - 1.30 mg/dL    eGFR 66 >60 mL/min/1.73m*2    Calcium 8.3 (L) 8.6 - 10.6 mg/dL    Phosphorus 4.9 2.5 - 4.9 mg/dL    Albumin 3.2 (L) 3.4 - 5.0 g/dL   Blood Gas Arterial Full Panel Unsolicited   Result Value Ref Range    POCT pH, Arterial 7.39 7.38 - 7.42 pH    POCT pCO2, Arterial 39 38 - 42 mm Hg    POCT pO2, Arterial 159 (H) 85 - 95 mm Hg    POCT SO2, Arterial 97 94 - 100 %    POCT Oxy Hemoglobin, Arterial 97.3 94.0 - 98.0 %    POCT Hematocrit Calculated, Arterial 29.0 (L) 41.0 - 52.0 %    POCT Sodium, Arterial 135 (L) 136 - 145 mmol/L    POCT Potassium, Arterial 4.8 3.5 - 5.3 mmol/L    POCT Chloride, Arterial 108 (H) 98 - 107 mmol/L    POCT Ionized Calcium, Arterial 1.11 1.10 - 1.33 mmol/L    POCT Glucose, Arterial 176 (H) 74 - 99 mg/dL    POCT Lactate, Arterial 2.0 0.4 - 2.0 mmol/L    POCT Base Excess, Arterial -1.2 -2.0 - 3.0 mmol/L    POCT HCO3 Calculated, Arterial 23.6 22.0 - 26.0 mmol/L    POCT Hemoglobin, Arterial 9.6 (L) 13.5 - 17.5 g/dL    POCT Anion Gap, Arterial 8 (L) 10 - 25 mmo/L    Patient Temperature 37.0 degrees Celsius    FiO2 40 %   Blood Gas Arterial Full Panel   Result Value Ref Range    POCT pH, Arterial 7.42 7.38 - 7.42 pH    POCT pCO2, Arterial 34 (L) 38 - 42 mm Hg    POCT pO2, Arterial 103 (H) 85 - 95 mm Hg    POCT SO2, Arterial 97 94 - 100 %    POCT Oxy Hemoglobin, Arterial 96.7 94.0 - 98.0 %    POCT Hematocrit Calculated, Arterial 31.0 (L) 41.0 - 52.0 %    POCT Sodium, Arterial 136 136 - 145 mmol/L    POCT Potassium, Arterial 5.8 (H) 3.5 - 5.3 mmol/L    POCT Chloride, Arterial 106 98 - 107  mmol/L    POCT Ionized Calcium, Arterial 1.07 (L) 1.10 - 1.33 mmol/L    POCT Glucose, Arterial 159 (H) 74 - 99 mg/dL    POCT Lactate, Arterial 2.3 (H) 0.4 - 2.0 mmol/L    POCT Base Excess, Arterial -1.9 -2.0 - 3.0 mmol/L    POCT HCO3 Calculated, Arterial 22.1 22.0 - 26.0 mmol/L    POCT Hemoglobin, Arterial 10.3 (L) 13.5 - 17.5 g/dL    POCT Anion Gap, Arterial 14 10 - 25 mmo/L    Patient Temperature 37.0 degrees Celsius    FiO2 21 %   POCT GLUCOSE   Result Value Ref Range    POCT Glucose 165 (H) 74 - 99 mg/dL   CBC   Result Value Ref Range    WBC 11.3 4.4 - 11.3 x10*3/uL    nRBC 0.0 0.0 - 0.0 /100 WBCs    RBC 4.63 4.50 - 5.90 x10*6/uL    Hemoglobin 9.4 (L) 13.5 - 17.5 g/dL    Hematocrit 33.0 (L) 41.0 - 52.0 %    MCV 71 (L) 80 - 100 fL    MCH 20.3 (L) 26.0 - 34.0 pg    MCHC 28.5 (L) 32.0 - 36.0 g/dL    RDW 19.7 (H) 11.5 - 14.5 %    Platelets 259 150 - 450 x10*3/uL   Basic metabolic panel   Result Value Ref Range    Glucose 182 (H) 74 - 99 mg/dL    Sodium 138 136 - 145 mmol/L    Potassium 5.0 3.5 - 5.3 mmol/L    Chloride 102 98 - 107 mmol/L    Bicarbonate 27 21 - 32 mmol/L    Anion Gap 14 10 - 20 mmol/L    Urea Nitrogen 25 (H) 6 - 23 mg/dL    Creatinine 1.26 0.50 - 1.30 mg/dL    eGFR 64 >60 mL/min/1.73m*2    Calcium 8.5 (L) 8.6 - 10.6 mg/dL   Blood Gas Arterial Full Panel   Result Value Ref Range    POCT pH, Arterial 7.23 (LL) 7.38 - 7.42 pH    POCT pCO2, Arterial 73 (HH) 38 - 42 mm Hg    POCT pO2, Arterial 75 (L) 85 - 95 mm Hg    POCT SO2, Arterial 92 (L) 94 - 100 %    POCT Oxy Hemoglobin, Arterial 90.8 (L) 94.0 - 98.0 %    POCT Hematocrit Calculated, Arterial 28.0 (L) 41.0 - 52.0 %    POCT Sodium, Arterial 137 136 - 145 mmol/L    POCT Potassium, Arterial 5.3 3.5 - 5.3 mmol/L    POCT Chloride, Arterial 106 98 - 107 mmol/L    POCT Ionized Calcium, Arterial 1.19 1.10 - 1.33 mmol/L    POCT Glucose, Arterial 173 (H) 74 - 99 mg/dL    POCT Lactate, Arterial 1.9 0.4 - 2.0 mmol/L    POCT Base Excess, Arterial 1.9 -2.0 - 3.0  mmol/L    POCT HCO3 Calculated, Arterial 30.6 (H) 22.0 - 26.0 mmol/L    POCT Hemoglobin, Arterial 9.4 (L) 13.5 - 17.5 g/dL    POCT Anion Gap, Arterial 6 (L) 10 - 25 mmo/L    Patient Temperature      FiO2 40 %   Blood Gas Arterial Full Panel   Result Value Ref Range    POCT pH, Arterial 7.26 (L) 7.38 - 7.42 pH    POCT pCO2, Arterial 65 (H) 38 - 42 mm Hg    POCT pO2, Arterial 130 (H) 85 - 95 mm Hg    POCT SO2, Arterial 97 94 - 100 %    POCT Oxy Hemoglobin, Arterial 96.7 94.0 - 98.0 %    POCT Hematocrit Calculated, Arterial 29.0 (L) 41.0 - 52.0 %    POCT Sodium, Arterial 136 136 - 145 mmol/L    POCT Potassium, Arterial 5.9 (H) 3.5 - 5.3 mmol/L    POCT Chloride, Arterial 105 98 - 107 mmol/L    POCT Ionized Calcium, Arterial 1.19 1.10 - 1.33 mmol/L    POCT Glucose, Arterial 166 (H) 74 - 99 mg/dL    POCT Lactate, Arterial 1.5 0.4 - 2.0 mmol/L    POCT Base Excess, Arterial 1.2 -2.0 - 3.0 mmol/L    POCT HCO3 Calculated, Arterial 29.2 (H) 22.0 - 26.0 mmol/L    POCT Hemoglobin, Arterial 9.5 (L) 13.5 - 17.5 g/dL    POCT Anion Gap, Arterial 8 (L) 10 - 25 mmo/L    Patient Temperature      FiO2 50 %   Blood Gas Arterial Full Panel   Result Value Ref Range    POCT pH, Arterial 7.29 (L) 7.38 - 7.42 pH    POCT pCO2, Arterial 63 (H) 38 - 42 mm Hg    POCT pO2, Arterial 169 (H) 85 - 95 mm Hg    POCT SO2, Arterial 98 94 - 100 %    POCT Oxy Hemoglobin, Arterial 97.5 94.0 - 98.0 %    POCT Hematocrit Calculated, Arterial 28.0 (L) 41.0 - 52.0 %    POCT Sodium, Arterial 136 136 - 145 mmol/L    POCT Potassium, Arterial 6.3 (HH) 3.5 - 5.3 mmol/L    POCT Chloride, Arterial 106 98 - 107 mmol/L    POCT Ionized Calcium, Arterial 1.18 1.10 - 1.33 mmol/L    POCT Glucose, Arterial 159 (H) 74 - 99 mg/dL    POCT Lactate, Arterial 1.3 0.4 - 2.0 mmol/L    POCT Base Excess, Arterial 2.8 -2.0 - 3.0 mmol/L    POCT HCO3 Calculated, Arterial 30.3 (H) 22.0 - 26.0 mmol/L    POCT Hemoglobin, Arterial 9.4 (L) 13.5 - 17.5 g/dL    POCT Anion Gap, Arterial 6 (L) 10  - 25 mmo/L    Patient Temperature      FiO2 50 %   Renal function panel   Result Value Ref Range    Glucose 151 (H) 74 - 99 mg/dL    Sodium 140 136 - 145 mmol/L    Potassium 5.9 (H) 3.5 - 5.3 mmol/L    Chloride 105 98 - 107 mmol/L    Bicarbonate 29 21 - 32 mmol/L    Anion Gap 12 10 - 20 mmol/L    Urea Nitrogen 25 (H) 6 - 23 mg/dL    Creatinine 1.16 0.50 - 1.30 mg/dL    eGFR 70 >60 mL/min/1.73m*2    Calcium 8.2 (L) 8.6 - 10.6 mg/dL    Phosphorus 5.0 (H) 2.5 - 4.9 mg/dL    Albumin 3.5 3.4 - 5.0 g/dL   Magnesium   Result Value Ref Range    Magnesium 1.66 1.60 - 2.40 mg/dL   Calcium, Ionized   Result Value Ref Range    POCT Calcium, Ionized 1.11 1.1 - 1.33 mmol/L   POCT GLUCOSE   Result Value Ref Range    POCT Glucose 153 (H) 74 - 99 mg/dL   POCT GLUCOSE   Result Value Ref Range    POCT Glucose 156 (H) 74 - 99 mg/dL   Blood Gas Arterial Full Panel   Result Value Ref Range    POCT pH, Arterial 7.45 (H) 7.38 - 7.42 pH    POCT pCO2, Arterial 40 38 - 42 mm Hg    POCT pO2, Arterial 126 (H) 85 - 95 mm Hg    POCT SO2, Arterial 97 94 - 100 %    POCT Oxy Hemoglobin, Arterial 97.1 94.0 - 98.0 %    POCT Hematocrit Calculated, Arterial 24.0 (L) 41.0 - 52.0 %    POCT Sodium, Arterial 137 136 - 145 mmol/L    POCT Potassium, Arterial 4.6 3.5 - 5.3 mmol/L    POCT Chloride, Arterial 108 (H) 98 - 107 mmol/L    POCT Ionized Calcium, Arterial 1.15 1.10 - 1.33 mmol/L    POCT Glucose, Arterial 144 (H) 74 - 99 mg/dL    POCT Lactate, Arterial 2.3 (H) 0.4 - 2.0 mmol/L    POCT Base Excess, Arterial 3.5 (H) -2.0 - 3.0 mmol/L    POCT HCO3 Calculated, Arterial 27.8 (H) 22.0 - 26.0 mmol/L    POCT Hemoglobin, Arterial 7.9 (L) 13.5 - 17.5 g/dL    POCT Anion Gap, Arterial 6 (L) 10 - 25 mmo/L    Patient Temperature      FiO2 21 %   Basic metabolic panel   Result Value Ref Range    Glucose 143 (H) 74 - 99 mg/dL    Sodium 140 136 - 145 mmol/L    Potassium 4.4 3.5 - 5.3 mmol/L    Chloride 108 (H) 98 - 107 mmol/L    Bicarbonate 27 21 - 32 mmol/L    Anion  Gap 9 (L) 10 - 20 mmol/L    Urea Nitrogen 28 (H) 6 - 23 mg/dL    Creatinine 1.52 (H) 0.50 - 1.30 mg/dL    eGFR 51 (L) >60 mL/min/1.73m*2    Calcium 7.9 (L) 8.6 - 10.6 mg/dL   Renal function panel   Result Value Ref Range    Glucose 143 (H) 74 - 99 mg/dL    Sodium 140 136 - 145 mmol/L    Potassium 4.4 3.5 - 5.3 mmol/L    Chloride 108 (H) 98 - 107 mmol/L    Bicarbonate 27 21 - 32 mmol/L    Anion Gap 9 (L) 10 - 20 mmol/L    Urea Nitrogen 28 (H) 6 - 23 mg/dL    Creatinine 1.52 (H) 0.50 - 1.30 mg/dL    eGFR 51 (L) >60 mL/min/1.73m*2    Calcium 7.9 (L) 8.6 - 10.6 mg/dL    Phosphorus 3.1 2.5 - 4.9 mg/dL    Albumin 2.9 (L) 3.4 - 5.0 g/dL   Blood Gas Lactic Acid, Venous   Result Value Ref Range    POCT Lactate, Venous 1.6 0.4 - 2.0 mmol/L   CBC   Result Value Ref Range    WBC 9.8 4.4 - 11.3 x10*3/uL    nRBC 0.0 0.0 - 0.0 /100 WBCs    RBC 3.55 (L) 4.50 - 5.90 x10*6/uL    Hemoglobin 7.1 (L) 13.5 - 17.5 g/dL    Hematocrit 26.9 (L) 41.0 - 52.0 %    MCV 76 (L) 80 - 100 fL    MCH 20.0 (L) 26.0 - 34.0 pg    MCHC 26.4 (L) 32.0 - 36.0 g/dL    RDW 19.3 (H) 11.5 - 14.5 %    Platelets 202 150 - 450 x10*3/uL        IMAGING RESULTS:  FL fluoro images no charge   Final Result      FL fluoro images no charge   Final Result             Assessment/Plan      64 y.o. male with PMH PONV, HTN, HLD, CAD, BIANCA, asthma, GERD, CKD, BPH, anemia, lumbar disc disease, spinal stenosis. Admitted 7/14/2025 with Lumbar radicular pain after presenting with BLE radiculopathy, 7/14 s/p L3-S1 lami and facetectomies/extension/revision.     NEURO:  #S/p L3-S1 lami and facetectomies/extension/revision  #PONV, prior L5-S1 fusion  Assessment:  - Neurologically: see above   - EBL 800cc  Plan:  - NSU  - NSGY primary   - Drain per NSGY   - Neuro Checks: Q1H  - Pain: acetaminophen PRN, oxycodone PRN, and hydromorphone PRN, Toradol 30mg q6h PRN  - Flexeril 10mg TID   - Gabapentin 600mg TID   - Nausea: ondansetron  - PT/OT/SLP    CARDIOVASCULAR:  #Hypotension, likely 2/2  volume loss post-surgery  #HTN, HLD, CAD  Assessment:  - SR on tele  - 6/30/25 ECHO: EF 60%     Plan:  - Continue to monitor on telemetry  - Pressors: Levo  - MAPs>65  - ASA 81mg daily   - Atorvastatin 40mg daily   - Hold losartan 25mg daily iso hypotension requiring pressors   - Give 1 L NS  - Increase maintenance fluids to 125 ml/hr NS  - Transfuse 2 units pRBCs    RESPIRATORY:  #BIANCA, asthma  Assessment:  - On HFNC (3 L home baseline)   Plan:  - Continuous pulse oximetry   - O2 PRN to maintain SpO2 > 94%, wean as tolerated  - Incentive spirometry while awake  - CPAP at night    RENAL/:  # CKD, BPH  Assessment:  - Baseline BUN/Cr: 20/0.85  Results from last 72 hours   Lab Units 07/15/25  1127 07/15/25  0615   BUN mg/dL 28*  28* 25*   CREATININE mg/dL 1.52*  1.52* 1.16       Plan:  - Monitor with daily RFP  - Keep Richardson for accurate Is&Os while hypotensive  - Hold home tamsulosin 0.4,g qH iso hypotension and richardson    FEN/GI:  #GERD  Assessment:  - Last BM Date:  (pta)  Plan  - Monitor and replace electrolytes per protocol  - IVF: PRN  - Diet: Adult diet Regular    - Bowel Regimen: Docusate-Senna BID and Miralax QD    ENDOCRINE:  #Hyperglycemia   Assessment:  Results from last 7 days   Lab Units 07/15/25  1127 07/15/25  1115 07/15/25  0757 07/15/25  0615 07/15/25  0123 07/14/25  2101 07/14/25  1457   POCT GLUCOSE mg/dL  --  156* 153*  --   --  165*  --    GLUCOSE mg/dL 143*  143*  --   --  151* 182*  --  181*   SODIUM mmol/L 140  140  --   --  140 138  --  140        Plan:  - Accuchecks & ISS AC&HS   - Hypoglycemia protocol     HEMATOLOGY:  #Acute anemia  Assessment:  Results from last 7 days   Lab Units 07/15/25  1156 07/15/25  0123 07/14/25  1457   HEMOGLOBIN g/dL 7.1* 9.4* 10.1*   HEMATOCRIT % 26.9* 33.0* 34.7*   PLATELETS AUTO x10*3/uL 202 259 254     Plan:  - Continue to monitor with daily CBC and Coag panel  - Check one overnight CBC to see if incremented appropriately  - Transfuse 2 units  pRBCs    INFECTIOUS DISEASE:  #SAILAJA  Assessment:  Results from last 7 days   Lab Units 07/15/25  1156 07/15/25  0123 25  1457   WBC AUTO x10*3/uL 9.8 11.3 11.2    - Temp (24hrs), Av.8 °C (98.3 °F), Min:36.1 °C (97 °F), Max:37 °C (98.6 °F)  Plan:  - Continue to monitor for s/sx of infection  - Pan culture for temperature > 38.4 C    MUSCULOSKELETAL:  - No acute issues    SKIN:  - No acute issues  - Turns and skin care per NSU protocol    ACCESS:  - PIVs  - L radial arterial line (--)    PROPHYLAXIS:  - DVT Ppx: SCDs and Hold SQH until POD #1  - GI Ppx: Pantoprazole    RESTRAINTS:  Not indicated/Patient does not meet criteria for restraints    Daily ICU Checklist:  - Restraint order/note  [] Yes [] No [x] N/A   - NIHSS Frequency appropriate? [] Yes [x] No   - Daily Labs Ordered? [x] Yes [] No [] N/A   - Medication Route? [x] Yes [] No  - (PO, NG, OG, G Tube)   - PPI ordered/needed? [x] Yes [] No [] N/A   - DVT PPx [x] Yes [] No   - Antibiotic stop date? [] Yes [] No [x] N/A   - Alex? [x] Yes [] No  DC? [] Y [x] N [] N/A   - Central Line? [] Yes [x] No  LOC:   DC? [] Y [] N [] N/A   - Central  orders [] Yes [x] No [] N/A   - Vent weaning plan? [] Yes [] No [x] N/A             Tiffanie Brown MD  Neurology PGY-2  Neuroscience Intensive Care    Attending Attestation:   I saw and evaluated the patient. I personally obtained the key and critical portions of the history and physical exam or was physically present for key and critical portions performed by the resident/fellow. I reviewed the resident/fellow's documentation and discussed the patient with the resident/fellow. I agree with the resident/fellow's medical decision making as documented in the note with the exception/addition of the following:    S/p Lumbar surgery.    Now with hypotension, suspect under-resuscitated. Giving IVF.  Weaning pressors.  Hold losartan, tamsulosin.    Increase maintenance fluids.    Statement of Acuity: I  have reviewed and evaluated all relevant data of the last 24 hours, personally examined the patient and formulated the plan of care.  This patient was critically ill and required continued critical care treatment.  Total critical care time: 35min.     Braulio Lance M.D.          [1] acetaminophen, 650 mg, oral, q6h  albumin human, 25 g, intravenous, Once  aspirin, 81 mg, oral, Daily  atorvastatin, 40 mg, oral, Nightly  cyclobenzaprine, 10 mg, oral, TID  gabapentin, 600 mg, oral, TID  heparin (porcine), 5,000 Units, subcutaneous, q8h  insulin lispro, 0-5 Units, subcutaneous, Before meals & nightly  losartan, 25 mg, oral, Daily  pantoprazole, 40 mg, oral, BID  polyethylene glycol, 17 g, oral, BID  sennosides-docusate sodium, 2 tablet, oral, BID  tamsulosin, 0.4 mg, oral, Nightly  [2] PRN medications: benzocaine-menthol, calcium gluconate, calcium gluconate, dextrose, dextrose, glucagon, glucagon, HYDROmorphone, ketorolac, magnesium sulfate, magnesium sulfate, naloxone, ondansetron **OR** ondansetron, oxyCODONE, oxyCODONE, oxyCODONE, oxygen, potassium chloride CR **OR** potassium chloride, potassium chloride CR **OR** potassium chloride  [3] norepinephrine, 0.01-1 mcg/kg/min, Last Rate: 0.25 mcg/kg/min (07/15/25 1305)  sodium chloride 0.9%, 75 mL/hr, Last Rate: 75 mL/hr (07/15/25 1253)

## 2025-07-15 NOTE — PROGRESS NOTES
[facility]  NEUROSCIENCE INTENSIVE CARE UNIT  DAILY PROGRESS NOTE       Patient Name: Jerman Colón Jr.   MRN: [id number]     Admit Date: 2025     : 1960 AGE: 64 y.o. GENDER: male        Subjective    Jerman Colón Jr. is a 65yo M with PMH PONV, HTN, HLD, CAD, BIANCA, asthma, GERD, CKD, BPH, anemia, lumbar disc disease, spinal stenosis. p/w BLE radiculopathy,  s/p L3-S1 lami and facetectomies/extension/revision.        Objective   VITALS (24H):  Temp:  [36.1 °C (97 °F)-37.2 °C (99 °F)] 37 °C (98.6 °F)  Heart Rate:  [] 112  Resp:  [11-27] 21  BP: (119-157)/(56-93) 128/92  Arterial Line BP 1: ()/(46-73) 124/65  INTAKE/OUTPUT:  Intake/Output Summary (Last 24 hours) at 2025 2302  Last data filed at 2025 2200  Gross per 24 hour   Intake 2100 ml   Output 2245 ml   Net -145 ml     VENT SETTINGS:  S RR:  [16] 16     PHYSICAL EXAM:  NEURO:  - Awake, AOx4, follows commands  - EOS, PERRL, EOMI, VFF  - MOORE 5/5 strength, no drift, no ataxia  - posterior surgical incision c/d/I   - Drain to full suction   CV:  - RRR on telemetry, NSR  RESP:  - No signs of resp distress  - On 3L NC   :  - Alex draining clear yellow urine to gravity   GI:  - Abdomen NT/ND, soft  SKIN:  - Intact    MEDICATIONS:  Scheduled: PRN: Continuous:   Scheduled Medications[1] PRN Medications[2] Continuous Medications[3]     LAB RESULTS:  Results from last 72 hours   Lab Units 25  1457   GLUCOSE mg/dL 181*   SODIUM mmol/L 140   POTASSIUM mmol/L 4.9   CHLORIDE mmol/L 105   CO2 mmol/L 24   ANION GAP mmol/L 16   BUN mg/dL 18   CREATININE mg/dL 1.22   EGFR mL/min/1.73m*2 66   CALCIUM mg/dL 8.3*   PHOSPHORUS mg/dL 4.9   ALBUMIN g/dL 3.2*      Results from last 72 hours   Lab Units 25  1457   WBC AUTO x10*3/uL 11.2   NRBC AUTO /100 WBCs 0.0   RBC AUTO x10*6/uL 4.91   HEMOGLOBIN g/dL 10.1*   HEMATOCRIT % 34.7*   MCV fL 71*   MCH pg 20.6*   MCHC g/dL 29.1*   RDW % 19.8*   PLATELETS AUTO  x10*3/uL 254      Results from last 72 hours   Lab Units 07/14/25  1457   WBC AUTO x10*3/uL 11.2   NRBC AUTO /100 WBCs 0.0   RBC AUTO x10*6/uL 4.91   HEMOGLOBIN g/dL 10.1*   HEMATOCRIT % 34.7*   MCV fL 71*   MCH pg 20.6*   MCHC g/dL 29.1*   RDW % 19.8*   PLATELETS AUTO x10*3/uL 254             IMAGING RESULTS:  FL fluoro images no charge   Final Result      FL fluoro images no charge   Final Result             Assessment/Plan    64 y.o. male with PMH PONV, HTN, HLD, CAD, BIANCA, asthma, GERD, CKD, BPH, anemia, lumbar disc disease, spinal stenosis. Admitted 7/14/2025 with Lumbar radicular pain after presenting with BLE radiculopathy, 7/14 s/p L3-S1 lami and facetectomies/extension/revision.    NEURO:  #s/p L3-S1 lami and facetectomies/extension/revision  #PONV, prior L5-S1 fusion  Assessment:  - Neurologically: see above   - EBL 800cc  Plan:  - NSU  - NSGY primary   - Drain per NSGY   - Neuro Checks: Q1H  - Pain: acetaminophen PRN, oxycodone PRN, and hydromorphone PRN, Toradol 30mg q6h PRN  - Flexeril 10mg TID   - Gabapentin 600mg TID   - Nausea: ondansetron  - PT/OT/SLP    CARDIOVASCULAR:  #HTN, HLD, CAD  Assessment:  - SR on tele  - 6/30/25 ECHO: EF 60%     Plan:  - Continue to monitor on telemetry  - MAPs>65  - ASA 81mg daily   - Atorvastatin 40mg daily   - Losartan 25mg daily    RESPIRATORY:  #BIANCA, asthma  Assessment:  - ON 3L NC (home baseline)   Plan:  - Continuous pulse oximetry   - O2 PRN to maintain SpO2 > 94%, wean as tolerated  - Incentive spirometry while awake  - CPAP at night     RENAL/:  # CKD, BPH  Assessment:  - Baseline BUN/Cr: 20/0.85  Results from last 72 hours   Lab Units 07/14/25  1457   BUN mg/dL 18   CREATININE mg/dL 1.22   Plan:  - Monitor with daily RFP  - Remove catheter by POD#1  - c/w home tamsulosin 0.4,g qHS    FEN/GI:  #GERD  Assessment:  - Last BM: PTA   Plan:  - Monitor and replace electrolytes per protocol  - IVF: PRN  - Diet: Adult diet Regular    - Bowel Regimen: Docusate-Senna BID  and Miralax QD    ENDOCRINE:  #Hyperglycemia   Assessment:  Results from last 7 days   Lab Units 25  2101 25  1457   POCT GLUCOSE mg/dL 165*  --    GLUCOSE mg/dL  --  181*   SODIUM mmol/L  --  140   Plan:  - Accuchecks & ISS AC&HS   - Hypoglycemia protocol     HEMATOLOGY:  Assessment:  Results from last 7 days   Lab Units 25  1457   HEMOGLOBIN g/dL 10.1*   HEMATOCRIT % 34.7*   PLATELETS AUTO x10*3/uL 254   Plan:  - Continue to monitor with daily CBC and Coag panel    INFECTIOUS DISEASE:  Assessment:  Results from last 7 days   Lab Units 25  1457   WBC AUTO x10*3/uL 11.2    - Temp (24hrs), Av.8 °C (98.2 °F), Min:36.1 °C (97 °F), Max:37.2 °C (99 °F)  Plan:  - Continue to monitor for s/sx of infection  - Pan culture for temperature > 38.4 C    MUSCULOSKELETAL:  - No acute issues    SKIN:  - No acute issues  - Turns and skin care per NSU protocol    ACCESS:  - PIVs  - L radial arterial line (--)    PROPHYLAXIS:  - DVT Ppx: SCDs and Hold SQH until POD #1  - GI Ppx: Pantoprazole    RESTRAINTS:  Not indicated/Patient does not meet criteria for restraints    Daily ICU Checklist:  - Restraint order/note  [] Yes [] No [] N/A   - NIHSS Frequency appropriate? [] Yes [] No   - Daily Labs Ordered? [] Yes [] No [] N/A   - Medication Route? [] Yes [] No  - (PO, NG, OG, G Tube)   - PPI ordered/needed? [] Yes [] No [] N/A   - DVT PPx [] Yes [] No   - Antibiotic stop date? [] Yes [] No [] N/A   - Alex? [] Yes [] No  DC? [] Y [] N [] N/A   - Central Line? [] Yes [] No  LOC:   DC? [] Y [] N [] N/A   - Central  orders [] Yes [] No [] N/A   - Vent weaning plan? [] Yes [] No [] N/A               Nektarios Kriaris, APRN-CNP  Neuroscience Intensive Care       Total critical care time of 45 minutes, with > 50% of time spent in direct contact with patient/family for education, counseling and coordination of care.           [1] acetaminophen, 650 mg, oral, q6h  [START ON 7/15/2025] aspirin, 81  mg, oral, Daily  atorvastatin, 40 mg, oral, Nightly  cyclobenzaprine, 10 mg, oral, TID  gabapentin, 600 mg, oral, TID  [START ON 7/15/2025] heparin (porcine), 5,000 Units, subcutaneous, q8h  insulin lispro, 0-5 Units, subcutaneous, Before meals & nightly  [START ON 7/15/2025] losartan, 25 mg, oral, Daily  pantoprazole, 40 mg, oral, BID  polyethylene glycol, 17 g, oral, BID  sennosides-docusate sodium, 2 tablet, oral, BID  tamsulosin, 0.4 mg, oral, Nightly     [2] PRN medications: benzocaine-menthol, dextrose, dextrose, glucagon, glucagon, HYDROmorphone, ketorolac, naloxone, ondansetron **OR** ondansetron, oxyCODONE, oxyCODONE, oxyCODONE, oxygen  [3]

## 2025-07-15 NOTE — PROGRESS NOTES
Occupational Therapy    Evaluation and Treatment    Patient Name: Jerman Colón Jr.  MRN: [id number]  Today's Date: 7/15/2025  Room: 01/01  Time Calculation  Start Time: 0906  Stop Time: 1006  Time Calculation (min): 60 min    Assessment  IP OT Assessment  OT Assessment: Patient demo decreased strength, ROM, coordination, sensation, transfers, balance, endurance, acitivity tolerance, functional mobility, ADLs, and IADLs. Patient would benefit from skilled OT services to address deficits and improve functional performance  Prognosis: Good  Barriers to Discharge Home: Physical needs  Physical Needs: 24hr mobility assistance needed, 24hr ADL assistance needed  Evaluation/Treatment Tolerance: Patient limited by pain, Patient limited by fatigue  Medical Staff Made Aware: Yes  End of Session Communication: Bedside nurse  End of Session Patient Position: Bed, 3 rail up, Alarm off, not on at start of session  Plan:  Inpatient Plan  Treatment Interventions: ADL retraining, Functional transfer training, UE strengthening/ROM, Endurance training, Patient/family training, Equipment evaluation/education, Neuromuscular reeducation, Fine motor coordination activities, Compensatory technique education  OT Frequency: 5 times per week  OT Discharge Recommendations: High intensity level of continued care  Equipment Recommended upon Discharge:  (TBD)  OT Recommended Transfer Status: Maximum assist, Assist of 2  OT - OK to Discharge: Yes  OT Assessment  OT Assessment Results: Decreased ADL status, Decreased upper extremity range of motion, Decreased upper extremity strength, Decreased endurance, Decreased sensation, Decreased fine motor control, Decreased functional mobility, Decreased gross motor control, Decreased IADLs, Decreased trunk control for functional activities  Prognosis: Good  Evaluation/Treatment Tolerance: Patient limited by pain, Patient limited by fatigue  Medical Staff Made Aware: Yes  Strengths: Attitude of  self, Support and attitude of living partners    Subjective   Current Problem:  1. Lumbar radicular pain        2. Lumbar foraminal stenosis        3. Lumbar radiculopathy, chronic        4. Lumbar spinal stenosis due to adjacent segment disease after fusion procedure          General:  Reason for Referral: Admitted 7/14/2025 with Lumbar radicular pain after presenting with BLE radiculopathy, 7/14 s/p L3-S1 lami and facetectomies/extension/revision  Past Medical History Relevant to Rehab: PMH PONV, HTN, HLD, CAD, BIANCA, asthma, GERD, CKD, BPH, anemia, lumbar disc disease, spinal stenosis. Prior L5-S1 fusion  Prior to Session Communication: Bedside nurse  Patient Position Received: Bed, 3 rail up, Alarm off, not on at start of session  Family/Caregiver Present: No  General Comment: Patient pleasant and agreeable to participate in therapy. Patient on 4L O2 at home during night.   Precautions:  Hearing/Visual Limitations: WFL  Medical Precautions: Fall precautions, Oxygen therapy device and L/min  Post-Surgical Precautions: Spinal precautions  Precautions Comment: SBP < 180, MAP > 65. 6L NC        Vital Signs       Vitals Session Pre OT During OT Post OT   Heart Rate 115 130 107   Resp  20 45 18   SpO2 100   97   /57 127/97 105/63   MAP 70  75       Patient instructed on breathing while EOB to slow respirations with activity.       Pain:  Pain Assessment  Pain Assessment: 0-10  0-10 (Numeric) Pain Score: 7  Pain Type: Surgical pain  Pain Location: Back  Lines/Tubes/Drains:  Arterial Line 07/14/25 Left Radial (Active)   Number of days: 1       Urethral Catheter (Active)   Number of days: 1       Closed/Suction Drain 1 Inferior;Midline Back Accordion 10 Fr. (Active)   Number of days: 1     6L NC, tele, tiffanie, x1 hemovac drain, richardson      Objective   Cognition:  Overall Cognitive Status: Within Functional Limits  Arousal/Alertness: Appropriate responses to stimuli  Orientation Level: Oriented X4  Following Commands:  Follows one step commands with repetition (% accuracy)  Cognition Comments: Patient follows one-step commands with % accuracy with repetition.  Attention: Within Functional Limits  Memory: Within Funtional Limits  Safety/Judgement: Within Functional Limits  Insight: Within function limits  Impulsive: Within functional limits  Processing Speed: Within funtional limits        Ortega Agitation Sedation Scale  Ortega Agitation Sedation Scale (RASS): Alert and calm  Home Living:  Type of Home: House  Lives With: Spouse (Wife and dogs)  Home Adaptive Equipment: Cane, Walker rolling or standard, Reacher, Sock aid, Long-handled shoehorn  Home Layout: Two level, Bed/bath upstairs, Stairs to alternate level with rails  Alternate Level Stairs-Rails: Right  Alternate Level Stairs-Number of Steps: Full flight  Home Access: Stairs to enter without rails  Entrance Stairs-Number of Steps: 2 front door, 0 in garage  Bathroom Shower/Tub: Walk-in shower  Bathroom Toilet: Handicapped height  Bathroom Equipment: Grab bars in shower, Grab bars around toilet, Hand-held shower hose  Home Living Comments: Patient reports having an adjustable bed at home.   Prior Function:  Level of Potter: Independent with ADLs and functional transfers, Independent with homemaking with ambulation  Receives Help From: Family  ADL Assistance:  (Reports wife would help with socks and shoes when she was available. If unavailable, patient would use sock aide, reacher, and shoe horn)  Homemaking Assistance:  (Wife completed IADLs)  Ambulatory Assistance: Independent  Vocational: Retired ()  Leisure: Enjoys going to the gym and taking his dogs outside  Hand Dominance: Right  Prior Function Comments: (+) drives, (+) 2 falls in past year d/t BLw buckling    ADL:  Eating Assistance: Minimal  Eating Deficit: Setup, Bringing food to mouth assist, Adaptive utensils (Comment) (Patient given built up utensil to improve coordination with  utensil use. Patient requested drink be brought to his mouth so it doesn't spill)  Grooming Assistance: Minimal  Grooming Deficit: Supervision/safety, Increased time to complete, Verbal cueing  Bathing Assistance: Maximal  Bathing Deficit: Supervision/safety, Increased time to complete , Verbal cueing, Use of adaptive equipment  UE Dressing Assistance: Moderate  UE Dressing Deficit: Supervision/safety, Verbal cueing, Increased time to complete  LE Dressing Assistance: Maximal  LE Dressing Deficit: Verbal cueing, Supervision/safety, Increased time to complete, Use of adaptive equipment  Toileting Assistance with Device: Maximal  Toileting Deficit: Steadying, Verbal cueing, Supervison/safety  Activity Tolerance:  Endurance: Tolerates 10 - 20 min exercise with multiple rests  Early Mobility/Exercise Safety Screen: Proceed with mobilization - No exclusion criteria met  Balance:  Dynamic Sitting Balance  Dynamic Sitting-Level of Assistance: Moderate assistance  Dynamic Standing Balance  Dynamic Standing-Level of Assistance: Maximum assistance (x2)  Static Sitting Balance  Static Sitting-Level of Assistance: Minimum assistance  Static Sitting-Comment/Number of Minutes: x10 minutes at EOB. Posterior lean  Static Standing Balance  Static Standing-Level of Assistance: Maximum assistance (x2)  Bed Mobility/Transfers: Bed Mobility/Transfers: Bed Mobility  Bed Mobility: Yes  Bed Mobility 1  Bed Mobility 1: Supine to sitting  Level of Assistance 1: Maximum assistance  Bed Mobility Comments 1: HOB elevated, use of RUE to reach for bed rail, cues for log roll technique  Bed Mobility 2  Bed Mobility  2: Sitting to supine  Level of Assistance 2: Maximum assistance, +2  Bed Mobility Comments 2: HOB flat, assist for legs, cues for log roll technique  Functional Mobility  Functional Mobility Performed: No   and Transfers  Transfer: Yes  Transfer 1  Transfer From 1: Sit to, Stand to  Transfer to 1: Stand, Sit  Technique 1: Sit to stand,  Stand to sit  Transfer Level of Assistance 1: Maximum assistance, +2  Trials/Comments 1: Arm in arm assist, max cues for hand placement and body mechanics. Bilateral knee blocking    Vision: Vision - Basic Assessment  Current Vision: Wears glasses only for reading   and Vision - Complex Assessment  Vision Comments: Denies double/blurry vision  Sensation:  Sensation Comment: Patient reports numbness in LLE from hip to foot. Light touch intact but diminished in LLE.    Perception:  Inattention/Neglect: Appears intact  Initiation: Cues to initiate tasks  Motor Planning: Appears intact  Perseveration: Not present  Coordination:  Movements are Fluid and Coordinated: No  Finger to Nose: Bradykinesia, Dysmetria  Alternating Toe Taps: Bradykinesia  Coordination Comment: Bradykinesia and dysmetria in BUE. BUE intention tremor observed.   Hand Function:  Hand Function  Gross Grasp: Functional  Coordination: Impaired  Extremities:   RUE AROM (degrees)  RUE AROM Comment: Shoulder flex AROM limited, AAROM WFL. Elbow and wrist AROM WFL.  RUE Strength  RUE Overall Strength:  (Shoulder 2-/5, Distal grossly >/=3/5), LUE AROM (degrees)  LUE AROM Comment: Shoulder flex AROM limited, AAROM WFL. Elbow and wrist AROM WFL.  LUE Strength  LUE Overall Strength:  (Shoulder 2-/5, Distal grossly >/=3/5), RLE   RLE :  (AROM limited. GrosslyC), and LLE   LLE :  (AROM limited. Grossly >/=3-/5)      Outcome Measures: [facility] Daily Activity  Putting on and taking off regular lower body clothing: A lot  Bathing (including washing, rinsing, drying): A lot  Putting on and taking off regular upper body clothing: A lot  Toileting, which includes using toilet, bedpan or urinal: A lot  Taking care of personal grooming such as brushing teeth: A little  Eating Meals: A little  Daily Activity - Total Score: 14    Confusion Assessment Method-ICU (CAM-ICU)  Feature 3: Altered Level of Consciousness: Negative   ICU Mobility Screen  Early Mobility/Exercise Safety  Screen: Proceed with mobilization - No exclusion criteria met  ICU Mobility Scale: Standing,   Ortega Agitation Sedation Scale  Ortega Agitation Sedation Scale (RASS): Alert and calm      Education Documentation  Handouts, taught by NORBERT Villaseñor at 7/15/2025 12:07 PM.  Learner: Patient  Readiness: Acceptance  Method: Explanation, Demonstration  Response: Verbalizes Understanding, Demonstrated Understanding, Needs Reinforcement  Comment: OT POC, ADL retraining, endurance    Body Mechanics, taught by NORBERT Villaseñor at 7/15/2025 12:07 PM.  Learner: Patient  Readiness: Acceptance  Method: Explanation, Demonstration  Response: Verbalizes Understanding, Demonstrated Understanding, Needs Reinforcement  Comment: OT POC, ADL retraining, endurance    Precautions, taught by NORBERT Villaseñor at 7/15/2025 12:07 PM.  Learner: Patient  Readiness: Acceptance  Method: Explanation, Demonstration  Response: Verbalizes Understanding, Demonstrated Understanding, Needs Reinforcement  Comment: OT POC, ADL retraining, endurance    ADL Training, taught by NORBERT Villaseñor at 7/15/2025 12:07 PM.  Learner: Patient  Readiness: Acceptance  Method: Explanation, Demonstration  Response: Verbalizes Understanding, Demonstrated Understanding, Needs Reinforcement  Comment: OT POC, ADL retraining, endurance    Education Comments  No comments found.        Goals:   Encounter Problems       Encounter Problems (Active)       ADLs       Patient will perform UB and LB bathing with minimal assist  level of assistance and PRN adaptive equipment.       Start:  07/15/25    Expected End:  07/29/25            Patient with complete upper body dressing with SBA donning and doffing all UE clothes while edge of bed        Start:  07/15/25    Expected End:  07/29/25            Patient with complete lower body dressing with minimal assist  level of assistance donning and doffing all LE clothes  with PRN adaptive equipment while edge of bed        Start:   07/15/25    Expected End:  07/29/25            Patient will complete daily grooming tasks with SBA and PRN adaptive equipment while edge of bed .       Start:  07/15/25    Expected End:  07/29/25            Patient will complete toileting including hygiene clothing management/hygiene with minimal assist  level of assistance and LRAD.       Start:  07/15/25    Expected End:  07/29/25               ADLs       Patient will feed self with modified independent level of assistance using PRN adaptive equipment without spillage       Start:  07/15/25    Expected End:  07/29/25               BALANCE       Pt will maintain dynamic sitting balance during ADL task with independent level of assistance in order to demonstrate decreased risk of falling and improved postural control.       Start:  07/15/25    Expected End:  07/29/25            Patientt will maintain static/dynamic standing balance > 10 minutes during ADL task with minimum level of assistance in order to demonstrate decreased risk of falling and improved postural control.       Start:  07/15/25    Expected End:  07/29/25               COGNITION/SAFETY       Patient will recall and adhere to spinal precautions during all functional mobility/ADL tasks in order to demonstrate improved understanding and promote healing post op       Start:  07/15/25    Expected End:  07/29/25               EXERCISE/STRENGTHENING       Patient will demo BUE coordination WFL during functional task without an increase in time to improve task performance.        Start:  07/15/25    Expected End:  07/29/25               EXERCISE/STRENGTHENING       Patient with increase BUE to 5/5 strength and ROM WFL       Start:  07/15/25    Expected End:  07/29/25               TRANSFERS       Patient will perform bed mobility contact guard assist level of assistance and bed rails in order to improve safety and independence with mobility       Start:  07/15/25    Expected End:  07/29/25            Patient  will complete functional transfers with least restrictive device with minimal assist  level of assistance.       Start:  07/15/25    Expected End:  07/29/25                   Treatment Completed on Evaluation    Activities of Daily Living: Feeding  Feeding Adaptive Equipment: Built up utensils  Feeding Level of Assistance: Minimum assistance  Feeding Where Assessed: Bed level  Feeding Comments: Patient provided with built up utensils to improve coordination for utensil use. Patient BUE propped up with pillows to provide support d/t decreased strength, ROM, and tremors. Patient requested that drink be brought to mouth so it does not spill. Coordination mildly improved when given built up utensil. Patient dysmetric with utensil use, req. L hand to stabalize.        Therapy/Activity:     Therapeutic Activity  Therapeutic Activity Performed: Yes  Therapeutic Activity 1: Patient completed x3 sit<>stand trials with Max A x2 and use of draw sheet with arm in arm assist and bilateral knee blocking. Patient req. cues to promote extension and upright posture. First trial with unsuccessful attempt to stand with walker. Second trial with 50% stand using walker with Max A. Third trial with 80% stand with Max A x2 using arm in arm assist and drawsheet. Fourth attempt with 80% stand with Max A x2 using arm in arm assist and drawsheet.  Therapeutic Activity 2: Patient sat EOB x20 minutes with Min A to improve hemodynamic stability. Patient initially dizzy in EOB position, patient instructed to complete B ankle pumps to improve blood pressure.    Completion of this session, clinical decision making, and documentation performed under the supervision/direction of Sherry Lombardi.       07/15/25 at 4:56 PM   MAGGY CAROLINAOT   Rehab Office: [phone]

## 2025-07-15 NOTE — PROGRESS NOTES
"  NEUROSURGERY PROGRESS NOTE    Jerman Colón Jr. is a 64 y.o. male on day 1 of admission presenting with Lumbar radicular pain.    Subjective   NAEON       Objective     Physical Exam  Exam:  Constitutional: No acute distress  Resp: on CPAP at night;   Neuro:  Awake, A&Ox3  Cranial Nerves II-XII: PERRL, EOMI, Face symmetric, Facial SILT, Palate/Tongue midline and symmetric, shoulder shrugs symmetric, hearing intact to finger rubs bilaterally  Motor: 5/5, no dysmetria on finger to nose, no pronator drift  Sensation: SILT throughout all extremities  DTRS: 2+ Throughout, No Hoffmans or Clonus      Last Recorded Vitals  Blood pressure 129/56, pulse (!) 111, temperature 37 °C (98.6 °F), temperature source Temporal, resp. rate 15, height 1.854 m (6' 1\"), weight 120 kg (264 lb 8.8 oz), SpO2 99%.     Intake/Output last 3 Shifts:  I/O last 3 completed shifts:  In: 3242.5 (27 mL/kg) [I.V.:2242.5 (18.7 mL/kg); IV Piggyback:1000]  Out: 2725 (22.7 mL/kg) [Urine:1710 (0.4 mL/kg/hr); Drains:215; Blood:800]  Weight: 120 kg     Labs:  Lab Results   Component Value Date    WBC 11.3 07/15/2025    HGB 9.4 (L) 07/15/2025    HCT 33.0 (L) 07/15/2025    MCV 71 (L) 07/15/2025     07/15/2025      Lab Results   Component Value Date    GLUCOSE 151 (H) 07/15/2025    CALCIUM 8.2 (L) 07/15/2025     07/15/2025    K 5.9 (H) 07/15/2025    CO2 29 07/15/2025     07/15/2025    BUN 25 (H) 07/15/2025    CREATININE 1.16 07/15/2025      Lab Results   Component Value Date    CREATININE 1.16 07/15/2025    BUN 25 (H) 07/15/2025     07/15/2025    K 5.9 (H) 07/15/2025     07/15/2025    CO2 29 07/15/2025      Lab Results   Component Value Date    CALCIUM 8.2 (L) 07/15/2025    PHOS 5.0 (H) 07/15/2025      Lab Results   Component Value Date    INR 1.0 06/27/2025    INR 1.1 01/21/2025    PROTIME 11.1 06/27/2025    PROTIME 12.9 (H) 01/21/2025        Imaging:  FL fluoro images no charge   Final Result      FL fluoro images no " charge   Final Result                Assessment/Plan   Assessment & Plan  Lumbar radicular pain    Lumbar foraminal stenosis    Lumbar radiculopathy, chronic    H/o prior L5-S1 fusion, hyperlipidemia, hypertension, CAD, sleep apnea (central/obstructive), asthma, GERD, CKD, BPH, anemia, 1/20/25 C4-6 ACDF/C2-T4 PCDF p/w BLE radiculopathy, 7/14 s/p L3-S1 lami and facetectomies/extension/revision ()        Plan:  NSU,  wean bipap,   Continue to monitor drain,   PTOT           Please ensure that CBC, RFP, coags, type and screen, are collected within 72 hours of OR    Rikki Infante MD  Department of Neurosurgery   [facility]

## 2025-07-15 NOTE — HOSPITAL COURSE
Jerman Colón  is a 64 y.o. male with a past medical history of  postop nausea vomiting, hyperlipidemia, hypertension, CAD, sleep apnea, asthma, GERD, CKD, BPH, anemia, arthritis, lumbar disc disease and spinal stenosis who presented for scheduled surgery.    7/14 s/p L3-S1 lami and facetectomies/extension/revision ()  7/15 hgb 7.1 s/p 1u pRBC, post Tx cbc 7.5, s/p 1u pRBC.          PT/OT evaluated patient and recommended Rehab     On the day of discharge, the patient was seen and evaluated by the neurosurgery team and deemed suitable for discharge. The patient was given detailed discharge instructions and were scheduled to follow up as an outpatient.

## 2025-07-15 NOTE — PROGRESS NOTES
07/15/25 1220   Discharge Planning   Living Arrangements Spouse/significant other   Support Systems Spouse/significant other   Type of Residence Private residence   Home or Post Acute Services Post acute facilities (Rehab/SNF/etc)   Type of Post Acute Facility Services Rehab   Expected Discharge Disposition Rehab   Does the patient need discharge transport arranged? Yes   [facility] coordination needed? Yes   Has discharge transport been arranged? No   Patient Choice   Provider Choice list and CMS website (https://medicare.gov/care-compare#search) for post-acute Quality and Resource Measure Data were provided and reviewed with: Patient     Social Work Discharge Planning note:    -Patient discussed during interdisciplinary rounds:  -Team members present: Fellow, PT and OT, and SW  -Plan per medical team: Pt is not medically ready for discharge. NSU; continue to monitor drain.   -Payer: [employer]  -Status: Inpatient  -Discharge disposition: Pt is currently with PT and OT recommendations for High intensity. SW met with Pt to further discuss discharge planning. Pt reported that he will be agreeable with acute rehab placement only if he is unable to do stairs by the time he is ready for discharge. If Pt is able to do stairs during this admission, he will opt to go home with home PT/OT and family to assist. SW spoke with Pt's wife, Zion, and she confirmed that if Pt is able to do steps, family will provide 24 hour assistance if Pt opts to return home. Zion reported that Pt refused rehab after his last surgery and he went home with [facility] and did well. If home care, Pt wants to use [facility] again (printed list of acute rehabs and home care providers was given to Pt). A referral has been initiated to [facility]. SW will continue to follow to assist with discharge planning.    -Support to Pt/family: Pt and Zion reported no other issues or concerns. SW will continue to follow for emotional/incidental  support to Pt/family.    -Potential Barriers: none  -Anticipated Date of Discharge: 7/18/25    This SW   VINCE Simpson, LSW    Office: [phone]  Secure chat via [employer]

## 2025-07-15 NOTE — SIGNIFICANT EVENT
Updated patient's spouse on new post-operative hypotension requiring ICU care with Levophed infusion. Patient with acute blood loss anemia secondary to spine surgery. Spouse agreeable to proceed with blood transfusions as clinically indicated. 2units of pRBC ordered.

## 2025-07-15 NOTE — PROGRESS NOTES
Physical Therapy    Physical Therapy Evaluation & Treatment    Patient Name: Jerman Colón Jr.  MRN: [id number]  Today's Date: 7/15/2025   Room: 01/01  Time Calculation  Start Time: 1029  Stop Time: 1109  Time Calculation (min): 40 min    Assessment/Plan   PT Assessment  PT Assessment Results: Decreased strength, Decreased endurance, Impaired balance, Decreased mobility, Pain, Orthopedic restrictions  Rehab Prognosis: Excellent  Barriers to Discharge Home: Physical needs, Caregiver assistance  Caregiver Assistance: Caregiver assistance needed per identified barriers - however, level of patient's required assistance exceeds assistance available at home  Physical Needs: High falls risk due to function or environment  Evaluation/Treatment Tolerance: Patient tolerated treatment well  End of Session Communication: Bedside nurse  Assessment Comment: Patient to benefit from ongoing PT services to address the above limitations and to facilitate timely return to prior level of function.  End of Session Patient Position: Bed, 3 rail up, Alarm off, not on at start of session  IP OR SWING BED PT PLAN  Inpatient or Swing Bed: Inpatient  PT Plan  Treatment/Interventions: Bed mobility, Transfer training, Gait training, Balance training, Neuromuscular re-education, Strengthening, Endurance training, Therapeutic exercise, Therapeutic activity, Home exercise program, Postural re-education, Orthotic fitting/training  PT Plan: Ongoing PT  PT Frequency: Daily  PT Discharge Recommendations: High intensity level of continued care  Equipment Recommended upon Discharge:  (TBD)  PT Recommended Transfer Status: Total assist  PT - OK to Discharge: Yes      Subjective   General Visit Information:  Reason for Referral: Pt p/w BLE radiculopathy, 7/14 s/p L3-S1 lami and facetectomies/extension/revision.  Past Medical History Relevant to Rehab: PONV, Vocal Cord Mass, HTN, HLD, CAD, COPD, Asthma BIANCA (no CPAP), GERD, BPH, hypogonadism, tubular  adenoma of colon, proteinuria Polcythemia, Fusion of lumbar spine, kyphosis of cervical region, cervical myelopathy, Spinal cord compression due to degenerative disorder of spinal column  Prior to Session Communication: Bedside nurse  Patient Position Received: Bed, 3 rail up, Alarm off, not on at start of session  Family/Caregiver Present: No  General Comment: Pt pleasant and cooperative. BP low throughout most of session; Applied B compression stockings and abdominal binder. RN notified of this and started pt on fluid bolus during session. See vitals for additional details.     Home Living:  Home Living  Type of Home: House  Lives With: Spouse (Works part time)  Home Adaptive Equipment: Cane, Reacher, Sock aid  Home Layout: Two level, Bed/bath upstairs, Stairs to alternate level with rails  Alternate Level Stairs-Number of Steps: Full flight  Home Access: Stairs to enter without rails  Entrance Stairs-Number of Steps: 2 front door, 0 through garage  Bathroom Shower/Tub: Walk-in shower  Bathroom Toilet: Handicapped height  Bathroom Equipment: Grab bars in shower, Grab bars around toilet    Prior Level of Function:  Prior Function Per Pt/Caregiver Report  Level of [place]: Independent with ADLs and functional transfers, Independent with homemaking with ambulation  ADL Assistance: Independent  Homemaking Assistance: Independent  Ambulatory Assistance: Independent  Vocational: Retired ()  Leisure: enjoys horse racing, gambling, sports, going to the gym  Hand Dominance: Right  Prior Function Comments: Drives. Reports 2 falls in past year due to BLE buckling.    Precautions:  Precautions  Hearing/Visual Limitations: WFL  Medical Precautions: Fall precautions  Post-Surgical Precautions: Spinal precautions  Precautions Comment: SBP <180    Vital Signs:   Date/Time Vitals Session Patient Position Pulse Resp SpO2 BP MAP (mmHg)    07/15/25 1029 Pre PT  Lying  108  --  99 %  98/45  60     07/15/25 1037 During PT   Lying  108  22  98 %  95/47  61     07/15/25 1053 During PT  Lying  103  20  97 %  96/51  65     07/15/25 1058 During PT  Lying  --  --  --  105/46  64     07/15/25 1104 During PT  Sitting  108  18  99 %  102/53  70     07/15/25 1109 Post PT  Lying  103  25  99 %  96/53  68             Objective   Lines/Tubes/Drains:  Arterial Line 07/14/25 Left Radial (Active)   Number of days: 1       Urethral Catheter (Active)   Number of days: 1       Closed/Suction Drain 1 Inferior;Midline Back Accordion 10 Fr. (Active)   Number of days: 0       Continuous Medications/Drips:  sodium chloride 0.9%, 75 mL/hr, Last Rate: 75 mL/hr (07/15/25 0406)      Oxygen: 6 L/min via NC     Pain:  Pain Assessment  Pain Assessment: 0-10  0-10 (Numeric) Pain Score: 6  Pain Type: Surgical pain  Pain Location: Back  Pain Interventions: Repositioned  Response to Interventions: No change in pain    Cognition:  Cognition  Overall Cognitive Status: Within Functional Limits  Orientation Level: Oriented X4  Insight: Within function limits  Impulsive: Within functional limits  Processing Speed: Within funtional limits      Extremity/Trunk Assessments:  Strength:       RUE   RUE : Within Functional Limits    LUE   LUE: Within Functional Limits    RLE   RLE : Exceptions to WFL  Strength RLE  R Hip Flexion: 3-/5  R Knee Flexion: 5/5  R Knee Extension: 5/5  R Ankle Dorsiflexion: 5/5  R Ankle Plantar Flexion: 5/5    LLE   LLE : Exceptions to WFL  Strength LLE  L Hip Flexion: 2-/5  L Knee Flexion: 4-/5  L Knee Extension: 3+/5  L Ankle Dorsiflexion: 3/5  L Ankle Plantar Flexion: 3+/5    General Assessments:         Activity Tolerance  Endurance: Tolerates 10 - 20 min exercise with multiple rests  Early Mobility/Exercise Safety Screen: Proceed with mobilization - No exclusion criteria met  Sensation  Light Touch:  (Pt reporting numbness/tingling over proximal, anterior thigh.)  Coordination  Movements are Fluid and Coordinated: No  Upper Body Coordination: BUE  tremor appreciated. Pt reports this is chronic.  Static Sitting Balance  Static Sitting-Balance Support: Bilateral upper extremity supported, Feet supported  Static Sitting-Level of Assistance: Minimum assistance  Dynamic Sitting Balance  Dynamic Sitting-Balance Support: Bilateral upper extremity supported, Feet supported  Dynamic Sitting-Level of Assistance: Minimum assistance  Dynamic Sitting-Balance: Forward lean  Static Standing Balance  Static Standing-Balance Support: Bilateral upper extremity supported  Static Standing-Level of Assistance: Moderate assistance (x2)  Dynamic Standing Balance  Dynamic Standing-Balance Support: Bilateral upper extremity supported  Dynamic Standing-Level of Assistance: Moderate assistance (x2)  Dynamic Standing-Balance:  (Side steps)    Functional Assessments:  Bed Mobility  Bed Mobility: Yes  Bed Mobility 1  Bed Mobility 1: Scooting (boosting)  Level of Assistance 1: Dependent, +2  Bed Mobility Comments 1: HOB flat, used draw sheet  Bed Mobility 2  Bed Mobility  2: Rolling left, Rolling right  Level of Assistance 2: Moderate assistance  Bed Mobility Comments 2: HOB flat, used draw sheet  Bed Mobility 3  Bed Mobility 3: Supine sitting, Sitting to supine  Level of Assistance 3: Maximum assistance, +2  Bed Mobility Comments 3: Cues for proper hand placement and task sequencing. HOB elevated sup to sit, 30 deg sit to sup.    Transfers  Transfer: Yes  Transfer 1  Transfer From 1: Bed to  Transfer to 1: Stand  Technique 1: Sit to stand, Stand to sit  Transfer Device 1:  (BUE arm in arm assist)  Transfer Level of Assistance 1: Maximum assistance, +2  Trials/Comments 1: Cues for proper hand placement and controlling speed    Ambulation/Gait Training  Ambulation/Gait Training Performed: Yes  Ambulation/Gait Training 1  Surface 1: Level tile  Device 1:  (BUE arm in arm assist)  Assistance 1: Moderate assistance (x2)  Quality of Gait 1: Narrow base of support, Diminished heel strike,  Shuffling gait, Decreased step length, Soft knee(s), Antalgic (Cues and assist for weight shifting and advancing BLE.)  Comments/Distance (ft) 1: 2 steps laterally to L    Stairs  Stairs: No         Treatment:  Skilled PT treatment was provided beyond the scope of evaluation, as follows:    Therapeutic Activity  Therapeutic Activity Performed: Yes  Therapeutic Activity 1: Progressive chair position with skilled vitals monitoring and application of compression stockings/abd binder to maxmize tolerance of upright position/upright mobility.      Therapeutic Exercise  Therapeutic Exercise Performed: Yes  Therapeutic Exercise Activity 1: 10x each of the following with cues for proper form, speed, and muscle fiber recruitment: Ankle pumps, heel slides, hip abduction, SAQ, isometric TA squeeze      Outcome Measures:  [facility] Basic Mobility  Turning from your back to your side while in a flat bed without using bedrails: A lot  Moving from lying on your back to sitting on the side of a flat bed without using bedrails: Total  Moving to and from bed to chair (including a wheelchair): Total  Standing up from a chair using your arms (e.g. wheelchair or bedside chair): Total  To walk in hospital room: Total  Climbing 3-5 steps with railing: Total  Basic Mobility - Total Score: 7                   FSS-ICU  Ambulation: Unable to attempt due to weakness  Rolling: Moderate assistance (performs 50 - 74% of task)  Sitting: Minimal assistance (performs 75% or more of task)  Transfer Sit-to-Stand: Total assistance (performs 25% or requires another person)  Transfer Supine-to-Sit: Total assistance (performs 25% or requires another person)  Total Score: 9    ICU Mobility Screen  Early Mobility/Exercise Safety Screen: Proceed with mobilization - No exclusion criteria met  ICU Mobility Scale: Standing                        Encounter Problems       Encounter Problems (Active)       PT Problem       Patient will perform bed mobility with </=  MIN A x1 to reduce risk of developing decubitus ulcers.  (Progressing)       Start:  07/15/25    Expected End:  07/29/25            Patient will perform sit to stand and stand to sit transfers with </= MIN A x1 and LRD to increase functional strength.  (Progressing)       Start:  07/15/25    Expected End:  07/29/25            Patient will ambulate at least 15 ft. with </= MOD A x1 and LRD to improve tolerance of household distances.  (Progressing)       Start:  07/15/25    Expected End:  07/29/25            BLE 5/5 (Progressing)       Start:  07/15/25    Expected End:  07/29/25            Patient will stand with UE support of LRD and </= CGA at least 2 min to improve balance required for self-care tasks.  (Progressing)       Start:  07/15/25    Expected End:  07/29/25                   Education Documentation  No documentation found.  Education Comments  No comments found.            07/15/25 at 11:29 AM   Stacey Lopez, PT   Rehab Office: [phone]

## 2025-07-16 LAB
ALBUMIN SERPL BCP-MCNC: 3 G/DL (ref 3.4–5)
ANION GAP SERPL CALC-SCNC: 9 MMOL/L (ref 10–20)
ATRIAL RATE: 92 BPM
BLOOD EXPIRATION DATE: NORMAL
BLOOD EXPIRATION DATE: NORMAL
BUN SERPL-MCNC: 22 MG/DL (ref 6–23)
CA-I BLD-SCNC: 1.14 MMOL/L (ref 1.1–1.33)
CALCIUM SERPL-MCNC: 8 MG/DL (ref 8.6–10.6)
CHLORIDE SERPL-SCNC: 107 MMOL/L (ref 98–107)
CO2 SERPL-SCNC: 29 MMOL/L (ref 21–32)
CREAT SERPL-MCNC: 1.02 MG/DL (ref 0.5–1.3)
DISPENSE STATUS: NORMAL
DISPENSE STATUS: NORMAL
EGFRCR SERPLBLD CKD-EPI 2021: 82 ML/MIN/1.73M*2
ERYTHROCYTE [DISTWIDTH] IN BLOOD BY AUTOMATED COUNT: 19.2 % (ref 11.5–14.5)
GLUCOSE BLD MANUAL STRIP-MCNC: 121 MG/DL (ref 74–99)
GLUCOSE BLD MANUAL STRIP-MCNC: 127 MG/DL (ref 74–99)
GLUCOSE BLD MANUAL STRIP-MCNC: 134 MG/DL (ref 74–99)
GLUCOSE SERPL-MCNC: 125 MG/DL (ref 74–99)
HCT VFR BLD AUTO: 26.4 % (ref 41–52)
HGB BLD-MCNC: 7.9 G/DL (ref 13.5–17.5)
MAGNESIUM SERPL-MCNC: 1.77 MG/DL (ref 1.6–2.4)
MCH RBC QN AUTO: 21 PG (ref 26–34)
MCHC RBC AUTO-ENTMCNC: 29.9 G/DL (ref 32–36)
MCV RBC AUTO: 70 FL (ref 80–100)
NRBC BLD-RTO: 0.3 /100 WBCS (ref 0–0)
P AXIS: 38 DEGREES
P OFFSET: 204 MS
P ONSET: 148 MS
PHOSPHATE SERPL-MCNC: 2.1 MG/DL (ref 2.5–4.9)
PLATELET # BLD AUTO: 187 X10*3/UL (ref 150–450)
POTASSIUM SERPL-SCNC: 4.4 MMOL/L (ref 3.5–5.3)
PR INTERVAL: 150 MS
PRODUCT BLOOD TYPE: 6200
PRODUCT BLOOD TYPE: 6200
PRODUCT CODE: NORMAL
PRODUCT CODE: NORMAL
Q ONSET: 223 MS
QRS COUNT: 15 BEATS
QRS DURATION: 108 MS
QT INTERVAL: 332 MS
QTC CALCULATION(BAZETT): 410 MS
QTC FREDERICIA: 382 MS
R AXIS: -1 DEGREES
RBC # BLD AUTO: 3.76 X10*6/UL (ref 4.5–5.9)
SODIUM SERPL-SCNC: 141 MMOL/L (ref 136–145)
T AXIS: 6 DEGREES
T OFFSET: 389 MS
UNIT ABO: NORMAL
UNIT ABO: NORMAL
UNIT NUMBER: NORMAL
UNIT NUMBER: NORMAL
UNIT RH: NORMAL
UNIT RH: NORMAL
UNIT VOLUME: 350
UNIT VOLUME: 350
VENTRICULAR RATE: 92 BPM
WBC # BLD AUTO: 7.9 X10*3/UL (ref 4.4–11.3)
XM INTEP: NORMAL
XM INTEP: NORMAL

## 2025-07-16 PROCEDURE — 84100 ASSAY OF PHOSPHORUS: CPT

## 2025-07-16 PROCEDURE — 93010 ELECTROCARDIOGRAM REPORT: CPT | Performed by: INTERNAL MEDICINE

## 2025-07-16 PROCEDURE — 2500000004 HC RX 250 GENERAL PHARMACY W/ HCPCS (ALT 636 FOR OP/ED)

## 2025-07-16 PROCEDURE — 2500000001 HC RX 250 WO HCPCS SELF ADMINISTERED DRUGS (ALT 637 FOR MEDICARE OP): Performed by: STUDENT IN AN ORGANIZED HEALTH CARE EDUCATION/TRAINING PROGRAM

## 2025-07-16 PROCEDURE — 83735 ASSAY OF MAGNESIUM: CPT | Performed by: REGISTERED NURSE

## 2025-07-16 PROCEDURE — 97530 THERAPEUTIC ACTIVITIES: CPT | Mod: GP

## 2025-07-16 PROCEDURE — 2060000001 HC INTERMEDIATE ICU ROOM DAILY

## 2025-07-16 PROCEDURE — 97110 THERAPEUTIC EXERCISES: CPT | Mod: GP

## 2025-07-16 PROCEDURE — 97535 SELF CARE MNGMENT TRAINING: CPT | Mod: GO

## 2025-07-16 PROCEDURE — 2500000004 HC RX 250 GENERAL PHARMACY W/ HCPCS (ALT 636 FOR OP/ED): Performed by: STUDENT IN AN ORGANIZED HEALTH CARE EDUCATION/TRAINING PROGRAM

## 2025-07-16 PROCEDURE — 2500000001 HC RX 250 WO HCPCS SELF ADMINISTERED DRUGS (ALT 637 FOR MEDICARE OP): Performed by: REGISTERED NURSE

## 2025-07-16 PROCEDURE — 97530 THERAPEUTIC ACTIVITIES: CPT | Mod: GO

## 2025-07-16 PROCEDURE — 82330 ASSAY OF CALCIUM: CPT | Performed by: REGISTERED NURSE

## 2025-07-16 PROCEDURE — 82947 ASSAY GLUCOSE BLOOD QUANT: CPT

## 2025-07-16 PROCEDURE — 37799 UNLISTED PX VASCULAR SURGERY: CPT | Performed by: REGISTERED NURSE

## 2025-07-16 PROCEDURE — 80069 RENAL FUNCTION PANEL: CPT

## 2025-07-16 PROCEDURE — 85027 COMPLETE CBC AUTOMATED: CPT

## 2025-07-16 PROCEDURE — 94660 CPAP INITIATION&MGMT: CPT

## 2025-07-16 RX ORDER — LABETALOL HYDROCHLORIDE 5 MG/ML
10 INJECTION, SOLUTION INTRAVENOUS EVERY 10 MIN PRN
Status: CANCELLED | OUTPATIENT
Start: 2025-07-16

## 2025-07-16 RX ORDER — NIMODIPINE 30 MG/1
60 CAPSULE, LIQUID FILLED ORAL EVERY 4 HOURS
Status: CANCELLED | OUTPATIENT
Start: 2025-07-16 | End: 2025-08-06

## 2025-07-16 RX ORDER — LEVETIRACETAM 5 MG/ML
500 INJECTION INTRAVASCULAR EVERY 12 HOURS
Status: CANCELLED | OUTPATIENT
Start: 2025-07-16 | End: 2025-07-19

## 2025-07-16 RX ORDER — NICARDIPINE HYDROCHLORIDE 0.2 MG/ML
0-15 INJECTION INTRAVENOUS CONTINUOUS PRN
Status: CANCELLED | OUTPATIENT
Start: 2025-07-16

## 2025-07-16 RX ORDER — HYDRALAZINE HYDROCHLORIDE 20 MG/ML
10 INJECTION INTRAMUSCULAR; INTRAVENOUS
Status: CANCELLED | OUTPATIENT
Start: 2025-07-16

## 2025-07-16 RX ORDER — SODIUM,POTASSIUM PHOSPHATES 280-250MG
2 POWDER IN PACKET (EA) ORAL ONCE
Status: COMPLETED | OUTPATIENT
Start: 2025-07-16 | End: 2025-07-16

## 2025-07-16 RX ORDER — MAGNESIUM SULFATE HEPTAHYDRATE 40 MG/ML
2 INJECTION, SOLUTION INTRAVENOUS ONCE
Status: COMPLETED | OUTPATIENT
Start: 2025-07-16 | End: 2025-07-16

## 2025-07-16 RX ADMIN — GABAPENTIN 600 MG: 300 CAPSULE ORAL at 15:07

## 2025-07-16 RX ADMIN — MAGNESIUM SULFATE HEPTAHYDRATE 2 G: 40 INJECTION, SOLUTION INTRAVENOUS at 09:05

## 2025-07-16 RX ADMIN — MAGNESIUM SULFATE HEPTAHYDRATE 2 G: 40 INJECTION, SOLUTION INTRAVENOUS at 03:20

## 2025-07-16 RX ADMIN — GABAPENTIN 600 MG: 300 CAPSULE ORAL at 20:12

## 2025-07-16 RX ADMIN — SENNOSIDES AND DOCUSATE SODIUM 2 TABLET: 50; 8.6 TABLET ORAL at 20:12

## 2025-07-16 RX ADMIN — SENNOSIDES AND DOCUSATE SODIUM 2 TABLET: 50; 8.6 TABLET ORAL at 09:04

## 2025-07-16 RX ADMIN — HYDROMORPHONE HYDROCHLORIDE 0.2 MG: 1 INJECTION, SOLUTION INTRAMUSCULAR; INTRAVENOUS; SUBCUTANEOUS at 21:41

## 2025-07-16 RX ADMIN — OXYCODONE HYDROCHLORIDE 10 MG: 5 TABLET ORAL at 20:12

## 2025-07-16 RX ADMIN — HEPARIN SODIUM 5000 UNITS: 5000 INJECTION INTRAVENOUS; SUBCUTANEOUS at 10:33

## 2025-07-16 RX ADMIN — ATORVASTATIN CALCIUM 40 MG: 40 TABLET, FILM COATED ORAL at 20:12

## 2025-07-16 RX ADMIN — HEPARIN SODIUM 5000 UNITS: 5000 INJECTION INTRAVENOUS; SUBCUTANEOUS at 00:06

## 2025-07-16 RX ADMIN — PANTOPRAZOLE SODIUM 40 MG: 40 TABLET, DELAYED RELEASE ORAL at 20:32

## 2025-07-16 RX ADMIN — ACETAMINOPHEN 650 MG: 325 TABLET, FILM COATED ORAL at 03:20

## 2025-07-16 RX ADMIN — ACETAMINOPHEN 650 MG: 325 TABLET, FILM COATED ORAL at 09:05

## 2025-07-16 RX ADMIN — PANTOPRAZOLE SODIUM 40 MG: 40 TABLET, DELAYED RELEASE ORAL at 09:04

## 2025-07-16 RX ADMIN — CYCLOBENZAPRINE 10 MG: 10 TABLET, FILM COATED ORAL at 20:12

## 2025-07-16 RX ADMIN — POTASSIUM & SODIUM PHOSPHATES POWDER PACK 280-160-250 MG 2 PACKET: 280-160-250 PACK at 04:41

## 2025-07-16 RX ADMIN — ACETAMINOPHEN 650 MG: 325 TABLET, FILM COATED ORAL at 20:31

## 2025-07-16 RX ADMIN — HEPARIN SODIUM 5000 UNITS: 5000 INJECTION INTRAVENOUS; SUBCUTANEOUS at 15:07

## 2025-07-16 RX ADMIN — OXYCODONE HYDROCHLORIDE 10 MG: 5 TABLET ORAL at 00:06

## 2025-07-16 RX ADMIN — OXYCODONE HYDROCHLORIDE 10 MG: 5 TABLET ORAL at 15:12

## 2025-07-16 RX ADMIN — CYCLOBENZAPRINE 10 MG: 10 TABLET, FILM COATED ORAL at 15:07

## 2025-07-16 RX ADMIN — OXYCODONE HYDROCHLORIDE 10 MG: 5 TABLET ORAL at 09:05

## 2025-07-16 RX ADMIN — SODIUM CHLORIDE 125 ML/HR: 9 INJECTION, SOLUTION INTRAVENOUS at 00:10

## 2025-07-16 RX ADMIN — POLYETHYLENE GLYCOL 3350 17 G: 17 POWDER, FOR SOLUTION ORAL at 09:05

## 2025-07-16 RX ADMIN — ACETAMINOPHEN 650 MG: 325 TABLET, FILM COATED ORAL at 15:07

## 2025-07-16 RX ADMIN — ASPIRIN 81 MG: 81 TABLET, COATED ORAL at 08:10

## 2025-07-16 RX ADMIN — GABAPENTIN 600 MG: 300 CAPSULE ORAL at 09:04

## 2025-07-16 RX ADMIN — CYCLOBENZAPRINE 10 MG: 10 TABLET, FILM COATED ORAL at 09:05

## 2025-07-16 RX ADMIN — OXYCODONE HYDROCHLORIDE 10 MG: 5 TABLET ORAL at 04:42

## 2025-07-16 RX ADMIN — KETOROLAC TROMETHAMINE 30 MG: 30 INJECTION, SOLUTION INTRAMUSCULAR; INTRAVENOUS at 01:15

## 2025-07-16 ASSESSMENT — PAIN SCALES - GENERAL
PAINLEVEL_OUTOF10: 7
PAINLEVEL_OUTOF10: 8
PAINLEVEL_OUTOF10: 7
PAINLEVEL_OUTOF10: 8
PAINLEVEL_OUTOF10: 8
PAINLEVEL_OUTOF10: 5 - MODERATE PAIN
PAINLEVEL_OUTOF10: 7
PAINLEVEL_OUTOF10: 6
PAINLEVEL_OUTOF10: 8
PAINLEVEL_OUTOF10: 4

## 2025-07-16 ASSESSMENT — COGNITIVE AND FUNCTIONAL STATUS - GENERAL
WALKING IN HOSPITAL ROOM: TOTAL
DAILY ACTIVITIY SCORE: 15
TOILETING: A LOT
CLIMB 3 TO 5 STEPS WITH RAILING: TOTAL
TURNING FROM BACK TO SIDE WHILE IN FLAT BAD: A LOT
EATING MEALS: A LITTLE
DRESSING REGULAR LOWER BODY CLOTHING: A LOT
MOVING TO AND FROM BED TO CHAIR: TOTAL
DRESSING REGULAR UPPER BODY CLOTHING: A LITTLE
HELP NEEDED FOR BATHING: A LOT
MOBILITY SCORE: 8
MOVING FROM LYING ON BACK TO SITTING ON SIDE OF FLAT BED WITH BEDRAILS: A LOT
PERSONAL GROOMING: A LITTLE
STANDING UP FROM CHAIR USING ARMS: TOTAL

## 2025-07-16 ASSESSMENT — PAIN - FUNCTIONAL ASSESSMENT
PAIN_FUNCTIONAL_ASSESSMENT: 0-10

## 2025-07-16 ASSESSMENT — ACTIVITIES OF DAILY LIVING (ADL)
HOME_MANAGEMENT_TIME_ENTRY: 14
HOME_MANAGEMENT_TIME_ENTRY: 14

## 2025-07-16 ASSESSMENT — PAIN DESCRIPTION - DESCRIPTORS: DESCRIPTORS: ACHING;SORE;PRESSURE

## 2025-07-16 NOTE — CARE PLAN
Interprofessional Rounds    Summary:  L3-s1 lami, closely following bp.  Patient is his own decision maker.  Wife is involved.     Participants: Advance Practice Provider, Ethicist, Pharmacist, Physical Therapist, Physician, RN, and ,     Care Plan Reviewed with:  Interdisciplinary Team

## 2025-07-16 NOTE — PROGRESS NOTES
"  NEUROSURGERY PROGRESS NOTE    Jerman Colón Jr. is a 64 y.o. male on day 2 of admission presenting with Lumbar radicular pain.    Subjective   Patient was upgraded to ICU status for low MAPS.  Was briefly on levo.  Received fluid bolus and blood transfusion.  Has been off pressors since 10 PM last night.       Objective     Physical Exam  Exam:  No acute distress  On room air, breathing comfortably   A&Ox3  OU3R, EOMI   RUE D5 / B5 / T5 / HG 5/ IO 5  LUE D5 / B5 / T5 / HG 5/ IO 5  RLE HF5 / KE 5/ DF 5/ PF 5  LLE HF5 / KE 5/ DF 5/ PF 5  Negative barron  No clonus  SILT    Last Recorded Vitals  Blood pressure 123/58, pulse 96, temperature 36.9 °C (98.4 °F), temperature source Temporal, resp. rate 19, height 1.854 m (6' 1\"), weight 129 kg (285 lb 7.9 oz), SpO2 97%.     Intake/Output last 3 Shifts:  I/O last 3 completed shifts:  In: 7415.8 (61.8 mL/kg) [P.O.:1000; I.V.:3065.8 (25.5 mL/kg); Blood:350; IV Piggyback:3000]  Out: 4335 (36.1 mL/kg) [Urine:3000 (0.7 mL/kg/hr); Drains:535; Blood:800]  Weight: 120 kg     Labs:  Lab Results   Component Value Date    WBC 7.9 07/16/2025    HGB 7.9 (L) 07/16/2025    HCT 26.4 (L) 07/16/2025    MCV 70 (L) 07/16/2025     07/16/2025      Lab Results   Component Value Date    GLUCOSE 125 (H) 07/16/2025    CALCIUM 8.0 (L) 07/16/2025     07/16/2025    K 4.4 07/16/2025    CO2 29 07/16/2025     07/16/2025    BUN 22 07/16/2025    CREATININE 1.02 07/16/2025      Lab Results   Component Value Date    CREATININE 1.02 07/16/2025    BUN 22 07/16/2025     07/16/2025    K 4.4 07/16/2025     07/16/2025    CO2 29 07/16/2025      Lab Results   Component Value Date    CALCIUM 8.0 (L) 07/16/2025    PHOS 2.1 (L) 07/16/2025      Lab Results   Component Value Date    INR 1.0 06/27/2025    INR 1.1 01/21/2025    PROTIME 11.1 06/27/2025    PROTIME 12.9 (H) 01/21/2025        Imaging:  FL fluoro images no charge   Final Result      FL fluoro images no charge   Final " Result                Assessment/Plan   Assessment & Plan  Lumbar radicular pain    Lumbar foraminal stenosis    Lumbar radiculopathy, chronic    H/o prior L5-S1 fusion, hyperlipidemia, hypertension, CAD, sleep apnea (central/obstructive), asthma, GERD, CKD, BPH, anemia, 1/20/25 C4-6 ACDF/C2-T4 PCDF p/w BLE radiculopathy, 7/14 s/p L3-S1 lami and facetectomies/extension/revision ()    7/15 hgb 7.1 s/p 2u pRBC    Plan:  NSU,, poss downgrade   wean airvo  Maintain drain  PTOT-rehab       Patricia Lyn MD  Department of Neurosurgery   [facility]

## 2025-07-16 NOTE — PROGRESS NOTES
Occupational Therapy    Occupational Therapy Treatment    Name: Jerman Colón Jr.  MRN: [id number]  : 1960  Date: 25  Room:       Time Calculation  Start Time: 1526  Stop Time: 1554  Time Calculation (min): 28 min    Assessment:  Evaluation/Treatment Tolerance: Patient tolerated treatment well  Medical Staff Made Aware: Yes  End of Session Communication: Bedside nurse  End of Session Patient Position: Bed, 3 rail up, Alarm on  Plan:  Treatment Interventions: ADL retraining, Functional transfer training, UE strengthening/ROM, Endurance training, Patient/family training, Equipment evaluation/education, Neuromuscular reeducation, Fine motor coordination activities, Compensatory technique education  OT Frequency: 5 times per week  OT Discharge Recommendations: High intensity level of continued care  Equipment Recommended upon Discharge:  (TBD)  OT Recommended Transfer Status: Maximum assist, Assist of 2  OT - OK to Discharge: Yes    Subjective   General:  OT Last Visit  OT Received On: 25  Reason for Referral: Pt p/w BLE radiculopathy,  s/p L3-S1 lami and facetectomies/extension/revision.  Past Medical History Relevant to Rehab: PONV, Vocal Cord Mass, HTN, HLD, CAD, COPD, Asthma BIANCA (no CPAP), GERD, BPH, hypogonadism, tubular adenoma of colon, proteinuria Polcythemia, Fusion of lumbar spine, kyphosis of cervical region, cervical myelopathy, Spinal cord compression due to degenerative disorder of spinal column  Prior to Session Communication: Bedside nurse  Patient Position Received: Up in chair, Alarm on  General Comment: Patient pleasant and agreeable to participate in therapy. Rehab aide assisted with session   Precautions:  Hearing/Visual Limitations: WFL  Medical Precautions: Fall precautions, Oxygen therapy device and L/min  Post-Surgical Precautions: Spinal precautions  Precautions Comment: SBP <150  Vitals:        Vital Signs       Vitals Session Pre OT During OT Post OT    Heart Rate 97   96   Resp  17   16   SpO2 100   99   /60  140/50   MAP 84  78             Lines/Tubes/Drains:  Arterial Line 07/14/25 Left Radial (Active)   Number of days: 2       Urethral Catheter (Active)   Number of days: 2       Closed/Suction Drain 1 Inferior;Midline Back Accordion 10 Fr. (Active)   Number of days: 2     AIRVO 30% 50L, x1 hemovac drain, tiffanie, tele, richardson  Cognition:  Overall Cognitive Status: Within Functional Limits  Arousal/Alertness: Appropriate responses to stimuli  Orientation Level: Oriented X4  Following Commands: Follows one step commands consistently  Cognition Comments: Patient follows one-step commands consistently with no difficulty.  Attention: Within Functional Limits  Memory: Within Funtional Limits  Safety/Judgement: Within Functional Limits  Insight: Within function limits  Impulsive: Within functional limits  Processing Speed: Within funtional limits    Pain Assessment:  Pain Assessment  Pain Assessment: 0-10  0-10 (Numeric) Pain Score: 5 - Moderate pain  Pain Type: Acute pain, Surgical pain  Pain Location: Back     Objective   Activities of Daily Living:                  LE Dressing  LE Dressing: Yes  LE Dressing Adaptive Equipment: Reacher, Sock aide  Sock Level of Assistance: Minimum assistance  LE Dressing Where Assessed: Chair  LE Dressing Comments: Patient req. Min A to chelle/doff socks using reacher and sock aide while sitting in chair. Patient req. cues for proper use of AE and to maintain spinal precautions.       Functional Standing Tolerance:  Functional Mobility  Functional Mobility Performed: Yes  Functional Mobility 1  Surface 1: Level tile  Device 1: Rolling walker  Assistance 1: Moderate assistance (x1)  Comments 1: Patient completed forward/backward steps with Mod A x1 using wheeled walker at EOB. Req. cues for hand placement  Bed Mobility/Transfers:   Bed Mobility  Bed Mobility: Yes  Bed Mobility 1  Bed Mobility 1: Sitting to supine  Level of  Assistance 1: Moderate assistance  Bed Mobility Comments 1: Mod A for legs. Cues for proper log roll technique  Transfers  Transfer: Yes  Transfer 1  Transfer From 1: Chair with arms to  Transfer to 1: Bed  Technique 1: Sit to stand, Stand to sit  Transfer Device 1: Walker  Transfer Level of Assistance 1: Moderate assistance, +2  Trials/Comments 1: Cues for hand placement, sequencing, and speed.                Balance:  Dynamic Sitting Balance  Dynamic Sitting-Level of Assistance: Contact guard  Dynamic Standing Balance  Dynamic Standing-Level of Assistance: Moderate assistance (x1)  Static Sitting Balance  Static Sitting-Level of Assistance: Close supervision  Static Standing Balance  Static Standing-Level of Assistance: Minimum assistance (Min x1)    Therapy/Activity:      Therapeutic Activity  Therapeutic Activity Performed: Yes  Therapeutic Activity 1: Patient completed forward/backward steps at EOB to transfer from chair to bed with Mod A x1 using wheeled walker. Patient req. cues for hand placement and safety.       Outcome Measures:  [facility] Daily Activity  Putting on and taking off regular lower body clothing: A lot  Bathing (including washing, rinsing, drying): A lot  Putting on and taking off regular upper body clothing: A little  Toileting, which includes using toilet, bedpan or urinal: A lot  Taking care of personal grooming such as brushing teeth: A little  Eating Meals: A little  Daily Activity - Total Score: 15  ICU Mobility Screen  Early Mobility/Exercise Safety Screen: Proceed with mobilization - No exclusion criteria met  ICU Mobility Scale: Marching on spot (at bedside)     Education Documentation  Body Mechanics, taught by NORBERT Villaseñor at 7/16/2025  5:14 PM.  Learner: Patient  Readiness: Acceptance  Method: Explanation, Demonstration  Response: Verbalizes Understanding, Demonstrated Understanding, Needs Reinforcement  Comment: OT POC, ADL retraining, endurance    Precautions, taught by Cherise  NORBERT Ybarra at 7/16/2025  5:14 PM.  Learner: Patient  Readiness: Acceptance  Method: Explanation, Demonstration  Response: Verbalizes Understanding, Demonstrated Understanding, Needs Reinforcement  Comment: OT POC, ADL retraining, endurance    ADL Training, taught by NORBERT Villaseñor at 7/16/2025  5:14 PM.  Learner: Patient  Readiness: Acceptance  Method: Explanation, Demonstration  Response: Verbalizes Understanding, Demonstrated Understanding, Needs Reinforcement  Comment: OT POC, ADL retraining, endurance    Education Comments  No comments found.        Goals:  Encounter Problems       Encounter Problems (Active)       ADLs       Patient will perform UB and LB bathing with minimal assist  level of assistance and PRN adaptive equipment. (Progressing)       Start:  07/15/25    Expected End:  07/29/25            Patient with complete upper body dressing with SBA donning and doffing all UE clothes while edge of bed  (Progressing)       Start:  07/15/25    Expected End:  07/29/25            Patient with complete lower body dressing with minimal assist  level of assistance donning and doffing all LE clothes  with PRN adaptive equipment while edge of bed  (Progressing)       Start:  07/15/25    Expected End:  07/29/25            Patient will complete daily grooming tasks with SBA and PRN adaptive equipment while edge of bed . (Progressing)       Start:  07/15/25    Expected End:  07/29/25            Patient will complete toileting including hygiene clothing management/hygiene with minimal assist  level of assistance and LRAD. (Progressing)       Start:  07/15/25    Expected End:  07/29/25               ADLs       Patient will feed self with modified independent level of assistance using PRN adaptive equipment without spillage (Progressing)       Start:  07/15/25    Expected End:  07/29/25               BALANCE       Pt will maintain dynamic sitting balance during ADL task with independent level of assistance in order  to demonstrate decreased risk of falling and improved postural control. (Progressing)       Start:  07/15/25    Expected End:  07/29/25            Patientt will maintain static/dynamic standing balance > 10 minutes during ADL task with minimum level of assistance in order to demonstrate decreased risk of falling and improved postural control. (Progressing)       Start:  07/15/25    Expected End:  07/29/25               COGNITION/SAFETY       Patient will recall and adhere to spinal precautions during all functional mobility/ADL tasks in order to demonstrate improved understanding and promote healing post op (Progressing)       Start:  07/15/25    Expected End:  07/29/25               EXERCISE/STRENGTHENING       Patient will demo BUE coordination WFL during functional task without an increase in time to improve task performance.  (Progressing)       Start:  07/15/25    Expected End:  07/29/25               EXERCISE/STRENGTHENING       Patient with increase BUE to 5/5 strength and ROM WFL (Progressing)       Start:  07/15/25    Expected End:  07/29/25               TRANSFERS       Patient will perform bed mobility contact guard assist level of assistance and bed rails in order to improve safety and independence with mobility (Progressing)       Start:  07/15/25    Expected End:  07/29/25            Patient will complete functional transfers with least restrictive device with minimal assist  level of assistance. (Progressing)       Start:  07/15/25    Expected End:  07/29/25                 Completion of this session, clinical decision making, and documentation performed under the supervision/direction of Sherry Lombardi.    07/16/25 at 6:09 PM   DIONE MOORE, S-OT   [phone]

## 2025-07-16 NOTE — PROGRESS NOTES
Physical Therapy    Physical Therapy Treatment    Patient Name: Jerman Colón Jr.  MRN: [id number]  Today's Date: 7/16/2025  Time Calculation  Start Time: 1231  Stop Time: 1256  Time Calculation (min): 25 min       Assessment/Plan   PT Assessment  Rehab Prognosis: Excellent  Barriers to Discharge Home: Physical needs, Caregiver assistance  Caregiver Assistance: Caregiver assistance needed per identified barriers - however, level of patient's required assistance exceeds assistance available at home  Physical Needs: High falls risk due to function or environment  Evaluation/Treatment Tolerance: Patient tolerated treatment well  End of Session Communication: Bedside nurse  End of Session Patient Position: Up in chair, Alarm on  PT Plan  Inpatient/Swing Bed or Outpatient: Inpatient  PT Plan  Treatment/Interventions: Bed mobility, Transfer training, Gait training, Balance training, Neuromuscular re-education, Strengthening, Endurance training, Therapeutic exercise, Therapeutic activity, Home exercise program, Postural re-education, Orthotic fitting/training  PT Plan: Ongoing PT  PT Frequency: Daily  PT Discharge Recommendations: High intensity level of continued care  Equipment Recommended upon Discharge:  (TBD)  PT Recommended Transfer Status: Assist x2  PT - OK to Discharge: Yes      General Visit Information:   PT  Visit  PT Received On: 07/16/25  Response to Previous Treatment: Patient with no complaints from previous session.  Reason for Referral: Pt p/w BLE radiculopathy, 7/14 s/p L3-S1 lami and facetectomies/extension/revision.  Past Medical History Relevant to Rehab: PONV, Vocal Cord Mass, HTN, HLD, CAD, COPD, Asthma BIANCA (no CPAP), GERD, BPH, hypogonadism, tubular adenoma of colon, proteinuria Polcythemia, Fusion of lumbar spine, kyphosis of cervical region, cervical myelopathy, Spinal cord compression due to degenerative disorder of spinal column  Prior to Session Communication: Bedside nurse  Patient  Position Received: Bed, 3 rail up, Alarm off, not on at start of session  Family/Caregiver Present: No  General Comment: Rehab aide assisted with session.     Subjective   Precautions:  Precautions  Hearing/Visual Limitations: WFL  Medical Precautions: Fall precautions, Oxygen therapy device and L/min  Post-Surgical Precautions: Spinal precautions  Precautions Comment: SBP <150    Vital Signs:   Date/Time Vitals Session Patient Position Pulse Resp SpO2 BP MAP (mmHg)    07/16/25 1200 --  --  82  15  98 %  113/60  74     07/16/25 1231 Pre PT  Lying  91  20  99 %  113/57  72     07/16/25 1238 During PT  Sitting  96  25  96 %  145/75  93     07/16/25 1244 During PT  Sitting  --  --  --  139/81  100     07/16/25 1256 Post PT  Sitting  101  18  98 %  150/82  98     07/16/25 1300 --  --  98  20  92 %  141/69  88        Objective   Pain:  Pain Assessment  Pain Assessment: 0-10  0-10 (Numeric) Pain Score: 7  Pain Type: Acute pain, Surgical pain  Pain Location: Back  Pain Interventions: Repositioned  Response to Interventions: No change in pain  Cognition:  Cognition  Overall Cognitive Status: Within Functional Limits  Arousal/Alertness: Appropriate responses to stimuli  Orientation Level: Oriented X4  Following Commands: Follows all commands and directions without difficulty  Insight: Within function limits  Impulsive: Within functional limits  Processing Speed: Within funtional limits    Lines/Tubes/Drains:  Arterial Line 07/14/25 Left Radial (Active)   Number of days: 2       Urethral Catheter (Active)   Number of days: 2       Closed/Suction Drain 1 Inferior;Midline Back Accordion 10 Fr. (Active)   Number of days: 2     Oxygen: Airvo 30% FiO2, 50 L/min     Postural Control:   Postural Control  Postural Control: Within Functional Limits  Head Control: WFL  Trunk Control: WFL  Righting Reactions: Intact  Protective Responses: Intact  Posture Comment: Rounded shoulders    Balance:   Static Sitting Balance  Static  Sitting-Balance Support: Bilateral upper extremity supported, Feet supported  Static Sitting-Level of Assistance: Close supervision  Dynamic Sitting Balance  Dynamic Sitting-Balance Support: Bilateral upper extremity supported, Feet supported  Dynamic Sitting-Level of Assistance: Close supervision  Dynamic Sitting-Balance: Forward lean    Static Standing Balance  Static Standing-Balance Support: Bilateral upper extremity supported  Static Standing-Level of Assistance:  (MOD A x2 progressing to MOD A x1)  Dynamic Standing Balance  Dynamic Standing-Balance Support: Bilateral upper extremity supported  Dynamic Standing-Level of Assistance: Moderate assistance (x2)  Dynamic Standing-Balance:  (Side steps)    Coordination:  Coordination  Movements are Fluid and Coordinated: Yes  Upper Body Coordination: BUE tremor appreciated. Pt reports this is chronic.    Extremity/Trunk Assessments:  Strength:       RUE   RUE : Within Functional Limits    LUE   LUE: Within Functional Limits    RLE   RLE : Exceptions to WFL  Strength RLE  R Knee Flexion: 5/5  R Knee Extension: 5/5  R Ankle Dorsiflexion: 5/5  R Ankle Plantar Flexion: 5/5    LLE   LLE : Exceptions to WFL  Strength LLE  L Knee Flexion: 5/5  L Knee Extension: 5/5  L Ankle Dorsiflexion: 5/5  L Ankle Plantar Flexion: 5/5    PT Treatments:  Therapeutic Exercise  Therapeutic Exercise Performed: Yes  Therapeutic Exercise Activity 1: 10x each of the following with cues for proper form, speed, and muscle fiber recruitment: Ankle pumps, B alternating marches, B alternating LAQ  Therapeutic Exercise Activity 2: 5x sit to stands with MOD A x2 improving to MOD A x1 on L, MIN A x1 on R. Cues for controlling speed when sitting.  Therapeutic Exercise Activity 3: 10x stepping fwd/bwd with MIN A x2.    Therapeutic Activity  Therapeutic Activity Performed: Yes  Therapeutic Activity 1: Progressive chair position with skilled vitals monitoring to maximize patient's tolerance of upright  position/upright mobility.         Bed Mobility  Bed Mobility: Yes  Bed Mobility 1  Bed Mobility 1: Supine to sitting  Level of Assistance 1: Maximum assistance  Bed Mobility Comments 1: Cues for proper log roll technique    Ambulation/Gait Training  Ambulation/Gait Training Performed: Yes  Ambulation/Gait Training 1  Surface 1: Level tile  Device 1:  (BUE arm in arm assist)  Assistance 1: Moderate assistance (x2)  Quality of Gait 1: Narrow base of support, Diminished heel strike, Shuffling gait, Decreased step length, Soft knee(s), Antalgic  Comments/Distance (ft) 1: 3 steps laterally to R    Transfers  Transfer: Yes  Transfer 1  Transfer From 1: Bed to  Transfer to 1: Chair with arms  Technique 1: Sit to stand, Stand to sit  Transfer Device 1:  (BUE arm in arm assist)  Transfer Level of Assistance 1:  (MOD A x2 progressing to MIN A x2)  Trials/Comments 1: Cues for proper hand placement and controlling speed.  Transfers 2  Transfer From 2: Chair with arms to  Transfer to 2: Stand  Technique 2: Sit to stand, Stand to sit  Transfer Device 2: Walker  Transfer Level of Assistance 2: Maximum assistance  Trials/Comments 2: Cues for proper hand placement and controlling speed.    Stairs  Stairs: No              Outcome Measures:  [facility] Basic Mobility  Turning from your back to your side while in a flat bed without using bedrails: A lot  Moving from lying on your back to sitting on the side of a flat bed without using bedrails: A lot  Moving to and from bed to chair (including a wheelchair): Total  Standing up from a chair using your arms (e.g. wheelchair or bedside chair): Total  To walk in hospital room: Total  Climbing 3-5 steps with railing: Total  Basic Mobility - Total Score: 8                   FSS-ICU  Ambulation: Walks <50 feet with any assistance x1 or walks any distance with assistance x2 people  Rolling: Moderate assistance (performs 50 - 74% of task)  Sitting: Supervision or set-up only  Transfer Sit-to-Stand:  Total assistance (performs 25% or requires another person)  Transfer Supine-to-Sit: Maximal assistance (performs 25% - 49% of task)  Total Score: 12    ICU Mobility Screen  Early Mobility/Exercise Safety Screen: Proceed with mobilization - No exclusion criteria met  ICU Mobility Scale: Marching on spot (at bedside)                   Education:  Education Documentation  Precautions, taught by Stacey Lopez PT at 7/16/2025  1:44 PM.  Learner: Patient  Readiness: Acceptance  Method: Explanation  Response: Verbalizes Understanding  Comment: PT expectations, mobility precautions, d/c recs    Body Mechanics, taught by Stacey Lopez PT at 7/16/2025  1:44 PM.  Learner: Patient  Readiness: Acceptance  Method: Explanation  Response: Verbalizes Understanding  Comment: PT expectations, mobility precautions, d/c recs    Mobility Training, taught by Stacey Lopez PT at 7/16/2025  1:44 PM.  Learner: Patient  Readiness: Acceptance  Method: Explanation  Response: Verbalizes Understanding  Comment: PT expectations, mobility precautions, d/c recs    Education Comments  No comments found.             Encounter Problems       Encounter Problems (Active)       PT Problem       Patient will perform bed mobility with </= MIN A x1 to reduce risk of developing decubitus ulcers.  (Progressing)       Start:  07/15/25    Expected End:  07/29/25            Patient will perform sit to stand and stand to sit transfers with </= MIN A x1 and LRD to increase functional strength.  (Progressing)       Start:  07/15/25    Expected End:  07/29/25            Patient will ambulate at least 15 ft. with </= MOD A x1 and LRD to improve tolerance of household distances.  (Progressing)       Start:  07/15/25    Expected End:  07/29/25            BLE 5/5 (Progressing)       Start:  07/15/25    Expected End:  07/29/25            Patient will stand with UE support of LRD and </= CGA at least 2 min to improve balance required for self-care tasks.   (Progressing)       Start:  07/15/25    Expected End:  07/29/25 07/16/25 at 1:45 PM   Stacey Lopez, PT   Rehab Office: [phone]

## 2025-07-17 LAB
ALBUMIN SERPL BCP-MCNC: 3.2 G/DL (ref 3.4–5)
ANION GAP SERPL CALC-SCNC: 13 MMOL/L (ref 10–20)
BUN SERPL-MCNC: 21 MG/DL (ref 6–23)
CALCIUM SERPL-MCNC: 8.2 MG/DL (ref 8.6–10.6)
CHLORIDE SERPL-SCNC: 105 MMOL/L (ref 98–107)
CO2 SERPL-SCNC: 29 MMOL/L (ref 21–32)
CREAT SERPL-MCNC: 0.9 MG/DL (ref 0.5–1.3)
EGFRCR SERPLBLD CKD-EPI 2021: >90 ML/MIN/1.73M*2
ERYTHROCYTE [DISTWIDTH] IN BLOOD BY AUTOMATED COUNT: 19.3 % (ref 11.5–14.5)
GLUCOSE BLD MANUAL STRIP-MCNC: 125 MG/DL (ref 74–99)
GLUCOSE BLD MANUAL STRIP-MCNC: 127 MG/DL (ref 74–99)
GLUCOSE BLD MANUAL STRIP-MCNC: 127 MG/DL (ref 74–99)
GLUCOSE BLD MANUAL STRIP-MCNC: 89 MG/DL (ref 74–99)
GLUCOSE SERPL-MCNC: 138 MG/DL (ref 74–99)
HCT VFR BLD AUTO: 29.5 % (ref 41–52)
HGB BLD-MCNC: 8.8 G/DL (ref 13.5–17.5)
MCH RBC QN AUTO: 21.8 PG (ref 26–34)
MCHC RBC AUTO-ENTMCNC: 29.8 G/DL (ref 32–36)
MCV RBC AUTO: 73 FL (ref 80–100)
NRBC BLD-RTO: 0 /100 WBCS (ref 0–0)
PHOSPHATE SERPL-MCNC: 3.4 MG/DL (ref 2.5–4.9)
PLATELET # BLD AUTO: 212 X10*3/UL (ref 150–450)
POTASSIUM SERPL-SCNC: 4.5 MMOL/L (ref 3.5–5.3)
RBC # BLD AUTO: 4.04 X10*6/UL (ref 4.5–5.9)
SODIUM SERPL-SCNC: 142 MMOL/L (ref 136–145)
WBC # BLD AUTO: 8 X10*3/UL (ref 4.4–11.3)

## 2025-07-17 PROCEDURE — 2500000004 HC RX 250 GENERAL PHARMACY W/ HCPCS (ALT 636 FOR OP/ED): Performed by: STUDENT IN AN ORGANIZED HEALTH CARE EDUCATION/TRAINING PROGRAM

## 2025-07-17 PROCEDURE — 82947 ASSAY GLUCOSE BLOOD QUANT: CPT

## 2025-07-17 PROCEDURE — 37799 UNLISTED PX VASCULAR SURGERY: CPT

## 2025-07-17 PROCEDURE — 2500000005 HC RX 250 GENERAL PHARMACY W/O HCPCS: Performed by: NEUROLOGICAL SURGERY

## 2025-07-17 PROCEDURE — 2500000005 HC RX 250 GENERAL PHARMACY W/O HCPCS: Performed by: NURSE PRACTITIONER

## 2025-07-17 PROCEDURE — 2500000005 HC RX 250 GENERAL PHARMACY W/O HCPCS

## 2025-07-17 PROCEDURE — 94660 CPAP INITIATION&MGMT: CPT

## 2025-07-17 PROCEDURE — 2500000001 HC RX 250 WO HCPCS SELF ADMINISTERED DRUGS (ALT 637 FOR MEDICARE OP): Performed by: STUDENT IN AN ORGANIZED HEALTH CARE EDUCATION/TRAINING PROGRAM

## 2025-07-17 PROCEDURE — 85027 COMPLETE CBC AUTOMATED: CPT

## 2025-07-17 PROCEDURE — 80069 RENAL FUNCTION PANEL: CPT

## 2025-07-17 PROCEDURE — 97116 GAIT TRAINING THERAPY: CPT | Mod: GP

## 2025-07-17 PROCEDURE — 97530 THERAPEUTIC ACTIVITIES: CPT | Mod: GP

## 2025-07-17 PROCEDURE — 2500000002 HC RX 250 W HCPCS SELF ADMINISTERED DRUGS (ALT 637 FOR MEDICARE OP, ALT 636 FOR OP/ED): Performed by: NURSE PRACTITIONER

## 2025-07-17 PROCEDURE — 2060000001 HC INTERMEDIATE ICU ROOM DAILY

## 2025-07-17 RX ORDER — TAMSULOSIN HYDROCHLORIDE 0.4 MG/1
0.4 CAPSULE ORAL NIGHTLY
Status: DISCONTINUED | OUTPATIENT
Start: 2025-07-17 | End: 2025-07-23

## 2025-07-17 RX ORDER — BISACODYL 10 MG/1
10 SUPPOSITORY RECTAL ONCE
Status: DISCONTINUED | OUTPATIENT
Start: 2025-07-17 | End: 2025-07-24

## 2025-07-17 RX ORDER — ALBUTEROL SULFATE 90 UG/1
2 INHALANT RESPIRATORY (INHALATION) EVERY 6 HOURS PRN
Status: DISCONTINUED | OUTPATIENT
Start: 2025-07-17 | End: 2025-07-29 | Stop reason: HOSPADM

## 2025-07-17 RX ADMIN — HEPARIN SODIUM 5000 UNITS: 5000 INJECTION INTRAVENOUS; SUBCUTANEOUS at 00:41

## 2025-07-17 RX ADMIN — PANTOPRAZOLE SODIUM 40 MG: 40 TABLET, DELAYED RELEASE ORAL at 21:52

## 2025-07-17 RX ADMIN — OXYCODONE HYDROCHLORIDE 10 MG: 5 TABLET ORAL at 10:07

## 2025-07-17 RX ADMIN — HEPARIN SODIUM 5000 UNITS: 5000 INJECTION INTRAVENOUS; SUBCUTANEOUS at 08:04

## 2025-07-17 RX ADMIN — GABAPENTIN 600 MG: 300 CAPSULE ORAL at 08:04

## 2025-07-17 RX ADMIN — CARBOXYMETHYLCELLULOSE SODIUM 1 DROP: 5 SOLUTION/ DROPS OPHTHALMIC at 22:02

## 2025-07-17 RX ADMIN — ATORVASTATIN CALCIUM 40 MG: 40 TABLET, FILM COATED ORAL at 21:53

## 2025-07-17 RX ADMIN — ACETAMINOPHEN 650 MG: 325 TABLET, FILM COATED ORAL at 21:53

## 2025-07-17 RX ADMIN — ASPIRIN 81 MG: 81 TABLET, COATED ORAL at 08:04

## 2025-07-17 RX ADMIN — ACETAMINOPHEN 650 MG: 325 TABLET, FILM COATED ORAL at 15:49

## 2025-07-17 RX ADMIN — OXYCODONE HYDROCHLORIDE 10 MG: 5 TABLET ORAL at 00:40

## 2025-07-17 RX ADMIN — CYCLOBENZAPRINE 10 MG: 10 TABLET, FILM COATED ORAL at 21:53

## 2025-07-17 RX ADMIN — CYCLOBENZAPRINE 10 MG: 10 TABLET, FILM COATED ORAL at 15:49

## 2025-07-17 RX ADMIN — CYCLOBENZAPRINE 10 MG: 10 TABLET, FILM COATED ORAL at 08:04

## 2025-07-17 RX ADMIN — HEPARIN SODIUM 5000 UNITS: 5000 INJECTION INTRAVENOUS; SUBCUTANEOUS at 15:49

## 2025-07-17 RX ADMIN — TAMSULOSIN HYDROCHLORIDE 0.4 MG: 0.4 CAPSULE ORAL at 21:53

## 2025-07-17 RX ADMIN — GABAPENTIN 600 MG: 300 CAPSULE ORAL at 15:49

## 2025-07-17 RX ADMIN — OXYCODONE HYDROCHLORIDE 10 MG: 5 TABLET ORAL at 14:49

## 2025-07-17 RX ADMIN — ACETAMINOPHEN 650 MG: 325 TABLET, FILM COATED ORAL at 10:07

## 2025-07-17 RX ADMIN — OXYCODONE HYDROCHLORIDE 10 MG: 5 TABLET ORAL at 21:53

## 2025-07-17 RX ADMIN — HYDROMORPHONE HYDROCHLORIDE 0.2 MG: 1 INJECTION, SOLUTION INTRAMUSCULAR; INTRAVENOUS; SUBCUTANEOUS at 08:50

## 2025-07-17 RX ADMIN — GABAPENTIN 600 MG: 300 CAPSULE ORAL at 21:53

## 2025-07-17 RX ADMIN — ACETAMINOPHEN 650 MG: 325 TABLET, FILM COATED ORAL at 04:15

## 2025-07-17 RX ADMIN — PANTOPRAZOLE SODIUM 40 MG: 40 TABLET, DELAYED RELEASE ORAL at 08:04

## 2025-07-17 RX ADMIN — Medication 60 L/MIN: at 21:00

## 2025-07-17 RX ADMIN — HYDROMORPHONE HYDROCHLORIDE 0.2 MG: 1 INJECTION, SOLUTION INTRAMUSCULAR; INTRAVENOUS; SUBCUTANEOUS at 04:15

## 2025-07-17 RX ADMIN — Medication 60 L/MIN: at 17:22

## 2025-07-17 ASSESSMENT — PAIN SCALES - GENERAL
PAINLEVEL_OUTOF10: 4
PAINLEVEL_OUTOF10: 3
PAINLEVEL_OUTOF10: 8
PAINLEVEL_OUTOF10: 4
PAINLEVEL_OUTOF10: 8
PAINLEVEL_OUTOF10: 3
PAINLEVEL_OUTOF10: 4
PAINLEVEL_OUTOF10: 8
PAINLEVEL_OUTOF10: 5 - MODERATE PAIN

## 2025-07-17 ASSESSMENT — PAIN DESCRIPTION - DESCRIPTORS
DESCRIPTORS: ACHING

## 2025-07-17 ASSESSMENT — COGNITIVE AND FUNCTIONAL STATUS - GENERAL
CLIMB 3 TO 5 STEPS WITH RAILING: TOTAL
TURNING FROM BACK TO SIDE WHILE IN FLAT BAD: A LITTLE
STANDING UP FROM CHAIR USING ARMS: A LOT
MOVING FROM LYING ON BACK TO SITTING ON SIDE OF FLAT BED WITH BEDRAILS: A LITTLE
MOVING TO AND FROM BED TO CHAIR: A LOT
MOBILITY SCORE: 13
WALKING IN HOSPITAL ROOM: A LOT

## 2025-07-17 ASSESSMENT — PAIN - FUNCTIONAL ASSESSMENT
PAIN_FUNCTIONAL_ASSESSMENT: 0-10

## 2025-07-17 NOTE — PROGRESS NOTES
Physical Therapy    Physical Therapy Treatment    Patient Name: Jerman Colón Jr.  MRN: [id number]  Today's Date: 7/17/2025  Time Calculation  Start Time: 1028  Stop Time: 1053  Time Calculation (min): 25 min       Assessment/Plan   PT Assessment  Rehab Prognosis: Excellent  Barriers to Discharge Home: Physical needs, Caregiver assistance  Caregiver Assistance: Caregiver assistance needed per identified barriers - however, level of patient's required assistance exceeds assistance available at home  Physical Needs: High falls risk due to function or environment  Evaluation/Treatment Tolerance: Patient tolerated treatment well  End of Session Communication: Bedside nurse  End of Session Patient Position: Up in chair, Alarm on  PT Plan  Inpatient/Swing Bed or Outpatient: Inpatient  PT Plan  Treatment/Interventions: Bed mobility, Transfer training, Gait training, Balance training, Neuromuscular re-education, Strengthening, Endurance training, Therapeutic exercise, Therapeutic activity, Home exercise program, Postural re-education, Orthotic fitting/training  PT Plan: Ongoing PT  PT Frequency: Daily  PT Discharge Recommendations: High intensity level of continued care  Equipment Recommended upon Discharge:  (TBD)  PT Recommended Transfer Status: Assist x1, Assistive device  PT - OK to Discharge: Yes      General Visit Information:   PT  Visit  PT Received On: 07/17/25  Response to Previous Treatment: Patient with no complaints from previous session.  Reason for Referral: Pt p/w BLE radiculopathy, 7/14 s/p L3-S1 lami and facetectomies/extension/revision.  Past Medical History Relevant to Rehab: PONV, Vocal Cord Mass, HTN, HLD, CAD, COPD, Asthma BIANCA (no CPAP), GERD, BPH, hypogonadism, tubular adenoma of colon, proteinuria Polcythemia, Fusion of lumbar spine, kyphosis of cervical region, cervical myelopathy, Spinal cord compression due to degenerative disorder of spinal column  Prior to Session Communication: Bedside  nurse  Patient Position Received: Bed, 3 rail up, Alarm off, not on at start of session  Family/Caregiver Present: No  General Comment: Patient pleasant and agreeable to participate in therapy. Rehab aide assisted with session. L wrist/a-line site noted to be bleeding slightly with wrist flexion/extension. RN notified of this.     Subjective   Precautions:  Precautions  Hearing/Visual Limitations: WDK  Medical Precautions: Fall precautions  Post-Surgical Precautions: Spinal precautions  Precautions Comment: SBP <150    Vital Signs:   Date/Time Vitals Session Patient Position Pulse Resp SpO2 BP MAP (mmHg)    07/17/25 1028 Pre PT  Lying  92  19  99 %  142/78  97     07/17/25 1038 During PT  Sitting  105  25  100 %  133/78  92     07/17/25 1053 Post PT  Sitting  87  19  97 %  160/81  101        Objective   Pain:     Cognition:  Cognition  Overall Cognitive Status: Within Functional Limits  Arousal/Alertness: Appropriate responses to stimuli  Orientation Level: Oriented X4  Following Commands: Follows all commands and directions without difficulty  Cognition Comments: Pt pleasant and cooperative  Insight: Within function limits  Impulsive: Within functional limits  Processing Speed: Within funtional limits    Lines/Tubes/Drains:  Arterial Line 07/14/25 Left Radial (Active)   Number of days: 3       Closed/Suction Drain 1 Inferior;Midline Back Accordion 10 Fr. (Active)   Number of days: 2     Oxygen: Initially on Airvo 30% FiO2/50 L/min. RT cleared pt to transition to room air for ambulation    Postural Control:   Postural Control  Postural Control: Within Functional Limits  Head Control: WFL  Trunk Control: WFL  Righting Reactions: Intact  Protective Responses: Intact  Posture Comment: Rounded shoulders    Balance:   Static Sitting Balance  Static Sitting-Balance Support: Bilateral upper extremity supported, Feet supported  Static Sitting-Level of Assistance: Close supervision  Dynamic Sitting Balance  Dynamic  Sitting-Balance Support: Bilateral upper extremity supported, Feet supported  Dynamic Sitting-Level of Assistance: Close supervision  Dynamic Sitting-Balance: Forward lean    Static Standing Balance  Static Standing-Balance Support: Bilateral upper extremity supported  Static Standing-Level of Assistance: Contact guard  Dynamic Standing Balance  Dynamic Standing-Balance Support: Bilateral upper extremity supported  Dynamic Standing-Level of Assistance: Minimum assistance  Dynamic Standing-Balance: Turning    Coordination:  Coordination  Movements are Fluid and Coordinated: Yes  Upper Body Coordination: BUE tremor appreciated. Pt reports this is chronic.      PT Treatments:                 Bed Mobility  Bed Mobility: Yes  Bed Mobility 1  Bed Mobility 1: Supine to sitting  Level of Assistance 1: Contact guard  Bed Mobility Comments 1: Therapist managed lines. Cues for log roll technique.    Ambulation/Gait Training  Ambulation/Gait Training Performed: Yes  Ambulation/Gait Training 1  Surface 1: Level tile  Device 1: Rolling walker  Assistance 1: Minimum assistance  Quality of Gait 1: Narrow base of support, Diminished heel strike, Shuffling gait, Decreased step length, Soft knee(s), Antalgic, Forward flexed posture  Comments/Distance (ft) 1: 3 steps laterally to R, cues for pactive cycle of breathing  Ambulation/Gait Training 2  Surface 2: Level tile  Device 2:  (RUE arm in arm assist)  Assistance 2: Minimum assistance  Quality of Gait 2: Narrow base of support, Diminished heel strike, Shuffling gait, Decreased step length, Soft knee(s), Antalgic, Forward flexed posture  Comments/Distance (ft) 2: 10 ft fwd/bwd, cues for proper posture    Transfers  Transfer: Yes  Transfer 1  Transfer From 1: Bed to  Transfer to 1: Stand  Technique 1: Sit to stand, Stand to sit  Transfer Device 1: Walker  Transfer Level of Assistance 1: Minimum assistance  Trials/Comments 1: Cues for hand placement and controlling speed.  Transfers  2  Transfer From 2: Chair with arms to  Transfer to 2: Stand  Technique 2: Sit to stand, Stand to sit  Transfer Device 2:  (First with walker, then with RUE arm in arm assist)  Transfer Level of Assistance 2: Moderate assistance  Trials/Comments 2: Cues for hand placement and controlling speed. Pt performed 3x.    Stairs  Stairs: No              Outcome Measures:  [facility] Basic Mobility  Turning from your back to your side while in a flat bed without using bedrails: A little  Moving from lying on your back to sitting on the side of a flat bed without using bedrails: A little  Moving to and from bed to chair (including a wheelchair): A lot  Standing up from a chair using your arms (e.g. wheelchair or bedside chair): A lot  To walk in hospital room: A lot  Climbing 3-5 steps with railing: Total  Basic Mobility - Total Score: 13                   FSS-ICU  Ambulation: Unable to attempt due to weakness  Rolling: Supervision or set-up only  Sitting: Supervision or set-up only  Transfer Sit-to-Stand: Moderate assistance (performs 50 - 74% of task)  Transfer Supine-to-Sit: Minimal assistance (performs 75% or more of task)  Total Score: 17    ICU Mobility Screen  Early Mobility/Exercise Safety Screen: Proceed with mobilization - No exclusion criteria met  ICU Mobility Scale: Walking with assistance of 1 person                   Education:  Education Documentation  Precautions, taught by Stacey Lopez PT at 7/17/2025 11:08 AM.  Learner: Patient  Readiness: Acceptance  Method: Explanation  Response: Verbalizes Understanding  Comment: PT expectations, mobility precautions, d/c recs    Body Mechanics, taught by Stacey Lopez PT at 7/17/2025 11:08 AM.  Learner: Patient  Readiness: Acceptance  Method: Explanation  Response: Verbalizes Understanding  Comment: PT expectations, mobility precautions, d/c recs    Mobility Training, taught by Stacey Lopez PT at 7/17/2025 11:08 AM.  Learner: Patient  Readiness:  Acceptance  Method: Explanation  Response: Verbalizes Understanding  Comment: PT expectations, mobility precautions, d/c recs    Education Comments  No comments found.             Encounter Problems       Encounter Problems (Active)       PT Problem       Patient will perform bed mobility with </= MIN A x1 to reduce risk of developing decubitus ulcers.  (Progressing)       Start:  07/15/25    Expected End:  07/29/25            Patient will perform sit to stand and stand to sit transfers with </= MIN A x1 and LRD to increase functional strength.  (Progressing)       Start:  07/15/25    Expected End:  07/29/25            Patient will ambulate at least 15 ft. with </= MOD A x1 and LRD to improve tolerance of household distances.  (Progressing)       Start:  07/15/25    Expected End:  07/29/25            BLE 5/5 (Progressing)       Start:  07/15/25    Expected End:  07/29/25            Patient will stand with UE support of LRD and </= CGA at least 2 min to improve balance required for self-care tasks.  (Progressing)       Start:  07/15/25    Expected End:  07/29/25 07/17/25 at 11:09 AM   Stacey Lopez, PT   Rehab Office: [phone]

## 2025-07-17 NOTE — CARE PLAN
Problem: Pain - Adult  Goal: Verbalizes/displays adequate comfort level or baseline comfort level  Outcome: Progressing     Problem: Safety - Adult  Goal: Free from fall injury  Outcome: Progressing     Problem: Discharge Planning  Goal: Discharge to home or other facility with appropriate resources  Outcome: Progressing     Problem: Chronic Conditions and Co-morbidities  Goal: Patient's chronic conditions and co-morbidity symptoms are monitored and maintained or improved  Outcome: Progressing     Problem: Nutrition  Goal: Nutrient intake appropriate for maintaining nutritional needs  Outcome: Progressing     Problem: Skin  Goal: Decreased wound size/increased tissue granulation at next dressing change  Outcome: Progressing  Goal: Participates in plan/prevention/treatment measures  Outcome: Progressing  Goal: Promote skin healing  Outcome: Progressing   The patient's goals for the shift include  pain control    The clinical goals for the shift include  the patient will have a reduction in pain score with interventions.

## 2025-07-17 NOTE — CARE PLAN
Problem: Pain - Adult  Goal: Verbalizes/displays adequate comfort level or baseline comfort level  Outcome: Progressing     Problem: Safety - Adult  Goal: Free from fall injury  Outcome: Progressing     Problem: Discharge Planning  Goal: Discharge to home or other facility with appropriate resources  Outcome: Progressing     Problem: Chronic Conditions and Co-morbidities  Goal: Patient's chronic conditions and co-morbidity symptoms are monitored and maintained or improved  Outcome: Progressing     Problem: Nutrition  Goal: Nutrient intake appropriate for maintaining nutritional needs  Outcome: Progressing     Problem: Skin  Goal: Decreased wound size/increased tissue granulation at next dressing change  Outcome: Progressing  Goal: Participates in plan/prevention/treatment measures  Outcome: Progressing  Goal: Prevent/manage excess moisture  Outcome: Progressing  Goal: Prevent/minimize sheer/friction injuries  Outcome: Progressing  Goal: Promote/optimize nutrition  Outcome: Progressing  Goal: Promote skin healing  Outcome: Progressing

## 2025-07-17 NOTE — PROGRESS NOTES
"Jerman Colón Jr. is a 64 y.o. male on day 3 of admission presenting with Lumbar radicular pain.    Subjective   No acute events overnight. CPAP overnight.       Objective     Physical Exam  No acute distress  A&Ox3  OU3R, EOMI   Face symmetric, Facial SILT   Tongue midline and symmetric  Shoulder shrugs symmetric  Hearing intact to finger rubs bilaterally  RUE D5 / B5 / T5 / HG 5/ IO 5  LUE D5 / B5 / T5 / HG 5/ IO 5  RLE HF5 / KE 5/ DF 5/ PF 5  LLE HF5 / KE 5/ DF 5/ PF 5  Negative barron  No clonus  SILT        Last Recorded Vitals  Blood pressure 136/73, pulse 87, temperature 36 °C (96.8 °F), temperature source Temporal, resp. rate 12, height 1.854 m (6' 1\"), weight 135 kg (296 lb 15.4 oz), SpO2 100%.  Intake/Output last 3 Shifts:  I/O last 3 completed shifts:  In: 5597.6 (41.6 mL/kg) [P.O.:2780; I.V.:2467.6 (18.3 mL/kg); Blood:350]  Out: 4135 (30.7 mL/kg) [Urine:3435 (0.7 mL/kg/hr); Drains:700]  Weight: 134.7 kg     Relevant Results  Results for orders placed or performed during the hospital encounter of 07/14/25 (from the past 24 hours)   POCT GLUCOSE   Result Value Ref Range    POCT Glucose 127 (H) 74 - 99 mg/dL   POCT GLUCOSE   Result Value Ref Range    POCT Glucose 121 (H) 74 - 99 mg/dL   POCT GLUCOSE   Result Value Ref Range    POCT Glucose 134 (H) 74 - 99 mg/dL   CBC   Result Value Ref Range    WBC 8.0 4.4 - 11.3 x10*3/uL    nRBC 0.0 0.0 - 0.0 /100 WBCs    RBC 4.04 (L) 4.50 - 5.90 x10*6/uL    Hemoglobin 8.8 (L) 13.5 - 17.5 g/dL    Hematocrit 29.5 (L) 41.0 - 52.0 %    MCV 73 (L) 80 - 100 fL    MCH 21.8 (L) 26.0 - 34.0 pg    MCHC 29.8 (L) 32.0 - 36.0 g/dL    RDW 19.3 (H) 11.5 - 14.5 %    Platelets 212 150 - 450 x10*3/uL   Renal function panel   Result Value Ref Range    Glucose 138 (H) 74 - 99 mg/dL    Sodium 142 136 - 145 mmol/L    Potassium 4.5 3.5 - 5.3 mmol/L    Chloride 105 98 - 107 mmol/L    Bicarbonate 29 21 - 32 mmol/L    Anion Gap 13 10 - 20 mmol/L    Urea Nitrogen 21 6 - 23 mg/dL    " Creatinine 0.90 0.50 - 1.30 mg/dL    eGFR >90 >60 mL/min/1.73m*2    Calcium 8.2 (L) 8.6 - 10.6 mg/dL    Phosphorus 3.4 2.5 - 4.9 mg/dL    Albumin 3.2 (L) 3.4 - 5.0 g/dL      ECG 12 lead  Result Date: 7/16/2025  Sinus rhythm with occasional Premature ventricular complexes Otherwise normal ECG When compared with ECG of 15-JUL-2025 16:10, No significant change was found Confirmed by Gallo Lucas ([id number]) on 7/16/2025 9:41:08 AM    ECG 12 lead  Result Date: 7/15/2025  Normal sinus rhythm Nonspecific T wave abnormality Abnormal ECG When compared with ECG of 15-JUL-2025 11:29, Premature ventricular complexes are no longer Present Confirmed by Gallo Lucas ([id number]) on 7/15/2025 3:44:48 PM        Assessment & Plan  Lumbar radicular pain    Lumbar foraminal stenosis    Lumbar radiculopathy, chronic    65yo male w h/o prior L5-S1 fusion, hyperlipidemia, hypertension, CAD, sleep apnea (central/obstructive), asthma, GERD, CKD, BPH, anemia, 1/20/25 C4-6 ACDF/C2-T4 PCDF p/w BLE radiculopathy, 7/14 s/p L3-S1 lami and facetectomies/extension/revision (), 7/15 hgb 7.1 s/p 1u pRBC, post Tx cbc 7.5, s/p 1u pRBC.     Plan:   NURA  Maintain drain  Restart BP meds when able  Wean airvo, continue CPAP at night  PTOT - rehab    Luci Davidson MD

## 2025-07-18 ENCOUNTER — APPOINTMENT (OUTPATIENT)
Dept: CARDIOLOGY | Facility: HOSPITAL | Age: 65
DRG: 447 | End: 2025-07-18
Payer: MEDICARE

## 2025-07-18 ENCOUNTER — APPOINTMENT (OUTPATIENT)
Dept: RADIOLOGY | Facility: HOSPITAL | Age: 65
End: 2025-07-18
Payer: MEDICARE

## 2025-07-18 LAB
ALBUMIN SERPL BCP-MCNC: 3.1 G/DL (ref 3.4–5)
ANION GAP BLDA CALCULATED.4IONS-SCNC: 5 MMO/L (ref 10–25)
ANION GAP SERPL CALC-SCNC: 14 MMOL/L
APPEARANCE UR: ABNORMAL
BACTERIA #/AREA URNS AUTO: ABNORMAL /HPF
BASE EXCESS BLDA CALC-SCNC: 7.7 MMOL/L (ref -2–3)
BASOPHILS # BLD AUTO: 0.03 X10*3/UL (ref 0–0.1)
BASOPHILS NFR BLD AUTO: 0.3 %
BILIRUB UR STRIP.AUTO-MCNC: NEGATIVE MG/DL
BODY TEMPERATURE: ABNORMAL
BUN SERPL-MCNC: 13 MG/DL (ref 6–23)
CA-I BLDA-SCNC: 1.14 MMOL/L (ref 1.1–1.33)
CALCIUM SERPL-MCNC: 8.4 MG/DL (ref 8.6–10.6)
CHLORIDE BLDA-SCNC: 102 MMOL/L (ref 98–107)
CHLORIDE SERPL-SCNC: 103 MMOL/L (ref 98–107)
CO2 SERPL-SCNC: 29 MMOL/L (ref 21–32)
COLOR UR: YELLOW
CREAT SERPL-MCNC: 0.77 MG/DL (ref 0.5–1.3)
EGFRCR SERPLBLD CKD-EPI 2021: >90 ML/MIN/1.73M*2
EOSINOPHIL # BLD AUTO: 0.04 X10*3/UL (ref 0–0.7)
EOSINOPHIL NFR BLD AUTO: 0.5 %
ERYTHROCYTE [DISTWIDTH] IN BLOOD BY AUTOMATED COUNT: 19.9 % (ref 11.5–14.5)
ERYTHROCYTE [DISTWIDTH] IN BLOOD BY AUTOMATED COUNT: 20 % (ref 11.5–14.5)
GLUCOSE BLD MANUAL STRIP-MCNC: 102 MG/DL (ref 74–99)
GLUCOSE BLD MANUAL STRIP-MCNC: 131 MG/DL (ref 74–99)
GLUCOSE BLD MANUAL STRIP-MCNC: 148 MG/DL (ref 74–99)
GLUCOSE BLD MANUAL STRIP-MCNC: 172 MG/DL (ref 74–99)
GLUCOSE BLDA-MCNC: 135 MG/DL (ref 74–99)
GLUCOSE SERPL-MCNC: 95 MG/DL (ref 74–99)
GLUCOSE UR STRIP.AUTO-MCNC: NORMAL MG/DL
HCO3 BLDA-SCNC: 30.9 MMOL/L (ref 22–26)
HCT VFR BLD AUTO: 31.5 % (ref 41–52)
HCT VFR BLD AUTO: 31.8 % (ref 41–52)
HCT VFR BLD EST: 26 % (ref 41–52)
HGB BLD-MCNC: 9.1 G/DL (ref 13.5–17.5)
HGB BLD-MCNC: 9.4 G/DL (ref 13.5–17.5)
HGB BLDA-MCNC: 8.7 G/DL (ref 13.5–17.5)
IMM GRANULOCYTES # BLD AUTO: 0.05 X10*3/UL (ref 0–0.7)
IMM GRANULOCYTES NFR BLD AUTO: 0.6 % (ref 0–0.9)
INHALED O2 CONCENTRATION: 30 %
KETONES UR STRIP.AUTO-MCNC: NEGATIVE MG/DL
LACTATE BLDA-SCNC: 1.4 MMOL/L (ref 0.4–2)
LACTATE SERPL-SCNC: 1.8 MMOL/L (ref 0.4–2)
LEUKOCYTE ESTERASE UR QL STRIP.AUTO: ABNORMAL
LYMPHOCYTES # BLD AUTO: 0.44 X10*3/UL (ref 1.2–4.8)
LYMPHOCYTES NFR BLD AUTO: 5 %
MCH RBC QN AUTO: 20.5 PG (ref 26–34)
MCH RBC QN AUTO: 21.6 PG (ref 26–34)
MCHC RBC AUTO-ENTMCNC: 28.6 G/DL (ref 32–36)
MCHC RBC AUTO-ENTMCNC: 29.8 G/DL (ref 32–36)
MCV RBC AUTO: 72 FL (ref 80–100)
MCV RBC AUTO: 72 FL (ref 80–100)
MONOCYTES # BLD AUTO: 0.5 X10*3/UL (ref 0.1–1)
MONOCYTES NFR BLD AUTO: 5.7 %
MUCOUS THREADS #/AREA URNS AUTO: ABNORMAL /LPF
NEUTROPHILS # BLD AUTO: 7.77 X10*3/UL (ref 1.2–7.7)
NEUTROPHILS NFR BLD AUTO: 87.9 %
NITRITE UR QL STRIP.AUTO: NEGATIVE
NRBC BLD-RTO: 0.5 /100 WBCS (ref 0–0)
NRBC BLD-RTO: 0.8 /100 WBCS (ref 0–0)
OXYHGB MFR BLDA: 96.8 % (ref 94–98)
PCO2 BLDA: 37 MM HG (ref 38–42)
PH BLDA: 7.53 PH (ref 7.38–7.42)
PH UR STRIP.AUTO: 6 [PH]
PHOSPHATE SERPL-MCNC: 3.3 MG/DL (ref 2.5–4.9)
PLATELET # BLD AUTO: 210 X10*3/UL (ref 150–450)
PLATELET # BLD AUTO: 226 X10*3/UL (ref 150–450)
PO2 BLDA: 87 MM HG (ref 85–95)
POTASSIUM BLDA-SCNC: 4 MMOL/L (ref 3.5–5.3)
POTASSIUM SERPL-SCNC: 4.4 MMOL/L (ref 3.5–5.3)
PROT UR STRIP.AUTO-MCNC: ABNORMAL MG/DL
RBC # BLD AUTO: 4.35 X10*6/UL (ref 4.5–5.9)
RBC # BLD AUTO: 4.43 X10*6/UL (ref 4.5–5.9)
RBC # UR STRIP.AUTO: ABNORMAL MG/DL
RBC #/AREA URNS AUTO: >20 /HPF
SAO2 % BLDA: 98 % (ref 94–100)
SODIUM BLDA-SCNC: 134 MMOL/L (ref 136–145)
SODIUM SERPL-SCNC: 142 MMOL/L (ref 136–145)
SP GR UR STRIP.AUTO: 1.03
SPECIMEN DRAWN FROM PATIENT: ABNORMAL
UROBILINOGEN UR STRIP.AUTO-MCNC: NORMAL MG/DL
WBC # BLD AUTO: 6.4 X10*3/UL (ref 4.4–11.3)
WBC # BLD AUTO: 8.8 X10*3/UL (ref 4.4–11.3)
WBC #/AREA URNS AUTO: >50 /HPF

## 2025-07-18 PROCEDURE — 97530 THERAPEUTIC ACTIVITIES: CPT | Mod: GP

## 2025-07-18 PROCEDURE — 82810 BLOOD GASES O2 SAT ONLY: CPT

## 2025-07-18 PROCEDURE — 71045 X-RAY EXAM CHEST 1 VIEW: CPT

## 2025-07-18 PROCEDURE — 85027 COMPLETE CBC AUTOMATED: CPT

## 2025-07-18 PROCEDURE — 2500000005 HC RX 250 GENERAL PHARMACY W/O HCPCS

## 2025-07-18 PROCEDURE — 84132 ASSAY OF SERUM POTASSIUM: CPT

## 2025-07-18 PROCEDURE — 87077 CULTURE AEROBIC IDENTIFY: CPT | Performed by: NURSE PRACTITIONER

## 2025-07-18 PROCEDURE — 2500000002 HC RX 250 W HCPCS SELF ADMINISTERED DRUGS (ALT 637 FOR MEDICARE OP, ALT 636 FOR OP/ED)

## 2025-07-18 PROCEDURE — 2500000002 HC RX 250 W HCPCS SELF ADMINISTERED DRUGS (ALT 637 FOR MEDICARE OP, ALT 636 FOR OP/ED): Performed by: NURSE PRACTITIONER

## 2025-07-18 PROCEDURE — 36415 COLL VENOUS BLD VENIPUNCTURE: CPT | Performed by: NURSE PRACTITIONER

## 2025-07-18 PROCEDURE — 2500000004 HC RX 250 GENERAL PHARMACY W/ HCPCS (ALT 636 FOR OP/ED): Performed by: STUDENT IN AN ORGANIZED HEALTH CARE EDUCATION/TRAINING PROGRAM

## 2025-07-18 PROCEDURE — 97530 THERAPEUTIC ACTIVITIES: CPT | Mod: GO

## 2025-07-18 PROCEDURE — 94660 CPAP INITIATION&MGMT: CPT

## 2025-07-18 PROCEDURE — 2060000001 HC INTERMEDIATE ICU ROOM DAILY

## 2025-07-18 PROCEDURE — 87040 BLOOD CULTURE FOR BACTERIA: CPT | Performed by: NURSE PRACTITIONER

## 2025-07-18 PROCEDURE — 81001 URINALYSIS AUTO W/SCOPE: CPT | Performed by: NURSE PRACTITIONER

## 2025-07-18 PROCEDURE — 71045 X-RAY EXAM CHEST 1 VIEW: CPT | Performed by: RADIOLOGY

## 2025-07-18 PROCEDURE — 83605 ASSAY OF LACTIC ACID: CPT | Performed by: NURSE PRACTITIONER

## 2025-07-18 PROCEDURE — 82947 ASSAY GLUCOSE BLOOD QUANT: CPT

## 2025-07-18 PROCEDURE — 97116 GAIT TRAINING THERAPY: CPT | Mod: GP

## 2025-07-18 PROCEDURE — 36415 COLL VENOUS BLD VENIPUNCTURE: CPT

## 2025-07-18 PROCEDURE — 93005 ELECTROCARDIOGRAM TRACING: CPT

## 2025-07-18 PROCEDURE — 80069 RENAL FUNCTION PANEL: CPT

## 2025-07-18 PROCEDURE — 2500000002 HC RX 250 W HCPCS SELF ADMINISTERED DRUGS (ALT 637 FOR MEDICARE OP, ALT 636 FOR OP/ED): Performed by: REGISTERED NURSE

## 2025-07-18 PROCEDURE — 85025 COMPLETE CBC W/AUTO DIFF WBC: CPT | Performed by: NURSE PRACTITIONER

## 2025-07-18 PROCEDURE — 2500000004 HC RX 250 GENERAL PHARMACY W/ HCPCS (ALT 636 FOR OP/ED)

## 2025-07-18 PROCEDURE — 2500000001 HC RX 250 WO HCPCS SELF ADMINISTERED DRUGS (ALT 637 FOR MEDICARE OP): Performed by: STUDENT IN AN ORGANIZED HEALTH CARE EDUCATION/TRAINING PROGRAM

## 2025-07-18 PROCEDURE — 93010 ELECTROCARDIOGRAM REPORT: CPT | Performed by: INTERNAL MEDICINE

## 2025-07-18 RX ORDER — NITROFURANTOIN 25; 75 MG/1; MG/1
100 CAPSULE ORAL EVERY 12 HOURS SCHEDULED
Status: DISCONTINUED | OUTPATIENT
Start: 2025-07-18 | End: 2025-07-20

## 2025-07-18 RX ADMIN — CYCLOBENZAPRINE 10 MG: 10 TABLET, FILM COATED ORAL at 08:43

## 2025-07-18 RX ADMIN — HYDROMORPHONE HYDROCHLORIDE 0.2 MG: 1 INJECTION, SOLUTION INTRAMUSCULAR; INTRAVENOUS; SUBCUTANEOUS at 11:36

## 2025-07-18 RX ADMIN — POLYETHYLENE GLYCOL 3350 17 G: 17 POWDER, FOR SOLUTION ORAL at 20:34

## 2025-07-18 RX ADMIN — PANTOPRAZOLE SODIUM 40 MG: 40 TABLET, DELAYED RELEASE ORAL at 08:43

## 2025-07-18 RX ADMIN — ACETAMINOPHEN 650 MG: 325 TABLET, FILM COATED ORAL at 15:35

## 2025-07-18 RX ADMIN — ASPIRIN 81 MG: 81 TABLET, COATED ORAL at 08:00

## 2025-07-18 RX ADMIN — NITROFURANTOIN MONOHYDRATE/MACROCRYSTALLINE 100 MG: 25; 75 CAPSULE ORAL at 15:38

## 2025-07-18 RX ADMIN — ACETAMINOPHEN 650 MG: 325 TABLET, FILM COATED ORAL at 08:43

## 2025-07-18 RX ADMIN — ATORVASTATIN CALCIUM 40 MG: 40 TABLET, FILM COATED ORAL at 20:35

## 2025-07-18 RX ADMIN — GABAPENTIN 600 MG: 300 CAPSULE ORAL at 14:03

## 2025-07-18 RX ADMIN — ACETAMINOPHEN 650 MG: 325 TABLET, FILM COATED ORAL at 03:50

## 2025-07-18 RX ADMIN — HEPARIN SODIUM 5000 UNITS: 5000 INJECTION INTRAVENOUS; SUBCUTANEOUS at 15:38

## 2025-07-18 RX ADMIN — SENNOSIDES AND DOCUSATE SODIUM 2 TABLET: 50; 8.6 TABLET ORAL at 20:35

## 2025-07-18 RX ADMIN — CARBOXYMETHYLCELLULOSE SODIUM 1 DROP: 5 SOLUTION/ DROPS OPHTHALMIC at 20:35

## 2025-07-18 RX ADMIN — HYDROMORPHONE HYDROCHLORIDE 0.2 MG: 1 INJECTION, SOLUTION INTRAMUSCULAR; INTRAVENOUS; SUBCUTANEOUS at 00:16

## 2025-07-18 RX ADMIN — NITROFURANTOIN MONOHYDRATE/MACROCRYSTALLINE 100 MG: 25; 75 CAPSULE ORAL at 20:34

## 2025-07-18 RX ADMIN — ACETAMINOPHEN 650 MG: 325 TABLET, FILM COATED ORAL at 20:34

## 2025-07-18 RX ADMIN — CYCLOBENZAPRINE 10 MG: 10 TABLET, FILM COATED ORAL at 14:03

## 2025-07-18 RX ADMIN — CYCLOBENZAPRINE 10 MG: 10 TABLET, FILM COATED ORAL at 20:34

## 2025-07-18 RX ADMIN — HEPARIN SODIUM 5000 UNITS: 5000 INJECTION INTRAVENOUS; SUBCUTANEOUS at 00:16

## 2025-07-18 RX ADMIN — KETOROLAC TROMETHAMINE 30 MG: 30 INJECTION, SOLUTION INTRAMUSCULAR; INTRAVENOUS at 20:35

## 2025-07-18 RX ADMIN — HEPARIN SODIUM 5000 UNITS: 5000 INJECTION INTRAVENOUS; SUBCUTANEOUS at 08:43

## 2025-07-18 RX ADMIN — GABAPENTIN 600 MG: 300 CAPSULE ORAL at 08:43

## 2025-07-18 RX ADMIN — GABAPENTIN 600 MG: 300 CAPSULE ORAL at 20:35

## 2025-07-18 RX ADMIN — SODIUM CHLORIDE 500 ML: 0.9 INJECTION, SOLUTION INTRAVENOUS at 13:20

## 2025-07-18 RX ADMIN — PANTOPRAZOLE SODIUM 40 MG: 40 TABLET, DELAYED RELEASE ORAL at 20:34

## 2025-07-18 RX ADMIN — INSULIN LISPRO 1 UNITS: 100 INJECTION, SOLUTION INTRAVENOUS; SUBCUTANEOUS at 20:48

## 2025-07-18 RX ADMIN — TAMSULOSIN HYDROCHLORIDE 0.4 MG: 0.4 CAPSULE ORAL at 20:34

## 2025-07-18 RX ADMIN — OXYCODONE HYDROCHLORIDE 10 MG: 5 TABLET ORAL at 02:08

## 2025-07-18 ASSESSMENT — PAIN SCALES - GENERAL
PAINLEVEL_OUTOF10: 7
PAINLEVEL_OUTOF10: 2
PAINLEVEL_OUTOF10: 7
PAINLEVEL_OUTOF10: 6
PAINLEVEL_OUTOF10: 3

## 2025-07-18 ASSESSMENT — PAIN DESCRIPTION - DESCRIPTORS
DESCRIPTORS: ACHING
DESCRIPTORS: SORE;TIGHTNESS

## 2025-07-18 ASSESSMENT — PAIN - FUNCTIONAL ASSESSMENT
PAIN_FUNCTIONAL_ASSESSMENT: 0-10

## 2025-07-18 ASSESSMENT — COGNITIVE AND FUNCTIONAL STATUS - GENERAL
MOVING FROM LYING ON BACK TO SITTING ON SIDE OF FLAT BED WITH BEDRAILS: A LITTLE
STANDING UP FROM CHAIR USING ARMS: A LITTLE
EATING MEALS: A LITTLE
DRESSING REGULAR LOWER BODY CLOTHING: A LOT
PERSONAL GROOMING: A LITTLE
WALKING IN HOSPITAL ROOM: A LITTLE
DRESSING REGULAR UPPER BODY CLOTHING: A LITTLE
TURNING FROM BACK TO SIDE WHILE IN FLAT BAD: A LITTLE
MOVING TO AND FROM BED TO CHAIR: A LITTLE
MOBILITY SCORE: 16
HELP NEEDED FOR BATHING: A LOT
CLIMB 3 TO 5 STEPS WITH RAILING: TOTAL
TOILETING: A LOT
DAILY ACTIVITIY SCORE: 15

## 2025-07-18 ASSESSMENT — PAIN DESCRIPTION - LOCATION: LOCATION: BACK

## 2025-07-18 ASSESSMENT — PAIN DESCRIPTION - ORIENTATION: ORIENTATION: MID;RIGHT

## 2025-07-18 NOTE — PROGRESS NOTES
07/18/25 0940   Discharge Planning   Home or Post Acute Services Post acute facilities (Rehab/SNF/etc)   Type of Post Acute Facility Services Rehab   Expected Discharge Disposition Rehab   Patient Choice   Provider Choice list and CMS website (https://medicare.gov/care-compare#search) for post-acute Quality and Resource Measure Data were provided and reviewed with: Patient     Social Work Discharge Planning note:    -Plan per medical team: Pt is not medically ready for discharge.  NURA Status.  Still on Airvo daytime, CPAP HS.   -Payer: [employer]  -Status: Inpatient  -Discharge disposition: Pt continues to be with PT and OT recommendations for High intensity. [facility] is willing to accept and will follow. Pt is still hoping to return home with home care, but will be agreeable with [facility] if he cannot do stairs by the time he is medically ready for discharge. SW will continue to follow to assist with discharge planning.    -Potential Barriers: none  -Anticipated Date of Discharge: 7/21/25    This VINCE Coleman, LSW    Office: [phone]  Secure chat via [employer]

## 2025-07-18 NOTE — PROGRESS NOTES
Occupational Therapy    Occupational Therapy Treatment    Name: Jerman Colón Jr.  MRN: [id number]  : 1960  Date: 25  Room:       Time Calculation  Start Time: 1529  Stop Time: 1538  Time Calculation (min): 9 min    Assessment:  Evaluation/Treatment Tolerance: Patient tolerated treatment well  End of Session Communication: Bedside nurse  End of Session Patient Position: Bed, 3 rail up, Alarm off, not on at start of session  Plan:  Treatment Interventions: ADL retraining, Functional transfer training, UE strengthening/ROM, Endurance training, Patient/family training, Equipment evaluation/education, Neuromuscular reeducation, Fine motor coordination activities, Compensatory technique education  OT Frequency: 5 times per week  OT Discharge Recommendations: High intensity level of continued care  Equipment Recommended upon Discharge:  (TBD)  OT Recommended Transfer Status: Maximum assist, Assist of 2  OT - OK to Discharge: Yes    Subjective   General:  OT Last Visit  OT Received On: 25  Prior to Session Communication: Bedside nurse  Patient Position Received: Up in chair, Alarm on  Family/Caregiver Present: No  General Comment: Pt pleasant and agreeable to therapy. Assisted pt back to bed. Pt c/o cold with shivering once returned to bed. Reported feeling tired from sitting up in chair for several hours.   Precautions:  Hearing/Visual Limitations: WFL  Medical Precautions: Fall precautions  Post-Surgical Precautions: Spinal precautions  Precautions Comment: SBP <180  Vitals:   Date/Time Vitals Session Patient Position Pulse Resp SpO2 BP MAP (mmHg)    25 1400 --  --  103  22  100 %  122/58  73     25 1500 --  --  109  22  100 %  125/65  79      Lines/Tubes/Drains:  Closed/Suction Drain 1 Inferior;Midline Back Accordion 10 Fr. (Active)   Number of days: 4       Cognition:  Overall Cognitive Status: Within Functional Limits  Arousal/Alertness: Appropriate responses to  stimuli  Orientation Level: Oriented X4  Following Commands: Follows multistep commands with repetition  Insight: Within function limits  Impulsive: Within functional limits  Processing Speed: Within funtional limits    Pain Assessment:  Pain Assessment  Pain Assessment: 0-10  0-10 (Numeric) Pain Score:  (no c/o pain)     Objective          Functional Standing Tolerance:  Functional Mobility  Functional Mobility Performed: Yes  Functional Mobility 1  Comments 1: Pt completed side steps at EOB wtih Min A x 1 at FWW with cues for body posture and walker management/safety.  Bed Mobility/Transfers:   Bed Mobility  Bed Mobility: Yes  Bed Mobility 1  Bed Mobility 1: Sitting to supine  Level of Assistance 1: Moderate assistance (x 1 assist)  Bed Mobility Comments 1: HOB flat, cues for sequencing steps for log roll technique, cooridnating breathing, and assist with B LE into bed.  Transfers  Transfer: Yes  Transfer 1  Transfer From 1: Chair with arms to  Transfer to 1: Bed  Technique 1: Sit to stand, Stand to sit (side steps)  Transfer Device 1: Walker  Transfer Level of Assistance 1: Moderate assistance (x 1 assist)  Trials/Comments 1: cues for proper hand placement  Transfers 2  Technique 2: Sit to stand, Stand to sit  Transfer Device 2: Walker  Transfer Level of Assistance 2: Moderate assistance (x 1 assist)  Trials/Comments 2: cues for proper hand placement and sequencing                Balance:  Dynamic Sitting Balance  Dynamic Sitting-Level of Assistance: Contact guard  Dynamic Standing Balance  Dynamic Standing-Level of Assistance: Minimum assistance  Static Sitting Balance  Static Sitting-Level of Assistance: Contact guard  Static Standing Balance  Static Standing-Level of Assistance: Minimum assistance      Outcome Measures:  [facility] Daily Activity  Putting on and taking off regular lower body clothing: A lot  Bathing (including washing, rinsing, drying): A lot  Putting on and taking off regular upper body clothing:  A little  Toileting, which includes using toilet, bedpan or urinal: A lot  Taking care of personal grooming such as brushing teeth: A little  Eating Meals: A little  Daily Activity - Total Score: 15  ICU Mobility Screen  Early Mobility/Exercise Safety Screen: Proceed with mobilization - No exclusion criteria met  ICU Mobility Scale: Marching on spot (at bedside)     Education Documentation  Handouts, taught by Sherry Lombardi OT at 7/18/2025  3:50 PM.  Learner: Patient  Readiness: Acceptance  Method: Explanation, Demonstration  Response: Verbalizes Understanding  Comment: OT POC, SPINE PRECAUTIONS, LOG ROLL    Body Mechanics, taught by Sherry Lombardi OT at 7/18/2025  3:50 PM.  Learner: Patient  Readiness: Acceptance  Method: Explanation, Demonstration  Response: Verbalizes Understanding  Comment: OT POC, SPINE PRECAUTIONS, LOG ROLL    Precautions, taught by Sherry Lombardi OT at 7/18/2025  3:50 PM.  Learner: Patient  Readiness: Acceptance  Method: Explanation, Demonstration  Response: Verbalizes Understanding  Comment: OT POC, SPINE PRECAUTIONS, LOG ROLL    ADL Training, taught by Sherry Lombardi OT at 7/18/2025  3:50 PM.  Learner: Patient  Readiness: Acceptance  Method: Explanation, Demonstration  Response: Verbalizes Understanding  Comment: OT POC, SPINE PRECAUTIONS, LOG ROLL    Education Comments  No comments found.        Goals:  Encounter Problems       Encounter Problems (Active)       ADLs       Patient will perform UB and LB bathing with minimal assist  level of assistance and PRN adaptive equipment. (Progressing)       Start:  07/15/25    Expected End:  07/29/25            Patient with complete upper body dressing with SBA donning and doffing all UE clothes while edge of bed  (Progressing)       Start:  07/15/25    Expected End:  07/29/25            Patient with complete lower body dressing with minimal assist  level of assistance donning and doffing all LE clothes  with PRN adaptive equipment  while edge of bed  (Progressing)       Start:  07/15/25    Expected End:  07/29/25            Patient will complete daily grooming tasks with SBA and PRN adaptive equipment while edge of bed . (Progressing)       Start:  07/15/25    Expected End:  07/29/25            Patient will complete toileting including hygiene clothing management/hygiene with minimal assist  level of assistance and LRAD. (Progressing)       Start:  07/15/25    Expected End:  07/29/25               ADLs       Patient will feed self with modified independent level of assistance using PRN adaptive equipment without spillage (Progressing)       Start:  07/15/25    Expected End:  07/29/25               BALANCE       Pt will maintain dynamic sitting balance during ADL task with independent level of assistance in order to demonstrate decreased risk of falling and improved postural control. (Progressing)       Start:  07/15/25    Expected End:  07/29/25            Patientt will maintain static/dynamic standing balance > 10 minutes during ADL task with minimum level of assistance in order to demonstrate decreased risk of falling and improved postural control. (Progressing)       Start:  07/15/25    Expected End:  07/29/25               COGNITION/SAFETY       Patient will recall and adhere to spinal precautions during all functional mobility/ADL tasks in order to demonstrate improved understanding and promote healing post op (Progressing)       Start:  07/15/25    Expected End:  07/29/25               EXERCISE/STRENGTHENING       Patient will demo BUE coordination WFL during functional task without an increase in time to improve task performance.  (Progressing)       Start:  07/15/25    Expected End:  07/29/25               EXERCISE/STRENGTHENING       Patient with increase BUE to 5/5 strength and ROM WFL (Progressing)       Start:  07/15/25    Expected End:  07/29/25               TRANSFERS       Patient will perform bed mobility contact guard assist  level of assistance and bed rails in order to improve safety and independence with mobility (Progressing)       Start:  07/15/25    Expected End:  07/29/25            Patient will complete functional transfers with least restrictive device with minimal assist  level of assistance. (Progressing)       Start:  07/15/25    Expected End:  07/29/25 07/18/25 at 3:51 PM   Sherry Lombardi, OT   [phone]

## 2025-07-18 NOTE — PROGRESS NOTES
Physical Therapy    Physical Therapy Treatment    Patient Name: Jerman Colón Jr.  MRN: [id number]  Today's Date: 7/18/2025  Time Calculation  Start Time: 1236  Stop Time: 1300  Time Calculation (min): 24 min       Assessment/Plan   PT Assessment  Rehab Prognosis: Excellent  Barriers to Discharge Home: Physical needs, Caregiver assistance  Caregiver Assistance: Caregiver assistance needed per identified barriers - however, level of patient's required assistance exceeds assistance available at home  Physical Needs: High falls risk due to function or environment  Evaluation/Treatment Tolerance: Patient tolerated treatment well  End of Session Communication: Bedside nurse  End of Session Patient Position: Up in chair, Alarm on  PT Plan  Inpatient/Swing Bed or Outpatient: Inpatient  PT Plan  Treatment/Interventions: Bed mobility, Transfer training, Gait training, Balance training, Neuromuscular re-education, Strengthening, Endurance training, Therapeutic exercise, Therapeutic activity, Home exercise program, Postural re-education, Orthotic fitting/training  PT Plan: Ongoing PT  PT Frequency: Daily  PT Discharge Recommendations: High intensity level of continued care  Equipment Recommended upon Discharge:  (TBD)  PT Recommended Transfer Status: Assist x1, Assistive device  PT - OK to Discharge: Yes      General Visit Information:   PT  Visit  PT Received On: 07/18/25  Response to Previous Treatment: Patient with no complaints from previous session.  Reason for Referral: Pt p/w BLE radiculopathy, 7/14 s/p L3-S1 lami and facetectomies/extension/revision.  Past Medical History Relevant to Rehab: PONV, Vocal Cord Mass, HTN, HLD, CAD, COPD, Asthma BIANCA (no CPAP), GERD, BPH, hypogonadism, tubular adenoma of colon, proteinuria Polcythemia, Fusion of lumbar spine, kyphosis of cervical region, cervical myelopathy, Spinal cord compression due to degenerative disorder of spinal column  Prior to Session Communication: Bedside  nurse  Patient Position Received: Bed, 3 rail up, Alarm off, not on at start of session  Family/Caregiver Present: No  General Comment: Patient pleasant and agreeable to participate in therapy.     Subjective   Precautions:  Precautions  Hearing/Visual Limitations: WFL  Medical Precautions: Fall precautions  Post-Surgical Precautions: Spinal precautions  Precautions Comment: SBP <180    Vital Signs:   Date/Time Vitals Session Patient Position Pulse Resp SpO2 BP MAP (mmHg)    07/18/25 1200 --  --  102  10  99 %  131/59  80     07/18/25 1236 Pre PT  Lying  110  20  98 %  134/57  79     07/18/25 1246 During PT  Sitting  110  18  100 %  128/79  96     07/18/25 1300 --  --  117  25  100 %  129/62  84     07/18/25 1301 Post PT  Sitting  116  17  100 %  129/62  84        Objective   Pain:  Pain Assessment  Pain Assessment: 0-10  0-10 (Numeric) Pain Score: 7  Pain Type: Surgical pain  Pain Location: Back  Pain Interventions: Repositioned  Response to Interventions:  (7.5/10 post)  Cognition:  Cognition  Overall Cognitive Status: Within Functional Limits  Arousal/Alertness: Appropriate responses to stimuli  Orientation Level: Oriented X4  Following Commands: Follows all commands and directions without difficulty  Insight: Within function limits  Impulsive: Within functional limits  Processing Speed: Within funtional limits    Lines/Tubes/Drains:  Closed/Suction Drain 1 Inferior;Midline Back Accordion 10 Fr. (Active)   Number of days: 4     Oxygen: Airvo 30% FiO2/50 L/min. RT cleared pt to switch to 2 L/min via NC for ambulation. Switched back to Airvo at end of session.     Postural Control:   Postural Control  Postural Control: Within Functional Limits  Head Control: WFL  Trunk Control: WFL  Righting Reactions: Intact  Protective Responses: Intact  Posture Comment: Rounded shoulders    Balance:   Static Sitting Balance  Static Sitting-Balance Support: Bilateral upper extremity supported, Feet supported  Static Sitting-Level  of Assistance: Close supervision  Dynamic Sitting Balance  Dynamic Sitting-Balance Support: Bilateral upper extremity supported, Feet supported  Dynamic Sitting-Level of Assistance: Close supervision  Dynamic Sitting-Balance: Forward lean    Static Standing Balance  Static Standing-Balance Support: Bilateral upper extremity supported  Static Standing-Level of Assistance: Contact guard  Dynamic Standing Balance  Dynamic Standing-Balance Support: Bilateral upper extremity supported  Dynamic Standing-Level of Assistance: Minimum assistance  Dynamic Standing-Balance: Turning    Coordination:  Coordination  Movements are Fluid and Coordinated: Yes  Upper Body Coordination: BUE tremor appreciated. Pt reports this is chronic.    Extremity/Trunk Assessments:  Strength:       RUE   RUE : Within Functional Limits    LUE   LUE: Within Functional Limits    RLE   RLE : Exceptions to WFL  Strength RLE  R Hip Flexion: 3-/5  R Knee Flexion: 5/5  R Knee Extension: 5/5  R Ankle Dorsiflexion: 5/5  R Ankle Plantar Flexion: 5/5    LLE   LLE : Exceptions to WFL  Strength LLE  L Hip Flexion: 2-/5  L Knee Flexion: 5/5  L Knee Extension: 5/5  L Ankle Dorsiflexion: 5/5  L Ankle Plantar Flexion: 5/5    PT Treatments:                 Bed Mobility  Bed Mobility: Yes  Bed Mobility 1  Bed Mobility 1: Supine to sitting  Level of Assistance 1: Contact guard  Bed Mobility Comments 1: Therapist managed lines. Cues for log roll. HOB 30 deg    Ambulation/Gait Training  Ambulation/Gait Training Performed: Yes  Ambulation/Gait Training 1  Surface 1: Level tile  Device 1: Rolling walker  Assistance 1: Minimum assistance  Quality of Gait 1: Narrow base of support, Diminished heel strike, Shuffling gait, Decreased step length, Soft knee(s), Antalgic, Forward flexed posture  Comments/Distance (ft) 1: 3 steps laterally to R, 20 ft in room. Cues for active cycle of breathin and for increased B step length    Transfers  Transfer: Yes  Transfer 1  Technique 1:  Sit to stand, Stand to sit  Transfer Device 1: Walker  Transfer Level of Assistance 1: Minimum assistance  Trials/Comments 1: 2x from low surfaces. Cues for proper hand placement and controlling speed.    Stairs  Stairs: No              Outcome Measures:  [facility] Basic Mobility  Turning from your back to your side while in a flat bed without using bedrails: A little  Moving from lying on your back to sitting on the side of a flat bed without using bedrails: A little  Moving to and from bed to chair (including a wheelchair): A little  Standing up from a chair using your arms (e.g. wheelchair or bedside chair): A little  To walk in hospital room: A little  Climbing 3-5 steps with railing: Total  Basic Mobility - Total Score: 16                   FSS-ICU  Ambulation: Walks <50 feet with any assistance x1 or walks any distance with assistance x2 people  Rolling: Minimal assistance (performs 75% or more of task)  Sitting: Supervision or set-up only  Transfer Sit-to-Stand: Minimal assistance (performs 75% or more of task)  Transfer Supine-to-Sit: Minimal assistance (performs 75% or more of task)  Total Score: 18    ICU Mobility Screen  Early Mobility/Exercise Safety Screen: Proceed with mobilization - No exclusion criteria met  ICU Mobility Scale: Sitting over edge of bed                   Education:  Education Documentation  Precautions, taught by Stacey Lopez PT at 7/18/2025  1:35 PM.  Learner: Patient  Readiness: Acceptance  Method: Explanation  Response: Verbalizes Understanding  Comment: PT expectations, mobility precautions, d/c recs    Body Mechanics, taught by Stacey Lopez PT at 7/18/2025  1:35 PM.  Learner: Patient  Readiness: Acceptance  Method: Explanation  Response: Verbalizes Understanding  Comment: PT expectations, mobility precautions, d/c recs    Mobility Training, taught by Stacey Lopez PT at 7/18/2025  1:35 PM.  Learner: Patient  Readiness: Acceptance  Method: Explanation  Response:  Verbalizes Understanding  Comment: PT expectations, mobility precautions, d/c recs    Education Comments  No comments found.             Encounter Problems       Encounter Problems (Active)       PT Problem       Patient will perform bed mobility with </= MIN A x1 to reduce risk of developing decubitus ulcers.  (Progressing)       Start:  07/15/25    Expected End:  07/29/25            Patient will perform sit to stand and stand to sit transfers with </= MIN A x1 and LRD to increase functional strength.  (Progressing)       Start:  07/15/25    Expected End:  07/29/25            Patient will ambulate at least 15 ft. with </= MOD A x1 and LRD to improve tolerance of household distances.  (Progressing)       Start:  07/15/25    Expected End:  07/29/25            BLE 5/5 (Progressing)       Start:  07/15/25    Expected End:  07/29/25            Patient will stand with UE support of LRD and </= CGA at least 2 min to improve balance required for self-care tasks.  (Progressing)       Start:  07/15/25    Expected End:  07/29/25 07/18/25 at 1:35 PM   Stacey Lopez, PT   Rehab Office: [phone]

## 2025-07-18 NOTE — PROGRESS NOTES
"Jerman Colón Jr. is a 64 y.o. male on day 4 of admission presenting with Lumbar radicular pain.    Subjective   Was CPAPing overnight, this AM had episode of confusion and UE tremors       Objective     Physical Exam  No acute distress  A&Ox3  OU3R, EOMI   Face symmetric, Facial SILT   Tongue midline and symmetric  Shoulder shrugs symmetric  Hearing intact to finger rubs bilaterally  RUE D5 / B5 / T5 / HG 5/ IO 5  LUE D5 / B5 / T5 / HG 5/ IO 5  RLE HF5 / KE 5/ DF 5/ PF 5  LLE HF5 / KE 5/ DF 5/ PF 5  Negative barron  No clonus  SILT        Last Recorded Vitals  Blood pressure (!) 161/92, pulse 102, temperature 36.4 °C (97.5 °F), temperature source Temporal, resp. rate 17, height 1.854 m (6' 1\"), weight 127 kg (280 lb 3.3 oz), SpO2 92%.  Intake/Output last 3 Shifts:  I/O last 3 completed shifts:  In: 1485 (11.7 mL/kg) [P.O.:1485]  Out: 2220 (17.5 mL/kg) [Urine:1960 (0.4 mL/kg/hr); Drains:260]  Weight: 127.1 kg     Relevant Results  Results for orders placed or performed during the hospital encounter of 07/14/25 (from the past 24 hours)   POCT GLUCOSE   Result Value Ref Range    POCT Glucose 89 74 - 99 mg/dL   POCT GLUCOSE   Result Value Ref Range    POCT Glucose 125 (H) 74 - 99 mg/dL   POCT GLUCOSE   Result Value Ref Range    POCT Glucose 127 (H) 74 - 99 mg/dL   POCT GLUCOSE   Result Value Ref Range    POCT Glucose 127 (H) 74 - 99 mg/dL   Renal function panel   Result Value Ref Range    Glucose 95 74 - 99 mg/dL    Sodium 142 136 - 145 mmol/L    Potassium 4.4 3.5 - 5.3 mmol/L    Chloride 103 98 - 107 mmol/L    Bicarbonate 29 21 - 32 mmol/L    Anion Gap 14 mmol/L    Urea Nitrogen 13 6 - 23 mg/dL    Creatinine 0.77 0.50 - 1.30 mg/dL    eGFR >90 >60 mL/min/1.73m*2    Calcium 8.4 (L) 8.6 - 10.6 mg/dL    Phosphorus 3.3 2.5 - 4.9 mg/dL    Albumin 3.1 (L) 3.4 - 5.0 g/dL   CBC   Result Value Ref Range    WBC 6.4 4.4 - 11.3 x10*3/uL    nRBC 0.5 (H) 0.0 - 0.0 /100 WBCs    RBC 4.43 (L) 4.50 - 5.90 x10*6/uL    Hemoglobin " 9.1 (L) 13.5 - 17.5 g/dL    Hematocrit 31.8 (L) 41.0 - 52.0 %    MCV 72 (L) 80 - 100 fL    MCH 20.5 (L) 26.0 - 34.0 pg    MCHC 28.6 (L) 32.0 - 36.0 g/dL    RDW 19.9 (H) 11.5 - 14.5 %    Platelets 210 150 - 450 x10*3/uL      No results found.        Assessment & Plan  Lumbar radicular pain    Lumbar foraminal stenosis    Lumbar radiculopathy, chronic    65yo male w h/o prior L5-S1 fusion, hyperlipidemia, hypertension, CAD, sleep apnea (central/obstructive), asthma, GERD, CKD, BPH, anemia, 1/20/25 C4-6 ACDF/C2-T4 PCDF p/w BLE radiculopathy, 7/14 s/p L3-S1 lami and facetectomies/extension/revision (), 7/15 hgb 7.1 s/p 1u pRBC, post Tx cbc 7.5, s/p 1u pRBC.     Plan:   NSU  Obtain ABG this AM  Monitor HR and respiratory status   Maintain drain and monitor output   Restart BP meds when able  Wean airvo, continue CPAP at night  PTOT - rehab    Lupillo De Jesus MD

## 2025-07-19 ENCOUNTER — APPOINTMENT (OUTPATIENT)
Dept: NEUROLOGY | Facility: HOSPITAL | Age: 65
End: 2025-07-19
Payer: MEDICARE

## 2025-07-19 LAB
ALBUMIN SERPL BCP-MCNC: 3.1 G/DL (ref 3.4–5)
ANION GAP SERPL CALC-SCNC: 12 MMOL/L (ref 10–20)
BUN SERPL-MCNC: 15 MG/DL (ref 6–23)
CALCIUM SERPL-MCNC: 7.9 MG/DL (ref 8.6–10.6)
CHLORIDE SERPL-SCNC: 98 MMOL/L (ref 98–107)
CO2 SERPL-SCNC: 31 MMOL/L (ref 21–32)
CREAT SERPL-MCNC: 1.05 MG/DL (ref 0.5–1.3)
EGFRCR SERPLBLD CKD-EPI 2021: 79 ML/MIN/1.73M*2
ERYTHROCYTE [DISTWIDTH] IN BLOOD BY AUTOMATED COUNT: 20.1 % (ref 11.5–14.5)
GLUCOSE BLD MANUAL STRIP-MCNC: 130 MG/DL (ref 74–99)
GLUCOSE BLD MANUAL STRIP-MCNC: 134 MG/DL (ref 74–99)
GLUCOSE BLD MANUAL STRIP-MCNC: 151 MG/DL (ref 74–99)
GLUCOSE BLD MANUAL STRIP-MCNC: 160 MG/DL (ref 74–99)
GLUCOSE SERPL-MCNC: 145 MG/DL (ref 74–99)
HCT VFR BLD AUTO: 30.3 % (ref 41–52)
HGB BLD-MCNC: 8.6 G/DL (ref 13.5–17.5)
LACTATE SERPL-SCNC: 1.4 MMOL/L (ref 0.4–2)
MCH RBC QN AUTO: 20.9 PG (ref 26–34)
MCHC RBC AUTO-ENTMCNC: 28.4 G/DL (ref 32–36)
MCV RBC AUTO: 74 FL (ref 80–100)
NRBC BLD-RTO: 0.5 /100 WBCS (ref 0–0)
PHOSPHATE SERPL-MCNC: 2.6 MG/DL (ref 2.5–4.9)
PLATELET # BLD AUTO: 208 X10*3/UL (ref 150–450)
POTASSIUM SERPL-SCNC: 3.9 MMOL/L (ref 3.5–5.3)
RBC # BLD AUTO: 4.12 X10*6/UL (ref 4.5–5.9)
SODIUM SERPL-SCNC: 137 MMOL/L (ref 136–145)
WBC # BLD AUTO: 6.1 X10*3/UL (ref 4.4–11.3)

## 2025-07-19 PROCEDURE — 36415 COLL VENOUS BLD VENIPUNCTURE: CPT

## 2025-07-19 PROCEDURE — 2500000001 HC RX 250 WO HCPCS SELF ADMINISTERED DRUGS (ALT 637 FOR MEDICARE OP): Performed by: STUDENT IN AN ORGANIZED HEALTH CARE EDUCATION/TRAINING PROGRAM

## 2025-07-19 PROCEDURE — 82947 ASSAY GLUCOSE BLOOD QUANT: CPT

## 2025-07-19 PROCEDURE — 84100 ASSAY OF PHOSPHORUS: CPT

## 2025-07-19 PROCEDURE — 2060000001 HC INTERMEDIATE ICU ROOM DAILY

## 2025-07-19 PROCEDURE — 80069 RENAL FUNCTION PANEL: CPT

## 2025-07-19 PROCEDURE — 94660 CPAP INITIATION&MGMT: CPT

## 2025-07-19 PROCEDURE — 2500000002 HC RX 250 W HCPCS SELF ADMINISTERED DRUGS (ALT 637 FOR MEDICARE OP, ALT 636 FOR OP/ED): Performed by: NURSE PRACTITIONER

## 2025-07-19 PROCEDURE — 85027 COMPLETE CBC AUTOMATED: CPT

## 2025-07-19 PROCEDURE — 2500000002 HC RX 250 W HCPCS SELF ADMINISTERED DRUGS (ALT 637 FOR MEDICARE OP, ALT 636 FOR OP/ED)

## 2025-07-19 PROCEDURE — 2500000004 HC RX 250 GENERAL PHARMACY W/ HCPCS (ALT 636 FOR OP/ED): Mod: TB | Performed by: STUDENT IN AN ORGANIZED HEALTH CARE EDUCATION/TRAINING PROGRAM

## 2025-07-19 PROCEDURE — 83605 ASSAY OF LACTIC ACID: CPT

## 2025-07-19 PROCEDURE — 95720 EEG PHY/QHP EA INCR W/VEEG: CPT | Performed by: STUDENT IN AN ORGANIZED HEALTH CARE EDUCATION/TRAINING PROGRAM

## 2025-07-19 PROCEDURE — 97116 GAIT TRAINING THERAPY: CPT | Mod: GP

## 2025-07-19 PROCEDURE — 97530 THERAPEUTIC ACTIVITIES: CPT | Mod: GP

## 2025-07-19 PROCEDURE — 2500000002 HC RX 250 W HCPCS SELF ADMINISTERED DRUGS (ALT 637 FOR MEDICARE OP, ALT 636 FOR OP/ED): Performed by: REGISTERED NURSE

## 2025-07-19 RX ADMIN — HYDROMORPHONE HYDROCHLORIDE 0.2 MG: 1 INJECTION, SOLUTION INTRAMUSCULAR; INTRAVENOUS; SUBCUTANEOUS at 02:48

## 2025-07-19 RX ADMIN — SENNOSIDES AND DOCUSATE SODIUM 2 TABLET: 50; 8.6 TABLET ORAL at 21:19

## 2025-07-19 RX ADMIN — KETOROLAC TROMETHAMINE 30 MG: 30 INJECTION, SOLUTION INTRAMUSCULAR; INTRAVENOUS at 04:30

## 2025-07-19 RX ADMIN — PANTOPRAZOLE SODIUM 40 MG: 40 TABLET, DELAYED RELEASE ORAL at 21:19

## 2025-07-19 RX ADMIN — INSULIN LISPRO 1 UNITS: 100 INJECTION, SOLUTION INTRAVENOUS; SUBCUTANEOUS at 17:00

## 2025-07-19 RX ADMIN — ASPIRIN 81 MG: 81 TABLET, COATED ORAL at 08:00

## 2025-07-19 RX ADMIN — HEPARIN SODIUM 5000 UNITS: 5000 INJECTION INTRAVENOUS; SUBCUTANEOUS at 15:21

## 2025-07-19 RX ADMIN — PANTOPRAZOLE SODIUM 40 MG: 40 TABLET, DELAYED RELEASE ORAL at 08:34

## 2025-07-19 RX ADMIN — HEPARIN SODIUM 5000 UNITS: 5000 INJECTION INTRAVENOUS; SUBCUTANEOUS at 08:43

## 2025-07-19 RX ADMIN — ACETAMINOPHEN 650 MG: 325 TABLET, FILM COATED ORAL at 21:20

## 2025-07-19 RX ADMIN — NITROFURANTOIN MONOHYDRATE/MACROCRYSTALLINE 100 MG: 25; 75 CAPSULE ORAL at 08:34

## 2025-07-19 RX ADMIN — OXYCODONE HYDROCHLORIDE 10 MG: 5 TABLET ORAL at 08:41

## 2025-07-19 RX ADMIN — ACETAMINOPHEN 650 MG: 325 TABLET, FILM COATED ORAL at 02:38

## 2025-07-19 RX ADMIN — CYCLOBENZAPRINE 10 MG: 10 TABLET, FILM COATED ORAL at 08:34

## 2025-07-19 RX ADMIN — HEPARIN SODIUM 5000 UNITS: 5000 INJECTION INTRAVENOUS; SUBCUTANEOUS at 00:53

## 2025-07-19 RX ADMIN — OXYCODONE HYDROCHLORIDE 10 MG: 5 TABLET ORAL at 01:40

## 2025-07-19 RX ADMIN — TAMSULOSIN HYDROCHLORIDE 0.4 MG: 0.4 CAPSULE ORAL at 21:20

## 2025-07-19 RX ADMIN — CYCLOBENZAPRINE 10 MG: 10 TABLET, FILM COATED ORAL at 15:21

## 2025-07-19 RX ADMIN — ATORVASTATIN CALCIUM 40 MG: 40 TABLET, FILM COATED ORAL at 21:20

## 2025-07-19 RX ADMIN — ACETAMINOPHEN 650 MG: 325 TABLET, FILM COATED ORAL at 15:21

## 2025-07-19 RX ADMIN — GABAPENTIN 600 MG: 300 CAPSULE ORAL at 08:34

## 2025-07-19 RX ADMIN — GABAPENTIN 600 MG: 300 CAPSULE ORAL at 15:21

## 2025-07-19 RX ADMIN — OXYCODONE HYDROCHLORIDE 10 MG: 5 TABLET ORAL at 12:41

## 2025-07-19 RX ADMIN — ACETAMINOPHEN 650 MG: 325 TABLET, FILM COATED ORAL at 08:41

## 2025-07-19 RX ADMIN — CYCLOBENZAPRINE 10 MG: 10 TABLET, FILM COATED ORAL at 21:21

## 2025-07-19 RX ADMIN — GABAPENTIN 600 MG: 300 CAPSULE ORAL at 21:21

## 2025-07-19 RX ADMIN — NITROFURANTOIN MONOHYDRATE/MACROCRYSTALLINE 100 MG: 25; 75 CAPSULE ORAL at 21:21

## 2025-07-19 ASSESSMENT — PAIN SCALES - GENERAL
PAINLEVEL_OUTOF10: 7
PAINLEVEL_OUTOF10: 3
PAINLEVEL_OUTOF10: 7
PAINLEVEL_OUTOF10: 7
PAINLEVEL_OUTOF10: 3
PAINLEVEL_OUTOF10: 3

## 2025-07-19 ASSESSMENT — PAIN - FUNCTIONAL ASSESSMENT
PAIN_FUNCTIONAL_ASSESSMENT: 0-10

## 2025-07-19 ASSESSMENT — COGNITIVE AND FUNCTIONAL STATUS - GENERAL
TURNING FROM BACK TO SIDE WHILE IN FLAT BAD: A LOT
WALKING IN HOSPITAL ROOM: A LITTLE
MOVING FROM LYING ON BACK TO SITTING ON SIDE OF FLAT BED WITH BEDRAILS: A LOT
MOVING TO AND FROM BED TO CHAIR: A LITTLE
MOBILITY SCORE: 14
CLIMB 3 TO 5 STEPS WITH RAILING: TOTAL
STANDING UP FROM CHAIR USING ARMS: A LITTLE

## 2025-07-19 ASSESSMENT — PAIN DESCRIPTION - DESCRIPTORS: DESCRIPTORS: ACHING;SORE

## 2025-07-19 NOTE — SIGNIFICANT EVENT
"Preliminary EEG Report     This vEEG is normal. No epileptiform activity or lateralizing signs are seen. .    This EEG was read from 15:29 to 15:52 on 07/19/25 . The final impression will be available tomorrow under Chart Review in the Media tab. When ready to discontinue video EEG, place \"Discontinue Continuous VEEG\" order.     Alfredo Basilio MD   PGY5  Epilepsy Fellow, [phone]  "

## 2025-07-19 NOTE — PROGRESS NOTES
Physical Therapy    Physical Therapy Treatment    Patient Name: Jerman Colón Jr.  MRN: [id number]  Department: [facility]  Room: 01/01-A  Today's Date: 7/19/2025  Time Calculation  Start Time: 0855  Stop Time: 0924  Time Calculation (min): 29 min    Assessment/Plan   PT Assessment  Barriers to Discharge Home: Physical needs, Caregiver assistance  Caregiver Assistance: Caregiver assistance needed per identified barriers - however, level of patient's required assistance exceeds assistance available at home  Physical Needs: Ambulating household distances limited by function/safety, 24hr mobility assistance needed, 24hr ADL assistance needed, High falls risk due to function or environment  End of Session Communication: Bedside nurse  Assessment Comment: Patient to benefit from ongoing PT services to address the above limitations and to facilitate timely return to prior level of function.  End of Session Patient Position: Bed, 3 rail up, Alarm on  PT Plan  Inpatient/Swing Bed or Outpatient: Inpatient  PT Plan  Treatment/Interventions: Bed mobility, Transfer training, Gait training, Balance training, Neuromuscular re-education, Strengthening, Endurance training, Therapeutic exercise, Therapeutic activity, Home exercise program, Postural re-education, Orthotic fitting/training  PT Plan: Ongoing PT  PT Frequency: Daily  PT Discharge Recommendations: High intensity level of continued care  Equipment Recommended upon Discharge:  (TBD)  PT Recommended Transfer Status: Assist x1, Assistive device  PT - OK to Discharge: Yes    PT Visit Info:  PT Received On: 07/19/25     General Visit Information:   General  Family/Caregiver Present: No  Prior to Session Communication: Bedside nurse  Patient Position Received: Bed, 3 rail up, Alarm on  General Comment: Pt pleasant and cooperative with therapy. Motivated to particfipate.    Subjective     Precautions:  Precautions  Hearing/Visual Limitations: WFL  Medical Precautions: Fall  precautions, Spinal precautions  Post-Surgical Precautions: Spinal precautions  Precautions Comment: SBP <180'    Lines/Tubes:   Telemetry   4 L O2 NC     Vital Signs     Vitals Session Pre PT During PT Post PT   Heart Rate 113  118   Resp 19  15   SpO2 98  98   /62  136/69     Objective     Pain:  Pain Assessment  Pain Assessment: 0-10  0-10 (Numeric) Pain Score: 7  Pain Type: Surgical pain  Pain Location:  (spine)    Cognition:  Cognition  Overall Cognitive Status: Within Functional Limits  Arousal/Alertness: Appropriate responses to stimuli  Orientation Level: Oriented X4  Following Commands: Follows all commands and directions without difficulty    Activity Tolerance:  Activity Tolerance  Early Mobility/Exercise Safety Screen: Proceed with mobilization - No exclusion criteria met    Treatments:  Bed Mobility  Bed Mobility: Yes  Bed Mobility 1  Bed Mobility 1: Supine to sitting  Level of Assistance 1: Moderate assistance (x1 person; cues for sequencing; log roll technique)  Bed Mobility Comments 1: HOB elevated  Bed Mobility 2  Bed Mobility  2: Sitting to supine  Level of Assistance 2: Moderate assistance (x1 person; cues for sequencing; log roll)  Bed Mobility Comments 2: HOB flat    Ambulation/Gait Training  Ambulation/Gait Training Performed: Yes  Ambulation/Gait Training 1  Surface 1: Level tile  Device 1: Rolling walker  Assistance 1: Minimum assistance (x1 person; cues for posture correction)  Quality of Gait 1: Wide base of support, Forward flexed posture  Comments/Distance (ft) 1: 100 ft x 2  Transfers  Transfer: Yes  Transfer 1  Technique 1: Sit to stand, Stand to sit  Transfer Device 1: Walker  Transfer Level of Assistance 1: Minimum assistance (x1 person; cues for hand placement)    Outcome Measures:  [facility] Basic Mobility  Turning from your back to your side while in a flat bed without using bedrails: A lot  Moving from lying on your back to sitting on the side of a flat bed without using  bedrails: A lot  Moving to and from bed to chair (including a wheelchair): A little  Standing up from a chair using your arms (e.g. wheelchair or bedside chair): A little  To walk in hospital room: A little  Climbing 3-5 steps with railing: Total  Basic Mobility - Total Score: 14    Confusion Assessment Method-ICU (CAM-ICU)  Feature 1: Acute Onset or Fluctuating Course: Negative  Overall CAM-ICU: Negative    FSS-ICU  Ambulation: Walks >/ or equal to 50 feet with any assistance x1  Rolling: Minimal assistance (performs 75% or more of task)  Sitting: Supervision or set-up only  Transfer Sit-to-Stand: Minimal assistance (performs 75% or more of task)  Transfer Supine-to-Sit: Moderate assistance (performs 50 - 74% of task)  Total Score: 18    Early Mobility/Exercise Safety Screen: Proceed with mobilization - No exclusion criteria met  ICU Mobility Scale: Walking with assistance of 1 person [8]  E = Exercise and Early Mobility  Early Mobility/Exercise Safety Screen: Proceed with mobilization - No exclusion criteria met  ICU Mobility Scale: Walking with assistance of 1 person    Education Documentation  Precautions, taught by Tamara Valle PT at 7/19/2025  2:50 PM.  Learner: Patient  Readiness: Acceptance  Method: Explanation  Response: Verbalizes Understanding, Demonstrated Understanding  Comment: spine precautions, log roll technique    Mobility Training, taught by Tamara Valle PT at 7/19/2025  2:50 PM.  Learner: Patient  Readiness: Acceptance  Method: Explanation  Response: Verbalizes Understanding, Demonstrated Understanding  Comment: spine precautions, log roll technique    Education Comments  No comments found.      Encounter Problems       Encounter Problems (Active)       PT Problem       Patient will perform bed mobility with </= MIN A x1 to reduce risk of developing decubitus ulcers.  (Progressing)       Start:  07/15/25    Expected End:  07/29/25            Patient will perform sit to stand and stand  to sit transfers with </= MIN A x1 and LRD to increase functional strength.  (Progressing)       Start:  07/15/25    Expected End:  07/29/25            Patient will ambulate at least 15 ft. with </= MOD A x1 and LRD to improve tolerance of household distances.  (Progressing)       Start:  07/15/25    Expected End:  07/29/25            BLE 5/5 (Progressing)       Start:  07/15/25    Expected End:  07/29/25            Patient will stand with UE support of LRD and </= CGA at least 2 min to improve balance required for self-care tasks.  (Progressing)       Start:  07/15/25    Expected End:  07/29/25                 Signed by Tamara Valle DPT

## 2025-07-19 NOTE — PROGRESS NOTES
"Jerman Colón Jr. is a 64 y.o. male on day 5 of admission presenting with Lumbar radicular pain.    Subjective   Was CPAPing overnight, drain auto dc'd        Objective     Physical Exam  No acute distress  A&Ox3  OU3R, EOMI   Face symmetric, Facial SILT   Tongue midline and symmetric  Shoulder shrugs symmetric  Hearing intact to finger rubs bilaterally  RUE D5 / B5 / T5 / HG 5/ IO 5  LUE D5 / B5 / T5 / HG 5/ IO 5  RLE HF5 / KE 5/ DF 5/ PF 5  LLE HF5 / KE 5/ DF 5/ PF 5  Negative barron  No clonus  SILT        Last Recorded Vitals  Blood pressure 160/76, pulse (!) 120, temperature 37.4 °C (99.3 °F), temperature source Temporal, resp. rate 21, height 1.854 m (6' 1\"), weight 127 kg (280 lb 3.3 oz), SpO2 100%.  Intake/Output last 3 Shifts:  I/O last 3 completed shifts:  In: 2517 (19.8 mL/kg) [P.O.:2017; IV Piggyback:500]  Out: 1865 (14.7 mL/kg) [Urine:1575 (0.3 mL/kg/hr); Drains:290]  Weight: 127.1 kg     Relevant Results  Results for orders placed or performed during the hospital encounter of 07/14/25 (from the past 24 hours)   POCT GLUCOSE   Result Value Ref Range    POCT Glucose 131 (H) 74 - 99 mg/dL   Blood Gas Arterial Full Panel   Result Value Ref Range    POCT pH, Arterial 7.53 (H) 7.38 - 7.42 pH    POCT pCO2, Arterial 37 (L) 38 - 42 mm Hg    POCT pO2, Arterial 87 85 - 95 mm Hg    POCT SO2, Arterial 98 94 - 100 %    POCT Oxy Hemoglobin, Arterial 96.8 94.0 - 98.0 %    POCT Hematocrit Calculated, Arterial 26.0 (L) 41.0 - 52.0 %    POCT Sodium, Arterial 134 (L) 136 - 145 mmol/L    POCT Potassium, Arterial 4.0 3.5 - 5.3 mmol/L    POCT Chloride, Arterial 102 98 - 107 mmol/L    POCT Ionized Calcium, Arterial 1.14 1.10 - 1.33 mmol/L    POCT Glucose, Arterial 135 (H) 74 - 99 mg/dL    POCT Lactate, Arterial 1.4 0.4 - 2.0 mmol/L    POCT Base Excess, Arterial 7.7 (H) -2.0 - 3.0 mmol/L    POCT HCO3 Calculated, Arterial 30.9 (H) 22.0 - 26.0 mmol/L    POCT Hemoglobin, Arterial 8.7 (L) 13.5 - 17.5 g/dL    POCT Anion " Gap, Arterial 5 (L) 10 - 25 mmo/L    Patient Temperature      FiO2 30 %    Site of Arterial Puncture Radial Right    CBC and Auto Differential   Result Value Ref Range    WBC 8.8 4.4 - 11.3 x10*3/uL    nRBC 0.8 (H) 0.0 - 0.0 /100 WBCs    RBC 4.35 (L) 4.50 - 5.90 x10*6/uL    Hemoglobin 9.4 (L) 13.5 - 17.5 g/dL    Hematocrit 31.5 (L) 41.0 - 52.0 %    MCV 72 (L) 80 - 100 fL    MCH 21.6 (L) 26.0 - 34.0 pg    MCHC 29.8 (L) 32.0 - 36.0 g/dL    RDW 20.0 (H) 11.5 - 14.5 %    Platelets 226 150 - 450 x10*3/uL    Neutrophils % 87.9 40.0 - 80.0 %    Immature Granulocytes %, Automated 0.6 0.0 - 0.9 %    Lymphocytes % 5.0 13.0 - 44.0 %    Monocytes % 5.7 2.0 - 10.0 %    Eosinophils % 0.5 0.0 - 6.0 %    Basophils % 0.3 0.0 - 2.0 %    Neutrophils Absolute 7.77 (H) 1.20 - 7.70 x10*3/uL    Immature Granulocytes Absolute, Automated 0.05 0.00 - 0.70 x10*3/uL    Lymphocytes Absolute 0.44 (L) 1.20 - 4.80 x10*3/uL    Monocytes Absolute 0.50 0.10 - 1.00 x10*3/uL    Eosinophils Absolute 0.04 0.00 - 0.70 x10*3/uL    Basophils Absolute 0.03 0.00 - 0.10 x10*3/uL   Lactate   Result Value Ref Range    Lactate 1.8 0.4 - 2.0 mmol/L   Blood Culture    Specimen: Peripheral Venipuncture; Blood culture   Result Value Ref Range    Blood Culture Loaded on Instrument - Culture in progress    Blood Culture    Specimen: Peripheral Venipuncture; Blood culture   Result Value Ref Range    Blood Culture Loaded on Instrument - Culture in progress    POCT GLUCOSE   Result Value Ref Range    POCT Glucose 148 (H) 74 - 99 mg/dL   Urinalysis with Reflex Culture and Microscopic   Result Value Ref Range    Color, Urine Yellow Light-Yellow, Yellow, Dark-Yellow    Appearance, Urine Turbid (N) Clear    Specific Gravity, Urine 1.027 1.005 - 1.035    pH, Urine 6.0 5.0, 5.5, 6.0, 6.5, 7.0, 7.5, 8.0    Protein, Urine 20 (TRACE) NEGATIVE, 10 (TRACE), 20 (TRACE) mg/dL    Glucose, Urine Normal Normal mg/dL    Blood, Urine 0.5 (2+) (A) NEGATIVE mg/dL    Ketones, Urine NEGATIVE  NEGATIVE mg/dL    Bilirubin, Urine NEGATIVE NEGATIVE mg/dL    Urobilinogen, Urine Normal Normal mg/dL    Nitrite, Urine NEGATIVE NEGATIVE    Leukocyte Esterase, Urine 500 Jose/uL (A) NEGATIVE   Microscopic Only, Urine   Result Value Ref Range    WBC, Urine >50 (A) 1-5, NONE /HPF    RBC, Urine >20 (A) NONE, 1-2, 3-5 /HPF    Bacteria, Urine 4+ (A) NONE SEEN /HPF    Mucus, Urine 4+ Reference range not established. /LPF   POCT GLUCOSE   Result Value Ref Range    POCT Glucose 102 (H) 74 - 99 mg/dL   POCT GLUCOSE   Result Value Ref Range    POCT Glucose 172 (H) 74 - 99 mg/dL      No results found.        Assessment & Plan  Lumbar radicular pain    Lumbar foraminal stenosis    Lumbar radiculopathy, chronic    65yo male w h/o prior L5-S1 fusion, hyperlipidemia, hypertension, CAD, sleep apnea (central/obstructive), asthma, GERD, CKD, BPH, anemia, 1/20/25 C4-6 ACDF/C2-T4 PCDF p/w BLE radiculopathy, 7/14 s/p L3-S1 lami and facetectomies/extension/revision (), 7/15 hgb 7.1 s/p 1u pRBC, post Tx cbc 7.5, s/p 1u pRBC.     Plan:   NURA, likely tele this AM  Monitor HR and respiratory status   Macrobid for UTI   Restart BP meds when able  continue CPAP at night  PTOT - rehab    Patricia Lyn MD

## 2025-07-20 ENCOUNTER — APPOINTMENT (OUTPATIENT)
Dept: RADIOLOGY | Facility: HOSPITAL | Age: 65
DRG: 447 | End: 2025-07-20
Payer: MEDICARE

## 2025-07-20 ENCOUNTER — APPOINTMENT (OUTPATIENT)
Dept: RADIOLOGY | Facility: HOSPITAL | Age: 65
End: 2025-07-20
Payer: MEDICARE

## 2025-07-20 VITALS
HEART RATE: 102 BPM | DIASTOLIC BLOOD PRESSURE: 62 MMHG | BODY MASS INDEX: 37.84 KG/M2 | RESPIRATION RATE: 21 BRPM | TEMPERATURE: 98.2 F | WEIGHT: 285.5 LBS | HEIGHT: 73 IN | SYSTOLIC BLOOD PRESSURE: 112 MMHG | OXYGEN SATURATION: 90 %

## 2025-07-20 LAB
ALBUMIN SERPL BCP-MCNC: 3.1 G/DL (ref 3.4–5)
ALBUMIN SERPL BCP-MCNC: 3.2 G/DL (ref 3.4–5)
ANION GAP BLDA CALCULATED.4IONS-SCNC: 4 MMO/L (ref 10–25)
ANION GAP SERPL CALC-SCNC: 13 MMOL/L (ref 10–20)
ANION GAP SERPL CALC-SCNC: 9 MMOL/L (ref 10–20)
BACTERIA BLD CULT: NORMAL
BACTERIA BLD CULT: NORMAL
BACTERIA UR CULT: ABNORMAL
BASE EXCESS BLDA CALC-SCNC: 6.9 MMOL/L (ref -2–3)
BODY TEMPERATURE: 37 DEGREES CELSIUS
BUN SERPL-MCNC: 17 MG/DL (ref 6–23)
BUN SERPL-MCNC: 19 MG/DL (ref 6–23)
CA-I BLDA-SCNC: 1.11 MMOL/L (ref 1.1–1.33)
CALCIUM SERPL-MCNC: 8.5 MG/DL (ref 8.6–10.6)
CALCIUM SERPL-MCNC: 8.5 MG/DL (ref 8.6–10.6)
CHLORIDE BLDA-SCNC: 100 MMOL/L (ref 98–107)
CHLORIDE SERPL-SCNC: 101 MMOL/L (ref 98–107)
CHLORIDE SERPL-SCNC: 98 MMOL/L (ref 98–107)
CO2 SERPL-SCNC: 30 MMOL/L (ref 21–32)
CO2 SERPL-SCNC: 31 MMOL/L (ref 21–32)
CREAT SERPL-MCNC: 1.04 MG/DL (ref 0.5–1.3)
CREAT SERPL-MCNC: 1.29 MG/DL (ref 0.5–1.3)
EGFRCR SERPLBLD CKD-EPI 2021: 62 ML/MIN/1.73M*2
EGFRCR SERPLBLD CKD-EPI 2021: 80 ML/MIN/1.73M*2
ERYTHROCYTE [DISTWIDTH] IN BLOOD BY AUTOMATED COUNT: 20.1 % (ref 11.5–14.5)
GLUCOSE BLD MANUAL STRIP-MCNC: 113 MG/DL (ref 74–99)
GLUCOSE BLD MANUAL STRIP-MCNC: 147 MG/DL (ref 74–99)
GLUCOSE BLD MANUAL STRIP-MCNC: 183 MG/DL (ref 74–99)
GLUCOSE BLD MANUAL STRIP-MCNC: 252 MG/DL (ref 74–99)
GLUCOSE BLDA-MCNC: 161 MG/DL (ref 74–99)
GLUCOSE SERPL-MCNC: 159 MG/DL (ref 74–99)
GLUCOSE SERPL-MCNC: 241 MG/DL (ref 74–99)
HCO3 BLDA-SCNC: 33.9 MMOL/L (ref 22–26)
HCT VFR BLD AUTO: 31.4 % (ref 41–52)
HCT VFR BLD EST: 34 % (ref 41–52)
HGB BLD-MCNC: 8.8 G/DL (ref 13.5–17.5)
HGB BLDA-MCNC: 11.3 G/DL (ref 13.5–17.5)
HOLD SPECIMEN: NORMAL
INHALED O2 CONCENTRATION: 40 %
LACTATE BLDA-SCNC: 1 MMOL/L (ref 0.4–2)
MAGNESIUM SERPL-MCNC: 1.69 MG/DL (ref 1.6–2.4)
MCH RBC QN AUTO: 20.9 PG (ref 26–34)
MCHC RBC AUTO-ENTMCNC: 28 G/DL (ref 32–36)
MCV RBC AUTO: 75 FL (ref 80–100)
NRBC BLD-RTO: 0 /100 WBCS (ref 0–0)
OXYHGB MFR BLDA: 97.9 % (ref 94–98)
PCO2 BLDA: 60 MM HG (ref 38–42)
PH BLDA: 7.36 PH (ref 7.38–7.42)
PHOSPHATE SERPL-MCNC: 2.2 MG/DL (ref 2.5–4.9)
PHOSPHATE SERPL-MCNC: 2.2 MG/DL (ref 2.5–4.9)
PLATELET # BLD AUTO: 192 X10*3/UL (ref 150–450)
PO2 BLDA: 180 MM HG (ref 85–95)
POTASSIUM BLDA-SCNC: 4 MMOL/L (ref 3.5–5.3)
POTASSIUM SERPL-SCNC: 3.5 MMOL/L (ref 3.5–5.3)
POTASSIUM SERPL-SCNC: 3.9 MMOL/L (ref 3.5–5.3)
RBC # BLD AUTO: 4.21 X10*6/UL (ref 4.5–5.9)
SAO2 % BLDA: 99 % (ref 94–100)
SODIUM BLDA-SCNC: 134 MMOL/L (ref 136–145)
SODIUM SERPL-SCNC: 137 MMOL/L (ref 136–145)
SODIUM SERPL-SCNC: 137 MMOL/L (ref 136–145)
WBC # BLD AUTO: 6.7 X10*3/UL (ref 4.4–11.3)

## 2025-07-20 PROCEDURE — 84100 ASSAY OF PHOSPHORUS: CPT

## 2025-07-20 PROCEDURE — 2500000001 HC RX 250 WO HCPCS SELF ADMINISTERED DRUGS (ALT 637 FOR MEDICARE OP): Performed by: STUDENT IN AN ORGANIZED HEALTH CARE EDUCATION/TRAINING PROGRAM

## 2025-07-20 PROCEDURE — 95700 EEG CONT REC W/VID EEG TECH: CPT

## 2025-07-20 PROCEDURE — 2550000001 HC RX 255 CONTRASTS: Performed by: NEUROLOGICAL SURGERY

## 2025-07-20 PROCEDURE — 51701 INSERT BLADDER CATHETER: CPT

## 2025-07-20 PROCEDURE — 2500000002 HC RX 250 W HCPCS SELF ADMINISTERED DRUGS (ALT 637 FOR MEDICARE OP, ALT 636 FOR OP/ED): Performed by: NURSE PRACTITIONER

## 2025-07-20 PROCEDURE — 84132 ASSAY OF SERUM POTASSIUM: CPT

## 2025-07-20 PROCEDURE — 97530 THERAPEUTIC ACTIVITIES: CPT | Mod: GP

## 2025-07-20 PROCEDURE — 71045 X-RAY EXAM CHEST 1 VIEW: CPT | Performed by: STUDENT IN AN ORGANIZED HEALTH CARE EDUCATION/TRAINING PROGRAM

## 2025-07-20 PROCEDURE — 71045 X-RAY EXAM CHEST 1 VIEW: CPT

## 2025-07-20 PROCEDURE — 83735 ASSAY OF MAGNESIUM: CPT

## 2025-07-20 PROCEDURE — 85027 COMPLETE CBC AUTOMATED: CPT

## 2025-07-20 PROCEDURE — 71275 CT ANGIOGRAPHY CHEST: CPT | Performed by: RADIOLOGY

## 2025-07-20 PROCEDURE — 2500000004 HC RX 250 GENERAL PHARMACY W/ HCPCS (ALT 636 FOR OP/ED): Performed by: STUDENT IN AN ORGANIZED HEALTH CARE EDUCATION/TRAINING PROGRAM

## 2025-07-20 PROCEDURE — 82947 ASSAY GLUCOSE BLOOD QUANT: CPT

## 2025-07-20 PROCEDURE — 2500000002 HC RX 250 W HCPCS SELF ADMINISTERED DRUGS (ALT 637 FOR MEDICARE OP, ALT 636 FOR OP/ED): Performed by: REGISTERED NURSE

## 2025-07-20 PROCEDURE — 2500000004 HC RX 250 GENERAL PHARMACY W/ HCPCS (ALT 636 FOR OP/ED)

## 2025-07-20 PROCEDURE — 97116 GAIT TRAINING THERAPY: CPT | Mod: GP

## 2025-07-20 PROCEDURE — 71275 CT ANGIOGRAPHY CHEST: CPT

## 2025-07-20 PROCEDURE — 2020000001 HC ICU ROOM DAILY

## 2025-07-20 PROCEDURE — 95716 VEEG EA 12-26HR CONT MNTR: CPT

## 2025-07-20 PROCEDURE — 2500000001 HC RX 250 WO HCPCS SELF ADMINISTERED DRUGS (ALT 637 FOR MEDICARE OP)

## 2025-07-20 PROCEDURE — 36415 COLL VENOUS BLD VENIPUNCTURE: CPT

## 2025-07-20 PROCEDURE — 94660 CPAP INITIATION&MGMT: CPT

## 2025-07-20 PROCEDURE — 2500000002 HC RX 250 W HCPCS SELF ADMINISTERED DRUGS (ALT 637 FOR MEDICARE OP, ALT 636 FOR OP/ED)

## 2025-07-20 PROCEDURE — 36600 WITHDRAWAL OF ARTERIAL BLOOD: CPT

## 2025-07-20 PROCEDURE — 80069 RENAL FUNCTION PANEL: CPT

## 2025-07-20 RX ORDER — SODIUM CHLORIDE, SODIUM LACTATE, POTASSIUM CHLORIDE, CALCIUM CHLORIDE 600; 310; 30; 20 MG/100ML; MG/100ML; MG/100ML; MG/100ML
75 INJECTION, SOLUTION INTRAVENOUS CONTINUOUS
Status: ACTIVE | OUTPATIENT
Start: 2025-07-20 | End: 2025-07-21

## 2025-07-20 RX ORDER — IBUPROFEN 400 MG/1
800 TABLET, FILM COATED ORAL ONCE
Status: COMPLETED | OUTPATIENT
Start: 2025-07-20 | End: 2025-07-20

## 2025-07-20 RX ORDER — CEFTRIAXONE 1 G/50ML
1 INJECTION, SOLUTION INTRAVENOUS EVERY 24 HOURS
Status: COMPLETED | OUTPATIENT
Start: 2025-07-20 | End: 2025-07-26

## 2025-07-20 RX ORDER — MAGNESIUM SULFATE HEPTAHYDRATE 40 MG/ML
2 INJECTION, SOLUTION INTRAVENOUS ONCE
Status: COMPLETED | OUTPATIENT
Start: 2025-07-20 | End: 2025-07-21

## 2025-07-20 RX ADMIN — HYDROMORPHONE HYDROCHLORIDE 0.2 MG: 1 INJECTION, SOLUTION INTRAMUSCULAR; INTRAVENOUS; SUBCUTANEOUS at 10:37

## 2025-07-20 RX ADMIN — POLYETHYLENE GLYCOL 3350 17 G: 17 POWDER, FOR SOLUTION ORAL at 21:54

## 2025-07-20 RX ADMIN — ATORVASTATIN CALCIUM 40 MG: 40 TABLET, FILM COATED ORAL at 21:54

## 2025-07-20 RX ADMIN — ACETAMINOPHEN 650 MG: 325 TABLET, FILM COATED ORAL at 21:54

## 2025-07-20 RX ADMIN — HEPARIN SODIUM 5000 UNITS: 5000 INJECTION INTRAVENOUS; SUBCUTANEOUS at 08:18

## 2025-07-20 RX ADMIN — SENNOSIDES AND DOCUSATE SODIUM 2 TABLET: 50; 8.6 TABLET ORAL at 21:54

## 2025-07-20 RX ADMIN — IBUPROFEN 800 MG: 400 TABLET ORAL at 15:45

## 2025-07-20 RX ADMIN — HEPARIN SODIUM 5000 UNITS: 5000 INJECTION INTRAVENOUS; SUBCUTANEOUS at 23:55

## 2025-07-20 RX ADMIN — ACETAMINOPHEN 650 MG: 325 TABLET, FILM COATED ORAL at 08:51

## 2025-07-20 RX ADMIN — MAGNESIUM SULFATE HEPTAHYDRATE 2 G: 40 INJECTION, SOLUTION INTRAVENOUS at 23:55

## 2025-07-20 RX ADMIN — ACETAMINOPHEN 650 MG: 325 TABLET, FILM COATED ORAL at 14:57

## 2025-07-20 RX ADMIN — CYCLOBENZAPRINE 10 MG: 10 TABLET, FILM COATED ORAL at 08:18

## 2025-07-20 RX ADMIN — OXYCODONE HYDROCHLORIDE 5 MG: 5 TABLET ORAL at 23:54

## 2025-07-20 RX ADMIN — IOHEXOL 90 ML: 350 INJECTION, SOLUTION INTRAVENOUS at 22:28

## 2025-07-20 RX ADMIN — OXYCODONE HYDROCHLORIDE 10 MG: 5 TABLET ORAL at 08:18

## 2025-07-20 RX ADMIN — CYCLOBENZAPRINE 10 MG: 10 TABLET, FILM COATED ORAL at 21:54

## 2025-07-20 RX ADMIN — OXYCODONE HYDROCHLORIDE 10 MG: 5 TABLET ORAL at 15:27

## 2025-07-20 RX ADMIN — POLYETHYLENE GLYCOL 3350 17 G: 17 POWDER, FOR SOLUTION ORAL at 08:19

## 2025-07-20 RX ADMIN — SODIUM CHLORIDE, SODIUM LACTATE, POTASSIUM CHLORIDE, AND CALCIUM CHLORIDE 75 ML/HR: .6; .31; .03; .02 INJECTION, SOLUTION INTRAVENOUS at 16:29

## 2025-07-20 RX ADMIN — PANTOPRAZOLE SODIUM 40 MG: 40 TABLET, DELAYED RELEASE ORAL at 21:54

## 2025-07-20 RX ADMIN — INSULIN LISPRO 2 UNITS: 100 INJECTION, SOLUTION INTRAVENOUS; SUBCUTANEOUS at 21:53

## 2025-07-20 RX ADMIN — HEPARIN SODIUM 5000 UNITS: 5000 INJECTION INTRAVENOUS; SUBCUTANEOUS at 15:27

## 2025-07-20 RX ADMIN — NITROFURANTOIN MONOHYDRATE/MACROCRYSTALLINE 100 MG: 25; 75 CAPSULE ORAL at 08:18

## 2025-07-20 RX ADMIN — GABAPENTIN 600 MG: 300 CAPSULE ORAL at 08:17

## 2025-07-20 RX ADMIN — SENNOSIDES AND DOCUSATE SODIUM 2 TABLET: 50; 8.6 TABLET ORAL at 08:18

## 2025-07-20 RX ADMIN — ACETAMINOPHEN 650 MG: 325 TABLET, FILM COATED ORAL at 02:52

## 2025-07-20 RX ADMIN — PANTOPRAZOLE SODIUM 40 MG: 40 TABLET, DELAYED RELEASE ORAL at 08:18

## 2025-07-20 RX ADMIN — ASPIRIN 81 MG: 81 TABLET, COATED ORAL at 08:00

## 2025-07-20 RX ADMIN — GABAPENTIN 600 MG: 300 CAPSULE ORAL at 21:54

## 2025-07-20 RX ADMIN — GABAPENTIN 600 MG: 300 CAPSULE ORAL at 14:57

## 2025-07-20 RX ADMIN — CEFTRIAXONE 1 G: 1 INJECTION, SOLUTION INTRAVENOUS at 13:39

## 2025-07-20 RX ADMIN — HEPARIN SODIUM 5000 UNITS: 5000 INJECTION INTRAVENOUS; SUBCUTANEOUS at 00:02

## 2025-07-20 RX ADMIN — CYCLOBENZAPRINE 10 MG: 10 TABLET, FILM COATED ORAL at 14:57

## 2025-07-20 RX ADMIN — TAMSULOSIN HYDROCHLORIDE 0.4 MG: 0.4 CAPSULE ORAL at 21:54

## 2025-07-20 ASSESSMENT — PAIN - FUNCTIONAL ASSESSMENT
PAIN_FUNCTIONAL_ASSESSMENT: 0-10

## 2025-07-20 ASSESSMENT — COGNITIVE AND FUNCTIONAL STATUS - GENERAL
TURNING FROM BACK TO SIDE WHILE IN FLAT BAD: A LOT
WALKING IN HOSPITAL ROOM: A LITTLE
CLIMB 3 TO 5 STEPS WITH RAILING: TOTAL
MOVING FROM LYING ON BACK TO SITTING ON SIDE OF FLAT BED WITH BEDRAILS: A LOT
STANDING UP FROM CHAIR USING ARMS: A LITTLE
MOVING TO AND FROM BED TO CHAIR: A LITTLE
MOBILITY SCORE: 14

## 2025-07-20 ASSESSMENT — PAIN DESCRIPTION - DESCRIPTORS: DESCRIPTORS: ACHING;SORE

## 2025-07-20 ASSESSMENT — PAIN SCALES - GENERAL
PAINLEVEL_OUTOF10: 7
PAINLEVEL_OUTOF10: 5 - MODERATE PAIN
PAINLEVEL_OUTOF10: 6
PAINLEVEL_OUTOF10: 8
PAINLEVEL_OUTOF10: 3
PAINLEVEL_OUTOF10: 0 - NO PAIN
PAINLEVEL_OUTOF10: 8
PAINLEVEL_OUTOF10: 3

## 2025-07-20 ASSESSMENT — PAIN DESCRIPTION - LOCATION
LOCATION: BACK
LOCATION: BACK

## 2025-07-20 NOTE — SIGNIFICANT EVENT
RAPID RESPONSE NOTE    Reason for Rapid Response:   []    BAT []  New CPAP/BiPAP []  Bleeding [x]  Change in mental status   []  Chest pain []  Code blue []  FiO2 >/= 50% []  HR </= 40 bpm   []  HR >/= 130 bpm []  Hyperglycemia []  Hypoglycemia []  SpO2 < 90%    []  RR </= 8 bpm []  RR >/= 30 bpm []  SBP </= 90 mmHg []  Seizure   [x]  Staff concern: Diaphoretic, somnolent, tachycardic, febrile         Vital Signs on Arrival:   Febrile earlier today but most recently 99.5F, -120s, RR 18-20, /65    Focused Physical Exam:   General: Somnolent, but conversant and answering questions appropriately  Cardiac: regular rate and rhythm  Pulm: Clear to auscultation  Abdomen: No tenderness appreciated  Extremities: No pitting edema appreciated  Neuro: No focal neurologic deficits appreciated    Workup:  - ABG, CBC, RFP, blood cultures, CXR    Intervention:  - IV fluids by primary team  - Abx broadened to ceftriaxone by primary team, per urine cultures growing Klebsiella    Working Diagnosis/Impression:   Infection given fever and tachycardia, likely urinary tract source being treated with IV ceftriaxone broadened from nitrofurantoin.     Conclusion:   [x] Patient stable - will continue to monitor  [] Patient failed to improve (pertinent vitals ), will notify ICU fellow    ICU Notified:   [] Yes  [x] No    Disposition: RNF - patient vitals stable, patient has somnolence but responsive and answers questions appropriately    If staying on floor: Plan for follow up - primary team to follow up workup    ADDENDUM:  Rapid called again 7:25PM for desaturation, nurse reports desat was as low as 40%s while patient was sleeping. He was difficult to arouse but eventually able to be awakened and sat improved to >90%. Desat thought due to patient known BIANCA.   Discussed with primary team the following:  - Patient to be placed on CPAP at all times while sleeping  - If mentation not improving and still hard to arouse/somnolent,  repeat blood gas  - If hemodynamically unstable/concern for sepsis, broaden abx and repeat gas  - Maintain patient on telemetry    Manuel Delarosa MD  PGY-3 Internal Medicine/DACR

## 2025-07-20 NOTE — SIGNIFICANT EVENT
Rapid Response Nurse Note: RADAR alert: 7    Pager time:   Arrival time:   Event end time:   Location:  206  [] Triage by phone or secure messaging    Rapid response initiated by:  [] Rapid response RN [] Family [] Nursing Supervisor [] Physician   [x] RADAR auto page [] Sepsis auto-page [] RN [] RT   [] NP/PA [] Other:     Primary reason for call:   [] BAT [] New CPAP/BiPAP [] Bleeding [] Change in mental status   [] Chest pain [] Code blue [] FiO2 >/= 50% [] HR </= 40 bpm   [] HR >/= 130 bpm [] Hyperglycemia [] Hypoglycemia [x] RADAR    [] RR </= 8 bpm [] RR >/= 30 bpm [] SBP </= 90 mmHg [] SpO2 < 90%   [] Seizure [] Sepsis [] Shortness of breath  [] Staff concern: see comments     Initial VS and/or RADAR VS: T 38.7 °C; ; RR 18; /73; SPO2 92%.    Providers present at bedside (if applicable):     Name of ICU Provider contacted (if applicable):     Interventions:  [x] None [] ABG/VBG [] Assist w/ICU transfer [] BAT paged    [] Bag mask [] Blood [] Cardioversion [] Code Blue   [] Code blue for intubation [] Code status changed [] Chest x-ray [] EKG   [] IV fluid/bolus [] KUB x-ray [] Labs/cultures [] Medication   [] Nebulizer treatment [] NIPPV (CPAP/BiPAP) [] Oxygen [] Oral airway   [] Peripheral IV [] Palliative care consult [] CT/MRI [] Sepsis protocol    [] Suctioned [] Other:     Outcome:  [] Coded and  [] Code blue for intubation [] Coded and transferred to ICU []  on division   [x] Remained on division (no change) [] Remained on division + additional monitoring [] Remained in ED [] Transferred to ED   [] Transferred to ICU [] Transferred to inpatient status [] Transferred for interventions (procedure) [] Transferred to ICU stepdown    [] Transferred to surgery [] Transferred to telemetry [] Sepsis protocol [] STEMI protocol   [] Stroke protocol [x] Bedside nurse instructed to page rapid response for any concerns or acute change in condition/VS     Additional Comments:      Radar auto-page received for a radar score of 7 with the above listed vital signs.  Vital signs were confirmed and reviewed with the primary RN.  He is at his current baseline and the primary RN.  There are no indications for interventions by Rapid Response at this time.  RN to contact Rapid Response with any future concerns or signs of clinical decompensation.     ADDENDUM    ~1630 notified by primary RN that he continues to be febrile and now is significantly diaphoretic.  Additionally with increased somnolence and confusion per spouse.    At the bedside, skin is hot.  He is somnolent - falling asleep during interview.  Largely oriented x 3-4.    Reviewed plan of care with neurosurgery resident Dr. Lyn.  Blood cultures x2, CBC, VBG, RFP, Chest x-ray ordered.    Currently on ceftriaxone due to klebsiella UTI.    To remain on HH2 for now.

## 2025-07-20 NOTE — PROGRESS NOTES
"Jerman Colón Jr. is a 64 y.o. male on day 6 of admission presenting with Lumbar radicular pain.    Subjective   CPAPing overnight, no events overnight        Objective     Physical Exam  No acute distress  A&Ox3  OU3R, EOMI   Face symmetric, Facial SILT   Tongue midline and symmetric  Shoulder shrugs symmetric  Hearing intact to finger rubs bilaterally  RUE D5 / B5 / T5 / HG 5/ IO 5  LUE D5 / B5 / T5 / HG 5/ IO 5  RLE HF5 / KE 5/ DF 5/ PF 5  LLE HF5 / KE 5/ DF 5/ PF 5  Negative barron  No clonus  SILT        Last Recorded Vitals  Blood pressure 131/66, pulse 108, temperature 36.6 °C (97.9 °F), temperature source Temporal, resp. rate 21, height 1.854 m (6' 1\"), weight 127 kg (280 lb 3.3 oz), SpO2 99%.  Intake/Output last 3 Shifts:  I/O last 3 completed shifts:  In: 2854 (22.5 mL/kg) [P.O.:2354; IV Piggyback:500]  Out: 1930 (15.2 mL/kg) [Urine:1800 (0.4 mL/kg/hr); Drains:130]  Weight: 127.1 kg     Relevant Results  Results for orders placed or performed during the hospital encounter of 07/14/25 (from the past 24 hours)   Lactate   Result Value Ref Range    Lactate 1.4 0.4 - 2.0 mmol/L   POCT GLUCOSE   Result Value Ref Range    POCT Glucose 130 (H) 74 - 99 mg/dL   POCT GLUCOSE   Result Value Ref Range    POCT Glucose 151 (H) 74 - 99 mg/dL   POCT GLUCOSE   Result Value Ref Range    POCT Glucose 160 (H) 74 - 99 mg/dL   POCT GLUCOSE   Result Value Ref Range    POCT Glucose 134 (H) 74 - 99 mg/dL   Renal function panel   Result Value Ref Range    Glucose 159 (H) 74 - 99 mg/dL    Sodium 137 136 - 145 mmol/L    Potassium 3.9 3.5 - 5.3 mmol/L    Chloride 101 98 - 107 mmol/L    Bicarbonate 31 21 - 32 mmol/L    Anion Gap 9 (L) 10 - 20 mmol/L    Urea Nitrogen 17 6 - 23 mg/dL    Creatinine 1.04 0.50 - 1.30 mg/dL    eGFR 80 >60 mL/min/1.73m*2    Calcium 8.5 (L) 8.6 - 10.6 mg/dL    Phosphorus 2.2 (L) 2.5 - 4.9 mg/dL    Albumin 3.2 (L) 3.4 - 5.0 g/dL   CBC   Result Value Ref Range    WBC 6.7 4.4 - 11.3 x10*3/uL    nRBC 0.0 " 0.0 - 0.0 /100 WBCs    RBC 4.21 (L) 4.50 - 5.90 x10*6/uL    Hemoglobin 8.8 (L) 13.5 - 17.5 g/dL    Hematocrit 31.4 (L) 41.0 - 52.0 %    MCV 75 (L) 80 - 100 fL    MCH 20.9 (L) 26.0 - 34.0 pg    MCHC 28.0 (L) 32.0 - 36.0 g/dL    RDW 20.1 (H) 11.5 - 14.5 %    Platelets 192 150 - 450 x10*3/uL      XR chest 1 view  Result Date: 7/19/2025  Interpreted By:  Tere Abdi, STUDY: Chest, single AP view.   INDICATION: Signs/Symptoms:Fevers.   COMPARISON: XR CHEST 1 VIEW 1/25/2025.   ACCESSION NUMBER(S): [id number]   ORDERING CLINICIAN: ANGELIC DEL REAL   FINDINGS: The cardiac silhouette size is within normal limits. There is no focal consolidation, edema or pneumothorax. No sizeable pleural effusion. No acute osseous abnormality. Partially visualized fusion changes extending from lower cervical spine into the upper thoracic spine.       1. No acute cardiopulmonary process.   MACRO: None.   Signed by: Tere Abdi 7/19/2025 7:54 AM Dictation workstation:   [id number]          Assessment & Plan  Lumbar radicular pain    Lumbar foraminal stenosis    Lumbar radiculopathy, chronic    65yo male w h/o prior L5-S1 fusion, hyperlipidemia, hypertension, CAD, sleep apnea (central/obstructive), asthma, GERD, CKD, BPH, anemia, 1/20/25 C4-6 ACDF/C2-T4 PCDF p/w BLE radiculopathy, 7/14 s/p L3-S1 lami and facetectomies/extension/revision (), 7/15 hgb 7.1 s/p 1u pRBC, post Tx cbc 7.5, s/p 1u pRBC. 7/18 drain auto dc'd    Plan:   NURA, likely tele this AM  Monitor HR and respiratory status   Macrobid for UTI, Ucx klebsiella   Restart BP meds when able  continue CPAP at night  Follow up EEG final read, if negative will dc  PTOT - rehab    Lupillo De Jesus MD

## 2025-07-20 NOTE — PROGRESS NOTES
Physical Therapy    Physical Therapy Treatment    Patient Name: Jerman Colón Jr.  MRN: [id number]  Department: [place] [address]  Room: 206/206A  Today's Date: 7/20/2025  Time Calculation  Start Time: 0910  Stop Time: 0942  Time Calculation (min): 32 min         Assessment/Plan   PT Assessment  End of Session Communication: Bedside nurse  Assessment Comment: Pt continues to benefit from skilled PT, ambulation distance limited this session by persistent tachycardia. Pt remains highly motivated  End of Session Patient Position: Up in chair, Alarm on  PT Plan  Inpatient/Swing Bed or Outpatient: Inpatient  PT Plan  Treatment/Interventions: Bed mobility, Transfer training, Gait training, Balance training, Neuromuscular re-education, Strengthening, Endurance training, Therapeutic exercise, Therapeutic activity, Home exercise program, Postural re-education, Orthotic fitting/training  PT Plan: Ongoing PT  PT Frequency: Daily  PT Discharge Recommendations: High intensity level of continued care  Equipment Recommended upon Discharge:  (TBD)  PT Recommended Transfer Status: Assist x1, Assistive device  PT - OK to Discharge: Yes    PT Visit Info:  PT Received On: 07/20/25     General Visit Information:   General  Prior to Session Communication: Bedside nurse  Patient Position Received: Bed, 3 rail up, Alarm on  General Comment: Pt pleasant and agreeable to PT. on 3L O2, vEEG    Subjective   Precautions:  Precautions  Medical Precautions: Fall precautions, Spinal precautions  Post-Surgical Precautions: Spinal precautions     Date/Time Vitals Session Patient Position Pulse Resp SpO2 BP MAP (mmHg)    07/20/25 0910 Pre PT  --  128  21  97 %  131/60  --     07/20/25 0942 Post PT  --  121  20  98 %  107/64  76     07/20/25 1032 --  --  116  18  92 %  138/73  95      Vital Signs Comment: during ambulation HR high 130s     Objective   Pain:  Pain Assessment  Pain Assessment: 0-10  0-10 (Numeric) Pain Score: 7  Pain Type: Surgical  pain  Pain Location: Back  Pain Orientation: Mid  Pain Interventions: Repositioned  Cognition:  Cognition  Overall Cognitive Status: Within Functional Limits  Arousal/Alertness: Appropriate responses to stimuli  Orientation Level: Oriented X4    Activity Tolerance:  Activity Tolerance  Endurance: Tolerates 10 - 20 min exercise with multiple rests  Early Mobility/Exercise Safety Screen: Proceed with mobilization - No exclusion criteria met  Treatments:       Therapeutic Activity  Therapeutic Activity Performed: Yes  Therapeutic Activity 1: Pt sat EOB 6 minutes with SBA prior to gait training for skilled vitals monitoring 2/2 lighteadedness and tachycardia         Bed Mobility  Bed Mobility: Yes  Bed Mobility 1  Bed Mobility 1: Supine to sitting  Level of Assistance 1: Moderate assistance  Bed Mobility Comments 1: HOB elevated, log roll    Ambulation/Gait Training  Ambulation/Gait Training Performed: Yes  Ambulation/Gait Training 1  Surface 1: Level tile  Device 1: Rolling walker  Assistance 1: Contact guard  Quality of Gait 1: Wide base of support, Forward flexed posture, Shuffling gait  Comments/Distance (ft) 1: 100ft x1, cues for posture and foot clearance  Transfers  Transfer: Yes  Transfer 1  Transfer From 1: Bed to, Stand to  Transfer to 1: Stand, Chair with arms  Transfer Device 1: Walker  Transfer Level of Assistance 1: Minimum assistance  Trials/Comments 1: cues for hand placement         Outcome Measures:  [facility] Basic Mobility  Turning from your back to your side while in a flat bed without using bedrails: A lot  Moving from lying on your back to sitting on the side of a flat bed without using bedrails: A lot  Moving to and from bed to chair (including a wheelchair): A little  Standing up from a chair using your arms (e.g. wheelchair or bedside chair): A little  To walk in hospital room: A little  Climbing 3-5 steps with railing: Total  Basic Mobility - Total Score: 14    FSS-ICU  Ambulation: Walks >/ or  equal to 50 feet with any assistance x1  Rolling: Minimal assistance (performs 75% or more of task)  Sitting: Supervision or set-up only  Transfer Sit-to-Stand: Minimal assistance (performs 75% or more of task)  Transfer Supine-to-Sit: Moderate assistance (performs 50 - 74% of task)  Total Score: 18      Early Mobility/Exercise Safety Screen: Proceed with mobilization - No exclusion criteria met  ICU Mobility Scale: Walking with assistance of 1 person [8]    Education Documentation  Precautions, taught by Myah Fang PT at 7/20/2025 11:06 AM.  Learner: Patient  Readiness: Acceptance  Method: Explanation  Response: Verbalizes Understanding  Comment: PT POC    Body Mechanics, taught by Myah Fang PT at 7/20/2025 11:06 AM.  Learner: Patient  Readiness: Acceptance  Method: Explanation  Response: Verbalizes Understanding  Comment: PT POC    Mobility Training, taught by Myah Fang PT at 7/20/2025 11:06 AM.  Learner: Patient  Readiness: Acceptance  Method: Explanation  Response: Verbalizes Understanding  Comment: PT POC    Education Comments  No comments found.        OP EDUCATION:       Encounter Problems       Encounter Problems (Active)       PT Problem       Patient will perform bed mobility with </= MIN A x1 to reduce risk of developing decubitus ulcers.  (Progressing)       Start:  07/15/25    Expected End:  07/29/25            Patient will perform sit to stand and stand to sit transfers with </= MIN A x1 and LRD to increase functional strength.  (Progressing)       Start:  07/15/25    Expected End:  07/29/25            Patient will ambulate at least 15 ft. with </= MOD A x1 and LRD to improve tolerance of household distances.  (Progressing)       Start:  07/15/25    Expected End:  07/29/25            BLE 5/5 (Progressing)       Start:  07/15/25    Expected End:  07/29/25            Patient will stand with UE support of LRD and </= CGA at least 2 min to improve balance required for self-care tasks.   (Progressing)       Start:  07/15/25    Expected End:  07/29/25                 Myah Fang PT

## 2025-07-20 NOTE — CARE PLAN
The patient's goals for the shift include  Patient will maintain safety       The clinical goals for the shift include Patient will report decrease in pain this shift

## 2025-07-21 ENCOUNTER — APPOINTMENT (OUTPATIENT)
Dept: VASCULAR MEDICINE | Facility: HOSPITAL | Age: 65
DRG: 447 | End: 2025-07-21
Payer: MEDICARE

## 2025-07-21 ENCOUNTER — APPOINTMENT (OUTPATIENT)
Dept: CARDIOLOGY | Facility: HOSPITAL | Age: 65
End: 2025-07-21
Payer: MEDICARE

## 2025-07-21 LAB
ALBUMIN SERPL BCP-MCNC: 3.3 G/DL (ref 3.4–5)
ANION GAP SERPL CALC-SCNC: 12 MMOL/L (ref 10–20)
AORTIC VALVE PEAK VELOCITY: 1.44 M/S
APTT PPP: 23 SECONDS (ref 26–36)
AV PEAK GRADIENT: 8 MMHG
AVA (PEAK VEL): 2.77 CM2
BUN SERPL-MCNC: 19 MG/DL (ref 6–23)
CA-I BLD-SCNC: 1.17 MMOL/L (ref 1.1–1.33)
CALCIUM SERPL-MCNC: 8.6 MG/DL (ref 8.6–10.6)
CHLORIDE SERPL-SCNC: 99 MMOL/L (ref 98–107)
CO2 SERPL-SCNC: 34 MMOL/L (ref 21–32)
CREAT SERPL-MCNC: 1.25 MG/DL (ref 0.5–1.3)
EGFRCR SERPLBLD CKD-EPI 2021: 64 ML/MIN/1.73M*2
EJECTION FRACTION APICAL 4 CHAMBER: 56.3
EJECTION FRACTION: 58 %
ERYTHROCYTE [DISTWIDTH] IN BLOOD BY AUTOMATED COUNT: 20.6 % (ref 11.5–14.5)
GLUCOSE BLD MANUAL STRIP-MCNC: 168 MG/DL (ref 74–99)
GLUCOSE BLD MANUAL STRIP-MCNC: 168 MG/DL (ref 74–99)
GLUCOSE BLD MANUAL STRIP-MCNC: 180 MG/DL (ref 74–99)
GLUCOSE BLD MANUAL STRIP-MCNC: 189 MG/DL (ref 74–99)
GLUCOSE SERPL-MCNC: 128 MG/DL (ref 74–99)
HCT VFR BLD AUTO: 35.2 % (ref 41–52)
HGB BLD-MCNC: 9.7 G/DL (ref 13.5–17.5)
HOLD SPECIMEN: NORMAL
LEFT ATRIUM VOLUME AREA LENGTH INDEX BSA: 33.1 ML/M2
LEFT VENTRICLE INTERNAL DIMENSION DIASTOLE: 5.4 CM (ref 3.5–6)
LEFT VENTRICULAR OUTFLOW TRACT DIAMETER: 2.1 CM
MAGNESIUM SERPL-MCNC: 2.26 MG/DL (ref 1.6–2.4)
MCH RBC QN AUTO: 21.2 PG (ref 26–34)
MCHC RBC AUTO-ENTMCNC: 27.6 G/DL (ref 32–36)
MCV RBC AUTO: 77 FL (ref 80–100)
NRBC BLD-RTO: 0.2 /100 WBCS (ref 0–0)
PHOSPHATE SERPL-MCNC: 3.3 MG/DL (ref 2.5–4.9)
PLATELET # BLD AUTO: 192 X10*3/UL (ref 150–450)
POTASSIUM SERPL-SCNC: 4.2 MMOL/L (ref 3.5–5.3)
RBC # BLD AUTO: 4.58 X10*6/UL (ref 4.5–5.9)
RIGHT VENTRICLE FREE WALL PEAK S': 13.1 CM/S
SODIUM SERPL-SCNC: 141 MMOL/L (ref 136–145)
TRICUSPID ANNULAR PLANE SYSTOLIC EXCURSION: 2.5 CM
UFH PPP CHRO-ACNC: 0.1 IU/ML (ref ?–1.1)
UFH PPP CHRO-ACNC: <0.1 IU/ML (ref ?–1.1)
WBC # BLD AUTO: 8.2 X10*3/UL (ref 4.4–11.3)

## 2025-07-21 PROCEDURE — 2500000002 HC RX 250 W HCPCS SELF ADMINISTERED DRUGS (ALT 637 FOR MEDICARE OP, ALT 636 FOR OP/ED)

## 2025-07-21 PROCEDURE — 82330 ASSAY OF CALCIUM: CPT

## 2025-07-21 PROCEDURE — 99222 1ST HOSP IP/OBS MODERATE 55: CPT | Performed by: INTERNAL MEDICINE

## 2025-07-21 PROCEDURE — 2500000004 HC RX 250 GENERAL PHARMACY W/ HCPCS (ALT 636 FOR OP/ED)

## 2025-07-21 PROCEDURE — 85730 THROMBOPLASTIN TIME PARTIAL: CPT

## 2025-07-21 PROCEDURE — 51701 INSERT BLADDER CATHETER: CPT

## 2025-07-21 PROCEDURE — 85027 COMPLETE CBC AUTOMATED: CPT

## 2025-07-21 PROCEDURE — 94660 CPAP INITIATION&MGMT: CPT

## 2025-07-21 PROCEDURE — 36415 COLL VENOUS BLD VENIPUNCTURE: CPT

## 2025-07-21 PROCEDURE — 2020000001 HC ICU ROOM DAILY

## 2025-07-21 PROCEDURE — 93970 EXTREMITY STUDY: CPT

## 2025-07-21 PROCEDURE — 93970 EXTREMITY STUDY: CPT | Performed by: INTERNAL MEDICINE

## 2025-07-21 PROCEDURE — 80069 RENAL FUNCTION PANEL: CPT

## 2025-07-21 PROCEDURE — 87040 BLOOD CULTURE FOR BACTERIA: CPT

## 2025-07-21 PROCEDURE — 93971 EXTREMITY STUDY: CPT | Performed by: INTERNAL MEDICINE

## 2025-07-21 PROCEDURE — 2500000001 HC RX 250 WO HCPCS SELF ADMINISTERED DRUGS (ALT 637 FOR MEDICARE OP)

## 2025-07-21 PROCEDURE — 85520 HEPARIN ASSAY: CPT

## 2025-07-21 PROCEDURE — 83735 ASSAY OF MAGNESIUM: CPT

## 2025-07-21 PROCEDURE — 82947 ASSAY GLUCOSE BLOOD QUANT: CPT

## 2025-07-21 PROCEDURE — 93306 TTE W/DOPPLER COMPLETE: CPT

## 2025-07-21 PROCEDURE — 99291 CRITICAL CARE FIRST HOUR: CPT

## 2025-07-21 PROCEDURE — 87075 CULTR BACTERIA EXCEPT BLOOD: CPT

## 2025-07-21 PROCEDURE — C8929 TTE W OR WO FOL WCON,DOPPLER: HCPCS

## 2025-07-21 RX ORDER — HEPARIN SODIUM 10000 [USP'U]/100ML
0-4000 INJECTION, SOLUTION INTRAVENOUS CONTINUOUS
Status: DISPENSED | OUTPATIENT
Start: 2025-07-21 | End: 2025-07-26

## 2025-07-21 RX ADMIN — OXYCODONE HYDROCHLORIDE 5 MG: 5 TABLET ORAL at 08:06

## 2025-07-21 RX ADMIN — ACETAMINOPHEN 650 MG: 325 TABLET, FILM COATED ORAL at 21:08

## 2025-07-21 RX ADMIN — SENNOSIDES AND DOCUSATE SODIUM 2 TABLET: 50; 8.6 TABLET ORAL at 21:08

## 2025-07-21 RX ADMIN — INSULIN LISPRO 1 UNITS: 100 INJECTION, SOLUTION INTRAVENOUS; SUBCUTANEOUS at 11:25

## 2025-07-21 RX ADMIN — GABAPENTIN 600 MG: 300 CAPSULE ORAL at 08:00

## 2025-07-21 RX ADMIN — CYCLOBENZAPRINE 10 MG: 10 TABLET, FILM COATED ORAL at 21:08

## 2025-07-21 RX ADMIN — HUMAN ALBUMIN MICROSPHERES AND PERFLUTREN 0.5 ML: 10; .22 INJECTION, SOLUTION INTRAVENOUS at 07:49

## 2025-07-21 RX ADMIN — OXYCODONE HYDROCHLORIDE 10 MG: 5 TABLET ORAL at 12:16

## 2025-07-21 RX ADMIN — CYCLOBENZAPRINE 10 MG: 10 TABLET, FILM COATED ORAL at 08:01

## 2025-07-21 RX ADMIN — INSULIN LISPRO 1 UNITS: 100 INJECTION, SOLUTION INTRAVENOUS; SUBCUTANEOUS at 21:16

## 2025-07-21 RX ADMIN — POLYETHYLENE GLYCOL 3350 17 G: 17 POWDER, FOR SOLUTION ORAL at 21:08

## 2025-07-21 RX ADMIN — INSULIN LISPRO 1 UNITS: 100 INJECTION, SOLUTION INTRAVENOUS; SUBCUTANEOUS at 08:01

## 2025-07-21 RX ADMIN — ACETAMINOPHEN 650 MG: 325 TABLET, FILM COATED ORAL at 14:30

## 2025-07-21 RX ADMIN — HEPARIN SODIUM 1000 UNITS/HR: 10000 INJECTION, SOLUTION INTRAVENOUS at 06:44

## 2025-07-21 RX ADMIN — CYCLOBENZAPRINE 10 MG: 10 TABLET, FILM COATED ORAL at 14:30

## 2025-07-21 RX ADMIN — TAMSULOSIN HYDROCHLORIDE 0.4 MG: 0.4 CAPSULE ORAL at 21:08

## 2025-07-21 RX ADMIN — ATORVASTATIN CALCIUM 40 MG: 40 TABLET, FILM COATED ORAL at 21:08

## 2025-07-21 RX ADMIN — PANTOPRAZOLE SODIUM 40 MG: 40 TABLET, DELAYED RELEASE ORAL at 21:08

## 2025-07-21 RX ADMIN — CEFTRIAXONE 1 G: 1 INJECTION, SOLUTION INTRAVENOUS at 12:46

## 2025-07-21 RX ADMIN — HYDROMORPHONE HYDROCHLORIDE 0.2 MG: 1 INJECTION, SOLUTION INTRAMUSCULAR; INTRAVENOUS; SUBCUTANEOUS at 10:03

## 2025-07-21 RX ADMIN — OXYCODONE HYDROCHLORIDE 5 MG: 5 TABLET ORAL at 03:29

## 2025-07-21 RX ADMIN — ASPIRIN 81 MG: 81 TABLET, COATED ORAL at 08:00

## 2025-07-21 RX ADMIN — ACETAMINOPHEN 650 MG: 325 TABLET, FILM COATED ORAL at 03:29

## 2025-07-21 RX ADMIN — INSULIN LISPRO 1 UNITS: 100 INJECTION, SOLUTION INTRAVENOUS; SUBCUTANEOUS at 15:38

## 2025-07-21 RX ADMIN — GABAPENTIN 600 MG: 300 CAPSULE ORAL at 21:08

## 2025-07-21 RX ADMIN — OXYCODONE HYDROCHLORIDE 10 MG: 5 TABLET ORAL at 16:15

## 2025-07-21 RX ADMIN — GABAPENTIN 600 MG: 300 CAPSULE ORAL at 14:30

## 2025-07-21 RX ADMIN — HEPARIN SODIUM 1700 UNITS/HR: 10000 INJECTION, SOLUTION INTRAVENOUS at 19:21

## 2025-07-21 RX ADMIN — PANTOPRAZOLE SODIUM 40 MG: 40 TABLET, DELAYED RELEASE ORAL at 08:00

## 2025-07-21 RX ADMIN — ACETAMINOPHEN 650 MG: 325 TABLET, FILM COATED ORAL at 08:49

## 2025-07-21 ASSESSMENT — PAIN SCALES - GENERAL
PAINLEVEL_OUTOF10: 10 - WORST POSSIBLE PAIN
PAINLEVEL_OUTOF10: 6
PAINLEVEL_OUTOF10: 8
PAINLEVEL_OUTOF10: 4
PAINLEVEL_OUTOF10: 6
PAINLEVEL_OUTOF10: 7
PAINLEVEL_OUTOF10: 8

## 2025-07-21 ASSESSMENT — ENCOUNTER SYMPTOMS
FEVER: 1
FREQUENCY: 1

## 2025-07-21 ASSESSMENT — PAIN DESCRIPTION - LOCATION
LOCATION: BACK

## 2025-07-21 ASSESSMENT — PAIN - FUNCTIONAL ASSESSMENT
PAIN_FUNCTIONAL_ASSESSMENT: 0-10

## 2025-07-21 ASSESSMENT — PAIN DESCRIPTION - DESCRIPTORS
DESCRIPTORS: ACHING
DESCRIPTORS: ACHING

## 2025-07-21 NOTE — SIGNIFICANT EVENT
Pt is h/o prior L5-S1 fusion, HTN, HLD, CAD, sleep apnea (central/obstructive), asthma, GERD, CKD, BPH, anemia, 1/20/25 C4-6 ACDF/C2-T4 PCDF p/w BLE radiculopathy, 7/14 s/p L3-S1 lami and facetectomies/extension/revision (),    The patient was noted to be tachycardic and febrile during the day and also appeared diaphoretic. ABG and laboratory tests were obtained and were overall reassuring. The patient had a urinary tract infection and had been on Macrobid; antibiotic coverage was broadened to ceftriaxone due to persistent tachycardia and fever. A sepsis workup was initiated, including repeat blood cultures and a lactate level. The Medicine team (DACR) evaluated the patient at the bedside and recommended repeating the ABG if the patient remains drowsy.    At approximately 19:25, the patient experienced a single episode of desaturation while sleeping and was subsequently placed on CPAP. Given the desaturation in the context of ongoing tachycardia and fever, a CT pulmonary embolism (PE) study was ordered. Preliminary CT PE results indicate a nonocclusive PE in the segmental left lower lobe (LLL) pulmonary artery and near occlusive PE in the subsegmental LLL pulmonary artery.    The patient was transferred to the Neuro ICU for closer monitoring and possible restart of aspirin (ASA). The patient’s wife was informed and updated on the clinical status.    Patricia Lyn MD

## 2025-07-21 NOTE — NURSING NOTE
Message MD Patricia Lyn at 10:54 reported elevated temp and lethargy. MD added Ceftraixone.    Reported urinary retention with bladder scan of 744, straight cath and got 950 ml with several blood clots.     At 15:08 reported that Patient after giving tylenol PO and apply Ice packs patients temp was still 38.9, still lethargic and now diaphoretic, tachycardic. Order IV LR 75 ml/h     At 1604 asked MD to please come to bedside and elevate patient. Message rapid response nurse.     Chest Xray ordered. Labs and blood cultures.     At shift change a change in mental status difficulty waking, diaphoretic, febrile, tachycardic and pulse ox dropping as low as 44 . Place patient on a non rebreathe.     Patient ordered CPAP, placed on telemetry and continuous pulse ox. If mentation does not improve repeat blood gases.

## 2025-07-21 NOTE — CONSULTS
Inpatient consult to Vascular Medicine  Consult performed by: Jhony Faye, APRN-CNP  Consult ordered by: Paul Patel MD  Reason for consult: Plumonary embolisims anticoagulants      Reason For Consult  Anticoagulation management of provoked Pulmonary Embolism     History Of Present Illness  Jerman Colón Jr. is a 64 y.o. male  with a past medical history of hyperlipidemia, hypertension, CAD, sleep apnea, asthma, GERD, CKD, BPH, anemia, arthritis, lumbar disc disease, spinal stenosis presenting for scheduled removal of previous L5-S1 instrumentation, exploration of previous L5-S1 fusion, L3-L4, L4-L5 and L5-S1 and fasciotomy for decompression with L2-S1; Instrumentation and pelvic fixation bilaterally  completed on 7/14/2025.     Patient's stay was complicated by rapid response called on 7/20/2025 for change in mental status and being somnolent  Patient's heart rate was between 110-120 with respirations between 18- 20, and he desaturated down to the 40's Respiratory was called for oxygen desaturation, placed on CPAP and CT of chest was obtained and showed a nonocclusive pulmonary embolism in the segmental left lower lobe (LLL) pulmonary artery and near occlusive pulmonary embolism in the subsegmental LLL pulmonary artery.  Patient was started on a heparin drip.    Vascular Medicine was consulted for further anticoagulation management    Pt denies prior history or FH of thrombosis   PSA elevated, following with urology      Past Medical History  He has a past medical history of Anemia, Arthritis, Asthma, BPH (benign prostatic hyperplasia), Cervical myelopathy, COPD (chronic obstructive pulmonary disease) (Multi), Coronary artery disease, GERD (gastroesophageal reflux disease), History of recent steroid use, Hyperlipidemia, Hypertension, Joint pain, Lumbar disc disease, Nephrolithiasis, Polycythemia, PONV (postoperative nausea and vomiting), Proteinuria, Sepsis (Multi), Skin disorder, Sleep apnea,  Spinal stenosis, and Vocal cord mass.    He has no past medical history of History of blood transfusion, Malignant hyperthermia, or Refusal of blood product.    Surgical History  He has a past surgical history that includes Total shoulder arthroplasty (Bilateral); Tonsillectomy; Hernia repair; Dental surgery; Lumbar fusion (2018); Cardiac catheterization (Left, 09/2023); Colonoscopy; Skin biopsy; and Cervical fusion.     Social History  He reports that he has never smoked. He has never been exposed to tobacco smoke. He has never used smokeless tobacco. He reports that he does not currently use alcohol. He reports that he does not use drugs.    Family History  Family History[1]     Allergies  Patient has no known allergies.    Review of Systems  No fevers, chills, night sweats, weight is stable  No sores, ulcers, rashes, skin lesions  No CP, palpitations or chest pressure  Positive for  cough and SOB  No BRBPR, melena, hematuria  No bleeding  Positive for edema on BLE, no calf pain  No paraesthesias  Patient reporting back pain 8/10       Last Recorded Vitals  /90 (BP Location: Right arm, Patient Position: Lying)   Pulse 110   Temp 37.6 °C (99.7 °F) (Temporal) Comment: MD zuniga notified of increasing temp. no new orders  Resp 20   Wt 129 kg (285 lb 7.9 oz)   SpO2 98%     Estimated Creatinine Clearance: 84.4 mL/min (by C-G formula based on SCr of 1.25 mg/dL).    General: acutely ill   Neuro: AAA   Chest: nieves CTA   CVS: tachycardia   Abdomen: soft, non tender   UE: no edema   LE: nieves LE edema, L>R, +2 DP     Medications   Scheduled medications  Scheduled Medications[2]  Continuous medications  Continuous Medications[3]  PRN medications  PRN Medications[4]     Lab Review   Recent Labs     07/21/25  0300 07/20/25  1735 07/20/25  0243 07/19/25  0412 07/18/25  0200 07/17/25  0053 07/16/25  0012 07/15/25  1127 07/15/25  0615 07/14/25  1457 01/27/25  0824 01/26/25  0657 01/25/25  0732 01/24/25  0036    137  "137 137 142 142 141 140  140 140   < > 137 141 141 137   K 4.2 3.5 3.9 3.9 4.4 4.5 4.4 4.4  4.4 5.9*   < > 4.5 4.0 4.1 4.9   CL 99 98 101 98 103 105 107 108*  108* 105   < > 99 100 102 104   CO2 34* 30 31 31 29 29 29 27  27 29   < > 32 34* 34* 25   ANIONGAP 12 13 9* 12 14 13 9* 9*  9* 12   < > 11 11 9* 13   BUN 19 19 17 15 13 21 22 28*  28* 25*   < > 20 22 22 19   CREATININE 1.25 1.29 1.04 1.05 0.77 0.90 1.02 1.52*  1.52* 1.16   < > 0.78 0.80 0.91 0.82   EGFR 64 62 80 79 >90 >90 82 51*  51* 70   < > >90 >90 >90 >90   MG 2.26 1.69  --   --   --   --  1.77  --  1.66  --  1.93 1.80 1.89 2.05    < > = values in this interval not displayed.     Recent Labs     07/21/25  0300 07/20/25  1735 07/20/25  0243 07/19/25  0412 07/18/25  0200   ALBUMIN 3.3* 3.1* 3.2* 3.1* 3.1*     Recent Labs     07/21/25  0300 07/20/25  0243 07/19/25  0412 07/18/25  1117 07/18/25  0200 07/17/25  0052 07/16/25  0012 07/15/25  1830   WBC 8.2 6.7 6.1 8.8 6.4 8.0 7.9 9.0   HGB 9.7* 8.8* 8.6* 9.4* 9.1* 8.8* 7.9* 7.5*   HCT 35.2* 31.4* 30.3* 31.5* 31.8* 29.5* 26.4* 27.7*    192 208 226 210 212 187 180   MCV 77* 75* 74* 72* 72* 73* 70* 74*     Recent Labs     06/27/25  1051 01/21/25  1815   INR 1.0 1.1   FIBRINOGEN  --  300     PTT - 7/21/2025:  6:05 AM  1.0   11.1 23     Estimated Creatinine Clearance: 84.4 mL/min (by C-G formula based on SCr of 1.25 mg/dL).    No results for input(s): \"CHOL\", \"LDLF\", \"HDL\", \"TRIG\" in the last 71662 hours.  Lab Results   Component Value Date    HGBA1C 5.7 (H) 03/22/2025     No results found for: \"TSH\"    Imaging  ECHO 7/21/25   1. Left ventricular ejection fraction is normal by visual estimate at 55-60%.   2. There is moderately increased posterior left ventricular wall thickness.   3. There is normal right ventricular global systolic function.   4. Mildly enlarged right ventricle.     CT angio chest for pulmonary embolism 7/21/2025  1. Nonocclusive central filling defects within a segmental left " lower  lobe pulmonary artery and near occlusive filling defects within  subsegmental left lower lobe pulmonary arteries are most consistent  with acute pulmonary emboli on this motion limited exam. No evident  right heart strain.  2. Subsegmental atelectasis within right-greater-than-left lower  lobes. Additional findings of potential excessive dynamic airway  collapse within the right lobar airways and left mainstem bronchus.  Correlate with clinical findings.  3. Mild chronic cardiomegaly.  4. Splenic artery aneurysm measuring up to 1.9 cm.     Assessment/Plan   64 y.o. male  with a past medical history of hyperlipidemia, hypertension, CAD, sleep apnea, asthma, GERD, CKD, BPH, anemia, arthritis, lumbar disc disease, spinal stenosis presenting for scheduled removal of previous hardware in the lumbar spine. Vascular Medicine was consulted for VTE and anticoagulation management    _Acute Seg/SS PE   Provoked in setting of hospitalization and surgical intervention   Pt denies prior history or FH of thrombosis     Plan   - Continue with low intensity heparin gtt, no bolus, monitor for bleeding, monitor hb and PLTs   - LE DVT US   - Will likely be discharged on Eliquis if compatible with his meds and labs are stable, please check for Eliquis affordability   - Patient will need to be seen by Vascular surgery for Splenic artery aneurysm ~1.9 cm when his acute issues subsides   - Atherosclerosis risk factors control   - Continue following with urology and his PCP for updated cancer screenings, especially with elevated PSA noted in 3/2025     CAROLINA Arellano-CNP  Vascular Medicine     I was present with the PA who participated in the documentation of this note. I have personally seen and re-examined the patient and performed the medical decision-making components (assessment and plan of care). I have reviewed the PA documentation and verified the findings in the note as written with additions or exceptions as stated in  the body of this note.    Rajni Bruner MD         [1]   Family History  Problem Relation Name Age of Onset    Other (htn) Mother      Diabetes Mother      Other (htn) Father      Heart attack Father      Lung cancer Mother's Sister     [2] acetaminophen, 650 mg, oral, q6h  aspirin, 81 mg, oral, Daily  atorvastatin, 40 mg, oral, Nightly  bisacodyl, 10 mg, rectal, Once  cefTRIAXone, 1 g, intravenous, q24h  cyclobenzaprine, 10 mg, oral, TID  gabapentin, 600 mg, oral, TID  insulin lispro, 0-5 Units, subcutaneous, Before meals & nightly  [Held by provider] losartan, 25 mg, oral, Daily  pantoprazole, 40 mg, oral, BID  perflutren lipid microspheres, 0.5-10 mL of dilution, intravenous, Once in imaging  polyethylene glycol, 17 g, oral, BID  sennosides-docusate sodium, 2 tablet, oral, BID  sulfur hexafluoride microsphr, 2 mL, intravenous, Once in imaging  tamsulosin, 0.4 mg, oral, Nightly  [3] heparin, 0-4,000 Units/hr, Last Rate: 1,000 Units/hr (07/21/25 1200)  lactated Ringer's, 75 mL/hr, Last Rate: Stopped (07/21/25 0300)  [4] PRN medications: albuterol, benzocaine-menthol, calcium gluconate, calcium gluconate, dextrose, dextrose, glucagon, glucagon, HYDROmorphone, lubricating eye drops, magnesium sulfate, magnesium sulfate, naloxone, ondansetron **OR** ondansetron, oxyCODONE, oxyCODONE, oxyCODONE, oxygen, potassium chloride CR **OR** potassium chloride, potassium chloride CR **OR** potassium chloride

## 2025-07-21 NOTE — PROGRESS NOTES
Communication Note    Patient Name: Jerman Colón Jr.  MRN: [id number]  Today's Date: 7/21/2025   Room: 04/04-A    Discipline: Occupational Therapy      Missed Visit Reason:  (Pt with new PE, started on Heparin, not therapeutic. Will hold OT at this time and reattempt as appropriate/able.)      07/21/25 at 3:34 PM   Sherry Lombardi OT   Rehab Office: [phone]

## 2025-07-21 NOTE — PROGRESS NOTES
Communication Note    Patient Name: Jerman Colón Jr.  MRN: [id number]  Today's Date: 7/21/2025   Room: 04/04-A    Discipline: Physical Therapy      PT Missed Visit: Yes  Missed Visit Reason:  (Pt with new PE, on heparin drip with heparin assay pending. Pt also pending DVT US UE and LE. Hold PT.)      07/21/25 at 9:23 AM   Stacey Lopez, PT   Rehab Office: [phone]

## 2025-07-21 NOTE — CARE PLAN
The patient's goals for the shift include  getting adequate rest to promote healing and pain management throughout the shift.    The clinical goals for the shift include pt will remain HDS throughout the shift

## 2025-07-21 NOTE — SIGNIFICANT EVENT
Rapid Response Respiratory Therapy Note        Respiratory Concerns:  []  None []  Increased WOB []  Shallow respirations []  Irregular Respirations   []  Tachypnea []  Bradypnea [x]  Oxygen desaturation []  Cyanosis   []  Poor Secretion Clearance []  Impaired/Weak Cough []  Copious Secretions []  Thick Secretions   []  Inability to Protect Airway []  Apnea []  Respiratory Arrest []  Shortness of Breath   [] Hemodynamic Instability []  Asymmetric Chest Rise []  Adventitious Breath Sounds []  Abnormal CXR   []  Aspiration []  Other:       Interventions:  []  None []  ABG/VBG []  Assist w/ICU transfer []  Bag-mask ventilation   []  Bronchial Hygiene []  Trach care/Suction []  ETCO2 []  CPR   []  Assist Intubation []  Venti Mask []  Chest x-ray []  CT/MRI transport    []  High Flow Therapy []  Igel  []  Nasal Airway []  NT Suctioning   []  Nebulizer treatment []  NIPPV (CPAP/BiPAP) []  Oxygen  Type:  FiO2:  Liter Flow: []  Oral Airway   []  Oral Suctioning []  Other:       Outcome:  []  Maintain on Division []  Transferred to ICU []  Transferred to ED []  Bedside nurse instructed to page Rapid Response for any concerns or acute change in condition/VS     Additional Comments: Rapid Response Respiratory Therapy Note      Rapid called for Desaturation and somnolence.  Placed on Avaps with a follow ABG based on PT's presentation

## 2025-07-21 NOTE — PROGRESS NOTES
"Jerman Colón Jr. is a 64 y.o. male on day 7 of admission presenting with Lumbar radicular pain.    Subjective   Febrile and tachycardic throughout yesterday afternoon. Episodic desaturations overnight while wearing CPAP. Required transfer back to ICU. CTE PE as well as infectious work up obtained.        Objective     Physical Exam  No acute distress  Breathing comfortably on CPAP overnight  A&Ox3  OU3R, EOMI   Face symmetric, Facial SILT   Hearing intact   RUE D5 / B5 / T5 / HG 5/ IO 5  LUE D5 / B5 / T5 / HG 5/ IO 5  RLE HF5 / KE 5/ DF 5/ PF 5  LLE HF5 / KE 5/ DF 5/ PF 5  SILT      Last Recorded Vitals  Blood pressure 105/63, pulse 108, temperature 36.1 °C (97 °F), temperature source Temporal, resp. rate 16, height 1.854 m (6' 1\"), weight 129 kg (285 lb 7.9 oz), SpO2 96%.  Intake/Output last 3 Shifts:  I/O last 3 completed shifts:  In: 2174.5 (16.8 mL/kg) [P.O.:900; I.V.:1274.5 (9.8 mL/kg)]  Out: 1800 (13.9 mL/kg) [Urine:1800 (0.4 mL/kg/hr)]  Weight: 129.5 kg     Relevant Results  Results for orders placed or performed during the hospital encounter of 07/14/25 (from the past 24 hours)   POCT GLUCOSE   Result Value Ref Range    POCT Glucose 113 (H) 74 - 99 mg/dL   POCT GLUCOSE   Result Value Ref Range    POCT Glucose 147 (H) 74 - 99 mg/dL   POCT GLUCOSE   Result Value Ref Range    POCT Glucose 252 (H) 74 - 99 mg/dL   Renal function panel   Result Value Ref Range    Glucose 241 (H) 74 - 99 mg/dL    Sodium 137 136 - 145 mmol/L    Potassium 3.5 3.5 - 5.3 mmol/L    Chloride 98 98 - 107 mmol/L    Bicarbonate 30 21 - 32 mmol/L    Anion Gap 13 10 - 20 mmol/L    Urea Nitrogen 19 6 - 23 mg/dL    Creatinine 1.29 0.50 - 1.30 mg/dL    eGFR 62 >60 mL/min/1.73m*2    Calcium 8.5 (L) 8.6 - 10.6 mg/dL    Phosphorus 2.2 (L) 2.5 - 4.9 mg/dL    Albumin 3.1 (L) 3.4 - 5.0 g/dL   Magnesium   Result Value Ref Range    Magnesium 1.69 1.60 - 2.40 mg/dL   Light Blue Top   Result Value Ref Range    Extra Tube Hold for add-ons.    Red " Top   Result Value Ref Range    Extra Tube Hold for add-ons.    SST TOP   Result Value Ref Range    Extra Tube Hold for add-ons.    Gray Top   Result Value Ref Range    Extra Tube Hold for add-ons.    POCT GLUCOSE   Result Value Ref Range    POCT Glucose 183 (H) 74 - 99 mg/dL   Blood Gas Arterial Full Panel   Result Value Ref Range    POCT pH, Arterial 7.36 (L) 7.38 - 7.42 pH    POCT pCO2, Arterial 60 (H) 38 - 42 mm Hg    POCT pO2, Arterial 180 (H) 85 - 95 mm Hg    POCT SO2, Arterial 99 94 - 100 %    POCT Oxy Hemoglobin, Arterial 97.9 94.0 - 98.0 %    POCT Hematocrit Calculated, Arterial 34.0 (L) 41.0 - 52.0 %    POCT Sodium, Arterial 134 (L) 136 - 145 mmol/L    POCT Potassium, Arterial 4.0 3.5 - 5.3 mmol/L    POCT Chloride, Arterial 100 98 - 107 mmol/L    POCT Ionized Calcium, Arterial 1.11 1.10 - 1.33 mmol/L    POCT Glucose, Arterial 161 (H) 74 - 99 mg/dL    POCT Lactate, Arterial 1.0 0.4 - 2.0 mmol/L    POCT Base Excess, Arterial 6.9 (H) -2.0 - 3.0 mmol/L    POCT HCO3 Calculated, Arterial 33.9 (H) 22.0 - 26.0 mmol/L    POCT Hemoglobin, Arterial 11.3 (L) 13.5 - 17.5 g/dL    POCT Anion Gap, Arterial 4 (L) 10 - 25 mmo/L    Patient Temperature 37.0 degrees Celsius    FiO2 40 %   Renal function panel   Result Value Ref Range    Glucose 128 (H) 74 - 99 mg/dL    Sodium 141 136 - 145 mmol/L    Potassium 4.2 3.5 - 5.3 mmol/L    Chloride 99 98 - 107 mmol/L    Bicarbonate 34 (H) 21 - 32 mmol/L    Anion Gap 12 10 - 20 mmol/L    Urea Nitrogen 19 6 - 23 mg/dL    Creatinine 1.25 0.50 - 1.30 mg/dL    eGFR 64 >60 mL/min/1.73m*2    Calcium 8.6 8.6 - 10.6 mg/dL    Phosphorus 3.3 2.5 - 4.9 mg/dL    Albumin 3.3 (L) 3.4 - 5.0 g/dL   CBC   Result Value Ref Range    WBC 8.2 4.4 - 11.3 x10*3/uL    nRBC 0.2 (H) 0.0 - 0.0 /100 WBCs    RBC 4.58 4.50 - 5.90 x10*6/uL    Hemoglobin 9.7 (L) 13.5 - 17.5 g/dL    Hematocrit 35.2 (L) 41.0 - 52.0 %    MCV 77 (L) 80 - 100 fL    MCH 21.2 (L) 26.0 - 34.0 pg    MCHC 27.6 (L) 32.0 - 36.0 g/dL    RDW 20.6  (H) 11.5 - 14.5 %    Platelets 192 150 - 450 x10*3/uL   Calcium, ionized   Result Value Ref Range    POCT Calcium, Ionized 1.17 1.1 - 1.33 mmol/L   Magnesium   Result Value Ref Range    Magnesium 2.26 1.60 - 2.40 mg/dL   Blood Culture    Specimen: Peripheral Venipuncture; Blood culture   Result Value Ref Range    Blood Culture Loaded on Instrument - Culture in progress    aPTT - baseline   Result Value Ref Range    aPTT 23 (L) 26 - 36 seconds     CT angio chest for pulmonary embolism  Result Date: 7/20/2025  STUDY: CT ANGIO CHEST FOR PULMONARY EMBOLISM;  7/20/2025 10:28 pm   INDICATION: Signs/Symptoms:R/o PE - tachy, diaphoretic, tachy.     COMPARISON: CT chest pulmonary embolism protocol 01/27/2025   ACCESSION NUMBER(S): [id number]   ORDERING CLINICIAN: JERMAIN LONG   TECHNIQUE: Helical data acquisition of the chest was obtained after intravenous administration of 90 ML Omnipaque 350, as per PE protocol. Images were reformatted in coronal and sagittal planes. Axial and coronal maximum intensity projection (MIP) images were created and reviewed.   FINDINGS: POTENTIAL LIMITATIONS OF THE STUDY: None   HEART AND VESSELS: There is nonocclusive central filling defects within a segmental left lower lobe pulmonary artery (series 401, image 172) and near occlusive filling defects within subsegmental left lower lobe pulmonary arteries (series 401, image 194). Main pulmonary artery and its branches are normal in caliber.   The thoracic aorta normal in course and caliber.There is mild scattered atherosclerosis present, including calcified and noncalcified plaques. Mild coronary artery calcifications are seen. Please note,the study is not optimized for evaluation of coronary arteries.   There is mild cardiomegaly, chronic.There are no findings to suggest right heart strain.   MEDIASTINUM AND JAMILAH, LOWER NECK AND AXILLA: The visualized thyroid gland is within normal limits. No evidence of thoracic lymphadenopathy by CT  criteria. Esophagus appears within normal limits as seen.   LUNGS AND AIRWAYS: The trachea and central airways are patent. No endobronchial lesion is seen.   There is subsegmental atelectasis within the right-greater-than-left lower lobes. Otherwise, no focal consolidation. No evident pleural effusion. No pneumothorax. The bilateral lungs are clear without evidence of focal consolidation, pleural effusion, or pneumothorax.   UPPER ABDOMEN: The visualized subdiaphragmatic structures demonstrate no remarkable findings.   CHEST WALL AND OSSEOUS STRUCTURES: Partially visualized cervicothoracic posterior spinal fusion hardware which extends to the T4 vertebral body. Chest wall is within normal limits. No acute osseous pathology.There are no suspicious osseous lesions.       1. Nonocclusive central filling defects within a segmental left lower lobe pulmonary artery and near occlusive filling defects within subsegmental left lower lobe pulmonary arteries are favored to represent acute pulmonary emboli. No evident right heart strain. 2. Subsegmental atelectasis within right-greater-than-left lower lobes. 3. Mild chronic cardiomegaly.   MACRO: Dr. Meghna Mathews discussed the significance and urgency of this critical finding by EPIC secure chat with Dr. Patricia Lyn on 7/20/2025 at 10:55 pm.  (**-RCF-**) Findings:  Acute pulmonary embolus.     Dictation workstation:   [id number]    XR chest 1 view  Result Date: 7/20/2025  Interpreted By:  Familia Naik, STUDY: XR CHEST 1 VIEW;  7/20/2025 5:31 pm   INDICATION: Signs/Symptoms:eval lungs r/o PNA.   COMPARISON: Chest radiograph 07/18/2025 and chest CT 01/27/2025.   ACCESSION NUMBER(S): [id number]   ORDERING CLINICIAN: JERMAIN LONG   FINDINGS: AP radiograph of the chest.   CARDIOMEDIASTINAL SILHOUETTE: The cardiomediastinal silhouette is stable in size and configuration. Aortic knob calcification.   LUNGS: Suspected left retrocardiac opacity, which may represent consolidation or  may be artifactual/due to x-ray beam underpenetration. Right lung is clear. There is no sizable pleural effusion or pneumothorax.   ABDOMEN: No remarkable upper abdominal findings.   BONES: No acute osseous abnormality. Partially visualized cervicothoracic spine fusion hardware. The patient is status post left shoulder reverse arthroplasty.       1. Suspected left retrocardiac opacity, which may represent consolidation or may be artifactual/due to x-ray beam underpenetration. Further evaluation with PA and lateral chest radiographs is recommended.   Signed by: Familia Naik 7/20/2025 5:38 PM Dictation workstation:   [id number]    EEG  IMPRESSION Impression This vEEG is indicative of a moderate diffuse encephalopathy. No epileptiform discharges or lateralizing signs are seen. A full report will be scanned into the patient's chart at a later time. This report has been interpreted and electronically signed by          Assessment & Plan  Lumbar radicular pain    Lumbar foraminal stenosis    Lumbar radiculopathy, chronic    63yo male w h/o prior L5-S1 fusion, HTN, HLD, CAD, sleep apnea (central/obstructive), asthma, GERD, CKD, BPH, anemia, 1/20/25 C4-6 ACDF/C2-T4 PCDF p/w BLE radiculopathy, 7/14 s/p L3-S1 lami and facetectomies/extension/revision (), 7/15 hgb 7.1 s/p 1u pRBC, post Tx 7.5, s/p 1u pRBC, 7/18 drain auto dc'd, 7/20 CT PE nonocclusive PE in segmental LLL pulm artery and near occlusive PE in subsegmental LLL pulm artery.     Plan:  NSU  Vasc med recs  Follow up CT PE final read  Heparin gtt   TTE and DVT US in AM   Follow up blood cx  PTOT - rehab      Luci Davidson MD

## 2025-07-21 NOTE — PROGRESS NOTES
07/21/25 1335   Discharge Planning   Home or Post Acute Services Post acute facilities (Rehab/SNF/etc)   Type of Post Acute Facility Services Rehab   Expected Discharge Disposition Rehab   Patient Choice   Provider Choice list and CMS website (https://medicare.gov/care-compare#search) for post-acute Quality and Resource Measure Data were provided and reviewed with: Patient     Social Work Discharge Planning note:    -Patient discussed during interdisciplinary rounds:  -Team members present: NP, PT and OT, and SW  -Plan per medical team: Pt is not medically ready for discharge. NSU; heparin drip; vascular medicine following for rec's.   -Payer: [employer]  -Status: Inpatient  -Discharge disposition: [facility] is willing to accept and will follow. SW will continue to follow to assist with discharge planning.    -Potential Barriers: none  -Anticipated Date of Discharge: 7/24/25    This VINCE Coleman, LSW    Office: [phone]  Secure chat via [employer]

## 2025-07-21 NOTE — PROGRESS NOTES
[facility]  NEUROSCIENCE INTENSIVE CARE UNIT  DAILY PROGRESS NOTE       Patient Name: Jerman Colón Jr.   MRN: [id number]     Admit Date: 2025     : 1960 AGE: 64 y.o. GENDER: male        Subjective    63yo male w h/o prior L5-S1 fusion, hyperlipidemia, hypertension, CAD, sleep apnea (central/obstructive), asthma, GERD, CKD, BPH, anemia, 25 C4-6 ACDF/C2-T4 PCDF p/w BLE radiculopathy,  s/p L3-S1 lami and facetectomies/extension/revision (), 7/15 hgb 7.1 s/p 2u pRBC, post Tx cbc 7.5, s/p 1u pRBC.  drain auto dc'd     Significant Events:  -  upgraded to NSU for close monitoring and restart of AC    Interval Events:   NAEO      Objective   VITALS (24H):  Temp:  [36 °C (96.8 °F)-38.9 °C (102 °F)] 36.6 °C (97.9 °F)  Heart Rate:  [] 107  Resp:  [16-32] 32  BP: (105-143)/(54-80) 117/69  FiO2 (%):  [28 %-40 %] 40 %  INTAKE/OUTPUT:  Intake/Output Summary (Last 24 hours) at 2025 1003  Last data filed at 2025 0900  Gross per 24 hour   Intake 2597.16 ml   Output 1050 ml   Net 1547.16 ml     VENT SETTINGS:  FiO2 (%):  [28 %-40 %] 40 %  S RR:  [14] 14  S VT:  [450 mL] 450 mL     PHYSICAL EXAM:  NEURO:  - Awake, AOx4, follows commands  - EOS, PERRL, EOMI, VFF  - All muscle groups 5/5   - posterior surgical incision c/d/I   CV:  - Sinus tachycardia  RESP:  - No signs of resp distress  - On 4L NC  :  - Alex draining clear yellow urine to gravity   GI:  - Abdomen NT/ND, soft  SKIN:  - Intact    MEDICATIONS:  Scheduled: PRN: Continuous:   Scheduled Medications[1] PRN Medications[2] Continuous Medications[3]     LAB RESULTS:  Results for orders placed or performed during the hospital encounter of 25 (from the past 24 hours)   POCT GLUCOSE   Result Value Ref Range    POCT Glucose 147 (H) 74 - 99 mg/dL   POCT GLUCOSE   Result Value Ref Range    POCT Glucose 252 (H) 74 - 99 mg/dL   Renal function panel   Result Value Ref Range    Glucose 241 (H) 74 - 99  mg/dL    Sodium 137 136 - 145 mmol/L    Potassium 3.5 3.5 - 5.3 mmol/L    Chloride 98 98 - 107 mmol/L    Bicarbonate 30 21 - 32 mmol/L    Anion Gap 13 10 - 20 mmol/L    Urea Nitrogen 19 6 - 23 mg/dL    Creatinine 1.29 0.50 - 1.30 mg/dL    eGFR 62 >60 mL/min/1.73m*2    Calcium 8.5 (L) 8.6 - 10.6 mg/dL    Phosphorus 2.2 (L) 2.5 - 4.9 mg/dL    Albumin 3.1 (L) 3.4 - 5.0 g/dL   Magnesium   Result Value Ref Range    Magnesium 1.69 1.60 - 2.40 mg/dL   POCT GLUCOSE   Result Value Ref Range    POCT Glucose 183 (H) 74 - 99 mg/dL   Blood Gas Arterial Full Panel   Result Value Ref Range    POCT pH, Arterial 7.36 (L) 7.38 - 7.42 pH    POCT pCO2, Arterial 60 (H) 38 - 42 mm Hg    POCT pO2, Arterial 180 (H) 85 - 95 mm Hg    POCT SO2, Arterial 99 94 - 100 %    POCT Oxy Hemoglobin, Arterial 97.9 94.0 - 98.0 %    POCT Hematocrit Calculated, Arterial 34.0 (L) 41.0 - 52.0 %    POCT Sodium, Arterial 134 (L) 136 - 145 mmol/L    POCT Potassium, Arterial 4.0 3.5 - 5.3 mmol/L    POCT Chloride, Arterial 100 98 - 107 mmol/L    POCT Ionized Calcium, Arterial 1.11 1.10 - 1.33 mmol/L    POCT Glucose, Arterial 161 (H) 74 - 99 mg/dL    POCT Lactate, Arterial 1.0 0.4 - 2.0 mmol/L    POCT Base Excess, Arterial 6.9 (H) -2.0 - 3.0 mmol/L    POCT HCO3 Calculated, Arterial 33.9 (H) 22.0 - 26.0 mmol/L    POCT Hemoglobin, Arterial 11.3 (L) 13.5 - 17.5 g/dL    POCT Anion Gap, Arterial 4 (L) 10 - 25 mmo/L    Patient Temperature 37.0 degrees Celsius    FiO2 40 %   Renal function panel   Result Value Ref Range    Glucose 128 (H) 74 - 99 mg/dL    Sodium 141 136 - 145 mmol/L    Potassium 4.2 3.5 - 5.3 mmol/L    Chloride 99 98 - 107 mmol/L    Bicarbonate 34 (H) 21 - 32 mmol/L    Anion Gap 12 10 - 20 mmol/L    Urea Nitrogen 19 6 - 23 mg/dL    Creatinine 1.25 0.50 - 1.30 mg/dL    eGFR 64 >60 mL/min/1.73m*2    Calcium 8.6 8.6 - 10.6 mg/dL    Phosphorus 3.3 2.5 - 4.9 mg/dL    Albumin 3.3 (L) 3.4 - 5.0 g/dL   CBC   Result Value Ref Range    WBC 8.2 4.4 - 11.3 x10*3/uL     nRBC 0.2 (H) 0.0 - 0.0 /100 WBCs    RBC 4.58 4.50 - 5.90 x10*6/uL    Hemoglobin 9.7 (L) 13.5 - 17.5 g/dL    Hematocrit 35.2 (L) 41.0 - 52.0 %    MCV 77 (L) 80 - 100 fL    MCH 21.2 (L) 26.0 - 34.0 pg    MCHC 27.6 (L) 32.0 - 36.0 g/dL    RDW 20.6 (H) 11.5 - 14.5 %    Platelets 192 150 - 450 x10*3/uL   Calcium, ionized   Result Value Ref Range    POCT Calcium, Ionized 1.17 1.1 - 1.33 mmol/L   Magnesium   Result Value Ref Range    Magnesium 2.26 1.60 - 2.40 mg/dL   Blood Culture    Specimen: Peripheral Venipuncture; Blood culture   Result Value Ref Range    Blood Culture Loaded on Instrument - Culture in progress    aPTT - baseline   Result Value Ref Range    aPTT 23 (L) 26 - 36 seconds   POCT GLUCOSE   Result Value Ref Range    POCT Glucose 168 (H) 74 - 99 mg/dL   Transthoracic Echo Complete   Result Value Ref Range    BSA 2.58 m2   Light Blue Top   Result Value Ref Range    Extra Tube Hold for add-ons.    Red Top   Result Value Ref Range    Extra Tube Hold for add-ons.    SST TOP   Result Value Ref Range    Extra Tube Hold for add-ons.    Gray Top   Result Value Ref Range    Extra Tube Hold for add-ons.         IMAGING RESULTS:  Transthoracic Echo Complete         CT angio chest for pulmonary embolism   Final Result   1. Nonocclusive central filling defects within a segmental left lower   lobe pulmonary artery and near occlusive filling defects within   subsegmental left lower lobe pulmonary arteries are most consistent   with acute pulmonary emboli on this motion limited exam. No evident   right heart strain.   2. Subsegmental atelectasis within right-greater-than-left lower   lobes. Additional findings of potential excessive dynamic airway   collapse within the right lobar airways and left mainstem bronchus.   Correlate with clinical findings.   3. Mild chronic cardiomegaly.   4. Splenic artery aneurysm measuring up to 1.9 cm.        MACRO:   Dr. Meghna Mathews discussed the significance and urgency of this    critical finding by [facility] secure chat with Dr. Patricia Lyn on 7/20/2025   at 10:55 pm.  (**-RCF-**) Findings:  Acute pulmonary embolus.        Signed by: Alex Mathias 7/21/2025 9:20 AM   Dictation workstation:   [id number]      XR chest 1 view   Final Result   1. Suspected left retrocardiac opacity, which may represent   consolidation or may be artifactual/due to x-ray beam   underpenetration. Further evaluation with PA and lateral chest   radiographs is recommended.        Signed by: Familia Naik 7/20/2025 5:38 PM   Dictation workstation:   [id number]      XR chest 1 view   Final Result   1. No acute cardiopulmonary process.        MACRO:   None.        Signed by: Tere Abdi 7/19/2025 7:54 AM   Dictation workstation:   [id number]      FL fluoro images no charge   Final Result      FL fluoro images no charge   Final Result             Assessment/Plan      64 y.o. male with PMH PONV, HTN, HLD, CAD, BIANCA, asthma, GERD, CKD, BPH, anemia, lumbar disc disease, spinal stenosis. Admitted 7/14/2025 with lumbar radicular pain after presenting with BLE radiculopathy, 7/14 s/p L3-S1 lami and facetectomies/extension/revision.    Updates:  - Start heparin gtt iso PE  - Consult Vasc Med for AC  - Duplex US of all 4 extremities to rule out DVT    NEURO:  #S/p L3-S1 lami and facetectomies/extension/revision  #PONV, prior L5-S1 fusion  Assessment:  - Neurologically: see above   - EBL 800cc  Plan:  - NSU  - NSGY primary   - Neuro Checks: Q4H  - Pain: acetaminophen PRN, oxycodone PRN, and hydromorphone PRN  - Flexeril 10mg TID   - Gabapentin 600mg TID   - Nausea: ondansetron  - PT/OT/SLP    CARDIOVASCULAR:  #Segmental non-occlusive PE  #Splenic artery aneurysm  #HTN, HLD, CAD  Assessment:  - SR on tele  - 6/30/25 ECHO: EF 60%     Plan:  - Continue to monitor on telemetry  - Off pressors  - ASA 81mg daily   - Atorvastatin 40mg daily   - Hold losartan 25mg daily   - Start heparin gtt iso PE  - Consult Vasc Med for AC  - Duplex US of  all 4 extremities to rule out DVT  - Patient will need to be seen by Vascular surgery for Splenic artery aneurysm ~1.9 cm when his acute issues subsides        RESPIRATORY:  #BIANCA, asthma  Assessment:  - On 4L NC (3 L home baseline)   Plan:  - Continuous pulse oximetry   - O2 PRN to maintain SpO2 > 94%, wean as tolerated  - Incentive spirometry while awake  - CPAP at night    RENAL/:  #CKD, BPH  Assessment:  - Baseline BUN/Cr: 20/0.85  Results from last 72 hours   Lab Units 07/21/25  0300 07/20/25  1735   BUN mg/dL 19 19   CREATININE mg/dL 1.25 1.29       Plan:  - Monitor with daily RFP  - No richardson needed at this time  - Tamsulosin 0.4 mg qH   - Continue following with urology and his PCP for updated cancer screenings, especially with elevated PSA noted in 3/2025     FEN/GI:  #GERD  Assessment:  - Last BM Date: 07/18/25  Plan  - Monitor and replace electrolytes per protocol  - IVF: PRN  - Diet: Adult diet Regular    - Bowel Regimen: Docusate-Senna BID and Miralax QD    ENDOCRINE:  #Hyperglycemia   Assessment:  Results from last 7 days   Lab Units 07/21/25  0756 07/21/25  0300 07/20/25  2146 07/20/25  1735 07/20/25  1633 07/20/25  1224 07/20/25  0747 07/20/25  0243 07/19/25  2023 07/19/25  0722 07/19/25  0412 07/18/25  0742 07/18/25  0200 07/17/25  0755 07/17/25  0053   POCT GLUCOSE mg/dL 168*  --  183*  --  252* 147* 113*  --  134*   < >  --    < >  --    < >  --    GLUCOSE mg/dL  --  128*  --  241*  --   --   --  159*  --   --  145*  --  95  --  138*   SODIUM mmol/L  --  141  --  137  --   --   --  137  --   --  137  --  142  --  142    < > = values in this interval not displayed.        Plan:  - Accuchecks & ISS AC&HS   - Hypoglycemia protocol     HEMATOLOGY:  #Acute anemia  Assessment:  Results from last 7 days   Lab Units 07/21/25  0300 07/20/25  0243 07/19/25  0412   HEMOGLOBIN g/dL 9.7* 8.8* 8.6*   HEMATOCRIT % 35.2* 31.4* 30.3*   PLATELETS AUTO x10*3/uL 192 192 208   - s/p 2 units pRBCs this  admission  Plan:  - Continue to monitor with daily CBC and Coag panel      INFECTIOUS DISEASE:  #Klebsiella UTI  Assessment:  Results from last 7 days   Lab Units 25  0300 25  0243 25  0412   WBC AUTO x10*3/uL 8.2 6.7 6.1    - Temp (24hrs), Av.3 °C (99.1 °F), Min:36 °C (96.8 °F), Max:38.9 °C (102 °F)   - Macrobid (-), CTX (-)  Plan:  - Continue ceftriaxone since Klebsiella from urine cx sensitive  - Pan culture for temperature > 38.4 C    MUSCULOSKELETAL:  - No acute issues    SKIN:  - No acute issues  - Turns and skin care per NSU protocol    ACCESS:  - PIVs  - L radial arterial line (--)    PROPHYLAXIS:  - DVT Ppx: SCDs and Hold SQH until POD #1  - GI Ppx: Pantoprazole    RESTRAINTS:  Not indicated/Patient does not meet criteria for restraints    Daily ICU Checklist:  - Restraint order/note  [] Yes [] No [x] N/A   - NIHSS Frequency appropriate? [] Yes [x] No   - Daily Labs Ordered? [x] Yes [] No [] N/A   - Medication Route? [x] Yes [] No  - (PO, NG, OG, G Tube)   - PPI ordered/needed? [x] Yes [] No [] N/A   - DVT PPx [x] Yes [] No   - Antibiotic stop date? [] Yes [] No [x] N/A   - Alex? [x] Yes [] No  DC? [] Y [x] N [] N/A   - Central Line? [] Yes [x] No  LOC:   DC? [] Y [] N [] N/A   - Central  orders [] Yes [x] No [] N/A   - Vent weaning plan? [] Yes [] No [x] N/A             Tiffanie Brown MD  Neurology PGY-2  Neuroscience Intensive Care    Attending Attestation:   I saw and evaluated the patient. I personally obtained the key and critical portions of the history and physical exam or was physically present for key and critical portions performed by the resident/fellow. I reviewed the resident/fellow's documentation and discussed the patient with the resident/fellow. I agree with the resident/fellow's medical decision making as documented in the note with the exception/addition of the following:     S/p Lumbar surgery.     New tachypnea, hypoxia overnight.  Found to have segmental PE on chest CT.  Started on heparin gtt.  Here for close respiratory monitoring.  On 2L NC close to baseline, on CPAP at night for BIANCA.    DVT US scans pending. Pain controlled on oxy, gabapentin.     Vas Med consulted for anticoag management.    Klebsiella UTI, on CTX.    Statement of Acuity: I have reviewed and evaluated all relevant data of the last 24 hours, personally examined the patient and formulated the plan of care.  This patient was critically ill and required continued critical care treatment.  Total critical care time: 35min.      Braulio Lance M.D.          [1] acetaminophen, 650 mg, oral, q6h  aspirin, 81 mg, oral, Daily  atorvastatin, 40 mg, oral, Nightly  bisacodyl, 10 mg, rectal, Once  cefTRIAXone, 1 g, intravenous, q24h  cyclobenzaprine, 10 mg, oral, TID  gabapentin, 600 mg, oral, TID  insulin lispro, 0-5 Units, subcutaneous, Before meals & nightly  [Held by provider] losartan, 25 mg, oral, Daily  pantoprazole, 40 mg, oral, BID  perflutren lipid microspheres, 0.5-10 mL of dilution, intravenous, Once in imaging  polyethylene glycol, 17 g, oral, BID  sennosides-docusate sodium, 2 tablet, oral, BID  sulfur hexafluoride microsphr, 2 mL, intravenous, Once in imaging  tamsulosin, 0.4 mg, oral, Nightly     [2] PRN medications: albuterol, benzocaine-menthol, calcium gluconate, calcium gluconate, dextrose, dextrose, glucagon, glucagon, HYDROmorphone, lubricating eye drops, magnesium sulfate, magnesium sulfate, naloxone, ondansetron **OR** ondansetron, oxyCODONE, oxyCODONE, oxyCODONE, oxygen, potassium chloride CR **OR** potassium chloride, potassium chloride CR **OR** potassium chloride  [3] heparin, 0-4,000 Units/hr, Last Rate: 1,000 Units/hr (07/21/25 0800)  lactated Ringer's, 75 mL/hr, Last Rate: Stopped (07/21/25 0300)

## 2025-07-21 NOTE — SIGNIFICANT EVENT
Rapid Response Nurse Note: Rapid Response    Pager time:   Arrival time:   Event end time:   Location: [facility]  [] Triage by phone or secure messaging    Rapid response initiated by:  [] Rapid response RN [] Family [] Nursing Supervisor [] Physician   [] RADAR auto page [] Sepsis auto-page [x] RN [] RT   [] NP/PA [] Other:     Primary reason for call:   [] BAT [] New CPAP/BiPAP [] Bleeding [x] Change in mental status   [] Chest pain [] Code blue [x] FiO2 >/= 50% [] HR </= 40 bpm   [] HR >/= 130 bpm [] Hyperglycemia [] Hypoglycemia [] RADAR    [] RR </= 8 bpm [] RR >/= 30 bpm [] SBP </= 90 mmHg [x] SpO2 < 90%   [] Seizure [] Sepsis [] Shortness of breath  [x] Staff concern: see comments     Initial VS and/or RADAR VS: T 36.6 °C; ; RR 15; /74; SPO2 99% 15L NRB    Providers present at bedside (if applicable): Patricia Lyn MD; ROSE Delarosa MD        Interventions:  [] None [] ABG/VBG [] Assist w/ICU transfer [] BAT paged    [] Bag mask [] Blood [] Cardioversion [] Code Blue   [] Code blue for intubation [] Code status changed [x] Chest x-ray [] EKG   [] IV fluid/bolus [] KUB x-ray [] Labs/cultures [] Medication   [] Nebulizer treatment [x] NIPPV (CPAP/BiPAP) [x] Oxygen [] Oral airway   [] Peripheral IV [] Palliative care consult [] CT/MRI [] Sepsis protocol    [] Suctioned [] Other:     Outcome:  [] Coded and  [] Code blue for intubation [] Coded and transferred to ICU []  on division   [] Remained on division (no change) [x] Remained on division + additional monitoring [] Remained in ED [] Transferred to ED   [] Transferred to ICU [] Transferred to inpatient status [] Transferred for interventions (procedure) [] Transferred to ICU stepdown    [] Transferred to surgery [] Transferred to telemetry [] Sepsis protocol [] STEMI protocol   [] Stroke protocol [x] Bedside nurse instructed to page rapid response for any concerns or acute change in condition/VS     Additional Comments: Rapid Response  "called for acute O2 desat to \"44%\" while on 4L via NC, and division nursing reported that patient was difficult to arouse, taking >2 minutes to awaken. Patient was placed on NRB by division nursing prior to Rapid team arrival. Patient somnolent but oriented to self, year and location. Denied pain. RT at bedside and placed patient on AVPAP. Neurosurg fellow to bedside. Patient ordered continuous SpO2 and tele monitoring. Patient to remain on current division at this time per MD staff at bedside. No narcan given at this time however patient did receive 10 mg oxycodone ~1530 today and Dilaudid prior to that. Vitals at the end of event: HR 99 (irregular); RR 16; /60; SpO2 99%   "

## 2025-07-21 NOTE — CARE PLAN
Problem: Pain - Adult  Goal: Verbalizes/displays adequate comfort level or baseline comfort level  7/21/2025 0918 by Barbara Martinez RN  Outcome: Progressing  7/21/2025 0918 by Barbara Martinez RN  Outcome: Progressing     Problem: Safety - Adult  Goal: Free from fall injury  7/21/2025 0918 by Barbara Martinez RN  Outcome: Progressing  7/21/2025 0918 by Barbara Martinez RN  Outcome: Progressing     Problem: Discharge Planning  Goal: Discharge to home or other facility with appropriate resources  Outcome: Progressing     Problem: Chronic Conditions and Co-morbidities  Goal: Patient's chronic conditions and co-morbidity symptoms are monitored and maintained or improved  7/21/2025 0918 by Barbara Martinez RN  Outcome: Progressing  7/21/2025 0918 by Barbara Martinez RN  Outcome: Progressing     Problem: Nutrition  Goal: Nutrient intake appropriate for maintaining nutritional needs  Outcome: Progressing     Problem: Skin  Goal: Participates in plan/prevention/treatment measures  7/21/2025 0918 by Barbara Martinez RN  Outcome: Progressing  7/21/2025 0918 by Barbara Martinez RN  Outcome: Progressing  Goal: Prevent/manage excess moisture  7/21/2025 0918 by Barbara Martinez RN  Outcome: Progressing  7/21/2025 0918 by Barbara Martinez RN  Outcome: Progressing  Goal: Prevent/minimize sheer/friction injuries  7/21/2025 0918 by Barbara Martinez RN  Outcome: Progressing  7/21/2025 0918 by Barbara Martinez RN  Outcome: Progressing  Goal: Promote/optimize nutrition  Outcome: Progressing     Problem: Fall/Injury  Goal: Verbalize understanding of personal risk factors for fall in the hospital  Outcome: Progressing     Problem: Pain  Goal: Takes deep breaths with improved pain control throughout the shift  Outcome: Progressing  Goal: Turns in bed with improved pain control throughout the shift  Outcome: Progressing  Goal: Performs ADL's with improved pain control throughout shift  Outcome: Progressing  Goal: Free from opioid side effects throughout the  shift  Outcome: Progressing  Goal: Free from acute confusion related to pain meds throughout the shift  Outcome: Progressing

## 2025-07-22 ENCOUNTER — APPOINTMENT (OUTPATIENT)
Dept: CARDIOLOGY | Facility: HOSPITAL | Age: 65
DRG: 447 | End: 2025-07-22
Payer: MEDICARE

## 2025-07-22 ENCOUNTER — APPOINTMENT (OUTPATIENT)
Dept: RADIOLOGY | Facility: HOSPITAL | Age: 65
DRG: 447 | End: 2025-07-22
Payer: MEDICARE

## 2025-07-22 LAB
ALBUMIN SERPL BCP-MCNC: 2.8 G/DL (ref 3.4–5)
ALP SERPL-CCNC: 49 U/L (ref 33–136)
ALT SERPL W P-5'-P-CCNC: 25 U/L (ref 10–52)
ANION GAP BLDV CALCULATED.4IONS-SCNC: 3 MMOL/L (ref 10–25)
ANION GAP SERPL CALC-SCNC: 13 MMOL/L (ref 10–20)
ANION GAP SERPL CALC-SCNC: 9 MMOL/L (ref 10–20)
AST SERPL W P-5'-P-CCNC: 30 U/L (ref 9–39)
ATRIAL RATE: 277 BPM
ATRIAL RATE: 92 BPM
BACTERIA BLD CULT: NORMAL
BACTERIA BLD CULT: NORMAL
BASE EXCESS BLDV CALC-SCNC: 11.2 MMOL/L (ref -2–3)
BASOPHILS # BLD AUTO: 0.05 X10*3/UL (ref 0–0.1)
BASOPHILS NFR BLD AUTO: 0.5 %
BILIRUB DIRECT SERPL-MCNC: 0.2 MG/DL (ref 0–0.3)
BILIRUB SERPL-MCNC: 0.5 MG/DL (ref 0–1.2)
BNP SERPL-MCNC: 244 PG/ML (ref 0–99)
BODY SURFACE AREA: 2.58 M2
BODY TEMPERATURE: ABNORMAL
BUN SERPL-MCNC: 17 MG/DL (ref 6–23)
BUN SERPL-MCNC: 17 MG/DL (ref 6–23)
CA-I BLDV-SCNC: 1.15 MMOL/L (ref 1.1–1.33)
CALCIUM SERPL-MCNC: 7.8 MG/DL (ref 8.6–10.6)
CALCIUM SERPL-MCNC: 7.9 MG/DL (ref 8.6–10.6)
CARDIAC TROPONIN I PNL SERPL HS: 16 NG/L (ref 0–53)
CHLORIDE BLDV-SCNC: 98 MMOL/L (ref 98–107)
CHLORIDE SERPL-SCNC: 96 MMOL/L (ref 98–107)
CHLORIDE SERPL-SCNC: 98 MMOL/L (ref 98–107)
CO2 SERPL-SCNC: 32 MMOL/L (ref 21–32)
CO2 SERPL-SCNC: 34 MMOL/L (ref 21–32)
CREAT SERPL-MCNC: 0.95 MG/DL (ref 0.5–1.3)
CREAT SERPL-MCNC: 1.09 MG/DL (ref 0.5–1.3)
EGFRCR SERPLBLD CKD-EPI 2021: 76 ML/MIN/1.73M*2
EGFRCR SERPLBLD CKD-EPI 2021: 89 ML/MIN/1.73M*2
EOSINOPHIL # BLD AUTO: 0.14 X10*3/UL (ref 0–0.7)
EOSINOPHIL NFR BLD AUTO: 1.4 %
ERYTHROCYTE [DISTWIDTH] IN BLOOD BY AUTOMATED COUNT: 20.3 % (ref 11.5–14.5)
ERYTHROCYTE [DISTWIDTH] IN BLOOD BY AUTOMATED COUNT: 20.5 % (ref 11.5–14.5)
GLUCOSE BLD MANUAL STRIP-MCNC: 120 MG/DL (ref 74–99)
GLUCOSE BLD MANUAL STRIP-MCNC: 140 MG/DL (ref 74–99)
GLUCOSE BLD MANUAL STRIP-MCNC: 142 MG/DL (ref 74–99)
GLUCOSE BLD MANUAL STRIP-MCNC: 143 MG/DL (ref 74–99)
GLUCOSE BLDV-MCNC: 138 MG/DL (ref 74–99)
GLUCOSE SERPL-MCNC: 151 MG/DL (ref 74–99)
GLUCOSE SERPL-MCNC: 171 MG/DL (ref 74–99)
HCO3 BLDV-SCNC: 37.4 MMOL/L (ref 22–26)
HCT VFR BLD AUTO: 30.2 % (ref 41–52)
HCT VFR BLD AUTO: 30.8 % (ref 41–52)
HCT VFR BLD EST: 26 % (ref 41–52)
HGB BLD-MCNC: 8.3 G/DL (ref 13.5–17.5)
HGB BLD-MCNC: 8.5 G/DL (ref 13.5–17.5)
HGB BLDV-MCNC: 8.8 G/DL (ref 13.5–17.5)
IMM GRANULOCYTES # BLD AUTO: 0.09 X10*3/UL (ref 0–0.7)
IMM GRANULOCYTES NFR BLD AUTO: 0.9 % (ref 0–0.9)
INHALED O2 CONCENTRATION: 44 %
LACTATE BLDV-SCNC: 1.3 MMOL/L (ref 0.4–2)
LYMPHOCYTES # BLD AUTO: 0.97 X10*3/UL (ref 1.2–4.8)
LYMPHOCYTES NFR BLD AUTO: 10 %
MAGNESIUM SERPL-MCNC: 1.94 MG/DL (ref 1.6–2.4)
MCH RBC QN AUTO: 20.7 PG (ref 26–34)
MCH RBC QN AUTO: 20.8 PG (ref 26–34)
MCHC RBC AUTO-ENTMCNC: 27.5 G/DL (ref 32–36)
MCHC RBC AUTO-ENTMCNC: 27.6 G/DL (ref 32–36)
MCV RBC AUTO: 75 FL (ref 80–100)
MCV RBC AUTO: 76 FL (ref 80–100)
MONOCYTES # BLD AUTO: 1 X10*3/UL (ref 0.1–1)
MONOCYTES NFR BLD AUTO: 10.3 %
NEUTROPHILS # BLD AUTO: 7.45 X10*3/UL (ref 1.2–7.7)
NEUTROPHILS NFR BLD AUTO: 76.9 %
NRBC BLD-RTO: 0.2 /100 WBCS (ref 0–0)
NRBC BLD-RTO: 0.4 /100 WBCS (ref 0–0)
OXYHGB MFR BLDV: 48.1 % (ref 45–75)
P AXIS: 51 DEGREES
P OFFSET: 196 MS
P ONSET: 143 MS
PCO2 BLDV: 59 MM HG (ref 41–51)
PH BLDV: 7.41 PH (ref 7.33–7.43)
PHOSPHATE SERPL-MCNC: 2.4 MG/DL (ref 2.5–4.9)
PHOSPHATE SERPL-MCNC: 2.6 MG/DL (ref 2.5–4.9)
PLATELET # BLD AUTO: 176 X10*3/UL (ref 150–450)
PLATELET # BLD AUTO: 186 X10*3/UL (ref 150–450)
PO2 BLDV: 31 MM HG (ref 35–45)
POTASSIUM BLDV-SCNC: 3.9 MMOL/L (ref 3.5–5.3)
POTASSIUM SERPL-SCNC: 3.5 MMOL/L (ref 3.5–5.3)
POTASSIUM SERPL-SCNC: 3.7 MMOL/L (ref 3.5–5.3)
PR INTERVAL: 146 MS
PROT SERPL-MCNC: 5 G/DL (ref 6.4–8.2)
Q ONSET: 216 MS
Q ONSET: 223 MS
QRS COUNT: 15 BEATS
QRS COUNT: 19 BEATS
QRS DURATION: 92 MS
QRS DURATION: 92 MS
QT INTERVAL: 308 MS
QT INTERVAL: 348 MS
QTC CALCULATION(BAZETT): 422 MS
QTC CALCULATION(BAZETT): 430 MS
QTC FREDERICIA: 380 MS
QTC FREDERICIA: 401 MS
R AXIS: 4 DEGREES
R AXIS: 6 DEGREES
RBC # BLD AUTO: 4.01 X10*6/UL (ref 4.5–5.9)
RBC # BLD AUTO: 4.08 X10*6/UL (ref 4.5–5.9)
SAO2 % BLDV: 49 % (ref 45–75)
SODIUM BLDV-SCNC: 134 MMOL/L (ref 136–145)
SODIUM SERPL-SCNC: 137 MMOL/L (ref 136–145)
SODIUM SERPL-SCNC: 137 MMOL/L (ref 136–145)
T AXIS: -10 DEGREES
T AXIS: 10 DEGREES
T OFFSET: 377 MS
T OFFSET: 390 MS
TSH SERPL-ACNC: 1.64 MIU/L (ref 0.44–3.98)
UFH PPP CHRO-ACNC: 0.2 IU/ML (ref ?–1.1)
UFH PPP CHRO-ACNC: 0.2 IU/ML (ref ?–1.1)
UFH PPP CHRO-ACNC: 0.3 IU/ML (ref ?–1.1)
UFH PPP CHRO-ACNC: 0.4 IU/ML (ref ?–1.1)
UFH PPP CHRO-ACNC: <0.1 IU/ML (ref ?–1.1)
VENTRICULAR RATE: 113 BPM
VENTRICULAR RATE: 92 BPM
WBC # BLD AUTO: 9 X10*3/UL (ref 4.4–11.3)
WBC # BLD AUTO: 9.7 X10*3/UL (ref 4.4–11.3)

## 2025-07-22 PROCEDURE — 2500000004 HC RX 250 GENERAL PHARMACY W/ HCPCS (ALT 636 FOR OP/ED)

## 2025-07-22 PROCEDURE — 82040 ASSAY OF SERUM ALBUMIN: CPT

## 2025-07-22 PROCEDURE — 85520 HEPARIN ASSAY: CPT

## 2025-07-22 PROCEDURE — 99232 SBSQ HOSP IP/OBS MODERATE 35: CPT | Performed by: INTERNAL MEDICINE

## 2025-07-22 PROCEDURE — 75635 CT ANGIO ABDOMINAL ARTERIES: CPT | Performed by: RADIOLOGY

## 2025-07-22 PROCEDURE — 85025 COMPLETE CBC W/AUTO DIFF WBC: CPT

## 2025-07-22 PROCEDURE — 97530 THERAPEUTIC ACTIVITIES: CPT | Mod: GO

## 2025-07-22 PROCEDURE — 93005 ELECTROCARDIOGRAM TRACING: CPT

## 2025-07-22 PROCEDURE — 83735 ASSAY OF MAGNESIUM: CPT

## 2025-07-22 PROCEDURE — 2500000001 HC RX 250 WO HCPCS SELF ADMINISTERED DRUGS (ALT 637 FOR MEDICARE OP)

## 2025-07-22 PROCEDURE — 99024 POSTOP FOLLOW-UP VISIT: CPT | Performed by: NEUROLOGICAL SURGERY

## 2025-07-22 PROCEDURE — 94660 CPAP INITIATION&MGMT: CPT

## 2025-07-22 PROCEDURE — 92960 CARDIOVERSION ELECTRIC EXT: CPT

## 2025-07-22 PROCEDURE — 2500000002 HC RX 250 W HCPCS SELF ADMINISTERED DRUGS (ALT 637 FOR MEDICARE OP, ALT 636 FOR OP/ED)

## 2025-07-22 PROCEDURE — 82248 BILIRUBIN DIRECT: CPT

## 2025-07-22 PROCEDURE — 36415 COLL VENOUS BLD VENIPUNCTURE: CPT

## 2025-07-22 PROCEDURE — 84484 ASSAY OF TROPONIN QUANT: CPT

## 2025-07-22 PROCEDURE — 97535 SELF CARE MNGMENT TRAINING: CPT | Mod: GO

## 2025-07-22 PROCEDURE — 84132 ASSAY OF SERUM POTASSIUM: CPT

## 2025-07-22 PROCEDURE — 84100 ASSAY OF PHOSPHORUS: CPT

## 2025-07-22 PROCEDURE — 93010 ELECTROCARDIOGRAM REPORT: CPT | Performed by: INTERNAL MEDICINE

## 2025-07-22 PROCEDURE — 2500000005 HC RX 250 GENERAL PHARMACY W/O HCPCS

## 2025-07-22 PROCEDURE — 83880 ASSAY OF NATRIURETIC PEPTIDE: CPT

## 2025-07-22 PROCEDURE — 85027 COMPLETE CBC AUTOMATED: CPT

## 2025-07-22 PROCEDURE — 2500000004 HC RX 250 GENERAL PHARMACY W/ HCPCS (ALT 636 FOR OP/ED): Performed by: STUDENT IN AN ORGANIZED HEALTH CARE EDUCATION/TRAINING PROGRAM

## 2025-07-22 PROCEDURE — 99222 1ST HOSP IP/OBS MODERATE 55: CPT

## 2025-07-22 PROCEDURE — 2500000001 HC RX 250 WO HCPCS SELF ADMINISTERED DRUGS (ALT 637 FOR MEDICARE OP): Performed by: STUDENT IN AN ORGANIZED HEALTH CARE EDUCATION/TRAINING PROGRAM

## 2025-07-22 PROCEDURE — 2550000001 HC RX 255 CONTRASTS: Performed by: NEUROLOGICAL SURGERY

## 2025-07-22 PROCEDURE — 82947 ASSAY GLUCOSE BLOOD QUANT: CPT

## 2025-07-22 PROCEDURE — 75635 CT ANGIO ABDOMINAL ARTERIES: CPT

## 2025-07-22 PROCEDURE — 2020000001 HC ICU ROOM DAILY

## 2025-07-22 PROCEDURE — 84443 ASSAY THYROID STIM HORMONE: CPT

## 2025-07-22 PROCEDURE — 51701 INSERT BLADDER CATHETER: CPT

## 2025-07-22 RX ORDER — MAGNESIUM SULFATE HEPTAHYDRATE 40 MG/ML
2 INJECTION, SOLUTION INTRAVENOUS ONCE
Status: COMPLETED | OUTPATIENT
Start: 2025-07-22 | End: 2025-07-22

## 2025-07-22 RX ORDER — METOPROLOL TARTRATE 25 MG/1
25 TABLET, FILM COATED ORAL 2 TIMES DAILY
Status: DISCONTINUED | OUTPATIENT
Start: 2025-07-22 | End: 2025-07-29 | Stop reason: HOSPADM

## 2025-07-22 RX ORDER — LIDOCAINE HYDROCHLORIDE 10 MG/ML
20 INJECTION, SOLUTION EPIDURAL; INFILTRATION; INTRACAUDAL; PERINEURAL ONCE
Status: DISCONTINUED | OUTPATIENT
Start: 2025-07-22 | End: 2025-07-22

## 2025-07-22 RX ORDER — METOPROLOL TARTRATE 1 MG/ML
5 INJECTION, SOLUTION INTRAVENOUS ONCE
Status: COMPLETED | OUTPATIENT
Start: 2025-07-22 | End: 2025-07-22

## 2025-07-22 RX ORDER — AMIODARONE HYDROCHLORIDE 200 MG/1
200 TABLET ORAL DAILY
Status: CANCELLED | OUTPATIENT
Start: 2025-08-04

## 2025-07-22 RX ORDER — AMIODARONE HYDROCHLORIDE 200 MG/1
400 TABLET ORAL 2 TIMES DAILY
Status: DISCONTINUED | OUTPATIENT
Start: 2025-07-22 | End: 2025-07-29 | Stop reason: HOSPADM

## 2025-07-22 RX ORDER — METOPROLOL TARTRATE 1 MG/ML
INJECTION, SOLUTION INTRAVENOUS
Status: COMPLETED
Start: 2025-07-22 | End: 2025-07-22

## 2025-07-22 RX ORDER — POTASSIUM CHLORIDE 14.9 MG/ML
20 INJECTION INTRAVENOUS
Status: DISPENSED | OUTPATIENT
Start: 2025-07-22 | End: 2025-07-22

## 2025-07-22 RX ORDER — POTASSIUM CHLORIDE 1.5 G/1.58G
20 POWDER, FOR SOLUTION ORAL ONCE
Status: COMPLETED | OUTPATIENT
Start: 2025-07-22 | End: 2025-07-22

## 2025-07-22 RX ORDER — AMIODARONE HYDROCHLORIDE 200 MG/1
400 TABLET ORAL 2 TIMES DAILY
Status: CANCELLED | OUTPATIENT
Start: 2025-07-22 | End: 2025-08-04

## 2025-07-22 RX ORDER — METOPROLOL TARTRATE 1 MG/ML
5 INJECTION, SOLUTION INTRAVENOUS EVERY 6 HOURS PRN
Status: DISCONTINUED | OUTPATIENT
Start: 2025-07-22 | End: 2025-07-29 | Stop reason: HOSPADM

## 2025-07-22 RX ADMIN — IOHEXOL 130 ML: 350 INJECTION, SOLUTION INTRAVENOUS at 23:09

## 2025-07-22 RX ADMIN — SENNOSIDES AND DOCUSATE SODIUM 2 TABLET: 50; 8.6 TABLET ORAL at 08:29

## 2025-07-22 RX ADMIN — ACETAMINOPHEN 650 MG: 325 TABLET, FILM COATED ORAL at 08:30

## 2025-07-22 RX ADMIN — HEPARIN SODIUM 2400 UNITS/HR: 10000 INJECTION, SOLUTION INTRAVENOUS at 14:50

## 2025-07-22 RX ADMIN — OXYCODONE HYDROCHLORIDE 5 MG: 5 TABLET ORAL at 03:52

## 2025-07-22 RX ADMIN — POLYETHYLENE GLYCOL 3350 17 G: 17 POWDER, FOR SOLUTION ORAL at 08:26

## 2025-07-22 RX ADMIN — METOPROLOL TARTRATE 25 MG: 25 TABLET, FILM COATED ORAL at 13:16

## 2025-07-22 RX ADMIN — PANTOPRAZOLE SODIUM 40 MG: 40 TABLET, DELAYED RELEASE ORAL at 23:31

## 2025-07-22 RX ADMIN — POTASSIUM PHOSPHATE, MONOBASIC AND POTASSIUM PHOSPHATE, DIBASIC 21 MMOL: 224; 236 INJECTION, SOLUTION, CONCENTRATE INTRAVENOUS at 23:32

## 2025-07-22 RX ADMIN — POTASSIUM CHLORIDE 20 MEQ: 14.9 INJECTION, SOLUTION INTRAVENOUS at 08:00

## 2025-07-22 RX ADMIN — ASPIRIN 81 MG: 81 TABLET, COATED ORAL at 08:39

## 2025-07-22 RX ADMIN — OXYCODONE HYDROCHLORIDE 10 MG: 5 TABLET ORAL at 08:37

## 2025-07-22 RX ADMIN — SODIUM CHLORIDE 1000 ML: 0.9 INJECTION, SOLUTION INTRAVENOUS at 09:37

## 2025-07-22 RX ADMIN — AMIODARONE HYDROCHLORIDE 1 MG/MIN: 1.8 INJECTION, SOLUTION INTRAVENOUS at 09:04

## 2025-07-22 RX ADMIN — TAMSULOSIN HYDROCHLORIDE 0.4 MG: 0.4 CAPSULE ORAL at 23:31

## 2025-07-22 RX ADMIN — GABAPENTIN 600 MG: 300 CAPSULE ORAL at 23:31

## 2025-07-22 RX ADMIN — POTASSIUM CHLORIDE 20 MEQ: 1.5 POWDER, FOR SOLUTION ORAL at 06:37

## 2025-07-22 RX ADMIN — METOPROLOL TARTRATE 5 MG: 1 INJECTION, SOLUTION INTRAVENOUS at 07:00

## 2025-07-22 RX ADMIN — OXYCODONE HYDROCHLORIDE 5 MG: 5 TABLET ORAL at 23:31

## 2025-07-22 RX ADMIN — GABAPENTIN 600 MG: 300 CAPSULE ORAL at 17:58

## 2025-07-22 RX ADMIN — ACETAMINOPHEN 650 MG: 325 TABLET, FILM COATED ORAL at 03:53

## 2025-07-22 RX ADMIN — ACETAMINOPHEN 650 MG: 325 TABLET, FILM COATED ORAL at 17:57

## 2025-07-22 RX ADMIN — ACETAMINOPHEN 650 MG: 325 TABLET, FILM COATED ORAL at 23:32

## 2025-07-22 RX ADMIN — PANTOPRAZOLE SODIUM 40 MG: 40 TABLET, DELAYED RELEASE ORAL at 08:27

## 2025-07-22 RX ADMIN — CEFTRIAXONE 1 G: 1 INJECTION, SOLUTION INTRAVENOUS at 17:31

## 2025-07-22 RX ADMIN — POLYETHYLENE GLYCOL 3350 17 G: 17 POWDER, FOR SOLUTION ORAL at 23:36

## 2025-07-22 RX ADMIN — METOPROLOL TARTRATE 5 MG: 1 INJECTION, SOLUTION INTRAVENOUS at 07:39

## 2025-07-22 RX ADMIN — ATORVASTATIN CALCIUM 40 MG: 40 TABLET, FILM COATED ORAL at 23:31

## 2025-07-22 RX ADMIN — GABAPENTIN 600 MG: 300 CAPSULE ORAL at 08:27

## 2025-07-22 RX ADMIN — AMIODARONE HYDROCHLORIDE 1 MG/MIN: 1.8 INJECTION, SOLUTION INTRAVENOUS at 10:37

## 2025-07-22 RX ADMIN — AMIODARONE HYDROCHLORIDE 150 MG: 1.5 INJECTION, SOLUTION INTRAVENOUS at 09:00

## 2025-07-22 RX ADMIN — HEPARIN SODIUM 2100 UNITS/HR: 10000 INJECTION, SOLUTION INTRAVENOUS at 01:19

## 2025-07-22 RX ADMIN — CYCLOBENZAPRINE 10 MG: 10 TABLET, FILM COATED ORAL at 08:26

## 2025-07-22 RX ADMIN — MAGNESIUM SULFATE HEPTAHYDRATE 2 G: 40 INJECTION, SOLUTION INTRAVENOUS at 20:09

## 2025-07-22 RX ADMIN — AMIODARONE HYDROCHLORIDE 1 MG/MIN: 1.8 INJECTION, SOLUTION INTRAVENOUS at 12:48

## 2025-07-22 RX ADMIN — CYCLOBENZAPRINE 10 MG: 10 TABLET, FILM COATED ORAL at 17:58

## 2025-07-22 RX ADMIN — METOPROLOL TARTRATE 5 MG: 1 INJECTION, SOLUTION INTRAVENOUS at 08:58

## 2025-07-22 RX ADMIN — METOPROLOL TARTRATE 25 MG: 25 TABLET, FILM COATED ORAL at 23:32

## 2025-07-22 RX ADMIN — SENNOSIDES AND DOCUSATE SODIUM 2 TABLET: 50; 8.6 TABLET ORAL at 23:32

## 2025-07-22 RX ADMIN — CYCLOBENZAPRINE 10 MG: 10 TABLET, FILM COATED ORAL at 23:32

## 2025-07-22 RX ADMIN — AMIODARONE HYDROCHLORIDE 400 MG: 200 TABLET ORAL at 23:32

## 2025-07-22 ASSESSMENT — ENCOUNTER SYMPTOMS
CHILLS: 0
CLAUDICATION: 0
PALPITATIONS: 1
IRREGULAR HEARTBEAT: 1
FREQUENCY: 0
ABDOMINAL PAIN: 0
FLANK PAIN: 0
DIAPHORESIS: 0
CHANGE IN BOWEL HABIT: 0
DYSURIA: 0
BLOATING: 0

## 2025-07-22 ASSESSMENT — COGNITIVE AND FUNCTIONAL STATUS - GENERAL
HELP NEEDED FOR BATHING: A LOT
PERSONAL GROOMING: A LITTLE
TOILETING: A LOT
DRESSING REGULAR LOWER BODY CLOTHING: A LOT
EATING MEALS: A LITTLE
DAILY ACTIVITIY SCORE: 15
DRESSING REGULAR UPPER BODY CLOTHING: A LITTLE

## 2025-07-22 ASSESSMENT — PAIN - FUNCTIONAL ASSESSMENT
PAIN_FUNCTIONAL_ASSESSMENT: 0-10

## 2025-07-22 ASSESSMENT — PAIN DESCRIPTION - DESCRIPTORS
DESCRIPTORS: JABBING;SORE
DESCRIPTORS: ACHING
DESCRIPTORS: ACHING

## 2025-07-22 ASSESSMENT — PAIN DESCRIPTION - LOCATION: LOCATION: BACK

## 2025-07-22 ASSESSMENT — PAIN SCALES - GENERAL
PAINLEVEL_OUTOF10: 0 - NO PAIN
PAINLEVEL_OUTOF10: 7
PAINLEVEL_OUTOF10: 3
PAINLEVEL_OUTOF10: 8

## 2025-07-22 ASSESSMENT — ACTIVITIES OF DAILY LIVING (ADL)
HOME_MANAGEMENT_TIME_ENTRY: 15
HOME_MANAGEMENT_TIME_ENTRY: 15

## 2025-07-22 NOTE — CONSULTS
VASCULAR SURGERY CONSULT NOTE    Assessment/Plan   Jerman Colón Jr. is 64 y.o. male with PMH of HTN, HLD, CAD, BIANCA, asthma, GERD, CKD, BPH, anemia, lumbar disc disease, spinal stenosis. Admitted 7/14/2025 with lumbar radicular pain after presenting with BLE radiculopathy, 7/14 s/p L Removal of previous L5-S1 instrumentation,  L3-4 L4-5 and L5-S1 laminectomy and facetectomy for decompression with L3-S1 instrumentation and Fusion. Cardiology is consulted for management of a new-onset Afib with RVR .    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    Plan:  CTA Abdomen Pelvis run off to look for other aneurysms  No surgical intervention required for the 19 mm splenic aneurysm  Will need follow up yearly on the splenic aneurysm  Appreciate cardiology recs for Afib/RVR  Appreciate anticoagulation plan from vascular medicine     D/w attending, Dr. Viviana Harris MD  PGY-2 Vascular Surgery Resident  Service Pager: [phone]       Subjective   HPI:  Jerman Colón Jr. is 64 y.o. male with a past medical history of hyperlipidemia, hypertension, CAD, sleep apnea, asthma, GERD, CKD, BPH, anemia, arthritis, lumbar disc disease, spinal stenosis presenting for scheduled removal of previous L5-S1 instrumentation, exploration of previous L5-S1 fusion, L3-L4, L4-L5 and L5-S1 and fasciotomy for decompression with L2-S1; Instrumentation and pelvic fixation bilaterally completed on 7/14/2025.     Vascular History:  none    PMH: Medical History[1]      PSH: Surgical History[2]     Home Meds:  Medications Ordered Prior to Encounter[3]     Allergies:  RX Allergies[4]      ROS: 12 system negative except HPI    Objective   Vitals:  Heart Rate:  []   Temp:  [36.3 °C (97.3 °F)-37.6 °C (99.7 °F)]   Resp:  [15-30]   BP: (106-161)/()   SpO2:  [91  %-100 %]     Exam:  Constitutional: No acute distress, resting comfortably  Neuro:  AOx3, grossly intact  ENMT: moist mucous membranes  Head/neck: atraumatic  CV: no tachycardia  Pulm: non-labored on room air  GI: soft, non-tender, non-distended  Skin: warm and dry  musculoskeletal: moving all extremities. bilateral femoral arteries and radial arteries. Palpable Right DP and multiphasic Signals Left DP.     Labs:  Results from last 7 days   Lab Units 07/22/25  0005 07/21/25  0300 07/20/25  0243   WBC AUTO x10*3/uL 9.0 8.2 6.7   HEMOGLOBIN g/dL 8.3* 9.7* 8.8*   PLATELETS AUTO x10*3/uL 176 192 192      Results from last 7 days   Lab Units 07/22/25  0005 07/21/25  0300 07/20/25  1735   SODIUM mmol/L 137 141 137   POTASSIUM mmol/L 3.5 4.2 3.5   CHLORIDE mmol/L 96* 99 98   CO2 mmol/L 32 34* 30   BUN mg/dL 17 19 19   CREATININE mg/dL 1.09 1.25 1.29   GLUCOSE mg/dL 171* 128* 241*   MAGNESIUM mg/dL  --  2.26 1.69   PHOSPHORUS mg/dL 2.6 3.3 2.2*      Results from last 7 days   Lab Units 07/21/25  0605   APTT seconds 23*     Results from last 7 days   Lab Units 07/22/25  1113 07/22/25  0543 07/22/25  0005   ANTI XA UNFRACTIONATED IU/mL 0.3 0.2 <0.1       Imaging:  Reviewed independently by vascular team:  CTA chest with incidental finding of fusiform splenic artery aneurysm of 19 mm          [1]   Past Medical History:  Diagnosis Date    Anemia     Arthritis     Asthma     BPH (benign prostatic hyperplasia)     Cervical myelopathy     COPD (chronic obstructive pulmonary disease) (Multi)     Coronary artery disease     GERD (gastroesophageal reflux disease)     controlled    History of recent steroid use     Hyperlipidemia     Hypertension     Joint pain     Lumbar disc disease     Nephrolithiasis     Polycythemia     PONV (postoperative nausea and vomiting)     Proteinuria     Sepsis (Multi)     Skin disorder     BASAL MULTIPLE TIMES    Sleep apnea     Spinal stenosis     Vocal cord mass    [2]   Past Surgical  History:  Procedure Laterality Date    CARDIAC CATHETERIZATION Left 09/2023    CERVICAL FUSION      COLONOSCOPY      DENTAL SURGERY      HERNIA REPAIR      LUMBAR FUSION  2018    L5-S1 Fusion    SKIN BIOPSY      TONSILLECTOMY      TOTAL SHOULDER ARTHROPLASTY Bilateral    [3]   No current facility-administered medications on file prior to encounter.     Current Outpatient Medications on File Prior to Encounter   Medication Sig Dispense Refill    aspirin 81 mg EC tablet Take 1 tablet (81 mg) by mouth once daily.      atorvastatin (Lipitor) 40 mg tablet Take 1 tablet (40 mg) by mouth once daily at bedtime.      cyclobenzaprine (Flexeril) 5 mg tablet Take 1 tablet (5 mg) by mouth 3 times a day. (Patient taking differently: Take 1 tablet (5 mg) by mouth 3 times a day. Takes #1 tablet UP to three times a day if needed. Takes #1 tablet at bedtime on most days) 15 tablet 0    gabapentin (Neurontin) 600 mg tablet Take 1 tablet (600 mg) by mouth 3 times a day.      losartan (Cozaar) 25 mg tablet Take 1 tablet (25 mg) by mouth once daily.      oxyCODONE-acetaminophen (Percocet) 5-325 mg tablet Take 1 tablet by mouth every 6 hours.      pantoprazole (ProtoNix) 40 mg EC tablet Take 1 tablet (40 mg) by mouth 2 times a day.      tamsulosin (Flomax) 0.4 mg 24 hr capsule Take 1 capsule (0.4 mg) by mouth once daily at bedtime.      albuterol 90 mcg/actuation inhaler Inhale 2 puffs every 6 hours if needed for wheezing. (Patient not taking: Reported on 7/14/2025) 18 g 0    oxyCODONE (Roxicodone) 5 mg immediate release tablet Take 1 tablet (5 mg) by mouth every 6 hours if needed for moderate pain (4 - 6). (Patient not taking: Reported on 7/14/2025) 28 tablet 0    polyethylene glycol (Glycolax, Miralax) 17 gram packet Take 17 g by mouth 2 times a day. (Patient not taking: Reported on 7/11/2025) 15 each 0    sennosides-docusate sodium (Mariia-Colace) 8.6-50 mg tablet Take 2 tablets by mouth once daily. (Patient not taking: Reported on  6/27/2025) 15 tablet 0   [4] No Known Allergies

## 2025-07-22 NOTE — CONSULTS
Reason for Consult:    Subjective   64 y.o. male w/PMH HTN, HLD, CAD w/ LHC 09/2023 (no pci done), CKD, BIANCA, asthma, GERD, BPH, lumbar disc disease admitted 07/14 for L3-S1 laminectomy and facetectomies/extension/revision. On 07/20 had a rapid response called due to acute change in mental status and desaturation  with mildly elevated heart rate. Small PE was found on CT, however patient pressures remained stable and no signs of right heart strain on echo. Patient was persistently tachycardic this morning, prompting a cardiology consult. On review of tele, he entered A-Fib at 6:28 AM, and has persisted at this rhythm.    Review of Systems  Review of Systems   Constitutional: Negative for chills and diaphoresis.   Cardiovascular:  Positive for irregular heartbeat and palpitations. Negative for chest pain, claudication and leg swelling.   Gastrointestinal:  Negative for bloating, abdominal pain and change in bowel habit.   Genitourinary:  Negative for dysuria, flank pain and frequency.         MEDICATIONS  Infusions:  amiodarone, Last Rate: 1 mg/min (07/22/25 0904)   Followed by  amiodarone  heparin, Last Rate: 2,400 Units/hr (07/22/25 0643)      Scheduled:  acetaminophen, 650 mg, q6h  amiodarone, 150 mg, Once  aspirin, 81 mg, Daily  atorvastatin, 40 mg, Nightly  bisacodyl, 10 mg, Once  cefTRIAXone, 1 g, q24h  cyclobenzaprine, 10 mg, TID  gabapentin, 600 mg, TID  insulin lispro, 0-5 Units, Before meals & nightly  [Held by provider] losartan, 25 mg, Daily  pantoprazole, 40 mg, BID  perflutren lipid microspheres, 0.5-10 mL of dilution, Once in imaging  polyethylene glycol, 17 g, BID  potassium chloride, 20 mEq, q2h  sennosides-docusate sodium, 2 tablet, BID  sodium chloride, 1,000 mL, Once  sulfur hexafluoride microsphr, 2 mL, Once in imaging  tamsulosin, 0.4 mg, Nightly      PRN:   albuterol, 2 puff, q6h PRN  benzocaine-menthol, 1 lozenge, q2h PRN  calcium gluconate, 1 g, q6h PRN  calcium gluconate, 2 g, q6h  "PRN  dextrose, 12.5 g, q15 min PRN  dextrose, 25 g, q15 min PRN  glucagon, 1 mg, q15 min PRN  glucagon, 1 mg, q15 min PRN  HYDROmorphone, 0.2 mg, q4h PRN  lubricating eye drops, 1 drop, TID PRN  magnesium sulfate, 2 g, q6h PRN  magnesium sulfate, 4 g, q6h PRN  metoprolol, 5 mg, q6h PRN  naloxone, 0.2 mg, q5 min PRN  ondansetron, 4 mg, q8h PRN   Or  ondansetron, 4 mg, q8h PRN  oxyCODONE, 10 mg, q4h PRN  oxyCODONE, 2.5 mg, q4h PRN  oxyCODONE, 5 mg, q4h PRN  oxygen, , Continuous PRN - O2/gases  potassium chloride CR, 20 mEq, q6h PRN   Or  potassium chloride, 20 mEq, q6h PRN  potassium chloride CR, 40 mEq, q6h PRN   Or  potassium chloride, 40 mEq, q6h PRN      Prior to Admission Meds:  Prescriptions Prior to Admission[1]    Objective   Visit Vitals  /68   Pulse (!) 136   Temp 36.3 °C (97.3 °F) (Temporal)   Resp 19   Ht 1.854 m (6' 1\")   Wt 129 kg (285 lb 7.9 oz)   SpO2 100%   BMI 37.67 kg/m²   Smoking Status Never   BSA 2.59 m²        Physical Exam  General: awake, alert, no acute distress  HEENT: no scleral icterus, no JVD  CV: Elevated HR, irregular rhythm, no p waves on tele, no MRG  Resp: CTA b/l, no wheezes, rales, or rhonchi  Abd: soft, NT/ND  LE: no edema, no cyanosis  Neuo: grossly normal  Psych: pleasant      Echocardiogram:   Recent Labs     07/21/25  0803 06/30/25  1356   EF 58 61   LVIDD 5.40 4.55   RVFRWALLPKSP 13.10 11.92   TAPSE 2.5 2.2   Transthoracic Echo (TTE) Complete With Contrast 07/21/2025    [facility], [address] [place] [address]  Tel [phone] and Fax [phone]    TRANSTHORACIC ECHOCARDIOGRAM REPORT      Patient Name:       STEF Atkinson Physician:    [id number] Louann Castillo MD  Study Date:         7/21/2025           Ordering Provider:    [id number] JERMAIN LONG  MRN/PID:            [id number]            Fellow:  Accession#:         [id number]        Nurse:  Date of Birth/Age:  1960 " / 64      Sonographer:          Alexandra Godoy  years                                     LUIS, FIORDALIZA  Gender assigned at  M                   Additional Staff:  Birth:  Height:             185.42 cm           Admit Date:           7/14/2025  Weight:             129.28 kg           Admission Status:     Inpatient -  Routine  BSA / BMI:          2.50 m2 / 37.60     Encounter#:           [id number]  kg/m2  Blood Pressure:     105/63 mmHg         Department Location:  [place]    Study Type:    TRANSTHORACIC ECHO (TTE) COMPLETE  Diagnosis/ICD: Multiple subsegmental pulmonary emboli without acute cor  pulmonale-I26.94  Indication:    Pulmonary Embolism  CPT Code:      Echo Complete w Full Doppler-26780    Patient History:  Pertinent History: HTN, Hyperlipidemia and CAD. BIANCA, polycethemia, GERD.    Study Detail: The following Echo studies were performed: 2D, M-Mode, Doppler and  color flow. Technically challenging study due to body habitus and  prominent lung artifact. Agitated saline used as a contrast agent  for intraseptal flow evaluation and Optison used as a contrast  agent for endocardial border definition. Total contrast used for  this procedure was 1 mL via IV push.      PHYSICIAN INTERPRETATION:  Left Ventricle: Left ventricular ejection fraction is normal by visual estimate at 55-60%. There are no regional left ventricular wall motion abnormalities. The left ventricular cavity size is normal. There is normal septal and moderately increased posterior left ventricular wall thickness. There is left ventricular concentric remodeling. Spectral Doppler shows a normal pattern of left ventricular diastolic filling.  Left Atrium: The left atrial size is normal. A bubble study using agitated saline was performed. Bubble study is negative.  Right Ventricle: The right ventricle is mildly enlarged. There is normal right ventricular global systolic function.  Right Atrium: The right atrium is mildly dilated.  Aortic  Valve: The aortic valve is trileaflet. There is minimal aortic valve cusp calcification. There is mild aortic valve thickening. There is no evidence of aortic valve regurgitation.  Mitral Valve: The mitral valve is mildly thickened. There is mild calcification of the anterior mitral valve leaflet. There is trace mitral valve regurgitation. The E Vmax is 0.88 m/s.  Tricuspid Valve: The tricuspid valve is structurally normal. There is trace tricuspid regurgitation.  Pulmonic Valve: The pulmonic valve is structurally normal. There is physiologic pulmonic valve regurgitation.  Pericardium: Trivial pericardial effusion.  Aorta: The aortic root is normal. The Asc Ao is 3.50 cm. There is no dilatation of the ascending aorta. The aortic root is at the upper limits of normal size.  Systemic Veins: The inferior vena cava appears dilated, with IVC inspiratory collapse greater than 50%.  In comparison to the previous echocardiogram(s): Compared with study dated 6/30/2025, today's study demonstrates mild dilatation of the right heart chambers.      CONCLUSIONS:  1. Left ventricular ejection fraction is normal by visual estimate at 55-60%.  2. There is moderately increased posterior left ventricular wall thickness.  3. There is normal right ventricular global systolic function.  4. Mildly enlarged right ventricle.    QUANTITATIVE DATA SUMMARY:    2D MEASUREMENTS:          Normal Ranges:  Ao Root d:       3.70 cm  (2.0-3.7cm)  LAs:             4.30 cm  (2.7-4.0cm)  IVSd:            1.00 cm  (0.6-1.1cm)  LVPWd:           1.50 cm  (0.6-1.1cm)  LVIDd:           5.40 cm  (3.9-5.9cm)  LVIDs:           3.60 cm  LV Mass Index:   112 g/m2  LVEDV Index:     61 ml/m2  LV % FS          33.3 %      LEFT ATRIUM:                  Normal Ranges:  LA Vol A4C:        90.1 ml    (22+/-6mL/m2)  LA Vol A2C:        75.6 ml  LA Vol BP:         82.8 ml  LA Vol Index A4C:  36.0ml/m2  LA Vol Index A2C:  30.2 ml/m2  LA Vol Index BP:   33.1 ml/m2  LA Area  A4C:       25.2 cm2  LA Area A2C:       23.0 cm2  LA Major Axis A4C: 6.0 cm  LA Major Axis A2C: 6.0 cm  LA Volume Index:   33.1 ml/m2      RIGHT ATRIUM:          Normal Ranges:  RA Area A4C:  21.3 cm2      AORTA MEASUREMENTS:         Normal Ranges:  Asc Ao, d:          3.50 cm (2.1-3.4cm)      LV SYSTOLIC FUNCTION:  Normal Ranges:  EF-A4C View:    56 % (>=55%)  EF-A2C View:    54 %  EF-Biplane:     57 %  EF-Visual:      58 %  LV EF Reported: 58 %      LV DIASTOLIC FUNCTION:           Normal Ranges:  MV Peak E:             0.88 m/s  (0.7-1.2 m/s)  MV e'                  0.085 m/s (>8.0)  MV lateral e'          0.09 m/s  MV medial e'           0.08 m/s  E/e' Ratio:            10.41     (<8.0)  MV DT:                 235 msec  (150-240 msec)      MITRAL VALVE:          Normal Ranges:  MV DT:        235 msec (150-240msec)      AORTIC VALVE:           Normal Ranges:  AoV Vmax:      1.44 m/s (<=1.7m/s)  AoV Peak P.3 mmHg (<20mmHg)  LVOT Max Xander:  1.15 m/s (<=1.1m/s)  LVOT VTI:      20.30 cm  LVOT Diameter: 2.10 cm  (1.8-2.4cm)  AoV Area,Vmax: 2.77 cm2 (2.5-4.5cm2)      RIGHT VENTRICLE:  RV Basal 4.30 cm  RV Mid   3.70 cm  RV Major 8.4 cm  TAPSE:   24.8 mm  RV s'    0.13 m/s      TRICUSPID VALVE/RVSP:         Normal Ranges:  Est. RA Pressure:     3  IVC Diam:             2.10 cm      PULMONIC VALVE:          Normal Ranges:  PV Accel Time:  78 msec  (>120ms)  PV Max Xander:     1.1 m/s  (0.6-0.9m/s)  PV Max P.5 mmHg      [id number] Louann Castillo MD  Electronically signed on 2025 at 11:19:26 AM        ** Final **    Coronary Angiography:   [facility] 2023:  1. Left main trunk was normal  2. Left anterior descending artery had 40% mid stenosis and mild diffuse disease throughout and a large first diagonal branch with an 85% mid stenosis that extended to the lateral apical wall.  3. Circumflex artery had mild diffuse disease  4. Dominant right coronary artery had a 60% distal stenosis and mild diffuse disease  [facility]   Cardiac Scoring:   [place] 4/2024:  5 days  No significant abnormal heart rhythm   One patient event (palpitation, dizziness) correspond to normal rhyhm     Cardiac MRI: No results found for this or any previous visit from the past 1800 days.    Nuclear:    NST 05/15/2023  1. SPECT Perfusion Study: Abnormal.    2. There is no scintigraphic evidence for inducible ischemia.    3. There is a small (<10%) fixed perfusion defect in the RCA territory.    4. Left ventricle is normal in size. The left ventricle systolic   function is mildly decreased.    5. Right ventricle is normal in size. The right ventricle systolic   function is normal.    6. This is a low risk scan     Metabolic Stress: No results found for this or any previous visit from the past 1800 days.      Lab Review   Lab Results   Component Value Date     07/22/2025    K 3.5 07/22/2025    CL 96 (L) 07/22/2025    CO2 32 07/22/2025    BUN 17 07/22/2025    CREATININE 1.09 07/22/2025    GLUCOSE 171 (H) 07/22/2025    CALCIUM 7.8 (L) 07/22/2025     Lab Results   Component Value Date    WBC 9.0 07/22/2025    HGB 8.3 (L) 07/22/2025    HCT 30.2 (L) 07/22/2025    MCV 75 (L) 07/22/2025     07/22/2025       Troponin I, High Sensitivity ([facility])   Date/Time Value Ref Range Status   07/15/2025 03:35 PM 29 0 - 53 ng/L Final         Assessment/Plan     Impression:  Tachycardia secondary to A-Fib w/ RVR  64 y.o. male w/PMH HTN, HLD, CAD w/ LHC 09/2023 (no pci done), CKD, BIANCA, asthma, GERD, BPH, lumbar disc disease admitted 07/14 for L3-S1 laminectomy w/ additional history of provoked PE. Cardiology consulted for tachycardia    :: tele reviewed and patient on afib w/ RVR with clear start around 7/22 6am  :: CHADSVASC 1 (HTN no diabetes)  :: patient HDS and asymptomatic  :: OK by neuro and NSGY for AC  :: patient cardioverted after initial IV amio bolus to NSR  Recommendations:   - Transition PO amiodarone 400 mg BID while inpatient   - Will plan to load  around 6g  - Recommend TSH,LFTs for baseline  - Monitor telemetry for possible recurrence of Afib  - Cardiology will continue following    Dustin Mullen MD  PGY 1 Internal medicine    Thank you for the opportunity to contribute to the care of this patient. Above recommendations discussed with fellow Dr. Burt and attending Dr. Ga    If further questions arise, please page the general cardiology consult pager at [phone] on weekdays 7AM - 6PM and weekends 7AM - 2PM, or at [phone] at all other times.            [1]   Medications Prior to Admission   Medication Sig Dispense Refill Last Dose/Taking    aspirin 81 mg EC tablet Take 1 tablet (81 mg) by mouth once daily.   7/13/2025 Morning    atorvastatin (Lipitor) 40 mg tablet Take 1 tablet (40 mg) by mouth once daily at bedtime.   7/13/2025    cyclobenzaprine (Flexeril) 5 mg tablet Take 1 tablet (5 mg) by mouth 3 times a day. (Patient taking differently: Take 1 tablet (5 mg) by mouth 3 times a day. Takes #1 tablet UP to three times a day if needed. Takes #1 tablet at bedtime on most days) 15 tablet 0 7/13/2025    gabapentin (Neurontin) 600 mg tablet Take 1 tablet (600 mg) by mouth 3 times a day.   7/13/2025    losartan (Cozaar) 25 mg tablet Take 1 tablet (25 mg) by mouth once daily.   7/13/2025    oxyCODONE-acetaminophen (Percocet) 5-325 mg tablet Take 1 tablet by mouth every 6 hours.   7/13/2025    pantoprazole (ProtoNix) 40 mg EC tablet Take 1 tablet (40 mg) by mouth 2 times a day.   7/14/2025 Morning    tamsulosin (Flomax) 0.4 mg 24 hr capsule Take 1 capsule (0.4 mg) by mouth once daily at bedtime.   7/13/2025    albuterol 90 mcg/actuation inhaler Inhale 2 puffs every 6 hours if needed for wheezing. (Patient not taking: Reported on 7/14/2025) 18 g 0 Not Taking    oxyCODONE (Roxicodone) 5 mg immediate release tablet Take 1 tablet (5 mg) by mouth every 6 hours if needed for moderate pain (4 - 6). (Patient not taking: Reported on 7/14/2025) 28 tablet 0 Not Taking     polyethylene glycol (Glycolax, Miralax) 17 gram packet Take 17 g by mouth 2 times a day. (Patient not taking: Reported on 7/11/2025) 15 each 0 Not Taking    sennosides-docusate sodium (Mariia-Colace) 8.6-50 mg tablet Take 2 tablets by mouth once daily. (Patient not taking: Reported on 6/27/2025) 15 tablet 0

## 2025-07-22 NOTE — PROGRESS NOTES
"Subjective Data:  64 y.o. male  with a past medical history of hyperlipidemia, hypertension, CAD, sleep apnea, asthma, GERD, CKD, BPH, anemia, arthritis, lumbar disc disease, spinal stenosis presenting for scheduled removal of previous L5-S1 instrumentation, exploration of previous L5-S1 fusion, L3-L4, L4-L5 and L5-S1 and fasciotomy for decompression with L2-S1; Instrumentation and pelvic fixation bilaterally completed on 7/14/2025, course c/b VTE and Afib     Overnight Events:    Patient went into A-fib with RVR HR up to 167; he was given metoprolol and amiodarone bolus, followed by a amiodarone gtt. He reamains on Heparin gtt at 24 units       Objective Data:  Last Recorded Vitals:  /83   Pulse 95   Temp 36.5 °C (97.7 °F) (Temporal)   Resp 19   Ht 1.854 m (6' 1\")   Wt 129 kg (285 lb 7.9 oz)   SpO2 100%   BMI 37.67 kg/m²     Physical Exam:  General: Acutely ill appearing  Neuro: Alert, Oriented to place,time, date  Chest: Bilateral CTA  Abdomen: distended and firm  Upper extremities: no edema  Lower extremities: Trace of edema L>R with + 2 DP    Lab Review   Recent Labs     07/22/25  0005 07/21/25  0300 07/20/25  1735 07/20/25  0243 07/19/25  0412 07/18/25  0200 07/17/25  0053 07/16/25  0012 07/15/25  1127 07/15/25  0615 07/14/25  1457 01/27/25  0824 01/26/25  0657 01/25/25  0732 01/24/25  0036    141 137 137 137 142 142 141   < > 140   < > 137 141 141 137   K 3.5 4.2 3.5 3.9 3.9 4.4 4.5 4.4   < > 5.9*   < > 4.5 4.0 4.1 4.9   CL 96* 99 98 101 98 103 105 107   < > 105   < > 99 100 102 104   CO2 32 34* 30 31 31 29 29 29   < > 29   < > 32 34* 34* 25   ANIONGAP 13 12 13 9* 12 14 13 9*   < > 12   < > 11 11 9* 13   BUN 17 19 19 17 15 13 21 22   < > 25*   < > 20 22 22 19   CREATININE 1.09 1.25 1.29 1.04 1.05 0.77 0.90 1.02   < > 1.16   < > 0.78 0.80 0.91 0.82   EGFR 76 64 62 80 79 >90 >90 82   < > 70   < > >90 >90 >90 >90   MG  --  2.26 1.69  --   --   --   --  1.77  --  1.66  --  1.93 1.80 1.89 2.05    " "< > = values in this interval not displayed.     Estimated Creatinine Clearance: 96.7 mL/min (by C-G formula based on SCr of 1.09 mg/dL).    Recent Labs     07/22/25  0005 07/21/25  0300 07/20/25  1735 07/20/25  0243 07/19/25  0412   ALBUMIN 2.8* 3.3* 3.1* 3.2* 3.1*     Recent Labs     07/22/25  0005 07/21/25  0300 07/20/25  0243 07/19/25  0412 07/18/25  1117 07/18/25  0200 07/17/25  0052 07/16/25  0012   WBC 9.0 8.2 6.7 6.1 8.8 6.4 8.0 7.9   HGB 8.3* 9.7* 8.8* 8.6* 9.4* 9.1* 8.8* 7.9*   HCT 30.2* 35.2* 31.4* 30.3* 31.5* 31.8* 29.5* 26.4*    192 192 208 226 210 212 187   MCV 75* 77* 75* 74* 72* 72* 73* 70*     Recent Labs     07/22/25  1113 07/22/25  0543 07/22/25  0005 07/21/25  1748 07/21/25  1307 06/27/25  1051 01/21/25  1815   INR  --   --   --   --   --  1.0 1.1   HAUF 0.3 0.2 <0.1 0.1 <0.1  --   --    FIBRINOGEN  --   --   --   --   --   --  300     PTT - 7/21/2025:  6:05 AM  1.0   11.1 23     No results for input(s): \"CHOL\", \"LDLF\", \"HDL\", \"TRIG\" in the last 27570 hours.  Lab Results   Component Value Date    HGBA1C 5.7 (H) 03/22/2025     No results found for: \"TSH\"      Last I/O:    Intake/Output Summary (Last 24 hours) at 7/22/2025 1156  Last data filed at 7/22/2025 1045  Gross per 24 hour   Intake 3402.28 ml   Output 1200 ml   Net 2202.28 ml      Inpatient Medications:  Scheduled medications  Scheduled Medications[1]  Continuous medications  Continuous Medications[2]  PRN medications  PRN Medications[3]    Imaging:   LE DVT US 7/21/25  PRELIMINARY CONCLUSIONS   Right Lower Venous: No evidence of acute deep vein thrombus visualized in the right lower extremity.  Left Lower Venous: No evidence of acute deep vein thrombus visualized in the left lower extremity.    Vascular US Upper Extremity Venous Duplex Bilateral  7/21/2025  PRELIMINARY CONCLUSIONS   Right Upper Venous: No evidence of acute deep vein thrombus visualized in the right upper extremity.  Left Upper Venous: No evidence of acute deep vein " thrombus visualized in the left upper extremity. The cephalic vein was not visualized due to its small caliber.    ECHO 7/21/25   1. Left ventricular ejection fraction is normal by visual estimate at 55-60%.   2. There is moderately increased posterior left ventricular wall thickness.   3. There is normal right ventricular global systolic function.   4. Mildly enlarged right ventricle.     CT angio chest for pulmonary embolism 7/21/2025  1. Nonocclusive central filling defects within a segmental left lower  lobe pulmonary artery and near occlusive filling defects within  subsegmental left lower lobe pulmonary arteries are most consistent  with acute pulmonary emboli on this motion limited exam. No evident  right heart strain.  2. Subsegmental atelectasis within right-greater-than-left lower  lobes. Additional findings of potential excessive dynamic airway  collapse within the right lobar airways and left mainstem bronchus.  Correlate with clinical findings.  3. Mild chronic cardiomegaly.  4. Splenic artery aneurysm measuring up to 1.9 cm.     Assessment/Plan     ::Acute  Segmental/SS PE. No UE/LE DVT   -Provoked in the setting of hospitalization and surgical intervention   ::Splenic artery aneurysm ~1.9 cm, with plans to follow up with VS     Plan   - Continue with low intensity heparin gtt, no bolus, monitor for bleeding, monitor hb and PLTs   - Will likely be discharged on Eliquis if compatible with his meds, affordable and labs are stable  - Duration of AC: 3- 6 months in setting of a provoked VTE, likely longer in setting of A-fib  - Continue following with urology and his PCP for updated cancer screenings, especially with elevated PSA noted in 3/2025     Jhony FIGUEROA-CNP  Department of Vascular Medicine       I was present with the PA who participated in the documentation of this note. I have personally performed the medical decision-making components (assessment and plan of care). I have reviewed the PA  documentation and verified the findings in the note as written with additions or exceptions as stated in the body of this note.    Rajni Bruner MD           [1] acetaminophen, 650 mg, oral, q6h  aspirin, 81 mg, oral, Daily  atorvastatin, 40 mg, oral, Nightly  bisacodyl, 10 mg, rectal, Once  cefTRIAXone, 1 g, intravenous, q24h  cyclobenzaprine, 10 mg, oral, TID  gabapentin, 600 mg, oral, TID  insulin lispro, 0-5 Units, subcutaneous, Before meals & nightly  [Held by provider] losartan, 25 mg, oral, Daily  metoprolol tartrate, 25 mg, oral, BID  pantoprazole, 40 mg, oral, BID  perflutren lipid microspheres, 0.5-10 mL of dilution, intravenous, Once in imaging  polyethylene glycol, 17 g, oral, BID  sennosides-docusate sodium, 2 tablet, oral, BID  sulfur hexafluoride microsphr, 2 mL, intravenous, Once in imaging  tamsulosin, 0.4 mg, oral, Nightly     [2] amiodarone, 1 mg/min, Last Rate: 1 mg/min (07/22/25 1037)  heparin, 0-4,000 Units/hr, Last Rate: 2,400 Units/hr (07/22/25 0643)     [3] PRN medications: albuterol, benzocaine-menthol, calcium gluconate, calcium gluconate, dextrose, dextrose, glucagon, glucagon, HYDROmorphone, lubricating eye drops, magnesium sulfate, magnesium sulfate, metoprolol, naloxone, ondansetron **OR** ondansetron, oxyCODONE, oxyCODONE, oxyCODONE, oxygen, potassium chloride CR **OR** potassium chloride, potassium chloride CR **OR** potassium chloride

## 2025-07-22 NOTE — PROGRESS NOTES
Communication Note    Patient Name: Jerman Colón Jr.  MRN: [id number]  Today's Date: 7/22/2025   Room: 04/04-A    Discipline: Occupational Therapy      Missed Visit Reason:  (Pt with resting -170s this AM, also not yet therapeutic on heparin for PE. Will hold OT at this time.)      07/22/25 at 8:47 AM   Sherry Lombardi OT   Rehab Office: [phone]

## 2025-07-22 NOTE — PROGRESS NOTES
Communication Note    Patient Name: Jerman Colón Jr.  MRN: [id number]  Today's Date: 7/22/2025   Room: 04/04A    Discipline: Physical Therapy      PT Missed Visit: Yes  Missed Visit Reason:  (Pt with resting HR in 150-160's. Hold PT.)      07/22/25 at 8:45 AM   Stacey Lopez, PT   Rehab Office: [phone]

## 2025-07-22 NOTE — PROGRESS NOTES
Occupational Therapy    Occupational Therapy Treatment    Name: Jerman Colón Jr.  MRN: [id number]  : 1960  Date: 25  Room: A    First session:  Time Calculation  Start Time: 1536  Stop Time: 1616  Time Calculation (min): 40 min  Second session (returned to help patient back to bed):  Time Calculation  Start Time: 1701  Stop Time: 1704  Time Calculation: 13 minutes  Total Treatment Time Calculation (min): 53 min    Assessment:  Evaluation/Treatment Tolerance: Patient tolerated treatment well  Medical Staff Made Aware: Yes  End of Session Communication: Bedside nurse  End of Session Patient Position: Up in chair, Alarm off, not on at start of session (Up in chair at end of first session. Back in bed at end of second session.)  Plan:  Treatment Interventions: ADL retraining, Functional transfer training, UE strengthening/ROM, Endurance training, Patient/family training, Equipment evaluation/education, Neuromuscular reeducation, Fine motor coordination activities, Compensatory technique education  OT Frequency: 5 times per week  OT Discharge Recommendations: High intensity level of continued care  Equipment Recommended upon Discharge:  (TBD)  OT Recommended Transfer Status: Maximum assist, Assist of 2  OT - OK to Discharge: Yes    Subjective   General:  OT Last Visit  OT Received On: 25  Reason for Referral: Admitted 2025 with Lumbar radicular pain after presenting with BLE radiculopathy,  s/p L3-S1 lami and facetectomies/extension/revision, 7/15 hgb 7.1 s/p 1u pRBC, post Tx 7.5, s/p 1u pRBC,  drain auto dc'd,  CT PE nonocclusive PE in segmental LLL pulm artery and near occlusive PE in subsegmental LLL pulm artery.    TTE 55-60%, DVT US x4 neg,  sinus tachy s/p metop  Past Medical History Relevant to Rehab: PONV, Vocal Cord Mass, HTN, HLD, CAD, COPD, Asthma BIANCA (no CPAP), GERD, BPH, hypogonadism, tubular adenoma of colon, proteinuria Polcythemia, Fusion of lumbar  spine, kyphosis of cervical region, cervical myelopathy, Spinal cord compression due to degenerative disorder of spinal column  Prior to Session Communication: Bedside nurse  Patient Position Received: Bed, 3 rail up, Alarm on  Family/Caregiver Present: No  General Comment: Patient pleasant and agreeablt to participate in therapy. Patient motivated to participate.   Precautions:  Hearing/Visual Limitations: WFL  Medical Precautions: Fall precautions, Oxygen therapy device and L/min, Cardiac precautions  Post-Surgical Precautions: Spinal precautions  Precautions Comment: SBP <180        Vital Signs       Vitals Session Pre OT During OT Post OT   Heart Rate 81  80s 84   Resp  14   16   SpO2 100  > 98 100   /64 113/53 EOB  133/58 in chair 135/74   MAP 77  91             Lines/Tubes/Drains:  External Urinary Catheter (Active)   Number of days: 0     Tele, heparin gtt, 4.5 L NC  Cognition:  Overall Cognitive Status: Within Functional Limits  Arousal/Alertness: Appropriate responses to stimuli  Orientation Level: Oriented X4  Following Commands: Follows one step commands with repetition  Cognition Comments: Patient follows commands with repetition % accuracy without difficulty  Attention: Within Functional Limits  Memory: Within Funtional Limits  Problem Solving: Within Functional Limits  Insight: Within function limits  Impulsive: Mildly  Processing Speed: Within funtional limits    Pain Assessment:  Pain Assessment  Pain Assessment: 0-10  0-10 (Numeric) Pain Score: 0 - No pain     Objective   Activities of Daily Living:      Grooming  Grooming Level of Assistance: Contact guard  Grooming Where Assessed: Edge of bed  Grooming Comments: Patient able to wash face using wipe with CGA while sitting at EOB.  UE Bathing  UE Bathing Level of Assistance: Contact guard  UE Bathing Where Assessed: Edge of bed  UE Bathing Comments: Patient able to wash BUE using wipe with CGA while sitting EOB.     UE Dressing  UE  Dressing Level of Assistance: Contact guard  UE Dressing Where Assessed: Edge of bed  UE Dressing Comments: Patient chelle gown with CGA while sitting EOB.          Functional Standing Tolerance:  Functional Mobility  Functional Mobility Performed: Yes  Functional Mobility 1  Surface 1: Level tile  Device 1: Rolling walker  Assistance 1: Minimum assistance  Comments 1: Side steps at EOB with Min A x1 using wheeled walker. Cues for hand placement  Bed Mobility/Transfers:   Bed Mobility  Bed Mobility: Yes  Bed Mobility 1  Bed Mobility 1: Supine to sitting  Level of Assistance 1: Contact guard, Minimum assistance  Bed Mobility Comments 1: HOB elevated, cue to reach RUE to bed rail, cue  for log roll technique, CGA to Min for steadying.  Bed Mobility 2  Bed Mobility  2: Sitting to supine  Level of Assistance 2: Minimum assistance  Bed Mobility Comments 2: Min A for legs  Transfers  Transfer: Yes  Transfer 1  Transfer From 1: Sit to, Stand to  Transfer to 1: Stand, Sit  Technique 1: Sit to stand, Stand to sit  Transfer Device 1: Walker  Transfer Level of Assistance 1: Moderate assistance, Maximum assistance  Trials/Comments 1: x3 trials. Max A using wheeled walker for trial 1, Mod A using wheeled walker for trial 2. Improvement in assist level following instruction on hand placement and forward lean with rocking.  Transfers 2  Transfer From 2: Chair with arms to  Transfer to 2: Bed  Technique 2: Sit to stand, Stand to sit  Transfer Device 2: Walker  Transfer Level of Assistance 2: Maximum assistance  Trials/Comments 2: Max A x1 to stand, Min A for side steps using wheeled walker.                Balance:  Dynamic Sitting Balance  Dynamic Sitting-Level of Assistance: Contact guard  Dynamic Sitting-Comments: Demo brief posterior and L lean, corrects with cues  Dynamic Standing Balance  Dynamic Standing-Level of Assistance: Minimum assistance  Static Sitting Balance  Static Sitting-Level of Assistance: Contact guard  Static  Sitting-Comment/Number of Minutes: x15 minutes EOB  Static Standing Balance  Static Standing-Level of Assistance: Minimum assistance    Therapy/Activity:      Therapeutic Activity  Therapeutic Activity Performed: Yes  Therapeutic Activity 1: Patient sat EOB x15 minutes with CGA. Patient demo brief posterior and L lateral lean, patient able to correct with cue. Patient req. CGA to wash face and BUE and chelle gown at EOB.  Therapeutic Activity 2: Patient completed x3 sit<>stand trials. First stand: Patient req. Max A using wheeled walker to stand with cues for hand placement. Patient reported mild dizziness with stand, BP remained stable and dizziness subsided with rest. Patient instructed on body mechanics and proper hand placement prior to 2nd stand. Second stand: Patient req. Mod A using wheeled walker. Third stand: Max A using wheeled walker.  Therapeutic Activity 3: Patient completed x2 trials of side steps at EOB using wheeled walker with Min A and cues for pacing to transfer from bed to chair. Patient reported mild dizziness and lightheadedness. Patient instructed on bilateral leg kicks and ankle pumps to improve BP. BP remained stable and symptoms subsided with BLE exercises and rest.       Outcome Measures:  [facility] Daily Activity  Putting on and taking off regular lower body clothing: A lot  Bathing (including washing, rinsing, drying): A lot  Putting on and taking off regular upper body clothing: A little  Toileting, which includes using toilet, bedpan or urinal: A lot  Taking care of personal grooming such as brushing teeth: A little  Eating Meals: A little  Daily Activity - Total Score: 15  Confusion Assessment Method-ICU (CAM-ICU)  Feature 1: Acute Onset or Fluctuating Course: Negative  Feature 2: Inattention: Negative  Feature 3: Altered Level of Consciousness: Negative  Feature 4: Disorganized Thinking: Negative  Overall CAM-ICU: Negative  ICU Mobility Screen  Early Mobility/Exercise Safety Screen:  Proceed with mobilization - No exclusion criteria met  ICU Mobility Scale: Marching on spot (at bedside)     Education Documentation  Handouts, taught by NORBERT Villaseñor at 7/22/2025  4:39 PM.  Learner: Patient  Readiness: Acceptance  Method: Explanation, Demonstration  Response: Verbalizes Understanding, Demonstrated Understanding  Comment: OT POC, ADL retraining, balance, endurance, activity tolerance.    Body Mechanics, taught by NORBERT Villaseñor at 7/22/2025  4:39 PM.  Learner: Patient  Readiness: Acceptance  Method: Explanation, Demonstration  Response: Verbalizes Understanding, Demonstrated Understanding  Comment: OT POC, ADL retraining, balance, endurance, activity tolerance.    Precautions, taught by NORBERT Villaseñor at 7/22/2025  4:39 PM.  Learner: Patient  Readiness: Acceptance  Method: Explanation, Demonstration  Response: Verbalizes Understanding, Demonstrated Understanding  Comment: OT POC, ADL retraining, balance, endurance, activity tolerance.    ADL Training, taught by NORBERT Villaseñor at 7/22/2025  4:39 PM.  Learner: Patient  Readiness: Acceptance  Method: Explanation, Demonstration  Response: Verbalizes Understanding, Demonstrated Understanding  Comment: OT POC, ADL retraining, balance, endurance, activity tolerance.    Education Comments  No comments found.        Goals:  Encounter Problems       Encounter Problems (Active)       ADLs       Patient will perform UB and LB bathing with minimal assist  level of assistance and PRN adaptive equipment. (Progressing)       Start:  07/15/25    Expected End:  07/29/25            Patient with complete upper body dressing with SBA donning and doffing all UE clothes while edge of bed  (Progressing)       Start:  07/15/25    Expected End:  07/29/25            Patient with complete lower body dressing with minimal assist  level of assistance donning and doffing all LE clothes  with PRN adaptive equipment while edge of bed  (Progressing)       Start:   07/15/25    Expected End:  07/29/25            Patient will complete daily grooming tasks with SBA and PRN adaptive equipment while edge of bed . (Progressing)       Start:  07/15/25    Expected End:  07/29/25            Patient will complete toileting including hygiene clothing management/hygiene with minimal assist  level of assistance and LRAD. (Progressing)       Start:  07/15/25    Expected End:  07/29/25               ADLs       Patient will feed self with modified independent level of assistance using PRN adaptive equipment without spillage (Progressing)       Start:  07/15/25    Expected End:  07/29/25               BALANCE       Pt will maintain dynamic sitting balance during ADL task with independent level of assistance in order to demonstrate decreased risk of falling and improved postural control. (Progressing)       Start:  07/15/25    Expected End:  07/29/25            Patientt will maintain static/dynamic standing balance > 10 minutes during ADL task with minimum level of assistance in order to demonstrate decreased risk of falling and improved postural control. (Progressing)       Start:  07/15/25    Expected End:  07/29/25               COGNITION/SAFETY       Patient will recall and adhere to spinal precautions during all functional mobility/ADL tasks in order to demonstrate improved understanding and promote healing post op (Progressing)       Start:  07/15/25    Expected End:  07/29/25               EXERCISE/STRENGTHENING       Patient will demo BUE coordination WFL during functional task without an increase in time to improve task performance.  (Progressing)       Start:  07/15/25    Expected End:  07/29/25               EXERCISE/STRENGTHENING       Patient with increase BUE to 5/5 strength and ROM WFL (Progressing)       Start:  07/15/25    Expected End:  07/29/25               TRANSFERS       Patient will perform bed mobility contact guard assist level of assistance and bed rails in order to  improve safety and independence with mobility (Progressing)       Start:  07/15/25    Expected End:  07/29/25            Patient will complete functional transfers with least restrictive device with minimal assist  level of assistance. (Progressing)       Start:  07/15/25    Expected End:  07/29/25                 Completion of this session, clinical decision making, and documentation performed under the supervision/direction of Sherry Lombardi.    07/22/25 at 5:51 PM   DIONE MOORE, S-OT   [phone]

## 2025-07-22 NOTE — PROGRESS NOTES
"Jerman Colón Jr. is a 64 y.o. male on day 8 of admission presenting with Lumbar radicular pain.    Subjective   Tachycardic this AM to the 170s, diaphoretic. Was CPAPing overnight. Dressing changed overnight, incision c/d/i       Objective     Physical Exam  No acute distress  Breathing comfortably on CPAP overnight  A&Ox3  OU3R, EOMI   Face symmetric, Facial SILT   Hearing intact   RUE D5 / B5 / T5 / HG 5/ IO 5  LUE D5 / B5 / T5 / HG 5/ IO 5  RLE HF5 / KE 5/ DF 5/ PF 5  LLE HF5 / KE 5/ DF 5/ PF 5  SILT      Last Recorded Vitals  Blood pressure 126/66, pulse (!) 139, temperature 36.3 °C (97.3 °F), temperature source Temporal, resp. rate 21, height 1.854 m (6' 1\"), weight 129 kg (285 lb 7.9 oz), SpO2 100%.  Intake/Output last 3 Shifts:  I/O last 3 completed shifts:  In: 5269.4 (40.7 mL/kg) [P.O.:3550; I.V.:1619.4 (12.5 mL/kg); IV Piggyback:100]  Out: 2250 (17.4 mL/kg) [Urine:2250 (0.5 mL/kg/hr)]  Weight: 129.5 kg     Relevant Results  Results for orders placed or performed during the hospital encounter of 07/14/25 (from the past 24 hours)   Light Blue Top   Result Value Ref Range    Extra Tube Hold for add-ons.    Red Top   Result Value Ref Range    Extra Tube Hold for add-ons.    SST TOP   Result Value Ref Range    Extra Tube Hold for add-ons.    Gray Top   Result Value Ref Range    Extra Tube Hold for add-ons.    POCT GLUCOSE   Result Value Ref Range    POCT Glucose 180 (H) 74 - 99 mg/dL   Heparin Assay, UFH   Result Value Ref Range    Heparin Unfractionated <0.1 See Comment Below for Therapeutic Ranges IU/mL   Vascular US lower extremity venous duplex bilateral   Result Value Ref Range    BSA 2.58 m2   POCT GLUCOSE   Result Value Ref Range    POCT Glucose 168 (H) 74 - 99 mg/dL   Heparin Assay, UFH   Result Value Ref Range    Heparin Unfractionated 0.1 See Comment Below for Therapeutic Ranges IU/mL   POCT GLUCOSE   Result Value Ref Range    POCT Glucose 189 (H) 74 - 99 mg/dL   Renal function panel   Result " Value Ref Range    Glucose 171 (H) 74 - 99 mg/dL    Sodium 137 136 - 145 mmol/L    Potassium 3.5 3.5 - 5.3 mmol/L    Chloride 96 (L) 98 - 107 mmol/L    Bicarbonate 32 21 - 32 mmol/L    Anion Gap 13 10 - 20 mmol/L    Urea Nitrogen 17 6 - 23 mg/dL    Creatinine 1.09 0.50 - 1.30 mg/dL    eGFR 76 >60 mL/min/1.73m*2    Calcium 7.8 (L) 8.6 - 10.6 mg/dL    Phosphorus 2.6 2.5 - 4.9 mg/dL    Albumin 2.8 (L) 3.4 - 5.0 g/dL   CBC   Result Value Ref Range    WBC 9.0 4.4 - 11.3 x10*3/uL    nRBC 0.2 (H) 0.0 - 0.0 /100 WBCs    RBC 4.01 (L) 4.50 - 5.90 x10*6/uL    Hemoglobin 8.3 (L) 13.5 - 17.5 g/dL    Hematocrit 30.2 (L) 41.0 - 52.0 %    MCV 75 (L) 80 - 100 fL    MCH 20.7 (L) 26.0 - 34.0 pg    MCHC 27.5 (L) 32.0 - 36.0 g/dL    RDW 20.5 (H) 11.5 - 14.5 %    Platelets 176 150 - 450 x10*3/uL   Heparin Assay, UFH   Result Value Ref Range    Heparin Unfractionated <0.1 See Comment Below for Therapeutic Ranges IU/mL   ECG 12 lead   Result Value Ref Range    Ventricular Rate 92 BPM    Atrial Rate 92 BPM    SC Interval 146 ms    QRS Duration 92 ms    QT Interval 348 ms    QTC Calculation(Bazett) 430 ms    P Axis 51 degrees    R Axis 6 degrees    T Axis 10 degrees    QRS Count 15 beats    Q Onset 216 ms    P Onset 143 ms    P Offset 196 ms    T Offset 390 ms    QTC Fredericia 401 ms   Heparin Assay, UFH   Result Value Ref Range    Heparin Unfractionated 0.2 See Comment Below for Therapeutic Ranges IU/mL   POCT GLUCOSE   Result Value Ref Range    POCT Glucose 142 (H) 74 - 99 mg/dL             Assessment & Plan  Lumbar radicular pain    Lumbar foraminal stenosis    Lumbar radiculopathy, chronic    65yo male w h/o prior L5-S1 fusion, HTN, HLD, CAD, sleep apnea (central/obstructive), asthma, GERD, CKD, BPH, anemia, 1/20/25 C4-6 ACDF/C2-T4 PCDF p/w BLE radiculopathy, 7/14 s/p L3-S1 lami and facetectomies/extension/revision (), 7/15 hgb 7.1 s/p 1u pRBC, post Tx 7.5, s/p 1u pRBC, 7/18 drain auto dc'd, 7/20 CT PE nonocclusive PE in  segmental LLL pulm artery and near occlusive PE in subsegmental LLL pulm artery.   7/21 TTE 55-60%, DVT US x4 neg, 7/22 sinus tachy s/p metop    Plan:  NSU  Cards consult this AM re HR  vasc med recs - con't hep gtt, trend hb & plts, needs splenic artery fu op, DC on Eliquis,   cards recs,   vasc surg c/s re splenic artery aneurysm,   blood cx,   PTOT-rehab      Lupillo De Jesus MD

## 2025-07-22 NOTE — PROGRESS NOTES
[facility]  NEUROSCIENCE INTENSIVE CARE UNIT  DAILY PROGRESS NOTE       Patient Name: Jerman Colón Jr.   MRN: [id number]     Admit Date: 2025     : 1960 AGE: 64 y.o. GENDER: male        Subjective    65yo male w h/o prior L5-S1 fusion, hyperlipidemia, hypertension, CAD, sleep apnea (central/obstructive), asthma, GERD, CKD, BPH, anemia, 25 C4-6 ACDF/C2-T4 PCDF p/w BLE radiculopathy,  s/p L3-S1 lami and facetectomies/extension/revision (), 7/15 hgb 7.1 s/p 2u pRBC, post Tx cbc 7.5, s/p 1u pRBC.  drain auto dc'd     Significant Events:  -  upgraded to NSU for close monitoring and restart of AC    Interval Events:   Pt was tachycardic into the 150s overnight. The tachycardia resolved before ECG could be obtained. He went into Afib this morning, EKG showed Afib with RVR. He converted after IV fluids and amiodarone bolus.      Objective   VITALS (24H):  Temp:  [36.6 °C (97.9 °F)-37.6 °C (99.7 °F)] 37.2 °C (99 °F)  Heart Rate:  [] 167  Resp:  [12-32] 30  BP: (106-161)/() 128/102  FiO2 (%):  [40 %] 40 %  INTAKE/OUTPUT:  Intake/Output Summary (Last 24 hours) at 2025 0705  Last data filed at 2025 0600  Gross per 24 hour   Intake 3244.95 ml   Output 1200 ml   Net 2044.95 ml     VENT SETTINGS:  FiO2 (%):  [40 %] 40 %  S RR:  [14] 14  S VT:  [450 mL] 450 mL     PHYSICAL EXAM:  NEURO:  - Awake, AOx4, follows commands  - EOS, PERRL, EOMI, VFF  - Deltoids, elbow flexion/extension, , and hip flexion 5/5 bilaterally  - posterior surgical incision c/d/I   CV:  - Irregular rhythm, tachycardia. Normal S1 and S2, no murmurs.  RESP:  - No signs of resp distress  - On 4L NC  :  - Alex draining clear yellow urine to gravity   GI:  - Abdomen NT/ND, soft  SKIN:  - Intact    MEDICATIONS:  Scheduled: PRN: Continuous:   Scheduled Medications[1] PRN Medications[2] Continuous Medications[3]     LAB RESULTS:  Results for orders placed or performed during the  hospital encounter of 07/14/25 (from the past 24 hours)   POCT GLUCOSE   Result Value Ref Range    POCT Glucose 168 (H) 74 - 99 mg/dL   Transthoracic Echo Complete   Result Value Ref Range    AV pk elsie 1.44 m/s    LVOT diam 2.10 cm    LA vol index A/L 33.1 ml/m2    Tricuspid annular plane systolic excursion 2.5 cm    LV EF 58 %    RV free wall pk S' 13.10 cm/s    LVIDd 5.40 cm    Aortic Valve Area by Continuity of Peak Velocity 2.77 cm2    AV pk grad 8 mmHg    LV A4C EF 56.3    Light Blue Top   Result Value Ref Range    Extra Tube Hold for add-ons.    Red Top   Result Value Ref Range    Extra Tube Hold for add-ons.    SST TOP   Result Value Ref Range    Extra Tube Hold for add-ons.    Gray Top   Result Value Ref Range    Extra Tube Hold for add-ons.    POCT GLUCOSE   Result Value Ref Range    POCT Glucose 180 (H) 74 - 99 mg/dL   Heparin Assay, UFH   Result Value Ref Range    Heparin Unfractionated <0.1 See Comment Below for Therapeutic Ranges IU/mL   Vascular US lower extremity venous duplex bilateral   Result Value Ref Range    BSA 2.58 m2   POCT GLUCOSE   Result Value Ref Range    POCT Glucose 168 (H) 74 - 99 mg/dL   Heparin Assay, UFH   Result Value Ref Range    Heparin Unfractionated 0.1 See Comment Below for Therapeutic Ranges IU/mL   POCT GLUCOSE   Result Value Ref Range    POCT Glucose 189 (H) 74 - 99 mg/dL   Renal function panel   Result Value Ref Range    Glucose 171 (H) 74 - 99 mg/dL    Sodium 137 136 - 145 mmol/L    Potassium 3.5 3.5 - 5.3 mmol/L    Chloride 96 (L) 98 - 107 mmol/L    Bicarbonate 32 21 - 32 mmol/L    Anion Gap 13 10 - 20 mmol/L    Urea Nitrogen 17 6 - 23 mg/dL    Creatinine 1.09 0.50 - 1.30 mg/dL    eGFR 76 >60 mL/min/1.73m*2    Calcium 7.8 (L) 8.6 - 10.6 mg/dL    Phosphorus 2.6 2.5 - 4.9 mg/dL    Albumin 2.8 (L) 3.4 - 5.0 g/dL   CBC   Result Value Ref Range    WBC 9.0 4.4 - 11.3 x10*3/uL    nRBC 0.2 (H) 0.0 - 0.0 /100 WBCs    RBC 4.01 (L) 4.50 - 5.90 x10*6/uL    Hemoglobin 8.3 (L) 13.5 -  17.5 g/dL    Hematocrit 30.2 (L) 41.0 - 52.0 %    MCV 75 (L) 80 - 100 fL    MCH 20.7 (L) 26.0 - 34.0 pg    MCHC 27.5 (L) 32.0 - 36.0 g/dL    RDW 20.5 (H) 11.5 - 14.5 %    Platelets 176 150 - 450 x10*3/uL   Heparin Assay, UFH   Result Value Ref Range    Heparin Unfractionated <0.1 See Comment Below for Therapeutic Ranges IU/mL   Heparin Assay, UFH   Result Value Ref Range    Heparin Unfractionated 0.2 See Comment Below for Therapeutic Ranges IU/mL        IMAGING RESULTS:  Vascular US lower extremity venous duplex bilateral         Upper extremity venous duplex bilateral         Transthoracic Echo Complete   Final Result      CT angio chest for pulmonary embolism   Final Result   1. Nonocclusive central filling defects within a segmental left lower   lobe pulmonary artery and near occlusive filling defects within   subsegmental left lower lobe pulmonary arteries are most consistent   with acute pulmonary emboli on this motion limited exam. No evident   right heart strain.   2. Subsegmental atelectasis within right-greater-than-left lower   lobes. Additional findings of potential excessive dynamic airway   collapse within the right lobar airways and left mainstem bronchus.   Correlate with clinical findings.   3. Mild chronic cardiomegaly.   4. Splenic artery aneurysm measuring up to 1.9 cm.        MACRO:   Dr. Meghna Mathews discussed the significance and urgency of this   critical finding by EPIC secure chat with Dr. Patricia Lyn on 7/20/2025   at 10:55 pm.  (**-RCF-**) Findings:  Acute pulmonary embolus.        Signed by: Alex Mathias 7/21/2025 9:20 AM   Dictation workstation:   [id number]      XR chest 1 view   Final Result   1. Suspected left retrocardiac opacity, which may represent   consolidation or may be artifactual/due to x-ray beam   underpenetration. Further evaluation with PA and lateral chest   radiographs is recommended.        Signed by: Familia Naik 7/20/2025 5:38 PM   Dictation workstation:   [id number]       XR chest 1 view   Final Result   1. No acute cardiopulmonary process.        MACRO:   None.        Signed by: Tere Abdi 7/19/2025 7:54 AM   Dictation workstation:   [id number]      FL fluoro images no charge   Final Result      FL fluoro images no charge   Final Result             Assessment/Plan      64 y.o. male with PMH PONV, HTN, HLD, CAD, BIANCA, asthma, GERD, CKD, BPH, anemia, lumbar disc disease, spinal stenosis. Admitted 7/14/2025 with lumbar radicular pain after presenting with BLE radiculopathy, 7/14 s/p L3-S1 lami and facetectomies/extension/revision.    Updates:  - Afib with RVR 7/22  - Cardiology consulted for Afib with RVR  - Amiodarone gtt for 6 hours per Cardiology  - Start metoprolol tartrate 25 mg BID for Afib prevention  - Low threshold to give additional fluids since hypovolemia could have caused Afib    NEURO:  #S/p L3-S1 lami and facetectomies/extension/revision  #PONV, prior L5-S1 fusion  Assessment:  - Neurologically: see above   - EBL 800cc  Plan:  - NSU  - NSGY primary   - Neuro Checks: Q4H  - Pain: acetaminophen PRN, oxycodone PRN, and hydromorphone PRN  - Flexeril 10mg TID   - Gabapentin 600mg TID   - Nausea: ondansetron  - PT/OT/SLP    CARDIOVASCULAR:  #Segmental non-occlusive PE  #New-onset Afib with RVR, spontaneously converted  #Splenic artery aneurysm  #HTN, HLD, CAD  Assessment:  - Afib on tele and EKG  - 6/30/25 ECHO: EF 60%     - 7/21 Duplex US all 4 extremities no DVT  Plan:  - Continue to monitor on telemetry  - Off pressors  - ASA 81mg daily   - Atorvastatin 40mg daily   - Hold losartan 25mg daily   - Heparin gtt iso PE  - 1L NS for hypovolemia  - Consult Cardiology for Afib with RVR  - Amiodarone drip for 6 hours Cardiology  - Start metoprolol tartrate 25 mg BID for Afib prevention  - Low threshold to give additional fluids since hypovolemia could have caused Afib  - BNP, trop, TSH, and CBC for cardiac workup  - Consult Vasc Med for AC  - Patient will need to be seen by  Vascular surgery for Splenic artery aneurysm ~1.9 cm when his acute issues subsides        RESPIRATORY:  #BIANCA, asthma  Assessment:  - On 4L NC (3-4 L home baseline)   Plan:  - Continuous pulse oximetry   - O2 PRN to maintain SpO2 > 94%, wean as tolerated  - Incentive spirometry while awake  - CPAP at night    RENAL/:  #CKD, BPH  Assessment:  - Baseline BUN/Cr: 20/0.85  Results from last 72 hours   Lab Units 07/22/25 0005 07/21/25  0300   BUN mg/dL 17 19   CREATININE mg/dL 1.09 1.25       Plan:  - Monitor with daily RFP  - No richardson needed at this time  - Tamsulosin 0.4 mg qH   - Continue following with urology and his PCP for updated cancer screenings, especially with elevated PSA noted in 3/2025     FEN/GI:  #GERD  Assessment:  - Last BM Date: 07/18/25  Plan  - Monitor and replace electrolytes per protocol  - IVF: PRN  - Diet: Adult diet Regular    - Bowel Regimen: Docusate-Senna BID and Miralax QD    ENDOCRINE:  #Hyperglycemia   Assessment:  Results from last 7 days   Lab Units 07/22/25 0005 07/21/25 2011 07/21/25  1529 07/21/25  1117 07/21/25  0756 07/21/25  0300 07/20/25  2146 07/20/25  1735 07/20/25  1633 07/20/25  0747 07/20/25  0243 07/19/25  0722 07/19/25  0412 07/18/25  0742 07/18/25  0200   POCT GLUCOSE mg/dL  --  189* 168* 180* 168*  --  183*  --  252*   < >  --    < >  --    < >  --    GLUCOSE mg/dL 171*  --   --   --   --  128*  --  241*  --   --  159*  --  145*  --  95   SODIUM mmol/L 137  --   --   --   --  141  --  137  --   --  137  --  137  --  142    < > = values in this interval not displayed.        Plan:  - Accuchecks & ISS AC&HS   - Hypoglycemia protocol     HEMATOLOGY:  #Acute anemia  Assessment:  Results from last 7 days   Lab Units 07/22/25 0005 07/21/25 0300 07/20/25  0243   HEMOGLOBIN g/dL 8.3* 9.7* 8.8*   HEMATOCRIT % 30.2* 35.2* 31.4*   PLATELETS AUTO x10*3/uL 176 192 192   - s/p 2 units pRBCs this admission  Plan:  - Continue to monitor with daily CBC and Coag  panel      INFECTIOUS DISEASE:  #Klebsiella UTI  Assessment:  Results from last 7 days   Lab Units 25  0005 25  0300 25  0243   WBC AUTO x10*3/uL 9.0 8.2 6.7    - Temp (24hrs), Av.2 °C (98.9 °F), Min:36.6 °C (97.9 °F), Max:37.6 °C (99.7 °F)   - Macrobid (-), CTX (-)  Plan:  - Continue ceftriaxone since Klebsiella from urine cx sensitive  - Pan culture for temperature > 38.4 C    MUSCULOSKELETAL:  - No acute issues    SKIN:  - No acute issues  - Turns and skin care per NSU protocol    ACCESS:  - PIVs    PROPHYLAXIS:  - DVT Ppx: SCDs and subcutaneous heparin  - GI Ppx: Pantoprazole    RESTRAINTS:  Not indicated/Patient does not meet criteria for restraints    Daily ICU Checklist:  - Restraint order/note  [] Yes [] No [x] N/A   - NIHSS Frequency appropriate? [] Yes [x] No   - Daily Labs Ordered? [x] Yes [] No [] N/A   - Medication Route? [x] Yes [] No  - (PO, NG, OG, G Tube)   - PPI ordered/needed? [x] Yes [] No [] N/A   - DVT PPx [x] Yes [] No   - Antibiotic stop date? [] Yes [] No [x] N/A   - Alex? [x] Yes [] No  DC? [] Y [x] N [] N/A   - Central Line? [] Yes [x] No  LOC:   DC? [] Y [] N [] N/A   - Central  orders [] Yes [x] No [] N/A   - Vent weaning plan? [] Yes [] No [x] N/A             Tiffanie Brown MD  Neurology PGY-2  Neuroscience Intensive Care    The patient is critically ill with pulmonary embolism following lumbar spine surgery  Neurologically stable  New onset atrial fibrillation: Cont HR control  PE: Cont heparin drip  History of BIANCA, asthma  Coronary artery disease  Acute blood loss anemia:  Cont to trend, transfuse for Hb<7  Cont antibiotics for klebsiella UTI    I have seen and examined the patient.  I have reviewed the patient's laboratory, radiographic, and clinical data.  I have spent 30 minutes providing critical care for the patient.      PENNY John M.D.          [1] acetaminophen, 650 mg, oral, q6h  aspirin, 81 mg, oral,  Daily  atorvastatin, 40 mg, oral, Nightly  bisacodyl, 10 mg, rectal, Once  cefTRIAXone, 1 g, intravenous, q24h  cyclobenzaprine, 10 mg, oral, TID  gabapentin, 600 mg, oral, TID  insulin lispro, 0-5 Units, subcutaneous, Before meals & nightly  [Held by provider] losartan, 25 mg, oral, Daily  metoprolol tartrate, , ,   metoprolol, 5 mg, intravenous, Once  pantoprazole, 40 mg, oral, BID  perflutren lipid microspheres, 0.5-10 mL of dilution, intravenous, Once in imaging  polyethylene glycol, 17 g, oral, BID  potassium chloride, 20 mEq, intravenous, q2h  sennosides-docusate sodium, 2 tablet, oral, BID  sulfur hexafluoride microsphr, 2 mL, intravenous, Once in imaging  tamsulosin, 0.4 mg, oral, Nightly     [2] PRN medications: albuterol, benzocaine-menthol, calcium gluconate, calcium gluconate, dextrose, dextrose, glucagon, glucagon, HYDROmorphone, lubricating eye drops, magnesium sulfate, magnesium sulfate, metoprolol tartrate, naloxone, ondansetron **OR** ondansetron, oxyCODONE, oxyCODONE, oxyCODONE, oxygen, potassium chloride CR **OR** potassium chloride, potassium chloride CR **OR** potassium chloride  [3] heparin, 0-4,000 Units/hr, Last Rate: 2,400 Units/hr (07/22/25 0643)

## 2025-07-23 DIAGNOSIS — I72.8 SPLENIC ARTERY ANEURYSM: Primary | ICD-10-CM

## 2025-07-23 LAB
ABO GROUP (TYPE) IN BLOOD: NORMAL
ALBUMIN SERPL BCP-MCNC: 2.7 G/DL (ref 3.4–5)
ANION GAP SERPL CALC-SCNC: 10 MMOL/L (ref 10–20)
ANTIBODY SCREEN: NORMAL
BASOPHILS # BLD AUTO: 0.07 X10*3/UL (ref 0–0.1)
BASOPHILS NFR BLD AUTO: 0.6 %
BLOOD EXPIRATION DATE: NORMAL
BUN SERPL-MCNC: 17 MG/DL (ref 6–23)
CA-I BLD-SCNC: 1.15 MMOL/L (ref 1.1–1.33)
CALCIUM SERPL-MCNC: 7.9 MG/DL (ref 8.6–10.6)
CHLORIDE SERPL-SCNC: 99 MMOL/L (ref 98–107)
CO2 SERPL-SCNC: 34 MMOL/L (ref 21–32)
CREAT SERPL-MCNC: 0.93 MG/DL (ref 0.5–1.3)
DISPENSE STATUS: NORMAL
EGFRCR SERPLBLD CKD-EPI 2021: >90 ML/MIN/1.73M*2
EOSINOPHIL # BLD AUTO: 0.21 X10*3/UL (ref 0–0.7)
EOSINOPHIL NFR BLD AUTO: 1.9 %
ERYTHROCYTE [DISTWIDTH] IN BLOOD BY AUTOMATED COUNT: 20.2 % (ref 11.5–14.5)
ERYTHROCYTE [DISTWIDTH] IN BLOOD BY AUTOMATED COUNT: 20.6 % (ref 11.5–14.5)
GLUCOSE BLD MANUAL STRIP-MCNC: 132 MG/DL (ref 74–99)
GLUCOSE BLD MANUAL STRIP-MCNC: 133 MG/DL (ref 74–99)
GLUCOSE BLD MANUAL STRIP-MCNC: 154 MG/DL (ref 74–99)
GLUCOSE BLD MANUAL STRIP-MCNC: 167 MG/DL (ref 74–99)
GLUCOSE BLD MANUAL STRIP-MCNC: 175 MG/DL (ref 74–99)
GLUCOSE SERPL-MCNC: 153 MG/DL (ref 74–99)
HCT VFR BLD AUTO: 26.9 % (ref 41–52)
HCT VFR BLD AUTO: 30.4 % (ref 41–52)
HGB BLD-MCNC: 7.6 G/DL (ref 13.5–17.5)
HGB BLD-MCNC: 9 G/DL (ref 13.5–17.5)
IMM GRANULOCYTES # BLD AUTO: 0.18 X10*3/UL (ref 0–0.7)
IMM GRANULOCYTES NFR BLD AUTO: 1.6 % (ref 0–0.9)
LYMPHOCYTES # BLD AUTO: 1.09 X10*3/UL (ref 1.2–4.8)
LYMPHOCYTES NFR BLD AUTO: 9.8 %
MAGNESIUM SERPL-MCNC: 2.15 MG/DL (ref 1.6–2.4)
MCH RBC QN AUTO: 20.9 PG (ref 26–34)
MCH RBC QN AUTO: 21.6 PG (ref 26–34)
MCHC RBC AUTO-ENTMCNC: 28.3 G/DL (ref 32–36)
MCHC RBC AUTO-ENTMCNC: 29.6 G/DL (ref 32–36)
MCV RBC AUTO: 73 FL (ref 80–100)
MCV RBC AUTO: 74 FL (ref 80–100)
MONOCYTES # BLD AUTO: 1.09 X10*3/UL (ref 0.1–1)
MONOCYTES NFR BLD AUTO: 9.8 %
NEUTROPHILS # BLD AUTO: 8.46 X10*3/UL (ref 1.2–7.7)
NEUTROPHILS NFR BLD AUTO: 76.3 %
NRBC BLD-RTO: 0.6 /100 WBCS (ref 0–0)
NRBC BLD-RTO: 0.7 /100 WBCS (ref 0–0)
PHOSPHATE SERPL-MCNC: 3.7 MG/DL (ref 2.5–4.9)
PLATELET # BLD AUTO: 201 X10*3/UL (ref 150–450)
PLATELET # BLD AUTO: 222 X10*3/UL (ref 150–450)
POTASSIUM SERPL-SCNC: 4.1 MMOL/L (ref 3.5–5.3)
PRODUCT BLOOD TYPE: 6200
PRODUCT CODE: NORMAL
RBC # BLD AUTO: 3.63 X10*6/UL (ref 4.5–5.9)
RBC # BLD AUTO: 4.16 X10*6/UL (ref 4.5–5.9)
RH FACTOR (ANTIGEN D): NORMAL
SODIUM SERPL-SCNC: 139 MMOL/L (ref 136–145)
UFH PPP CHRO-ACNC: 0.5 IU/ML (ref ?–1.1)
UFH PPP CHRO-ACNC: 0.5 IU/ML (ref ?–1.1)
UNIT ABO: NORMAL
UNIT NUMBER: NORMAL
UNIT RH: NORMAL
UNIT VOLUME: 350
WBC # BLD AUTO: 11.1 X10*3/UL (ref 4.4–11.3)
WBC # BLD AUTO: 8.7 X10*3/UL (ref 4.4–11.3)
XM INTEP: NORMAL

## 2025-07-23 PROCEDURE — 82947 ASSAY GLUCOSE BLOOD QUANT: CPT

## 2025-07-23 PROCEDURE — 2500000002 HC RX 250 W HCPCS SELF ADMINISTERED DRUGS (ALT 637 FOR MEDICARE OP, ALT 636 FOR OP/ED)

## 2025-07-23 PROCEDURE — 99232 SBSQ HOSP IP/OBS MODERATE 35: CPT | Performed by: INTERNAL MEDICINE

## 2025-07-23 PROCEDURE — 1200000002 HC GENERAL ROOM WITH TELEMETRY DAILY

## 2025-07-23 PROCEDURE — 86850 RBC ANTIBODY SCREEN: CPT | Performed by: STUDENT IN AN ORGANIZED HEALTH CARE EDUCATION/TRAINING PROGRAM

## 2025-07-23 PROCEDURE — 2500000004 HC RX 250 GENERAL PHARMACY W/ HCPCS (ALT 636 FOR OP/ED)

## 2025-07-23 PROCEDURE — 97530 THERAPEUTIC ACTIVITIES: CPT | Mod: GO

## 2025-07-23 PROCEDURE — 99233 SBSQ HOSP IP/OBS HIGH 50: CPT | Performed by: NURSE PRACTITIONER

## 2025-07-23 PROCEDURE — 36415 COLL VENOUS BLD VENIPUNCTURE: CPT | Performed by: STUDENT IN AN ORGANIZED HEALTH CARE EDUCATION/TRAINING PROGRAM

## 2025-07-23 PROCEDURE — 83735 ASSAY OF MAGNESIUM: CPT | Performed by: STUDENT IN AN ORGANIZED HEALTH CARE EDUCATION/TRAINING PROGRAM

## 2025-07-23 PROCEDURE — 94660 CPAP INITIATION&MGMT: CPT

## 2025-07-23 PROCEDURE — 36415 COLL VENOUS BLD VENIPUNCTURE: CPT

## 2025-07-23 PROCEDURE — 97530 THERAPEUTIC ACTIVITIES: CPT | Mod: GP

## 2025-07-23 PROCEDURE — 2500000005 HC RX 250 GENERAL PHARMACY W/O HCPCS

## 2025-07-23 PROCEDURE — 82330 ASSAY OF CALCIUM: CPT | Performed by: STUDENT IN AN ORGANIZED HEALTH CARE EDUCATION/TRAINING PROGRAM

## 2025-07-23 PROCEDURE — 85025 COMPLETE CBC W/AUTO DIFF WBC: CPT | Performed by: STUDENT IN AN ORGANIZED HEALTH CARE EDUCATION/TRAINING PROGRAM

## 2025-07-23 PROCEDURE — 2500000002 HC RX 250 W HCPCS SELF ADMINISTERED DRUGS (ALT 637 FOR MEDICARE OP, ALT 636 FOR OP/ED): Performed by: STUDENT IN AN ORGANIZED HEALTH CARE EDUCATION/TRAINING PROGRAM

## 2025-07-23 PROCEDURE — 36430 TRANSFUSION BLD/BLD COMPNT: CPT

## 2025-07-23 PROCEDURE — P9016 RBC LEUKOCYTES REDUCED: HCPCS

## 2025-07-23 PROCEDURE — 85520 HEPARIN ASSAY: CPT

## 2025-07-23 PROCEDURE — 80069 RENAL FUNCTION PANEL: CPT | Performed by: STUDENT IN AN ORGANIZED HEALTH CARE EDUCATION/TRAINING PROGRAM

## 2025-07-23 PROCEDURE — 85027 COMPLETE CBC AUTOMATED: CPT

## 2025-07-23 PROCEDURE — 97535 SELF CARE MNGMENT TRAINING: CPT | Mod: GO

## 2025-07-23 PROCEDURE — 2500000001 HC RX 250 WO HCPCS SELF ADMINISTERED DRUGS (ALT 637 FOR MEDICARE OP)

## 2025-07-23 PROCEDURE — 51701 INSERT BLADDER CATHETER: CPT

## 2025-07-23 PROCEDURE — 86923 COMPATIBILITY TEST ELECTRIC: CPT

## 2025-07-23 PROCEDURE — 86900 BLOOD TYPING SEROLOGIC ABO: CPT | Performed by: STUDENT IN AN ORGANIZED HEALTH CARE EDUCATION/TRAINING PROGRAM

## 2025-07-23 PROCEDURE — 97116 GAIT TRAINING THERAPY: CPT | Mod: GP

## 2025-07-23 RX ORDER — AMOXICILLIN 250 MG
2 CAPSULE ORAL 2 TIMES DAILY
Status: CANCELLED
Start: 2025-07-23

## 2025-07-23 RX ORDER — ONDANSETRON 4 MG/1
4 TABLET, FILM COATED ORAL EVERY 8 HOURS PRN
Status: CANCELLED
Start: 2025-07-23

## 2025-07-23 RX ORDER — OXYCODONE HYDROCHLORIDE 5 MG/1
5 TABLET ORAL EVERY 6 HOURS PRN
Status: CANCELLED
Start: 2025-07-23

## 2025-07-23 RX ORDER — ACETAMINOPHEN 325 MG/1
650 TABLET ORAL EVERY 6 HOURS
Status: CANCELLED
Start: 2025-07-23

## 2025-07-23 RX ORDER — POLYETHYLENE GLYCOL 3350 17 G/17G
17 POWDER, FOR SOLUTION ORAL 2 TIMES DAILY
Status: CANCELLED
Start: 2025-07-23

## 2025-07-23 RX ORDER — TAMSULOSIN HYDROCHLORIDE 0.4 MG/1
0.8 CAPSULE ORAL NIGHTLY
Status: DISCONTINUED | OUTPATIENT
Start: 2025-07-23 | End: 2025-07-29 | Stop reason: HOSPADM

## 2025-07-23 RX ADMIN — CYCLOBENZAPRINE 10 MG: 10 TABLET, FILM COATED ORAL at 20:31

## 2025-07-23 RX ADMIN — HEPARIN SODIUM 2700 UNITS/HR: 10000 INJECTION, SOLUTION INTRAVENOUS at 00:42

## 2025-07-23 RX ADMIN — METOPROLOL TARTRATE 25 MG: 25 TABLET, FILM COATED ORAL at 20:31

## 2025-07-23 RX ADMIN — ATORVASTATIN CALCIUM 40 MG: 40 TABLET, FILM COATED ORAL at 20:31

## 2025-07-23 RX ADMIN — INSULIN LISPRO 1 UNITS: 100 INJECTION, SOLUTION INTRAVENOUS; SUBCUTANEOUS at 14:06

## 2025-07-23 RX ADMIN — ACETAMINOPHEN 650 MG: 325 TABLET, FILM COATED ORAL at 20:31

## 2025-07-23 RX ADMIN — ASPIRIN 81 MG: 81 TABLET, COATED ORAL at 08:35

## 2025-07-23 RX ADMIN — SENNOSIDES AND DOCUSATE SODIUM 2 TABLET: 50; 8.6 TABLET ORAL at 08:35

## 2025-07-23 RX ADMIN — INSULIN LISPRO 1 UNITS: 100 INJECTION, SOLUTION INTRAVENOUS; SUBCUTANEOUS at 22:21

## 2025-07-23 RX ADMIN — POLYETHYLENE GLYCOL 3350 17 G: 17 POWDER, FOR SOLUTION ORAL at 08:35

## 2025-07-23 RX ADMIN — PANTOPRAZOLE SODIUM 40 MG: 40 TABLET, DELAYED RELEASE ORAL at 20:31

## 2025-07-23 RX ADMIN — HEPARIN SODIUM 2700 UNITS/HR: 10000 INJECTION, SOLUTION INTRAVENOUS at 21:26

## 2025-07-23 RX ADMIN — GABAPENTIN 600 MG: 300 CAPSULE ORAL at 20:31

## 2025-07-23 RX ADMIN — METOPROLOL TARTRATE 25 MG: 25 TABLET, FILM COATED ORAL at 08:35

## 2025-07-23 RX ADMIN — ACETAMINOPHEN 650 MG: 325 TABLET, FILM COATED ORAL at 08:35

## 2025-07-23 RX ADMIN — AMIODARONE HYDROCHLORIDE 400 MG: 200 TABLET ORAL at 08:35

## 2025-07-23 RX ADMIN — TAMSULOSIN HYDROCHLORIDE 0.8 MG: 0.4 CAPSULE ORAL at 20:31

## 2025-07-23 RX ADMIN — POLYETHYLENE GLYCOL 3350 17 G: 17 POWDER, FOR SOLUTION ORAL at 20:30

## 2025-07-23 RX ADMIN — PANTOPRAZOLE SODIUM 40 MG: 40 TABLET, DELAYED RELEASE ORAL at 08:35

## 2025-07-23 RX ADMIN — GABAPENTIN 600 MG: 300 CAPSULE ORAL at 08:35

## 2025-07-23 RX ADMIN — CYCLOBENZAPRINE 10 MG: 10 TABLET, FILM COATED ORAL at 15:10

## 2025-07-23 RX ADMIN — GABAPENTIN 600 MG: 300 CAPSULE ORAL at 15:10

## 2025-07-23 RX ADMIN — CYCLOBENZAPRINE 10 MG: 10 TABLET, FILM COATED ORAL at 08:35

## 2025-07-23 RX ADMIN — CEFTRIAXONE 1 G: 1 INJECTION, SOLUTION INTRAVENOUS at 14:07

## 2025-07-23 RX ADMIN — AMIODARONE HYDROCHLORIDE 400 MG: 200 TABLET ORAL at 20:30

## 2025-07-23 RX ADMIN — HEPARIN SODIUM 2700 UNITS/HR: 10000 INJECTION, SOLUTION INTRAVENOUS at 11:56

## 2025-07-23 RX ADMIN — Medication: at 15:16

## 2025-07-23 RX ADMIN — SENNOSIDES AND DOCUSATE SODIUM 2 TABLET: 50; 8.6 TABLET ORAL at 20:30

## 2025-07-23 RX ADMIN — OXYCODONE HYDROCHLORIDE 10 MG: 5 TABLET ORAL at 22:02

## 2025-07-23 RX ADMIN — ACETAMINOPHEN 650 MG: 325 TABLET, FILM COATED ORAL at 15:10

## 2025-07-23 ASSESSMENT — COGNITIVE AND FUNCTIONAL STATUS - GENERAL
DRESSING REGULAR UPPER BODY CLOTHING: A LITTLE
TOILETING: A LOT
MOVING TO AND FROM BED TO CHAIR: A LOT
DRESSING REGULAR LOWER BODY CLOTHING: A LOT
STANDING UP FROM CHAIR USING ARMS: A LITTLE
TURNING FROM BACK TO SIDE WHILE IN FLAT BAD: A LITTLE
HELP NEEDED FOR BATHING: A LOT
MOBILITY SCORE: 15
CLIMB 3 TO 5 STEPS WITH RAILING: TOTAL
WALKING IN HOSPITAL ROOM: A LITTLE
PERSONAL GROOMING: A LITTLE
MOVING FROM LYING ON BACK TO SITTING ON SIDE OF FLAT BED WITH BEDRAILS: A LITTLE
DAILY ACTIVITIY SCORE: 16

## 2025-07-23 ASSESSMENT — PAIN - FUNCTIONAL ASSESSMENT
PAIN_FUNCTIONAL_ASSESSMENT: 0-10

## 2025-07-23 ASSESSMENT — PAIN SCALES - GENERAL
PAINLEVEL_OUTOF10: 6
PAINLEVEL_OUTOF10: 0 - NO PAIN
PAINLEVEL_OUTOF10: 0 - NO PAIN
PAINLEVEL_OUTOF10: 7
PAINLEVEL_OUTOF10: 8

## 2025-07-23 ASSESSMENT — ACTIVITIES OF DAILY LIVING (ADL): HOME_MANAGEMENT_TIME_ENTRY: 10

## 2025-07-23 ASSESSMENT — PAIN DESCRIPTION - DESCRIPTORS
DESCRIPTORS: ACHING
DESCRIPTORS: ACHING

## 2025-07-23 NOTE — PROGRESS NOTES
"   VASCULAR MEDICINE PROGRESS NOTE    HISTORY OF PRESENT ILLNESS  Day 9 of admission     Overnight events:  -No acute events overnight, patient remains therapeutic on heparin gtt at 27 units   -down trending hb s/p PRBCs, no obvious bleeding  -CT angio of aorta and bilateral iliofemoral runoff including without contrast completed  -Neurosurgery cleared patient to be transferred to [facility]    Objective   /73   Pulse 79   Temp 36.5 °C (97.7 °F)   Resp 23   Ht 1.854 m (6' 1\")   Wt 129 kg (285 lb 7.9 oz)   SpO2 98%   BMI 37.67 kg/m²   0-10 (Numeric) Pain Score: 0 - No pain  Vitals:    07/20/25 0600   Weight: 129 kg (285 lb 7.9 oz)         Intake/Output Summary (Last 24 hours) at 7/23/2025 1216  Last data filed at 7/23/2025 1200  Gross per 24 hour   Intake 2778.43 ml   Output 1750 ml   Net 1028.43 ml       Physical Exam  Physical Exam  Constitutional:       Appearance: He is ill-appearing.   HENT:      Head: Normocephalic and atraumatic.      Nose: Nose normal.      Mouth/Throat:      Mouth: Mucous membranes are moist.     Eyes:      Pupils: Pupils are equal, round, and reactive to light.       Cardiovascular:      Rate and Rhythm: Normal rate and regular rhythm.      Pulses:           Dorsalis pedis pulses are 2+ on the right side and 2+ on the left side.        Posterior tibial pulses are 2+ on the right side and 2+ on the left side.   Pulmonary:      Breath sounds: Wheezing present.      Comments: Expiratory wheezing noted on bilateral upper lobes posteriorly   Abdominal:      General: There is distension.     Musculoskeletal:      Cervical back: Normal range of motion.      Right lower leg: Edema present.      Left lower leg: Edema present.      Comments: L>R edema     Skin:     General: Skin is warm and dry.      Capillary Refill: Capillary refill takes less than 2 seconds.      Comments: Dressing on right back intact with no bleeding noted      Neurological:      Mental Status: He is alert and oriented to " person, place, and time.         Medications   Scheduled medications  Scheduled Medications[1]  Continuous medications  Continuous Medications[2]  PRN medications  PRN Medications[3]     Lab Review   Recent Labs     07/23/25  0553 07/22/25  1435 07/22/25  0005 07/21/25 0300 07/20/25  1735 07/20/25  0243 07/19/25  0412 07/18/25  0200 07/17/25  0053 07/16/25  0012 07/15/25  1127 07/15/25  0615 07/14/25  1457 01/27/25  0824 01/26/25  0657    137 137 141 137 137 137 142   < > 141   < > 140   < > 137 141   K 4.1 3.7 3.5 4.2 3.5 3.9 3.9 4.4   < > 4.4   < > 5.9*   < > 4.5 4.0   CL 99 98 96* 99 98 101 98 103   < > 107   < > 105   < > 99 100   CO2 34* 34* 32 34* 30 31 31 29   < > 29   < > 29   < > 32 34*   ANIONGAP 10 9* 13 12 13 9* 12 14   < > 9*   < > 12   < > 11 11   BUN 17 17 17 19 19 17 15 13   < > 22   < > 25*   < > 20 22   CREATININE 0.93 0.95 1.09 1.25 1.29 1.04 1.05 0.77   < > 1.02   < > 1.16   < > 0.78 0.80   EGFR >90 89 76 64 62 80 79 >90   < > 82   < > 70   < > >90 >90   MG 2.15 1.94  --  2.26 1.69  --   --   --   --  1.77  --  1.66  --  1.93 1.80    < > = values in this interval not displayed.     Recent Labs     07/23/25  0553 07/22/25  1435 07/22/25 0005 07/21/25 0300 07/20/25 1735   ALBUMIN 2.7* 2.8*  2.8* 2.8* 3.3* 3.1*   ALKPHOS  --  49  --   --   --    ALT  --  25  --   --   --    AST  --  30  --   --   --    BILITOT  --  0.5  --   --   --      Recent Labs     07/23/25  0134 07/22/25  1435 07/22/25  0005 07/21/25  0300 07/20/25  0243 07/19/25  0412 07/18/25  1117 07/18/25  0200   WBC 8.7 9.7 9.0 8.2 6.7 6.1 8.8 6.4   HGB 7.6* 8.5* 8.3* 9.7* 8.8* 8.6* 9.4* 9.1*   HCT 26.9* 30.8* 30.2* 35.2* 31.4* 30.3* 31.5* 31.8*    186 176 192 192 208 226 210   MCV 74* 76* 75* 77* 75* 74* 72* 72*     Recent Labs     07/23/25  0553 07/23/25  0134 07/22/25  2120 07/22/25  1453 07/22/25  1113 07/22/25  0543 07/22/25  0005 07/21/25  1748 07/21/25  1307 06/27/25  1051 01/21/25  1815   INR  --   --   --   --    "--   --   --   --   --  1.0 1.1   HAUF 0.5 0.5 0.4 0.2 0.3 0.2 <0.1 0.1   < >  --   --    FIBRINOGEN  --   --   --   --   --   --   --   --   --   --  300    < > = values in this interval not displayed.     PTT - 7/21/2025:  6:05 AM    No results for input(s): \"CHOL\", \"LDLF\", \"HDL\", \"TRIG\" in the last 30244 hours.  Lab Results   Component Value Date    HGBA1C 5.7 (H) 03/22/2025     Lab Results   Component Value Date    TSH 1.64 07/22/2025     Estimated Creatinine Clearance: 113.4 mL/min (by C-G formula based on SCr of 0.93 mg/dL).    Imaging:  CT angio aorta and bilateral iliofemoral runoff including without contrast 7/22/2025  IMPRESSION:  Vascular findings:  1. Partially calcified splenic artery aneurysm measuring 1.5 cm.  2. Multifocal bilateral lower extremity vascular calcifications as  above with two-vessel runoff to the bilateral lower extremities.      Nonvascular findings:  1. Interval increase in left-greater-than-right bibasilar  consolidative opacities which may be infectious/inflammatory or  represent evolving pulmonary infarcts patient's known pulmonary  emboli, suboptimally assessed on the current examination.  2. Postsurgical changes of the lumbosacral spine with adjacent  subcutaneous fluid collection described as above may represent  postsurgical seroma versus postsurgical blood products.      LE DVT US 7/21/25  PRELIMINARY CONCLUSIONS   Right Lower Venous: No evidence of acute deep vein thrombus visualized in the right lower extremity.  Left Lower Venous: No evidence of acute deep vein thrombus visualized in the left lower extremity.     Vascular US Upper Extremity Venous Duplex Bilateral  7/21/2025  PRELIMINARY CONCLUSIONS   Right Upper Venous: No evidence of acute deep vein thrombus visualized in the right upper extremity.  Left Upper Venous: No evidence of acute deep vein thrombus visualized in the left upper extremity. The cephalic vein was not visualized due to its small caliber.     ECHO " 7/21/25   1. Left ventricular ejection fraction is normal by visual estimate at 55-60%.   2. There is moderately increased posterior left ventricular wall thickness.   3. There is normal right ventricular global systolic function.   4. Mildly enlarged right ventricle.     CT angio chest for pulmonary embolism 7/21/2025  1. Nonocclusive central filling defects within a segmental left lower  lobe pulmonary artery and near occlusive filling defects within  subsegmental left lower lobe pulmonary arteries are most consistent  with acute pulmonary emboli on this motion limited exam. No evident  right heart strain.  2. Subsegmental atelectasis within right-greater-than-left lower  lobes. Additional findings of potential excessive dynamic airway  collapse within the right lobar airways and left mainstem bronchus.  Correlate with clinical findings.  3. Mild chronic cardiomegaly.  4. Splenic artery aneurysm measuring up to 1.9 cm.          ASSESSMENT & PLAN  64 y.o. male  with a past medical history of hyperlipidemia, hypertension, CAD, sleep apnea, asthma, GERD, CKD, BPH, anemia, arthritis, lumbar disc disease, spinal stenosis presenting for scheduled removal of previous L5-S1 instrumentation, exploration of previous L5-S1 fusion, L3-L4, L4-L5 and L5-S1 and fasciotomy for decompression with L2-S1; Instrumentation and pelvic fixation bilaterally completed on 7/14/2025, course c/b VTE and Afib     ::Acute  Segmental/SS PE. No UE/LE DVT   -Provoked in the setting of hospitalization and surgical intervention. Cleared by neuro and [facility] for AC   ::Afib w/RVR-managed by cards    ::Splenic artery aneurysm ~1.5 cm, with plans to follow up with VS   ::Anemia      Recommendations/Plan:   Plan   - Continue with low intensity heparin gtt, no bolus, monitor for bleeding, monitor hemoglobin and PLTs   Noted down trending hb and need for PRBCs x1 for optimization, per our discussion with primary team, likely post surgical. Please monitor  very closely for bleeding, further work up as deemed appropriate by the primary team   - Will likely be discharged on Eliquis if compatible with his meds, affordable and labs are stable  - Duration of AC: 3- 6 months in setting of a provoked VTE, likely longer in setting of A-fib  - Continue following with urology and his PCP for updated cancer screenings, especially with elevated PSA noted in 3/2025     Will continue to follow along.  Patient educated and all questions answered.     CAROLINA Arellano-CNP   Vascular Medicine  Consult CHARI  [facility]      I saw and evaluated the patient. I personally obtained the key and critical portions of the history and physical exam or was physically present for key and critical portions performed by the CNP. I reviewed the resident/fellow's documentation and discussed the patient with the CNP. I agree with the CNP's medical decision making as documented in the note.    Rajni Bruner MD           [1] acetaminophen, 650 mg, oral, q6h  amiodarone, 400 mg, oral, BID  aspirin, 81 mg, oral, Daily  atorvastatin, 40 mg, oral, Nightly  bisacodyl, 10 mg, rectal, Once  cefTRIAXone, 1 g, intravenous, q24h  cyclobenzaprine, 10 mg, oral, TID  gabapentin, 600 mg, oral, TID  insulin lispro, 0-5 Units, subcutaneous, Before meals & nightly  [Held by provider] losartan, 25 mg, oral, Daily  metoprolol tartrate, 25 mg, oral, BID  pantoprazole, 40 mg, oral, BID  polyethylene glycol, 17 g, oral, BID  sennosides-docusate sodium, 2 tablet, oral, BID  tamsulosin, 0.8 mg, oral, Nightly  [2] heparin, 0-4,000 Units/hr, Last Rate: 2,700 Units/hr (07/23/25 1156)  [3] PRN medications: albuterol, benzocaine-menthol, dextrose, dextrose, glucagon, glucagon, HYDROmorphone, lubricating eye drops, metoprolol, naloxone, ondansetron **OR** ondansetron, oxyCODONE, oxyCODONE, oxyCODONE, oxygen

## 2025-07-23 NOTE — CONSULTS
VASCULAR SURGERY CONSULT NOTE    Assessment/Plan   Jerman Colón Jr. is 64 y.o. male with PMH of HTN, HLD, CAD, BIANCA, asthma, GERD, CKD, BPH, anemia, lumbar disc disease, spinal stenosis. Admitted 7/14/2025 with lumbar radicular pain after presenting with BLE radiculopathy, 7/14 s/p L Removal of previous L5-S1 instrumentation,  L3-4 L4-5 and L5-S1 laminectomy and facetectomy for decompression with L3-S1 instrumentation and Fusion. Cardiology is consulted for management of a new-onset Afib with RVR .    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    Plan:  No surgical intervention required for the 19 mm splenic aneurysm  Follow up as outpatient with Dr Michelle in 6 months with splenic artery duplex scan  Vascular surgery will sign off. Please reach out in case of any questions.    D/w attending, Dr. Viviana Leigh MD   Vascular Surgery PGY1  Team Pager [phone]  07/23/25  3:07 PM        Subjective   No acute overnight events.    Vascular History:  none    PMH: Medical History[1]      PSH: Surgical History[2]     Home Meds:  Medications Ordered Prior to Encounter[3]     Allergies:  RX Allergies[4]      ROS: 12 system negative except HPI    Objective   Vitals:  Heart Rate:  [70-90]   Temp:  [36.1 °C (97 °F)-37.3 °C (99.1 °F)]   Resp:  [11-26]   BP: (101-173)/()   SpO2:  [91 %-100 %]     Exam:  Constitutional: No acute distress, resting comfortably  Neuro:  AOx3, grossly intact  ENMT: moist mucous membranes  Head/neck: atraumatic  CV: no tachycardia  Pulm: non-labored on room air  GI: soft, non-tender, non-distended  Skin: warm and dry  musculoskeletal: moving all extremities. bilateral femoral arteries and radial arteries. Palpable Right DP and multiphasic Signals Left DP.     Labs:  Results from last 7 days   Lab Units 07/23/25  5489  07/23/25  0134 07/22/25  1435   WBC AUTO x10*3/uL 11.1 8.7 9.7   HEMOGLOBIN g/dL 9.0* 7.6* 8.5*   PLATELETS AUTO x10*3/uL 201 222 186      Results from last 7 days   Lab Units 07/23/25  0553 07/22/25  1435 07/22/25  0005   SODIUM mmol/L 139 137 137   POTASSIUM mmol/L 4.1 3.7 3.5   CHLORIDE mmol/L 99 98 96*   CO2 mmol/L 34* 34* 32   BUN mg/dL 17 17 17   CREATININE mg/dL 0.93 0.95 1.09   GLUCOSE mg/dL 153* 151* 171*   MAGNESIUM mg/dL 2.15 1.94  --    PHOSPHORUS mg/dL 3.7 2.4* 2.6      Results from last 7 days   Lab Units 07/21/25  0605   APTT seconds 23*     Results from last 7 days   Lab Units 07/23/25  0553 07/23/25  0134 07/22/25  2120   ANTI XA UNFRACTIONATED IU/mL 0.5 0.5 0.4       Imaging:  Reviewed independently by vascular team:  CTA chest with incidental finding of fusiform splenic artery aneurysm of 19 mm            [1]   Past Medical History:  Diagnosis Date    Anemia     Arthritis     Asthma     BPH (benign prostatic hyperplasia)     Cervical myelopathy     COPD (chronic obstructive pulmonary disease) (Multi)     Coronary artery disease     GERD (gastroesophageal reflux disease)     controlled    History of recent steroid use     Hyperlipidemia     Hypertension     Joint pain     Lumbar disc disease     Nephrolithiasis     Polycythemia     PONV (postoperative nausea and vomiting)     Proteinuria     Sepsis (Multi)     Skin disorder     BASAL MULTIPLE TIMES    Sleep apnea     Spinal stenosis     Vocal cord mass    [2]   Past Surgical History:  Procedure Laterality Date    CARDIAC CATHETERIZATION Left 09/2023    CERVICAL FUSION      COLONOSCOPY      DENTAL SURGERY      HERNIA REPAIR      LUMBAR FUSION  2018    L5-S1 Fusion    SKIN BIOPSY      TONSILLECTOMY      TOTAL SHOULDER ARTHROPLASTY Bilateral    [3]   No current facility-administered medications on file prior to encounter.     Current Outpatient Medications on File Prior to Encounter   Medication Sig Dispense Refill    aspirin 81 mg EC tablet Take 1  tablet (81 mg) by mouth once daily.      atorvastatin (Lipitor) 40 mg tablet Take 1 tablet (40 mg) by mouth once daily at bedtime.      cyclobenzaprine (Flexeril) 5 mg tablet Take 1 tablet (5 mg) by mouth 3 times a day. (Patient taking differently: Take 1 tablet (5 mg) by mouth 3 times a day. Takes #1 tablet UP to three times a day if needed. Takes #1 tablet at bedtime on most days) 15 tablet 0    gabapentin (Neurontin) 600 mg tablet Take 1 tablet (600 mg) by mouth 3 times a day.      losartan (Cozaar) 25 mg tablet Take 1 tablet (25 mg) by mouth once daily.      oxyCODONE-acetaminophen (Percocet) 5-325 mg tablet Take 1 tablet by mouth every 6 hours.      pantoprazole (ProtoNix) 40 mg EC tablet Take 1 tablet (40 mg) by mouth 2 times a day.      tamsulosin (Flomax) 0.4 mg 24 hr capsule Take 1 capsule (0.4 mg) by mouth once daily at bedtime.      albuterol 90 mcg/actuation inhaler Inhale 2 puffs every 6 hours if needed for wheezing. (Patient not taking: Reported on 7/14/2025) 18 g 0    oxyCODONE (Roxicodone) 5 mg immediate release tablet Take 1 tablet (5 mg) by mouth every 6 hours if needed for moderate pain (4 - 6). (Patient not taking: Reported on 7/14/2025) 28 tablet 0    polyethylene glycol (Glycolax, Miralax) 17 gram packet Take 17 g by mouth 2 times a day. (Patient not taking: Reported on 7/11/2025) 15 each 0    sennosides-docusate sodium (Mariia-Colace) 8.6-50 mg tablet Take 2 tablets by mouth once daily. (Patient not taking: Reported on 6/27/2025) 15 tablet 0   [4] No Known Allergies

## 2025-07-23 NOTE — PROGRESS NOTES
"Jerman Colón Jr. is a 64 y.o. male on day 9 of admission presenting with Lumbar radicular pain.    Subjective   Pain controlled. Continues to require straight cath. Small BM earlier and denies abdominal pain    Objective     Physical Exam  No acute distress  Breathing comfortably on CPAP overnight  A&Ox3  RUE D5 / B5 / T5 / HG 5/ IO 5  LUE D5 / B5 / T5 / HG 5/ IO 5  RLE HF5 / KE 5/ DF 5/ PF 5  LLE HF5 / KE 5/ DF 5/ PF 5  SILT  Incision c/d/I  Abdomen distended, non-tender to palpation    Last Recorded Vitals  Blood pressure 127/60, pulse 79, temperature 36.5 °C (97.7 °F), temperature source Temporal, resp. rate 15, height 1.854 m (6' 1\"), weight 129 kg (285 lb 7.9 oz), SpO2 100%.  Intake/Output last 3 Shifts:  I/O last 3 completed shifts:  In: 4365.6 (33.7 mL/kg) [P.O.:1910; I.V.:1148.6 (8.9 mL/kg); IV Piggyback:1307]  Out: 2200 (17 mL/kg) [Urine:2200 (0.5 mL/kg/hr)]  Weight: 129.5 kg     Relevant Results  Results for orders placed or performed during the hospital encounter of 07/14/25 (from the past 24 hours)   ECG 12 lead   Result Value Ref Range    Ventricular Rate 113 BPM    Atrial Rate 277 BPM    QRS Duration 92 ms    QT Interval 308 ms    QTC Calculation(Bazett) 422 ms    R Axis 4 degrees    T Axis -10 degrees    QRS Count 19 beats    Q Onset 223 ms    T Offset 377 ms    QTC Fredericia 380 ms   POCT GLUCOSE   Result Value Ref Range    POCT Glucose 142 (H) 74 - 99 mg/dL   Troponin I, High Sensitivity   Result Value Ref Range    Troponin I, High Sensitivity (CMC) 16 0 - 53 ng/L   B-type natriuretic peptide   Result Value Ref Range     (H) 0 - 99 pg/mL   TSH   Result Value Ref Range    Thyroid Stimulating Hormone 1.64 0.44 - 3.98 mIU/L   Blood Gas Venous Full Panel   Result Value Ref Range    POCT pH, Venous 7.41 7.33 - 7.43 pH    POCT pCO2, Venous 59 (H) 41 - 51 mm Hg    POCT pO2, Venous 31 (L) 35 - 45 mm Hg    POCT SO2, Venous 49 45 - 75 %    POCT Oxy Hemoglobin, Venous 48.1 45.0 - 75.0 %    POCT " Hematocrit Calculated, Venous 26.0 (L) 41.0 - 52.0 %    POCT Sodium, Venous 134 (L) 136 - 145 mmol/L    POCT Potassium, Venous 3.9 3.5 - 5.3 mmol/L    POCT Chloride, Venous 98 98 - 107 mmol/L    POCT Ionized Calicum, Venous 1.15 1.10 - 1.33 mmol/L    POCT Glucose, Venous 138 (H) 74 - 99 mg/dL    POCT Lactate, Venous 1.3 0.4 - 2.0 mmol/L    POCT Base Excess, Venous 11.2 (H) -2.0 - 3.0 mmol/L    POCT HCO3 Calculated, Venous 37.4 (H) 22.0 - 26.0 mmol/L    POCT Hemoglobin, Venous 8.8 (L) 13.5 - 17.5 g/dL    POCT Anion Gap, Venous 3.0 (L) 10.0 - 25.0 mmol/L    Patient Temperature      FiO2 44 %   Cardioversion external   Result Value Ref Range    BSA 2.58 m2   Heparin Assay, UFH   Result Value Ref Range    Heparin Unfractionated 0.3 See Comment Below for Therapeutic Ranges IU/mL   POCT GLUCOSE   Result Value Ref Range    POCT Glucose 140 (H) 74 - 99 mg/dL   CBC and Auto Differential   Result Value Ref Range    WBC 9.7 4.4 - 11.3 x10*3/uL    nRBC 0.4 (H) 0.0 - 0.0 /100 WBCs    RBC 4.08 (L) 4.50 - 5.90 x10*6/uL    Hemoglobin 8.5 (L) 13.5 - 17.5 g/dL    Hematocrit 30.8 (L) 41.0 - 52.0 %    MCV 76 (L) 80 - 100 fL    MCH 20.8 (L) 26.0 - 34.0 pg    MCHC 27.6 (L) 32.0 - 36.0 g/dL    RDW 20.3 (H) 11.5 - 14.5 %    Platelets 186 150 - 450 x10*3/uL    Neutrophils % 76.9 40.0 - 80.0 %    Immature Granulocytes %, Automated 0.9 0.0 - 0.9 %    Lymphocytes % 10.0 13.0 - 44.0 %    Monocytes % 10.3 2.0 - 10.0 %    Eosinophils % 1.4 0.0 - 6.0 %    Basophils % 0.5 0.0 - 2.0 %    Neutrophils Absolute 7.45 1.20 - 7.70 x10*3/uL    Immature Granulocytes Absolute, Automated 0.09 0.00 - 0.70 x10*3/uL    Lymphocytes Absolute 0.97 (L) 1.20 - 4.80 x10*3/uL    Monocytes Absolute 1.00 0.10 - 1.00 x10*3/uL    Eosinophils Absolute 0.14 0.00 - 0.70 x10*3/uL    Basophils Absolute 0.05 0.00 - 0.10 x10*3/uL   Renal function panel   Result Value Ref Range    Glucose 151 (H) 74 - 99 mg/dL    Sodium 137 136 - 145 mmol/L    Potassium 3.7 3.5 - 5.3 mmol/L     Chloride 98 98 - 107 mmol/L    Bicarbonate 34 (H) 21 - 32 mmol/L    Anion Gap 9 (L) 10 - 20 mmol/L    Urea Nitrogen 17 6 - 23 mg/dL    Creatinine 0.95 0.50 - 1.30 mg/dL    eGFR 89 >60 mL/min/1.73m*2    Calcium 7.9 (L) 8.6 - 10.6 mg/dL    Phosphorus 2.4 (L) 2.5 - 4.9 mg/dL    Albumin 2.8 (L) 3.4 - 5.0 g/dL   Magnesium   Result Value Ref Range    Magnesium 1.94 1.60 - 2.40 mg/dL   Hepatic function panel   Result Value Ref Range    Albumin 2.8 (L) 3.4 - 5.0 g/dL    Bilirubin, Total 0.5 0.0 - 1.2 mg/dL    Bilirubin, Direct 0.2 0.0 - 0.3 mg/dL    Alkaline Phosphatase 49 33 - 136 U/L    ALT 25 10 - 52 U/L    AST 30 9 - 39 U/L    Total Protein 5.0 (L) 6.4 - 8.2 g/dL   Heparin Assay, UFH   Result Value Ref Range    Heparin Unfractionated 0.2 See Comment Below for Therapeutic Ranges IU/mL   POCT GLUCOSE   Result Value Ref Range    POCT Glucose 143 (H) 74 - 99 mg/dL   POCT GLUCOSE   Result Value Ref Range    POCT Glucose 120 (H) 74 - 99 mg/dL   Heparin Assay, UFH   Result Value Ref Range    Heparin Unfractionated 0.4 See Comment Below for Therapeutic Ranges IU/mL   CBC   Result Value Ref Range    WBC 8.7 4.4 - 11.3 x10*3/uL    nRBC 0.7 (H) 0.0 - 0.0 /100 WBCs    RBC 3.63 (L) 4.50 - 5.90 x10*6/uL    Hemoglobin 7.6 (L) 13.5 - 17.5 g/dL    Hematocrit 26.9 (L) 41.0 - 52.0 %    MCV 74 (L) 80 - 100 fL    MCH 20.9 (L) 26.0 - 34.0 pg    MCHC 28.3 (L) 32.0 - 36.0 g/dL    RDW 20.2 (H) 11.5 - 14.5 %    Platelets 222 150 - 450 x10*3/uL   Heparin Assay, UFH   Result Value Ref Range    Heparin Unfractionated 0.5 See Comment Below for Therapeutic Ranges IU/mL   Prepare RBC: 1 Units   Result Value Ref Range    PRODUCT CODE [id number]     Unit Number [id number]     Unit ABO A     Unit RH POS     XM INTEP COMP     Dispense Status IS     Blood Expiration Date 8/4/2025 11:59:00 PM EDT     PRODUCT BLOOD TYPE 6200     UNIT VOLUME 350    Type and screen   Result Value Ref Range    ABO TYPE A     Rh TYPE POS     ANTIBODY SCREEN NEG    Renal  function panel   Result Value Ref Range    Glucose 153 (H) 74 - 99 mg/dL    Sodium 139 136 - 145 mmol/L    Potassium 4.1 3.5 - 5.3 mmol/L    Chloride 99 98 - 107 mmol/L    Bicarbonate 34 (H) 21 - 32 mmol/L    Anion Gap 10 10 - 20 mmol/L    Urea Nitrogen 17 6 - 23 mg/dL    Creatinine 0.93 0.50 - 1.30 mg/dL    eGFR >90 >60 mL/min/1.73m*2    Calcium 7.9 (L) 8.6 - 10.6 mg/dL    Phosphorus 3.7 2.5 - 4.9 mg/dL    Albumin 2.7 (L) 3.4 - 5.0 g/dL   Magnesium   Result Value Ref Range    Magnesium 2.15 1.60 - 2.40 mg/dL   Calcium, ionized   Result Value Ref Range    POCT Calcium, Ionized 1.15 1.1 - 1.33 mmol/L             Assessment & Plan  Lumbar radicular pain    Lumbar foraminal stenosis    Lumbar radiculopathy, chronic    65yo male w h/o prior L5-S1 fusion, HTN, HLD, CAD, sleep apnea (central/obstructive), asthma, GERD, CKD, BPH, anemia, 1/20/25 C4-6 ACDF/C2-T4 PCDF p/w BLE radiculopathy, 7/14 s/p L3-S1 lami and facetectomies/extension/revision (), 7/15 hgb 7.1 s/p 1u pRBC, post Tx 7.5, s/p 1u pRBC, 7/18 drain auto dc'd, 7/20 CT PE nonocclusive PE in segmental LLL pulm artery and near occlusive PE in subsegmental LLL pulm artery.   7/21 TTE 55-60%, DVT US x4 neg, 7/22 sinus tachy s/p metop    Plan:  NSU, downgrade to NURA  Void trial   Monitor hgb w/ PM CBC  Con't CTX until 7/27 for UTI treatment  Con't hep gtt, ASA, and PO amiodarone  Cards recs - PO amiodarone  vasc med recs - con't hep gtt, trend hb & plts, needs splenic artery fu op, DC on Eliquis x3-6 months  vasc surg recs - no surgical intervention, will need yearly splenic artery follow up   Follow up blood culture  PTOT-rehab      William Trejo MD

## 2025-07-23 NOTE — DISCHARGE INSTRUCTIONS
Schedule follow up visit for sleep apnea and to obtain mask for at home.   Continue following with urology/primary care provider for updated cancer screenings, especially with elevated PSA noted in 3/2025.   Follow up with Dr Michelle (Vascular Surgery) in 6 months with splenic artery duplex scan for Splenic aneurysm; appointment made  Follow up appointment with vascular medicine for continued management of Eliquis; appointment made  Please follow-up with your cardiologist Dr. Fried for new onset atrial fibrillation and medication management (on Amiodarone, Toprol and Eliquis)

## 2025-07-23 NOTE — PROGRESS NOTES
Physical Therapy    Physical Therapy Treatment    Patient Name: Jerman Colón Jr.  MRN: [id number]  Today's Date: 7/23/2025  Time Calculation  Start Time: 0905  Stop Time: 0934  Time Calculation (min): 29 min       Assessment/Plan   PT Assessment  Rehab Prognosis: Excellent  Barriers to Discharge Home: Physical needs, Caregiver assistance  Caregiver Assistance: Caregiver assistance needed per identified barriers - however, level of patient's required assistance exceeds assistance available at home  Physical Needs: Ambulating household distances limited by function/safety, 24hr mobility assistance needed, 24hr ADL assistance needed, High falls risk due to function or environment  Evaluation/Treatment Tolerance: Patient tolerated treatment well  End of Session Communication: Bedside nurse  Assessment Comment: Patient to benefit from ongoing PT services to address the above limitations and to facilitate timely return to prior level of function.  End of Session Patient Position: Up in chair, Alarm off, not on at start of session  PT Plan  Inpatient/Swing Bed or Outpatient: Inpatient  PT Plan  Treatment/Interventions: Bed mobility, Transfer training, Gait training, Balance training, Neuromuscular re-education, Strengthening, Endurance training, Therapeutic exercise, Therapeutic activity, Home exercise program, Postural re-education, Orthotic fitting/training  PT Plan: Ongoing PT  PT Frequency: Daily  PT Discharge Recommendations: High intensity level of continued care  Equipment Recommended upon Discharge:  (TBD)  PT Recommended Transfer Status: Assist x1, Assistive device  PT - OK to Discharge: Yes      General Visit Information:   PT  Visit  PT Received On: 07/23/25  Response to Previous Treatment: Patient with no complaints from previous session.  Reason for Referral: Admitted 7/14/2025 with Lumbar radicular pain after presenting with BLE radiculopathy, 7/14 s/p L3-S1 lami and facetectomies/extension/revision,  7/15 hgb 7.1 s/p 1u pRBC, post Tx 7.5, s/p 1u pRBC, 7/18 drain auto dc'd. 7/20 CT PE nonocclusive PE in segmental LLL pulm artery and near occlusive PE in subsegmental LLL pulm artery. 7/21 TTE 55-60%, DVT US x4 neg, 7/22 sinus tachy s/p metop  Past Medical History Relevant to Rehab: PONV, Vocal Cord Mass, HTN, HLD, CAD, COPD, Asthma BIANCA (no CPAP), GERD, BPH, hypogonadism, tubular adenoma of colon, proteinuria Polcythemia, Fusion of lumbar spine, kyphosis of cervical region, cervical myelopathy, Spinal cord compression due to degenerative disorder of spinal column  Prior to Session Communication: Bedside nurse  Patient Position Received: Bed, 3 rail up, Alarm off, not on at start of session  Family/Caregiver Present: No  General Comment: Rehab aide present for part of session     Subjective   Precautions:  Precautions  Hearing/Visual Limitations: WFL  Medical Precautions: Fall precautions, Cardiac precautions  Post-Surgical Precautions: Spinal precautions  Precautions Comment: SBP <180    Vital Signs:   Date/Time Vitals Session Patient Position Pulse Resp SpO2 BP MAP (mmHg)    07/23/25 0800 --  --  74  18  100 %  125/100  110     07/23/25 0814 --  --  79  18  100 %  140/85  --     07/23/25 0900 --  --  76  17  100 %  132/73  89     07/23/25 0905 Pre PT  Lying  80  17  100 %  144/76  74     07/23/25 0915 During PT  Sitting  83  19  100 %  139/72  92     07/23/25 0918 During PT  Sitting  88  25  100 %  137/65  86     07/23/25 0934 Post PT  Sitting  84  18  100 %  133/71  89     07/23/25 0940 --  --  78  19  96 %  133/71  --        Objective   Pain:  Pain Assessment  Pain Assessment: 0-10  0-10 (Numeric) Pain Score: 0 - No pain  Cognition:  Cognition  Overall Cognitive Status: Within Functional Limits  Arousal/Alertness: Appropriate responses to stimuli  Orientation Level: Oriented X4  Following Commands: Follows all commands and directions without difficulty  Insight: Within function limits  Impulsive: Within  functional limits  Processing Speed: Within funtional limits    Lines/Tubes/Drains:  External Urinary Catheter (Active)   Number of days: 1       Continuous Medications/Drips:  heparin, 0-4,000 Units/hr, Last Rate: 2,700 Units/hr (07/23/25 0600)      Oxygen: room air     Postural Control:   Postural Control  Postural Control: Within Functional Limits  Head Control: WFL  Trunk Control: WFL  Righting Reactions: Intact  Protective Responses: Intact  Posture Comment: Rounded shoulders    Balance:   Static Sitting Balance  Static Sitting-Balance Support: Bilateral upper extremity supported, Feet supported  Static Sitting-Level of Assistance: Close supervision  Dynamic Sitting Balance  Dynamic Sitting-Balance Support: Bilateral upper extremity supported, Feet supported  Dynamic Sitting-Level of Assistance: Close supervision  Dynamic Sitting-Balance: Forward lean    Static Standing Balance  Static Standing-Balance Support: Bilateral upper extremity supported  Static Standing-Level of Assistance: Contact guard  Dynamic Standing Balance  Dynamic Standing-Balance Support: Bilateral upper extremity supported  Dynamic Standing-Level of Assistance: Minimum assistance  Dynamic Standing-Balance: Turning    Coordination:  Coordination  Movements are Fluid and Coordinated: Yes  Upper Body Coordination: BUE tremor appreciated. Pt reports this is chronic.    PT Treatments:       Therapeutic Activity  Therapeutic Activity Performed: Yes  Therapeutic Activity 1: Skilled vitals monitoring provided throughout session  Therapeutic Activity 2: Cues for active cycle of breathing while using toilet to prevent Valsalva maneuver.         Bed Mobility  Bed Mobility: Yes  Bed Mobility 1  Bed Mobility 1: Supine to sitting, Log roll  Level of Assistance 1: Minimum assistance  Bed Mobility Comments 1: Therapist assisted with trunk. Cues for active cycle of breathing.    Ambulation/Gait Training  Ambulation/Gait Training Performed:  Yes  Ambulation/Gait Training 1  Surface 1: Level tile  Device 1: Rolling walker  Assistance 1: Contact guard  Quality of Gait 1: Forward flexed posture, Shuffling gait, Narrow base of support, Diminished heel strike, Antalgic (Cues for proper posture, increased B step length. Therapist managed lines.)  Comments/Distance (ft) 1: 20 ft + 20 ft    Transfers  Transfer: Yes  Transfer 1  Transfer From 1: Bed to  Transfer to 1: Stand  Technique 1: Sit to stand  Transfer Device 1: Walker  Transfer Level of Assistance 1: Moderate assistance  Trials/Comments 1: Cues for proper hand placement  Transfers 2  Transfer From 2: Stand to  Transfer to 2: Toilet  Technique 2: Stand to sit  Transfer Device 2: Walker  Transfer Level of Assistance 2: Moderate assistance  Trials/Comments 2: Cues for proper hand placement and controlling speed  Transfers 3  Transfer From 3: Stand to  Transfer to 3: Chair with arms  Technique 3: Sit to stand, Stand to sit  Transfer Device 3: Walker  Transfer Level of Assistance 3: Minimum assistance  Trials/Comments 3: Cues for proper hand placement and controlling speed    Stairs  Stairs: No              Outcome Measures:  [facility] Basic Mobility  Turning from your back to your side while in a flat bed without using bedrails: A little  Moving from lying on your back to sitting on the side of a flat bed without using bedrails: A little  Moving to and from bed to chair (including a wheelchair): A lot  Standing up from a chair using your arms (e.g. wheelchair or bedside chair): A little  To walk in hospital room: A little  Climbing 3-5 steps with railing: Total  Basic Mobility - Total Score: 15                   FSS-ICU  Ambulation: Walks <50 feet with any assistance x1 or walks any distance with assistance x2 people  Rolling: Minimal assistance (performs 75% or more of task)  Sitting: Supervision or set-up only  Transfer Sit-to-Stand: Moderate assistance (performs 50 - 74% of task)  Transfer Supine-to-Sit:  Minimal assistance (performs 75% or more of task)  Total Score: 17    ICU Mobility Screen  Early Mobility/Exercise Safety Screen: Proceed with mobilization - No exclusion criteria met  ICU Mobility Scale: Walking with assistance of 1 person                   Education:  Education Documentation  Precautions, taught by Stacey Lopez PT at 7/23/2025  9:43 AM.  Learner: Patient  Readiness: Acceptance  Method: Explanation  Response: Verbalizes Understanding  Comment: PT expectations, mobility precautions, d/c recs    Body Mechanics, taught by Stacey Lopez PT at 7/23/2025  9:43 AM.  Learner: Patient  Readiness: Acceptance  Method: Explanation  Response: Verbalizes Understanding  Comment: PT expectations, mobility precautions, d/c recs    Mobility Training, taught by Stacey Lopez PT at 7/23/2025  9:43 AM.  Learner: Patient  Readiness: Acceptance  Method: Explanation  Response: Verbalizes Understanding  Comment: PT expectations, mobility precautions, d/c recs    Education Comments  No comments found.             Encounter Problems       Encounter Problems (Active)       PT Problem       Patient will perform bed mobility with </= MIN A x1 to reduce risk of developing decubitus ulcers.  (Progressing)       Start:  07/15/25    Expected End:  07/29/25            Patient will perform sit to stand and stand to sit transfers with </= MIN A x1 and LRD to increase functional strength.  (Progressing)       Start:  07/15/25    Expected End:  07/29/25            Patient will ambulate at least 15 ft. with </= MOD A x1 and LRD to improve tolerance of household distances.  (Progressing)       Start:  07/15/25    Expected End:  07/29/25            BLE 5/5 (Progressing)       Start:  07/15/25    Expected End:  07/29/25            Patient will stand with UE support of LRD and </= CGA at least 2 min to improve balance required for self-care tasks.  (Progressing)       Start:  07/15/25    Expected End:  07/29/25                      07/23/25 at 9:43 AM   Stacey Lopez, PT   Rehab Office: [phone]

## 2025-07-23 NOTE — PROGRESS NOTES
Subjective Data:  SR 80s with isolated PVCs. No further AF  Denies CP/ SOB/dizziness, palpitations   Difficult time sleeping 2/2 CPAP    Objective Data:  Last Recorded Vitals:  Vitals:    07/23/25 1000 07/23/25 1100 07/23/25 1200 07/23/25 1300   BP: 101/53 112/73  108/53   BP Location:       Patient Position:       Pulse: 71 74 79 76   Resp: 20 16 23 18   Temp:       TempSrc:       SpO2: 97% 92% 98% 94%   Weight:       Height:           Last Labs:  CBC - 7/23/2025:  1:34 AM  8.7 7.6 222    26.9      CMP - 7/23/2025:  5:53 AM  7.9 5.0 30 --- 0.5   3.7 2.7 25 49      PTT - 7/21/2025:  6:05 AM  1.0   11.1 23     TROPHS   Date/Time Value Ref Range Status   07/22/2025 08:40 AM 16 0 - 53 ng/L Final   07/15/2025 03:35 PM 29 0 - 53 ng/L Final     BNP   Date/Time Value Ref Range Status   07/22/2025 08:40  0 - 99 pg/mL Final     HGBA1C   Date/Time Value Ref Range Status   03/22/2025 09:17 AM 5.7 4.3 - 5.6 % Final     Comment:     American Diabetes Association guidelines indicate that patients with HgbA1c in the range 5.7-6.4% are at increased risk for development of diabetes, and intervention by lifestyle modification may be beneficial. HgbA1c greater or equal to 6.5% is considered diagnostic of diabetes.   10/19/2022 02:28 PM 5.7 4.3 - 5.6 % Final     Comment:     American Diabetes Association guidelines indicate that patients with HgbA1c in the range 5.7-6.4% are at increased risk for development of diabetes, and intervention by lifestyle modification may be beneficial. HgbA1c greater or equal to 6.5% is considered diagnostic of diabetes.      Last I/O:  I/O last 3 completed shifts:  In: 4365.6 (33.7 mL/kg) [P.O.:1910; I.V.:1148.6 (8.9 mL/kg); IV Piggyback:1307]  Out: 2200 (17 mL/kg) [Urine:2200 (0.5 mL/kg/hr)]  Weight: 129.5 kg       7/21/25 Transthoracic Echo (TTE) Complete With Contrast   CONCLUSIONS:   1. Left ventricular ejection fraction is normal by visual estimate at 55-60%.   2. There is moderately increased  posterior left ventricular wall thickness.   3. There is normal right ventricular global systolic function.   4. Mildly enlarged right ventricle.      Inpatient Medications:  Scheduled Medications[1]  PRN Medications[2]  Continuous Medications[3]    Physical Exam:  General: Sitting up in bed, 6L nc, NAD  Skin:  warm and dry  HEENT: EOMI. MMM  Cardiovascular: RRR. Difficult to assess JVD 2/2 body habitus   Respiratory: rhonchi right; CTA left  Gastrointestinal: Round, soft, non-distended  Extremities: (chronic) LLE edema, KH ANAND  Neurological: no gross focal deficits  Psychiatric: appropriate mood and affect      Assessment/Plan   64 y.o. male w/PMH HTN, HLD, CAD w/ LHC 09/2023 (no pci done), CKD, BIANCA, asthma, GERD, BPH, lumbar disc disease admitted 07/14 for L3-S1 laminectomy w/ additional history of provoked PE. Cardiology consulted for tachycardia     #New onset A-Fib w/ RVR  #Severe obstructive and central sleep apnea per patient  :: tele reviewed and patient on afib w/ RVR with clear start around 7/22 6am  :: CHADSVASC 1 (HTN no diabetes)  :: patient HDS and asymptomatic  :: OK by neuro and NSGY for AC  :: patient converted after initial IV amio bolus to NSR    Recommendations:   - amiodarone 400 mg PO BID for 6g load  - for now continue metoprolol tartrate 25 mg BID   - TSH, LFTs unremarkable  - continuous telemetry until discharge  - will provide DC recommendations (?ziopatch) once patient determines if he will obtain further cardiology care here or at [facility]  - strongly encouraged him to complete follow up for sleep apnea and obtain mask for at home    Cardiology will follow  Case discussed with Dr. Jenifer Corbett, APRN-CNP  Cardiology Consults    Please call with any questions  General Cardiology Consult Pager: [phone] (weekday 7AM-6PM and weekend 7AM-2PM) and other: [phone]  EP Consult Pager: [phone] (weekday 7AM-6PM and weekend 7AM-2PM) and other: [phone]  CICU Fellow Pager: [phone] anytime  EP Device  Nurse Pager: [phone] (weekday 7AM-4PM)  Advanced Heart Failure Consult Pager: [phone] anytime     Code Status:  Full Code       [1]   Scheduled medications   Medication Dose Route Frequency    acetaminophen  650 mg oral q6h    amiodarone  400 mg oral BID    aspirin  81 mg oral Daily    atorvastatin  40 mg oral Nightly    bisacodyl  10 mg rectal Once    cefTRIAXone  1 g intravenous q24h    cyclobenzaprine  10 mg oral TID    gabapentin  600 mg oral TID    insulin lispro  0-5 Units subcutaneous Before meals & nightly    [Held by provider] losartan  25 mg oral Daily    metoprolol tartrate  25 mg oral BID    pantoprazole  40 mg oral BID    polyethylene glycol  17 g oral BID    sennosides-docusate sodium  2 tablet oral BID    tamsulosin  0.8 mg oral Nightly   [2]   PRN medications   Medication    albuterol    benzocaine-menthol    dextrose    dextrose    glucagon    glucagon    HYDROmorphone    lubricating eye drops    metoprolol    naloxone    ondansetron    Or    ondansetron    oxyCODONE    oxyCODONE    oxyCODONE    oxygen   [3]   Continuous Medications   Medication Dose Last Rate    heparin  0-4,000 Units/hr 2,700 Units/hr (07/23/25 7478)

## 2025-07-24 ENCOUNTER — HOME HEALTH ADMISSION (OUTPATIENT)
Dept: HOME HEALTH SERVICES | Facility: HOME HEALTH | Age: 65
End: 2025-07-24
Payer: MEDICARE

## 2025-07-24 LAB
ALBUMIN SERPL BCP-MCNC: 2.9 G/DL (ref 3.4–5)
ANION GAP SERPL CALC-SCNC: 11 MMOL/L (ref 10–20)
BUN SERPL-MCNC: 15 MG/DL (ref 6–23)
CALCIUM SERPL-MCNC: 8.4 MG/DL (ref 8.6–10.6)
CHLORIDE SERPL-SCNC: 100 MMOL/L (ref 98–107)
CO2 SERPL-SCNC: 34 MMOL/L (ref 21–32)
CREAT SERPL-MCNC: 0.99 MG/DL (ref 0.5–1.3)
EGFRCR SERPLBLD CKD-EPI 2021: 85 ML/MIN/1.73M*2
ERYTHROCYTE [DISTWIDTH] IN BLOOD BY AUTOMATED COUNT: 21.5 % (ref 11.5–14.5)
GLUCOSE BLD MANUAL STRIP-MCNC: 119 MG/DL (ref 74–99)
GLUCOSE BLD MANUAL STRIP-MCNC: 139 MG/DL (ref 74–99)
GLUCOSE BLD MANUAL STRIP-MCNC: 145 MG/DL (ref 74–99)
GLUCOSE BLD MANUAL STRIP-MCNC: 156 MG/DL (ref 74–99)
GLUCOSE SERPL-MCNC: 102 MG/DL (ref 74–99)
HCT VFR BLD AUTO: 31.7 % (ref 41–52)
HGB BLD-MCNC: 9.1 G/DL (ref 13.5–17.5)
MAGNESIUM SERPL-MCNC: 2.03 MG/DL (ref 1.6–2.4)
MCH RBC QN AUTO: 21.7 PG (ref 26–34)
MCHC RBC AUTO-ENTMCNC: 28.7 G/DL (ref 32–36)
MCV RBC AUTO: 76 FL (ref 80–100)
NRBC BLD-RTO: 1.1 /100 WBCS (ref 0–0)
PHOSPHATE SERPL-MCNC: 3.2 MG/DL (ref 2.5–4.9)
PLATELET # BLD AUTO: 289 X10*3/UL (ref 150–450)
POTASSIUM SERPL-SCNC: 3.5 MMOL/L (ref 3.5–5.3)
RBC # BLD AUTO: 4.19 X10*6/UL (ref 4.5–5.9)
SODIUM SERPL-SCNC: 141 MMOL/L (ref 136–145)
UFH PPP CHRO-ACNC: 0.6 IU/ML (ref ?–1.1)
WBC # BLD AUTO: 11.8 X10*3/UL (ref 4.4–11.3)

## 2025-07-24 PROCEDURE — 2500000002 HC RX 250 W HCPCS SELF ADMINISTERED DRUGS (ALT 637 FOR MEDICARE OP, ALT 636 FOR OP/ED): Performed by: STUDENT IN AN ORGANIZED HEALTH CARE EDUCATION/TRAINING PROGRAM

## 2025-07-24 PROCEDURE — 99233 SBSQ HOSP IP/OBS HIGH 50: CPT | Performed by: NURSE PRACTITIONER

## 2025-07-24 PROCEDURE — 80069 RENAL FUNCTION PANEL: CPT | Performed by: STUDENT IN AN ORGANIZED HEALTH CARE EDUCATION/TRAINING PROGRAM

## 2025-07-24 PROCEDURE — 2500000001 HC RX 250 WO HCPCS SELF ADMINISTERED DRUGS (ALT 637 FOR MEDICARE OP)

## 2025-07-24 PROCEDURE — 94660 CPAP INITIATION&MGMT: CPT

## 2025-07-24 PROCEDURE — 2500000001 HC RX 250 WO HCPCS SELF ADMINISTERED DRUGS (ALT 637 FOR MEDICARE OP): Performed by: NURSE PRACTITIONER

## 2025-07-24 PROCEDURE — 1200000002 HC GENERAL ROOM WITH TELEMETRY DAILY

## 2025-07-24 PROCEDURE — 2500000002 HC RX 250 W HCPCS SELF ADMINISTERED DRUGS (ALT 637 FOR MEDICARE OP, ALT 636 FOR OP/ED)

## 2025-07-24 PROCEDURE — 85520 HEPARIN ASSAY: CPT

## 2025-07-24 PROCEDURE — 99232 SBSQ HOSP IP/OBS MODERATE 35: CPT | Performed by: INTERNAL MEDICINE

## 2025-07-24 PROCEDURE — 36415 COLL VENOUS BLD VENIPUNCTURE: CPT | Performed by: NURSE PRACTITIONER

## 2025-07-24 PROCEDURE — 2500000004 HC RX 250 GENERAL PHARMACY W/ HCPCS (ALT 636 FOR OP/ED)

## 2025-07-24 PROCEDURE — 83735 ASSAY OF MAGNESIUM: CPT | Performed by: STUDENT IN AN ORGANIZED HEALTH CARE EDUCATION/TRAINING PROGRAM

## 2025-07-24 PROCEDURE — 2500000004 HC RX 250 GENERAL PHARMACY W/ HCPCS (ALT 636 FOR OP/ED): Performed by: NURSE PRACTITIONER

## 2025-07-24 PROCEDURE — 2500000002 HC RX 250 W HCPCS SELF ADMINISTERED DRUGS (ALT 637 FOR MEDICARE OP, ALT 636 FOR OP/ED): Performed by: NURSE PRACTITIONER

## 2025-07-24 PROCEDURE — 36415 COLL VENOUS BLD VENIPUNCTURE: CPT

## 2025-07-24 PROCEDURE — 82947 ASSAY GLUCOSE BLOOD QUANT: CPT

## 2025-07-24 PROCEDURE — 85027 COMPLETE CBC AUTOMATED: CPT | Performed by: NURSE PRACTITIONER

## 2025-07-24 PROCEDURE — 97530 THERAPEUTIC ACTIVITIES: CPT | Mod: GP

## 2025-07-24 RX ORDER — FUROSEMIDE 20 MG/1
40 TABLET ORAL ONCE
Status: COMPLETED | OUTPATIENT
Start: 2025-07-24 | End: 2025-07-24

## 2025-07-24 RX ORDER — FUROSEMIDE 10 MG/ML
40 INJECTION INTRAMUSCULAR; INTRAVENOUS 2 TIMES DAILY
Status: DISCONTINUED | OUTPATIENT
Start: 2025-07-24 | End: 2025-07-24

## 2025-07-24 RX ORDER — FUROSEMIDE 10 MG/ML
40 INJECTION INTRAMUSCULAR; INTRAVENOUS 2 TIMES DAILY
Status: COMPLETED | OUTPATIENT
Start: 2025-07-24 | End: 2025-07-26

## 2025-07-24 RX ORDER — AMIODARONE HYDROCHLORIDE 200 MG/1
200 TABLET ORAL DAILY
Status: DISCONTINUED | OUTPATIENT
Start: 2025-07-30 | End: 2025-07-29 | Stop reason: HOSPADM

## 2025-07-24 RX ADMIN — AMIODARONE HYDROCHLORIDE 400 MG: 200 TABLET ORAL at 08:26

## 2025-07-24 RX ADMIN — CYCLOBENZAPRINE 10 MG: 10 TABLET, FILM COATED ORAL at 08:26

## 2025-07-24 RX ADMIN — METOPROLOL TARTRATE 25 MG: 25 TABLET, FILM COATED ORAL at 08:25

## 2025-07-24 RX ADMIN — ACETAMINOPHEN 650 MG: 325 TABLET, FILM COATED ORAL at 20:00

## 2025-07-24 RX ADMIN — GABAPENTIN 600 MG: 300 CAPSULE ORAL at 14:37

## 2025-07-24 RX ADMIN — FUROSEMIDE 40 MG: 10 INJECTION, SOLUTION INTRAMUSCULAR; INTRAVENOUS at 20:01

## 2025-07-24 RX ADMIN — HEPARIN SODIUM 2700 UNITS/HR: 10000 INJECTION, SOLUTION INTRAVENOUS at 17:05

## 2025-07-24 RX ADMIN — PANTOPRAZOLE SODIUM 40 MG: 40 TABLET, DELAYED RELEASE ORAL at 08:26

## 2025-07-24 RX ADMIN — FUROSEMIDE 40 MG: 20 TABLET ORAL at 14:37

## 2025-07-24 RX ADMIN — PANTOPRAZOLE SODIUM 40 MG: 40 TABLET, DELAYED RELEASE ORAL at 20:00

## 2025-07-24 RX ADMIN — ACETAMINOPHEN 650 MG: 325 TABLET, FILM COATED ORAL at 03:14

## 2025-07-24 RX ADMIN — AMIODARONE HYDROCHLORIDE 400 MG: 200 TABLET ORAL at 20:00

## 2025-07-24 RX ADMIN — HYDROMORPHONE HYDROCHLORIDE 0.2 MG: 1 INJECTION, SOLUTION INTRAMUSCULAR; INTRAVENOUS; SUBCUTANEOUS at 00:56

## 2025-07-24 RX ADMIN — ACETAMINOPHEN 650 MG: 325 TABLET, FILM COATED ORAL at 14:37

## 2025-07-24 RX ADMIN — INSULIN LISPRO 1 UNITS: 100 INJECTION, SOLUTION INTRAVENOUS; SUBCUTANEOUS at 16:58

## 2025-07-24 RX ADMIN — CEFTRIAXONE 1 G: 1 INJECTION, SOLUTION INTRAVENOUS at 13:43

## 2025-07-24 RX ADMIN — CYCLOBENZAPRINE 10 MG: 10 TABLET, FILM COATED ORAL at 20:00

## 2025-07-24 RX ADMIN — METOPROLOL TARTRATE 25 MG: 25 TABLET, FILM COATED ORAL at 20:00

## 2025-07-24 RX ADMIN — ATORVASTATIN CALCIUM 40 MG: 40 TABLET, FILM COATED ORAL at 20:00

## 2025-07-24 RX ADMIN — CYCLOBENZAPRINE 10 MG: 10 TABLET, FILM COATED ORAL at 14:37

## 2025-07-24 RX ADMIN — TAMSULOSIN HYDROCHLORIDE 0.8 MG: 0.4 CAPSULE ORAL at 20:00

## 2025-07-24 RX ADMIN — ASPIRIN 81 MG: 81 TABLET, COATED ORAL at 08:26

## 2025-07-24 RX ADMIN — OXYCODONE HYDROCHLORIDE 5 MG: 5 TABLET ORAL at 08:29

## 2025-07-24 RX ADMIN — HEPARIN SODIUM 27 UNITS/HR: 10000 INJECTION, SOLUTION INTRAVENOUS at 07:28

## 2025-07-24 RX ADMIN — ACETAMINOPHEN 650 MG: 325 TABLET, FILM COATED ORAL at 08:37

## 2025-07-24 RX ADMIN — GABAPENTIN 600 MG: 300 CAPSULE ORAL at 20:00

## 2025-07-24 RX ADMIN — GABAPENTIN 600 MG: 300 CAPSULE ORAL at 08:26

## 2025-07-24 ASSESSMENT — PAIN SCALES - GENERAL
PAINLEVEL_OUTOF10: 3
PAINLEVEL_OUTOF10: 5 - MODERATE PAIN
PAINLEVEL_OUTOF10: 0 - NO PAIN
PAINLEVEL_OUTOF10: 6

## 2025-07-24 ASSESSMENT — COGNITIVE AND FUNCTIONAL STATUS - GENERAL
DRESSING REGULAR LOWER BODY CLOTHING: A LOT
CLIMB 3 TO 5 STEPS WITH RAILING: A LOT
DAILY ACTIVITIY SCORE: 12
EATING MEALS: A LOT
DRESSING REGULAR UPPER BODY CLOTHING: A LOT
WALKING IN HOSPITAL ROOM: A LOT
STANDING UP FROM CHAIR USING ARMS: A LOT
WALKING IN HOSPITAL ROOM: A LITTLE
PERSONAL GROOMING: A LOT
MOVING TO AND FROM BED TO CHAIR: A LITTLE
MOBILITY SCORE: 16
CLIMB 3 TO 5 STEPS WITH RAILING: TOTAL
HELP NEEDED FOR BATHING: A LOT
STANDING UP FROM CHAIR USING ARMS: A LITTLE
TOILETING: A LOT
MOBILITY SCORE: 16
TURNING FROM BACK TO SIDE WHILE IN FLAT BAD: A LITTLE
MOVING FROM LYING ON BACK TO SITTING ON SIDE OF FLAT BED WITH BEDRAILS: A LITTLE
MOVING TO AND FROM BED TO CHAIR: A LOT

## 2025-07-24 ASSESSMENT — PAIN - FUNCTIONAL ASSESSMENT
PAIN_FUNCTIONAL_ASSESSMENT: 0-10

## 2025-07-24 NOTE — PROGRESS NOTES
Subjective Data:  SR 60-70s. No further AF  Denies CP/ SOB/dizziness, palpitations   Hypervolemic    Objective Data:  Last Recorded Vitals:  Vitals:    07/24/25 0408 07/24/25 0500 07/24/25 0823 07/24/25 1200   BP:  130/79 136/72 128/68   BP Location:  Right arm  Right arm   Patient Position:  Sitting  Lying   Pulse:  80 72 75   Resp: 16 18 16 16   Temp:  36.8 °C (98.2 °F) 36.4 °C (97.5 °F) 36.6 °C (97.9 °F)   TempSrc:  Temporal Tympanic Temporal   SpO2:  96% 96% 95%   Weight:       Height:           Last Labs:  CBC - 7/23/2025: 11:57 AM  11.1 9.0 201    30.4      CMP - 7/24/2025:  7:31 AM  8.4 5.0 30 --- 0.5   3.2 2.9 25 49      PTT - 7/21/2025:  6:05 AM  1.0   11.1 23     TROPHS   Date/Time Value Ref Range Status   07/22/2025 08:40 AM 16 0 - 53 ng/L Final   07/15/2025 03:35 PM 29 0 - 53 ng/L Final     BNP   Date/Time Value Ref Range Status   07/22/2025 08:40  0 - 99 pg/mL Final     HGBA1C   Date/Time Value Ref Range Status   03/22/2025 09:17 AM 5.7 4.3 - 5.6 % Final     Comment:     American Diabetes Association guidelines indicate that patients with HgbA1c in the range 5.7-6.4% are at increased risk for development of diabetes, and intervention by lifestyle modification may be beneficial. HgbA1c greater or equal to 6.5% is considered diagnostic of diabetes.   10/19/2022 02:28 PM 5.7 4.3 - 5.6 % Final     Comment:     American Diabetes Association guidelines indicate that patients with HgbA1c in the range 5.7-6.4% are at increased risk for development of diabetes, and intervention by lifestyle modification may be beneficial. HgbA1c greater or equal to 6.5% is considered diagnostic of diabetes.      Last I/O:  I/O last 3 completed shifts:  In: 2472 (19.1 mL/kg) [P.O.:980; I.V.:831.7 (6.4 mL/kg); Blood:403.3; IV Piggyback:257]  Out: 2400 (18.5 mL/kg) [Urine:2400 (0.5 mL/kg/hr)]  Weight: 129.5 kg       7/21/25 Transthoracic Echo (TTE) Complete With Contrast   CONCLUSIONS:   1. Left ventricular ejection fraction  is normal by visual estimate at 55-60%.   2. There is moderately increased posterior left ventricular wall thickness.   3. There is normal right ventricular global systolic function.   4. Mildly enlarged right ventricle.      Inpatient Medications:  Scheduled Medications[1]  PRN Medications[2]  Continuous Medications[3]    Physical Exam:  General: Sitting up in bed, 2L nc, NAD  Skin:  warm and dry  HEENT: EOMI. MMM  Cardiovascular: RRR. JVD mid neck at 45 degrees   Respiratory: reduced A/E R>L  Gastrointestinal: Round, soft, non-distended  Extremities: (chronic) LLE edema; mod-lg amt pitting edema around abdomen, buttocks, thighs   Neurological: no gross focal deficits  Psychiatric: appropriate mood and affect      Assessment/Plan   64 y.o. male w/PMH HTN, HLD, CAD w/ LHC 09/2023 (no pci done), CKD, BIANCA, asthma, GERD, BPH, lumbar disc disease admitted 07/14 for L3-S1 laminectomy w/ additional history of provoked PE. Cardiology consulted for tachycardia     #New onset A-Fib w/ RVR  #Severe obstructive and central sleep apnea per patient  :: tele reviewed and patient on afib w/ RVR with clear start around 7/22 6am  :: CHADSVASC 1 (HTN no diabetes)  :: OK by neuro and NSGY for AC  :: patient converted after initial IV amio bolus to NSR  :: remains NSR       Recommendations:  - Lasix 40 mg IV today. May need to repeat a few doses  - strict I/Os   - amiodarone 400 mg PO BID for 6g load then 200 mg daily until Cardiology follow up   - If needed for rate control, continue metoprolol tartrate 25 mg BID and transition to Toprol XL 50 mg daily at discharge    - TSH, LFTs unremarkable  - continuous telemetry until discharge  - strongly encouraged him to complete follow up for sleep apnea and obtain mask for at home  - Unable to place ziopatch at discharge as he does not plan to follow with  Cardiology   - He has a cardiologist with [facility] whom he wishes to continue to follow with    Cardiology will sign off   Case discussed  with Dr. Jenifer Corbett APRN-CNP  Cardiology Consults    Please call with any questions  General Cardiology Consult Pager: [phone] (weekday 7AM-6PM and weekend 7AM-2PM) and other: [phone]  EP Consult Pager: [phone] (weekday 7AM-6PM and weekend 7AM-2PM) and other: [phone]  CICU Fellow Pager: [phone] anytime  EP Device Nurse Pager: [phone] (weekday 7AM-4PM)  Advanced Heart Failure Consult Pager: [phone] anytime     Code Status:  Full Code         [1]   Scheduled medications   Medication Dose Route Frequency    acetaminophen  650 mg oral q6h    amiodarone  400 mg oral BID    aspirin  81 mg oral Daily    atorvastatin  40 mg oral Nightly    cefTRIAXone  1 g intravenous q24h    cyclobenzaprine  10 mg oral TID    gabapentin  600 mg oral TID    insulin lispro  0-5 Units subcutaneous Before meals & nightly    [Held by provider] losartan  25 mg oral Daily    metoprolol tartrate  25 mg oral BID    pantoprazole  40 mg oral BID    polyethylene glycol  17 g oral BID    sennosides-docusate sodium  2 tablet oral BID    tamsulosin  0.8 mg oral Nightly   [2]   PRN medications   Medication    albuterol    benzocaine-menthol    dextrose    dextrose    glucagon    glucagon    lubricating eye drops    metoprolol    naloxone    ondansetron    Or    ondansetron    oxyCODONE    oxyCODONE    oxyCODONE    oxygen   [3]   Continuous Medications   Medication Dose Last Rate    heparin  0-4,000 Units/hr 27 Units/hr (07/24/25 0728)

## 2025-07-24 NOTE — PROGRESS NOTES
"  Department of Neurosurgery  Daily Progress Note    Patient Name: Jerman Colón Jr.  MRN: [id number]  Date:  07/24/25     Subjective  No overnight events noted. Patient states voiding, but also needing straight cath x1 last night. BM X 2 last night. Patient was recommended Acute Rehab per PT, patient states he will refuse rehab and would just like to go home at discharge.     Objective    Vital Signs  /79 (BP Location: Right arm, Patient Position: Sitting)   Pulse 80   Temp 36.8 °C (98.2 °F) (Temporal)   Resp 18   Ht 1.854 m (6' 1\")   Wt 129 kg (285 lb 7.9 oz)   SpO2 96%   BMI 37.67 kg/m²      Physical Exam  Constitutional: A&Ox3, calm and cooperative, NAD.  Eyes: PERRL, EOMI.   ENMT: Moist mucous membranes, no apparent injuries or lesions.  Cardiovascular: Normal rate and regular rhythm. 2+ equal pulses of the distal extremities.  Respiratory/Thorax: CTAB, regular respirations on RA. Good symmetric chest expansion.   Gastrointestinal: Abdomen soft, non tender.   Genitourinary: voids   Neurological:   A&Ox3  Face symmetric, Facial SILT   Tongue midline and symmetric  RUE D5 / B5 / T5 / HG 5/ IO 5  LUE D5 / B5 / T5 / HG 5/ IO 5  RLE HF5 / KE 5/ DF 5/ PF 5  LLE HF5 / KE 5/ DF 5/ PF 5  No clonus, neg Velasco    Psychological: Appropriate mood and behavior.   Skin: Warm and dry. Incision lumbar c/d/I.    Diagnostics   Results for orders placed or performed during the hospital encounter of 07/14/25 (from the past 24 hours)   POCT GLUCOSE   Result Value Ref Range    POCT Glucose 167 (H) 74 - 99 mg/dL   CBC and Auto Differential   Result Value Ref Range    WBC 11.1 4.4 - 11.3 x10*3/uL    nRBC 0.6 (H) 0.0 - 0.0 /100 WBCs    RBC 4.16 (L) 4.50 - 5.90 x10*6/uL    Hemoglobin 9.0 (L) 13.5 - 17.5 g/dL    Hematocrit 30.4 (L) 41.0 - 52.0 %    MCV 73 (L) 80 - 100 fL    MCH 21.6 (L) 26.0 - 34.0 pg    MCHC 29.6 (L) 32.0 - 36.0 g/dL    RDW 20.6 (H) 11.5 - 14.5 %    Platelets 201 150 - 450 x10*3/uL    Neutrophils % " 76.3 40.0 - 80.0 %    Immature Granulocytes %, Automated 1.6 (H) 0.0 - 0.9 %    Lymphocytes % 9.8 13.0 - 44.0 %    Monocytes % 9.8 2.0 - 10.0 %    Eosinophils % 1.9 0.0 - 6.0 %    Basophils % 0.6 0.0 - 2.0 %    Neutrophils Absolute 8.46 (H) 1.20 - 7.70 x10*3/uL    Immature Granulocytes Absolute, Automated 0.18 0.00 - 0.70 x10*3/uL    Lymphocytes Absolute 1.09 (L) 1.20 - 4.80 x10*3/uL    Monocytes Absolute 1.09 (H) 0.10 - 1.00 x10*3/uL    Eosinophils Absolute 0.21 0.00 - 0.70 x10*3/uL    Basophils Absolute 0.07 0.00 - 0.10 x10*3/uL   POCT GLUCOSE   Result Value Ref Range    POCT Glucose 175 (H) 74 - 99 mg/dL   POCT GLUCOSE   Result Value Ref Range    POCT Glucose 133 (H) 74 - 99 mg/dL   POCT GLUCOSE   Result Value Ref Range    POCT Glucose 154 (H) 74 - 99 mg/dL     Imaging  CT angio aorta and bilateral iliofemoral runoff including without contrast if performed  Result Date: 7/23/2025  Vascular findings: 1. Partially calcified splenic artery aneurysm measuring 1.5 cm. 2. Multifocal bilateral lower extremity vascular calcifications as above with two-vessel runoff to the bilateral lower extremities.   Nonvascular findings: 1. Interval increase in left-greater-than-right bibasilar consolidative opacities which may be infectious/inflammatory or represent evolving pulmonary infarcts patient's known pulmonary emboli, suboptimally assessed on the current examination. 2. Postsurgical changes of the lumbosacral spine with adjacent subcutaneous fluid collection described as above may represent postsurgical seroma versus postsurgical blood products.   I personally reviewed the images/study and I agree with the findings as stated by resident physician Ramiro Hall MD. This study was interpreted at [facility], [place], [place].   MACRO: None   Signed by: Royal Morton 7/23/2025 11:23 AM Dictation workstation:   [id number]    CT angio chest for pulmonary embolism  Result Date: 7/21/2025  1.  Nonocclusive central filling defects within a segmental left lower lobe pulmonary artery and near occlusive filling defects within subsegmental left lower lobe pulmonary arteries are most consistent with acute pulmonary emboli on this motion limited exam. No evident right heart strain. 2. Subsegmental atelectasis within right-greater-than-left lower lobes. Additional findings of potential excessive dynamic airway collapse within the right lobar airways and left mainstem bronchus. Correlate with clinical findings. 3. Mild chronic cardiomegaly. 4. Splenic artery aneurysm measuring up to 1.9 cm.   MACRO: Dr. Meghna Mathews discussed the significance and urgency of this critical finding by [facility] secure chat with Dr. Patricia Lyn on 7/20/2025 at 10:55 pm.  (**-RCF-**) Findings:  Acute pulmonary embolus.   Signed by: Alex Mathias 7/21/2025 9:20 AM Dictation workstation:   [id number]    XR chest 1 view  Result Date: 7/20/2025  1. Suspected left retrocardiac opacity, which may represent consolidation or may be artifactual/due to x-ray beam underpenetration. Further evaluation with PA and lateral chest radiographs is recommended.   Signed by: Familia Naik 7/20/2025 5:38 PM Dictation workstation:   [id number]    EEG  Result Date: 7/20/2025  IMPRESSION Impression This vEEG is indicative of a moderate diffuse encephalopathy. No epileptiform discharges or lateralizing signs are seen. A full report will be scanned into the patient's chart at a later time. This report has been interpreted and electronically signed by    XR chest 1 view  Result Date: 7/19/2025  1. No acute cardiopulmonary process.   MACRO: None.   Signed by: Tere Abdi 7/19/2025 7:54 AM Dictation workstation:   [id number]    Assessment/Plan  Jerman Colón Jr. is a 64 y.o. male with a past medical history of  postop nausea vomiting, hyperlipidemia, hypertension, CAD, sleep apnea, asthma, GERD, CKD, BPH, anemia, arthritis, lumbar disc disease and spinal  stenosis who presented for scheduled surgery.    7/14 s/p L3-S1 lami and facetectomies/extension/revision ()  7/15 hgb 7.1 s/p 1u pRBC, post Tx cbc 7.5, s/p 1u pRBC.   7/18 drain auto dc'd,   7/20, the patient had a desat along with tachycardia and fever. CT PE positive for LLL segmental/subsegmental PE. He was transferred to NSU for closer monitoring and restarting of AC.   7/21 started on Heparin drip.  DVT US BUE/BLE preliminary negative.  Vasc Med consulted--> continue Heparin drip; likely discharge on eliquis 3-6 months in setting of a provoked VTE; likely longer with Afib  7/22 Cardiology consulted for Afib with RVR.  Given IV Amio bolus and converted to NSR; started on amiodarone 400 mg oral 2 times a day  Vasc surg consulted for Splenic aneurysm.  No surgical intervention needed; yearly follow up  7/23 Downgrade to NURA, Alex removed- void trial, 1 unit PRBC given (HGB 7.6)  7/24 Alex reinserted     # S/p L3-4 L4-5 and L5-S1 laminectomy and facetectomy for decompression with L3-S1 instrumentation and Fusion.    - Neuro checks q 4hrs  - Monitor VS/pulse ox L5dhphm   - Monitor AM CBC/RFP  - Continue drain care per nursing       · Empty and record output at least every 8 hours       · Strip drains TID and PRN to avoid drain obstruction  - Alex reinserted  - Continue PT/OT    # Acute postoperative pain  - Well controlled per patient, continue current regimen       ·  Tylenol 650mg PO Q4tcrek        ·  Oxycodone 2.5/5/10mg PO C4ldrow PRN mild/mod/severe pain   - Bowel regimen with Colace 100 mg PO BID while using narcotics  - Zofran PRN nausea due to narcotics   - Pain assessments [id number]     #Acute postop anemia  - Continue to monitor for signs and symptoms of bleeding  - Monitor CBC   - Replace blood products PRN    #New onset Afib  -Continue to monitor heart rate and rhythm  - Patient HR/BP wnl and rhythm currently NSR  - Currently stable on Amiodarone 400mg bid (per cards)  - Metoprolol 25mg bid (per  cards)  - Heparin gtt may bridge to eliquis (per Vascular Medicine)  - Continue ASA 81 mg daily    #PE  -Continue to monitor patient's respiratory status and SPO2  -Continue Heparin gtt (per VM)    #UTI  - Continue Rocephin until need for PO ABX at time of discharge    #Urinary Retention/BPH  - Continue to monitor I/O's  - Continue void trial  - Bladder scan as needed.  - Continue Flomax 0.8mg daily    # Hx of HTN  - Continue to monitor and record BP  - Home BP medications being managed per cards    #Hx of BIANCA  - Continue Bipap at HS    Prophylaxis:   - DVT: SCDs, encourage ambulation   - Encourage IS x10 every hour while awake     FEN/GI:  - Monitor/replete electrolytes PRN   - IVF until voiding without difficulty   - Continue Regular diet   - GI protection with Protonix 40mg daily   - Bowel regimen: Scheduled Miralax and pericolace daily        Disposition: PT/OT recs AR. Patient states refusing AR at discharge. Hopeful discharge tomorrow, but due to medical concerns may be early next week.  Follow up appointment with neurosurgery scheduled for 8/6/2025.     Patient and plan discussed with chief neurosurgery resident, Dr. Martinez, and Dr. Patel.     Royal Oro, CNP   Neurologic Surgery   [facility]  Team Pager #[phone]     Total face to face time spent with patient/family of > 60 minutes, with >50% of the time spent discussing plan of care/management, counseling/educating on disease processes, explaining results of diagnostic testing.

## 2025-07-24 NOTE — PROGRESS NOTES
TCC met with patient at bedside to discuss discharge planning. Patient is currently declining Acute Rehabilitation and has opted for home care. Per patient request, referral sent to [facility] [place] [facility]. TCC will continue to follow for discharge planning.      DIANNE Echevarria, RN  [facility], [facility]  Transitional Care Coordinator, Mon-Fri  Cell: [phone], Office: [phone]  Email: [email or web address]

## 2025-07-24 NOTE — PROGRESS NOTES
Physical Therapy    Physical Therapy Treatment    Patient Name: Jerman Cloón Jr.  MRN: [id number]  Department: [place] [address]  Room: [address] [place] [address]  Today's Date: 7/24/2025  Time Calculation  Start Time: 1415  Stop Time: 1501  Time Calculation (min): 46 min         Assessment/Plan   PT Assessment  Barriers to Discharge Home: Physical needs, Caregiver assistance  Caregiver Assistance: Caregiver assistance needed per identified barriers - however, level of patient's required assistance exceeds assistance available at home  Physical Needs: Ambulating household distances limited by function/safety, 24hr mobility assistance needed, 24hr ADL assistance needed, High falls risk due to function or environment  Evaluation/Treatment Tolerance: Patient tolerated treatment well  End of Session Communication: Bedside nurse  Assessment Comment: Patient tolerated session well and able to progress ambulation distance. patient min A for mobility with FWW. progressing well, may progress from high to low with 24/7. pt is refusing rehab at this time but may still benefit for further strength and balance training and stairs  End of Session Patient Position: Bed, 3 rail up, Alarm off, not on at start of session  PT Plan  Inpatient/Swing Bed or Outpatient: Inpatient  PT Plan  Treatment/Interventions: Bed mobility, Transfer training, Gait training, Balance training, Neuromuscular re-education, Strengthening, Endurance training, Therapeutic exercise, Therapeutic activity, Home exercise program, Postural re-education, Orthotic fitting/training  PT Plan: Ongoing PT  PT Frequency: Daily  PT Discharge Recommendations: High intensity level of continued care  Equipment Recommended upon Discharge:  (TBD)  PT Recommended Transfer Status: Assist x1, Assistive device  PT - OK to Discharge: Yes    PT Visit Info:  PT Received On: 07/24/25     General Visit Information:   General  Prior to Session Communication: Bedside nurse  Patient Position Received: Bed,  3 rail up, Alarm on  General Comment: pt supine in bed, RN cleared. pt pleasant and cooperative. motivated    Subjective   Precautions:  Precautions  Medical Precautions: Fall precautions  Post-Surgical Precautions: Spinal precautions     Date/Time Vitals Session Patient Position Pulse Resp SpO2 BP MAP (mmHg)    07/24/25 1415 --  --  --  --  95 %  --  --        Objective   Pain:  Pain Assessment  Pain Assessment: 0-10  0-10 (Numeric) Pain Score: 6  Pain Type: Surgical pain  Pain Location: Back  Pain Interventions: Repositioned (RN medicated)  Cognition:  Cognition  Overall Cognitive Status: Within Functional Limits  Orientation Level: Oriented X4    Activity Tolerance:  Activity Tolerance  Endurance: Tolerates 10 - 20 min exercise with multiple rests  Treatments:  Therapeutic Activity  Therapeutic Activity Performed: Yes  Therapeutic Activity 1: bed mobility > sat EOB for 5 min  Therapeutic Activity 2: stood and ambulated in chi  Therapeutic Activity 3: used restroom, extended time to stand for clean up. 2 stands from toilet    Bed Mobility  Bed Mobility: Yes  Bed Mobility 1  Bed Mobility 1: Supine to sitting  Level of Assistance 1: Minimum assistance  Bed Mobility Comments 1: cues for log roll    Ambulation/Gait Training  Ambulation/Gait Training Performed: Yes  Ambulation/Gait Training 1  Surface 1: Level tile  Device 1: Rolling walker  Assistance 1: Minimum assistance  Quality of Gait 1: Decreased step length, Forward flexed posture  Comments/Distance (ft) 1: 150' in chi with cues for turns and walker management  Transfers  Transfer: Yes  Transfer 1  Transfer From 1: Sit to, Stand to  Transfer to 1: Stand, Sit  Technique 1: Sit to stand, Stand to sit  Transfer Device 1: Walker  Transfer Level of Assistance 1: Minimum assistance  Trials/Comments 1: stood from EOB and toilet twice. cues for hand placement    Outcome Measures:  [facility] Basic Mobility  Turning from your back to your side while in a flat bed without  using bedrails: A little  Moving from lying on your back to sitting on the side of a flat bed without using bedrails: A little  Moving to and from bed to chair (including a wheelchair): A little  Standing up from a chair using your arms (e.g. wheelchair or bedside chair): A little  To walk in hospital room: A little  Climbing 3-5 steps with railing: Total  Basic Mobility - Total Score: 16    Education Documentation  Precautions, taught by Jessica Avelar PT at 7/24/2025  3:22 PM.  Learner: Patient  Readiness: Acceptance  Method: Explanation  Response: Verbalizes Understanding  Comment: pt recalled spinal precautions, then review dc plan and safety    Mobility Training, taught by Jessica Avelar PT at 7/24/2025  3:22 PM.  Learner: Patient  Readiness: Acceptance  Method: Explanation  Response: Verbalizes Understanding  Comment: pt recalled spinal precautions, then review dc plan and safety    Education Comments  No comments found.        OP EDUCATION:       Encounter Problems       Encounter Problems (Active)       PT Problem       Patient will perform bed mobility with </= MIN A x1 to reduce risk of developing decubitus ulcers.  (Progressing)       Start:  07/15/25    Expected End:  07/29/25            Patient will perform sit to stand and stand to sit transfers with </= MIN A x1 and LRD to increase functional strength.  (Progressing)       Start:  07/15/25    Expected End:  07/29/25            Patient will ambulate at least 15 ft. with </= MOD A x1 and LRD to improve tolerance of household distances.  (Progressing)       Start:  07/15/25    Expected End:  07/29/25            BLE 5/5 (Progressing)       Start:  07/15/25    Expected End:  07/29/25            Patient will stand with UE support of LRD and </= CGA at least 2 min to improve balance required for self-care tasks.  (Progressing)       Start:  07/15/25    Expected End:  07/29/25

## 2025-07-24 NOTE — CARE PLAN
The patient's goals for the shift include      The clinical goals for the shift include pt will remain HDS throughout the shift    Over the shift, the patient did make progress toward the following goals.

## 2025-07-24 NOTE — PROGRESS NOTES
"   VASCULAR MEDICINE PROGRESS NOTE    HISTORY OF PRESENT ILLNESS  Day 10 of admission     Overnight events:  -No acute events overnight, patient remains therapeutic on heparin gtt at 27 units   -Patient received 1 unit of PRBC on 7.23.2025 for hemoglobin 7.6-->now > 9 x2, no bleeding   -Tx to RNF       Objective   /66 (BP Location: Right arm, Patient Position: Sitting)   Pulse 84   Temp 36.2 °C (97.2 °F) (Temporal)   Resp 16   Ht 1.854 m (6' 1\")   Wt 129 kg (285 lb 7.9 oz)   SpO2 94%   BMI 37.67 kg/m²   0-10 (Numeric) Pain Score: 6  Vitals:    07/20/25 0600   Weight: 129 kg (285 lb 7.9 oz)         Intake/Output Summary (Last 24 hours) at 7/24/2025 1711  Last data filed at 7/24/2025 0837  Gross per 24 hour   Intake 948.65 ml   Output 600 ml   Net 348.65 ml         Physical Exam  Constitutional:       Appearance: Normal appearance. He is not ill-appearing.   HENT:      Head: Normocephalic and atraumatic.      Nose: Nose normal.      Mouth/Throat:      Mouth: Mucous membranes are moist.     Eyes:      Pupils: Pupils are equal, round, and reactive to light.       Cardiovascular:      Rate and Rhythm: Normal rate and regular rhythm.      Pulses:           Dorsalis pedis pulses are 2+ on the right side and 2+ on the left side.   Pulmonary:      Effort: Pulmonary effort is normal.      Breath sounds: No wheezing.   Abdominal:      General: There is distension.     Musculoskeletal:      Cervical back: Normal range of motion.      Right lower leg: 3+ Pitting Edema present.      Left lower leg: 3+ Pitting Edema present.      Comments: L>R edema     Skin:     General: Skin is warm and dry.      Capillary Refill: Capillary refill takes less than 2 seconds.      Comments: Dressing on right back intact with no bleeding noted      Neurological:      Mental Status: He is alert and oriented to person, place, and time.         Medications   Scheduled medications  Scheduled Medications[1]  Continuous " medications  Continuous Medications[2]  PRN medications  PRN Medications[3]     Lab Review   Recent Labs     07/24/25  0731 07/23/25  0553 07/22/25  1435 07/22/25  0005 07/21/25  0300 07/20/25  1735 07/20/25  0243 07/19/25  0412 07/17/25  0053 07/16/25  0012 07/15/25  1127 07/15/25  0615 07/14/25  1457 01/27/25  0824    139 137 137 141 137 137 137   < > 141   < > 140   < > 137   K 3.5 4.1 3.7 3.5 4.2 3.5 3.9 3.9   < > 4.4   < > 5.9*   < > 4.5    99 98 96* 99 98 101 98   < > 107   < > 105   < > 99   CO2 34* 34* 34* 32 34* 30 31 31   < > 29   < > 29   < > 32   ANIONGAP 11 10 9* 13 12 13 9* 12   < > 9*   < > 12   < > 11   BUN 15 17 17 17 19 19 17 15   < > 22   < > 25*   < > 20   CREATININE 0.99 0.93 0.95 1.09 1.25 1.29 1.04 1.05   < > 1.02   < > 1.16   < > 0.78   EGFR 85 >90 89 76 64 62 80 79   < > 82   < > 70   < > >90   MG 2.03 2.15 1.94  --  2.26 1.69  --   --   --  1.77  --  1.66  --  1.93    < > = values in this interval not displayed.     Recent Labs     07/24/25  0731 07/23/25  0553 07/22/25  1435 07/22/25  0005 07/21/25  0300   ALBUMIN 2.9* 2.7* 2.8*  2.8* 2.8* 3.3*   ALKPHOS  --   --  49  --   --    ALT  --   --  25  --   --    AST  --   --  30  --   --    BILITOT  --   --  0.5  --   --      Recent Labs     07/24/25  1524 07/23/25  1157 07/23/25  0134 07/22/25  1435 07/22/25  0005 07/21/25  0300 07/20/25  0243 07/19/25  0412   WBC 11.8* 11.1 8.7 9.7 9.0 8.2 6.7 6.1   HGB 9.1* 9.0* 7.6* 8.5* 8.3* 9.7* 8.8* 8.6*   HCT 31.7* 30.4* 26.9* 30.8* 30.2* 35.2* 31.4* 30.3*    201 222 186 176 192 192 208   MCV 76* 73* 74* 76* 75* 77* 75* 74*     Recent Labs     07/24/25  0730 07/23/25  0553 07/23/25  0134 07/22/25  2120 07/22/25  1453 07/22/25  1113 07/22/25  0543 07/22/25  0005 07/21/25  1307 06/27/25  1051 01/21/25  1815   INR  --   --   --   --   --   --   --   --   --  1.0 1.1   HAUF 0.6 0.5 0.5 0.4 0.2 0.3 0.2 <0.1   < >  --   --    FIBRINOGEN  --   --   --   --   --   --   --   --   --   --  300  "   < > = values in this interval not displayed.     PTT - 7/21/2025:  6:05 AM    No results for input(s): \"CHOL\", \"LDLF\", \"HDL\", \"TRIG\" in the last 08743 hours.  Lab Results   Component Value Date    HGBA1C 5.7 (H) 03/22/2025     Lab Results   Component Value Date    TSH 1.64 07/22/2025     Estimated Creatinine Clearance: 106.5 mL/min (by C-G formula based on SCr of 0.99 mg/dL).    Imaging:    CT angio aorta and bilateral iliofemoral runoff including without contrast 7/22/2025  IMPRESSION:  Vascular findings:  1. Partially calcified splenic artery aneurysm measuring 1.5 cm.  2. Multifocal bilateral lower extremity vascular calcifications as  above with two-vessel runoff to the bilateral lower extremities.      Nonvascular findings:  1. Interval increase in left-greater-than-right bibasilar  consolidative opacities which may be infectious/inflammatory or  represent evolving pulmonary infarcts patient's known pulmonary  emboli, suboptimally assessed on the current examination.  2. Postsurgical changes of the lumbosacral spine with adjacent  subcutaneous fluid collection described as above may represent  postsurgical seroma versus postsurgical blood products.      LE DVT US 7/21/25  PRELIMINARY CONCLUSIONS   Right Lower Venous: No evidence of acute deep vein thrombus visualized in the right lower extremity.  Left Lower Venous: No evidence of acute deep vein thrombus visualized in the left lower extremity.     Vascular US Upper Extremity Venous Duplex Bilateral  7/21/2025  PRELIMINARY CONCLUSIONS   Right Upper Venous: No evidence of acute deep vein thrombus visualized in the right upper extremity.  Left Upper Venous: No evidence of acute deep vein thrombus visualized in the left upper extremity. The cephalic vein was not visualized due to its small caliber.     ECHO 7/21/25   1. Left ventricular ejection fraction is normal by visual estimate at 55-60%.   2. There is moderately increased posterior left ventricular wall " thickness.   3. There is normal right ventricular global systolic function.   4. Mildly enlarged right ventricle.     CT angio chest for pulmonary embolism 7/21/2025  1. Nonocclusive central filling defects within a segmental left lower  lobe pulmonary artery and near occlusive filling defects within  subsegmental left lower lobe pulmonary arteries are most consistent  with acute pulmonary emboli on this motion limited exam. No evident  right heart strain.  2. Subsegmental atelectasis within right-greater-than-left lower  lobes. Additional findings of potential excessive dynamic airway  collapse within the right lobar airways and left mainstem bronchus.  Correlate with clinical findings.  3. Mild chronic cardiomegaly.  4. Splenic artery aneurysm measuring up to 1.9 cm.          ASSESSMENT & PLAN  64 y.o. male  with a past medical history of hyperlipidemia, hypertension, CAD, sleep apnea, asthma, GERD, CKD, BPH, anemia, arthritis, lumbar disc disease, spinal stenosis presenting for scheduled removal of previous L5-S1 instrumentation, exploration of previous L5-S1 fusion, L3-L4, L4-L5 and L5-S1 and fasciotomy for decompression with L2-S1; Instrumentation and pelvic fixation bilaterally completed on 7/14/2025, course c/b VTE and Afib     ::Acute  Segmental/SS PE. No UE/LE DVT   -Provoked in the setting of hospitalization and surgical intervention. Cleared by neuro and [facility] for AC   ::Afib w/RVR-managed by cards    ::Splenic artery aneurysm ~1.5 cm, with plans to follow up with VS   ::Anemia, Noted down trending hb and need for PRBCs x1 for optimization on 7/23, per our discussion with primary team, likely post surgical, hb 9s x2     Recommendations/Plan:   - Continue with low intensity heparin gtt, no bolus, monitor for bleeding, monitor hemoglobin and PLTs   -Hemoglobin has improved to 9.1 after 1 unit of PRBC's 7.23.2025, Please monitor very closely for bleeding, further work up as deemed appropriate by the primary  team   - If no procedures planned, no concerns about bleeding, and when ready for discharge. Assuming kidney function, liver function, hb and PLTs continue to be stable, may initiate  Eliquis 5 mg twice a day if compatible with the patient's medications (please review) and affordable (Please assess insurance coverage)  - Duration of AC: 3- 6 months in setting of a provoked VTE, likely longer in setting of A-fib, patient being followed by cardiology and they recommended zio-patch however unable to place ziopatch at discharge, due to patient not following with  cardiology.   - Continue following with urology and his PCP for updated cancer screenings, especially with elevated PSA noted in 3/2025     Thank you for allowing us to participate in the patient's care.  Vascular medicine will sign off.  Please do not hesitate to call us back with questions or concerns. Will arrange for vascular medicine follow-up    CAROLINA Arellano-CNP   Vascular Medicine  Consult CHARI  [facility]      I saw and evaluated the patient. I personally obtained the key and critical portions of the history and physical exam or was physically present for key and critical portions performed by the CNP. I reviewed the resident/fellow's documentation and discussed the patient with the CNP. I agree with the CNP's medical decision making as documented in the note.    Rajni Bruner MD                 [1] acetaminophen, 650 mg, oral, q6h  [START ON 7/30/2025] amiodarone, 200 mg, oral, Daily  amiodarone, 400 mg, oral, BID  aspirin, 81 mg, oral, Daily  atorvastatin, 40 mg, oral, Nightly  cefTRIAXone, 1 g, intravenous, q24h  cyclobenzaprine, 10 mg, oral, TID  furosemide, 40 mg, intravenous, BID  gabapentin, 600 mg, oral, TID  insulin lispro, 0-5 Units, subcutaneous, Before meals & nightly  metoprolol tartrate, 25 mg, oral, BID  pantoprazole, 40 mg, oral, BID  polyethylene glycol, 17 g, oral, BID  sennosides-docusate sodium, 2  tablet, oral, BID  tamsulosin, 0.8 mg, oral, Nightly     [2] heparin, 0-4,000 Units/hr, Last Rate: 2,700 Units/hr (07/24/25 1705)     [3] PRN medications: albuterol, benzocaine-menthol, dextrose, dextrose, glucagon, glucagon, lubricating eye drops, metoprolol, naloxone, ondansetron **OR** ondansetron, oxyCODONE, oxyCODONE, oxyCODONE, oxygen

## 2025-07-25 LAB
ALBUMIN SERPL BCP-MCNC: 2.8 G/DL (ref 3.4–5)
ALBUMIN SERPL BCP-MCNC: 2.9 G/DL (ref 3.4–5)
ANION GAP SERPL CALC-SCNC: 12 MMOL/L (ref 10–20)
ANION GAP SERPL CALC-SCNC: 16 MMOL/L (ref 10–20)
ATRIAL RATE: 113 BPM
BACTERIA BLD CULT: NORMAL
BUN SERPL-MCNC: 13 MG/DL (ref 6–23)
BUN SERPL-MCNC: 14 MG/DL (ref 6–23)
CALCIUM SERPL-MCNC: 7.9 MG/DL (ref 8.6–10.6)
CALCIUM SERPL-MCNC: 8 MG/DL (ref 8.6–10.6)
CHLORIDE SERPL-SCNC: 105 MMOL/L (ref 98–107)
CHLORIDE SERPL-SCNC: 98 MMOL/L (ref 98–107)
CO2 SERPL-SCNC: 33 MMOL/L (ref 21–32)
CO2 SERPL-SCNC: 33 MMOL/L (ref 21–32)
CREAT SERPL-MCNC: 1.06 MG/DL (ref 0.5–1.3)
CREAT SERPL-MCNC: 1.11 MG/DL (ref 0.5–1.3)
EGFRCR SERPLBLD CKD-EPI 2021: 74 ML/MIN/1.73M*2
EGFRCR SERPLBLD CKD-EPI 2021: 78 ML/MIN/1.73M*2
ERYTHROCYTE [DISTWIDTH] IN BLOOD BY AUTOMATED COUNT: 21.5 % (ref 11.5–14.5)
ERYTHROCYTE [DISTWIDTH] IN BLOOD BY AUTOMATED COUNT: 22.2 % (ref 11.5–14.5)
GLUCOSE BLD MANUAL STRIP-MCNC: 125 MG/DL (ref 74–99)
GLUCOSE BLD MANUAL STRIP-MCNC: 126 MG/DL (ref 74–99)
GLUCOSE SERPL-MCNC: 108 MG/DL (ref 74–99)
GLUCOSE SERPL-MCNC: 111 MG/DL (ref 74–99)
HCT VFR BLD AUTO: 29.2 % (ref 41–52)
HCT VFR BLD AUTO: 35.8 % (ref 41–52)
HGB BLD-MCNC: 8.3 G/DL (ref 13.5–17.5)
HGB BLD-MCNC: 9.7 G/DL (ref 13.5–17.5)
MAGNESIUM SERPL-MCNC: 1.84 MG/DL (ref 1.6–2.4)
MCH RBC QN AUTO: 21.3 PG (ref 26–34)
MCH RBC QN AUTO: 21.7 PG (ref 26–34)
MCHC RBC AUTO-ENTMCNC: 27.1 G/DL (ref 32–36)
MCHC RBC AUTO-ENTMCNC: 28.4 G/DL (ref 32–36)
MCV RBC AUTO: 76 FL (ref 80–100)
MCV RBC AUTO: 79 FL (ref 80–100)
NRBC BLD-RTO: 1 /100 WBCS (ref 0–0)
NRBC BLD-RTO: 1.3 /100 WBCS (ref 0–0)
P AXIS: 36 DEGREES
P OFFSET: 207 MS
P ONSET: 150 MS
PHOSPHATE SERPL-MCNC: 3.7 MG/DL (ref 2.5–4.9)
PHOSPHATE SERPL-MCNC: 4.2 MG/DL (ref 2.5–4.9)
PLATELET # BLD AUTO: 306 X10*3/UL (ref 150–450)
PLATELET # BLD AUTO: 379 X10*3/UL (ref 150–450)
POTASSIUM SERPL-SCNC: 3.5 MMOL/L (ref 3.5–5.3)
POTASSIUM SERPL-SCNC: 3.8 MMOL/L (ref 3.5–5.3)
PR INTERVAL: 148 MS
Q ONSET: 224 MS
QRS COUNT: 19 BEATS
QRS DURATION: 92 MS
QT INTERVAL: 310 MS
QTC CALCULATION(BAZETT): 425 MS
QTC FREDERICIA: 382 MS
R AXIS: 11 DEGREES
RBC # BLD AUTO: 3.82 X10*6/UL (ref 4.5–5.9)
RBC # BLD AUTO: 4.55 X10*6/UL (ref 4.5–5.9)
SODIUM SERPL-SCNC: 139 MMOL/L (ref 136–145)
SODIUM SERPL-SCNC: 150 MMOL/L (ref 136–145)
T AXIS: 4 DEGREES
T OFFSET: 379 MS
UFH PPP CHRO-ACNC: 0.5 IU/ML (ref ?–1.1)
VENTRICULAR RATE: 113 BPM
WBC # BLD AUTO: 11.9 X10*3/UL (ref 4.4–11.3)
WBC # BLD AUTO: 12.6 X10*3/UL (ref 4.4–11.3)

## 2025-07-25 PROCEDURE — 94660 CPAP INITIATION&MGMT: CPT

## 2025-07-25 PROCEDURE — 82947 ASSAY GLUCOSE BLOOD QUANT: CPT

## 2025-07-25 PROCEDURE — 2500000005 HC RX 250 GENERAL PHARMACY W/O HCPCS: Performed by: NURSE PRACTITIONER

## 2025-07-25 PROCEDURE — 2500000004 HC RX 250 GENERAL PHARMACY W/ HCPCS (ALT 636 FOR OP/ED)

## 2025-07-25 PROCEDURE — 97530 THERAPEUTIC ACTIVITIES: CPT | Mod: GP

## 2025-07-25 PROCEDURE — 2500000004 HC RX 250 GENERAL PHARMACY W/ HCPCS (ALT 636 FOR OP/ED): Performed by: NURSE PRACTITIONER

## 2025-07-25 PROCEDURE — 1200000002 HC GENERAL ROOM WITH TELEMETRY DAILY

## 2025-07-25 PROCEDURE — 2500000002 HC RX 250 W HCPCS SELF ADMINISTERED DRUGS (ALT 637 FOR MEDICARE OP, ALT 636 FOR OP/ED)

## 2025-07-25 PROCEDURE — 85027 COMPLETE CBC AUTOMATED: CPT | Performed by: NURSE PRACTITIONER

## 2025-07-25 PROCEDURE — 85027 COMPLETE CBC AUTOMATED: CPT | Performed by: PHYSICIAN ASSISTANT

## 2025-07-25 PROCEDURE — 2500000002 HC RX 250 W HCPCS SELF ADMINISTERED DRUGS (ALT 637 FOR MEDICARE OP, ALT 636 FOR OP/ED): Performed by: NURSE PRACTITIONER

## 2025-07-25 PROCEDURE — 2500000002 HC RX 250 W HCPCS SELF ADMINISTERED DRUGS (ALT 637 FOR MEDICARE OP, ALT 636 FOR OP/ED): Performed by: STUDENT IN AN ORGANIZED HEALTH CARE EDUCATION/TRAINING PROGRAM

## 2025-07-25 PROCEDURE — 36415 COLL VENOUS BLD VENIPUNCTURE: CPT

## 2025-07-25 PROCEDURE — 99233 SBSQ HOSP IP/OBS HIGH 50: CPT | Performed by: NURSE PRACTITIONER

## 2025-07-25 PROCEDURE — 97110 THERAPEUTIC EXERCISES: CPT | Mod: GP

## 2025-07-25 PROCEDURE — 2500000001 HC RX 250 WO HCPCS SELF ADMINISTERED DRUGS (ALT 637 FOR MEDICARE OP)

## 2025-07-25 PROCEDURE — 80069 RENAL FUNCTION PANEL: CPT | Performed by: PHYSICIAN ASSISTANT

## 2025-07-25 PROCEDURE — 83735 ASSAY OF MAGNESIUM: CPT | Performed by: PHYSICIAN ASSISTANT

## 2025-07-25 PROCEDURE — 85520 HEPARIN ASSAY: CPT

## 2025-07-25 PROCEDURE — 80069 RENAL FUNCTION PANEL: CPT

## 2025-07-25 RX ORDER — POTASSIUM CHLORIDE 20 MEQ/1
40 TABLET, EXTENDED RELEASE ORAL ONCE
Status: COMPLETED | OUTPATIENT
Start: 2025-07-25 | End: 2025-07-25

## 2025-07-25 RX ORDER — METOPROLOL SUCCINATE 50 MG/1
50 TABLET, EXTENDED RELEASE ORAL DAILY
Qty: 30 TABLET | Refills: 1 | Status: CANCELLED | OUTPATIENT
Start: 2025-07-25 | End: 2025-08-24

## 2025-07-25 RX ADMIN — INSULIN LISPRO 1 UNITS: 100 INJECTION, SOLUTION INTRAVENOUS; SUBCUTANEOUS at 21:07

## 2025-07-25 RX ADMIN — HEPARIN SODIUM 2700 UNITS/HR: 10000 INJECTION, SOLUTION INTRAVENOUS at 01:55

## 2025-07-25 RX ADMIN — AMIODARONE HYDROCHLORIDE 400 MG: 200 TABLET ORAL at 20:06

## 2025-07-25 RX ADMIN — TAMSULOSIN HYDROCHLORIDE 0.8 MG: 0.4 CAPSULE ORAL at 20:07

## 2025-07-25 RX ADMIN — GABAPENTIN 600 MG: 300 CAPSULE ORAL at 08:21

## 2025-07-25 RX ADMIN — CEFTRIAXONE 1 G: 1 INJECTION, SOLUTION INTRAVENOUS at 13:03

## 2025-07-25 RX ADMIN — ASPIRIN 81 MG: 81 TABLET, COATED ORAL at 08:20

## 2025-07-25 RX ADMIN — FUROSEMIDE 40 MG: 10 INJECTION, SOLUTION INTRAMUSCULAR; INTRAVENOUS at 20:08

## 2025-07-25 RX ADMIN — GABAPENTIN 600 MG: 300 CAPSULE ORAL at 20:07

## 2025-07-25 RX ADMIN — METOPROLOL TARTRATE 25 MG: 25 TABLET, FILM COATED ORAL at 20:07

## 2025-07-25 RX ADMIN — INSULIN LISPRO 1 UNITS: 100 INJECTION, SOLUTION INTRAVENOUS; SUBCUTANEOUS at 13:00

## 2025-07-25 RX ADMIN — GABAPENTIN 600 MG: 300 CAPSULE ORAL at 14:59

## 2025-07-25 RX ADMIN — ACETAMINOPHEN 650 MG: 325 TABLET, FILM COATED ORAL at 15:02

## 2025-07-25 RX ADMIN — HYDROMORPHONE HYDROCHLORIDE 0.2 MG: 1 INJECTION, SOLUTION INTRAMUSCULAR; INTRAVENOUS; SUBCUTANEOUS at 15:14

## 2025-07-25 RX ADMIN — PANTOPRAZOLE SODIUM 40 MG: 40 TABLET, DELAYED RELEASE ORAL at 20:07

## 2025-07-25 RX ADMIN — PANTOPRAZOLE SODIUM 40 MG: 40 TABLET, DELAYED RELEASE ORAL at 08:22

## 2025-07-25 RX ADMIN — CYCLOBENZAPRINE 10 MG: 10 TABLET, FILM COATED ORAL at 20:07

## 2025-07-25 RX ADMIN — ACETAMINOPHEN 650 MG: 325 TABLET, FILM COATED ORAL at 08:34

## 2025-07-25 RX ADMIN — METOPROLOL TARTRATE 25 MG: 25 TABLET, FILM COATED ORAL at 08:22

## 2025-07-25 RX ADMIN — ATORVASTATIN CALCIUM 40 MG: 40 TABLET, FILM COATED ORAL at 20:07

## 2025-07-25 RX ADMIN — FUROSEMIDE 40 MG: 10 INJECTION, SOLUTION INTRAMUSCULAR; INTRAVENOUS at 08:22

## 2025-07-25 RX ADMIN — HEPARIN SODIUM 2700 UNITS/HR: 10000 INJECTION, SOLUTION INTRAVENOUS at 21:18

## 2025-07-25 RX ADMIN — POTASSIUM CHLORIDE 40 MEQ: 1500 TABLET, EXTENDED RELEASE ORAL at 13:03

## 2025-07-25 RX ADMIN — HEPARIN SODIUM 2700 UNITS/HR: 10000 INJECTION, SOLUTION INTRAVENOUS at 12:12

## 2025-07-25 RX ADMIN — OXYCODONE HYDROCHLORIDE 10 MG: 5 TABLET ORAL at 12:09

## 2025-07-25 RX ADMIN — Medication 2 DOSE: at 20:56

## 2025-07-25 RX ADMIN — CYCLOBENZAPRINE 10 MG: 10 TABLET, FILM COATED ORAL at 15:02

## 2025-07-25 RX ADMIN — AMIODARONE HYDROCHLORIDE 400 MG: 200 TABLET ORAL at 08:21

## 2025-07-25 RX ADMIN — ACETAMINOPHEN 650 MG: 325 TABLET, FILM COATED ORAL at 20:07

## 2025-07-25 RX ADMIN — CYCLOBENZAPRINE 10 MG: 10 TABLET, FILM COATED ORAL at 08:21

## 2025-07-25 ASSESSMENT — COGNITIVE AND FUNCTIONAL STATUS - GENERAL
WALKING IN HOSPITAL ROOM: A LOT
TURNING FROM BACK TO SIDE WHILE IN FLAT BAD: A LITTLE
WALKING IN HOSPITAL ROOM: A LITTLE
MOVING TO AND FROM BED TO CHAIR: A LOT
TURNING FROM BACK TO SIDE WHILE IN FLAT BAD: A LOT
TOILETING: A LOT
EATING MEALS: A LOT
DRESSING REGULAR UPPER BODY CLOTHING: A LOT
PERSONAL GROOMING: A LOT
MOVING TO AND FROM BED TO CHAIR: A LITTLE
DRESSING REGULAR LOWER BODY CLOTHING: A LOT
HELP NEEDED FOR BATHING: A LOT
CLIMB 3 TO 5 STEPS WITH RAILING: A LOT
STANDING UP FROM CHAIR USING ARMS: A LOT
STANDING UP FROM CHAIR USING ARMS: A LITTLE
DAILY ACTIVITIY SCORE: 12
CLIMB 3 TO 5 STEPS WITH RAILING: A LOT
MOBILITY SCORE: 15
MOBILITY SCORE: 15
MOVING FROM LYING ON BACK TO SITTING ON SIDE OF FLAT BED WITH BEDRAILS: A LOT

## 2025-07-25 ASSESSMENT — PAIN DESCRIPTION - DESCRIPTORS
DESCRIPTORS: ACHING

## 2025-07-25 ASSESSMENT — PAIN SCALES - GENERAL
PAINLEVEL_OUTOF10: 7
PAINLEVEL_OUTOF10: 6
PAINLEVEL_OUTOF10: 0 - NO PAIN
PAINLEVEL_OUTOF10: 8
PAINLEVEL_OUTOF10: 8

## 2025-07-25 ASSESSMENT — PAIN - FUNCTIONAL ASSESSMENT
PAIN_FUNCTIONAL_ASSESSMENT: 0-10

## 2025-07-25 ASSESSMENT — PAIN DESCRIPTION - LOCATION: LOCATION: BACK

## 2025-07-25 NOTE — PROGRESS NOTES
Physical Therapy    Physical Therapy Treatment    Patient Name: Jerman Colón Jr.  MRN: [id number]  Department: [place] [id number]  Room: [address] [place], [place] [address]  Today's Date: 7/25/2025  Time Calculation  Start Time: 1415  Stop Time: 1453  Time Calculation (min): 38 min         Assessment/Plan   PT Assessment  Barriers to Discharge Home: Physical needs, Caregiver assistance  Caregiver Assistance: Caregiver assistance needed per identified barriers - however, level of patient's required assistance exceeds assistance available at home  Physical Needs: 24hr mobility assistance needed, 24hr ADL assistance needed, High falls risk due to function or environment, Stair navigation into home limited by function/safety, Stair navigation to access bed limited by function/safety  Evaluation/Treatment Tolerance: Patient tolerated treatment well  Medical Staff Made Aware: Yes  End of Session Communication: Bedside nurse  Assessment Comment: Pt. is progressing appropriately, tolerating multiple transfers and ambulation trials. Pt continues to demo decreased functional strength, decreased activity tolerance/endurance, impaired balance, and increased difficulty with functional mobility compared to baseline. Pt completed TUG in 30 seconds with FWW and required assist for sit<>stand transfer, indicating pt is at increased risk for falls. Pt. will continue to benefit from skilled PT intervention while inpatient to address deficits and maximize return to PLOF. Pt remains appropriate for HIGH intensity PT upon discharge.  End of Session Patient Position: Bed, 3 rail up, Alarm on  PT Plan  Inpatient/Swing Bed or Outpatient: Inpatient  PT Plan  Treatment/Interventions: Bed mobility, Transfer training, Gait training, Balance training, Neuromuscular re-education, Strengthening, Endurance training, Therapeutic exercise, Therapeutic activity, Home exercise program, Postural re-education, Orthotic fitting/training  PT Plan: Ongoing PT  PT Frequency: Daily  PT  Discharge Recommendations: High intensity level of continued care  Equipment Recommended upon Discharge:  (TBD)  PT Recommended Transfer Status: Assist x1, Assistive device  PT - OK to Discharge: Yes    PT Visit Info:  PT Received On: 07/25/25  Response to Previous Treatment: Patient with no complaints from previous session.     General Visit Information:   General  Prior to Session Communication: Bedside nurse  Patient Position Received: Bed, 3 rail up, Alarm off, not on at start of session  Preferred Learning Style: auditory, verbal  General Comment: Pt received supine in bed, pleasant and agreeable to participate.    Subjective   Precautions:  Precautions  Hearing/Visual Limitations: wfl  Medical Precautions: Fall precautions  Post-Surgical Precautions: Spinal precautions  Precautions Comment: SBP <180     Date/Time Vitals Session Patient Position Pulse Resp SpO2 BP MAP (mmHg)    07/25/25 1415 Pre PT  Lying  82  --  94 %  --  --        Objective   Pain:  Pain Assessment  Pain Assessment: 0-10  0-10 (Numeric) Pain Score: 7  Pain Type: Acute pain, Surgical pain  Pain Location: Back  Pain Interventions: Repositioned, Ambulation/increased activity    Cognition:  Cognition  Overall Cognitive Status: Within Functional Limits  Orientation Level: Oriented X4    Activity Tolerance:  Activity Tolerance  Endurance: Tolerates 10 - 20 min exercise with multiple rests  Early Mobility/Exercise Safety Screen: Proceed with mobilization - No exclusion criteria met  Activity Tolerance Comments: Appears deconditioned, seated rest throughout session    Treatments:  Therapeutic Exercise  Therapeutic Exercise Performed: Yes  Therapeutic Exercise Activity 1: EOB: AP x20 nieves, LAQ x15 nieves, seated hip flexion x10 neives  Therapeutic Exercise Activity 2: Standing: heel raises, 2x10, nieves UE support on FWW    Therapeutic Activity  Therapeutic Activity Performed: Yes  Therapeutic Activity 1: x2 seated rest breaks during session.  Therapeutic  Activity 2: Completed TUG, refer to outcome measures    Bed Mobility  Bed Mobility: Yes  Bed Mobility 1  Bed Mobility 1: Supine to sitting, Sitting to supine  Level of Assistance 1: Moderate assistance, Moderate verbal cues  Bed Mobility Comments 1: Cues for log roll, Increased assist to upright trunk this date. Assist to manage LEs with return to supine    Ambulation/Gait Training  Ambulation/Gait Training Performed: Yes  Ambulation/Gait Training 1  Surface 1: Level tile  Device 1: Rolling walker  Assistance 1: Contact guard, Minimal verbal cues  Quality of Gait 1: Decreased step length, Forward flexed posture, Shuffling gait (slow dean, increased instability with change in directions though no acute LOB)  Comments/Distance (ft) 1: 150 ft, seated rest  Ambulation/Gait Training 2  Surface 2: Level tile  Device 2: Rolling walker  Assistance 2: Contact guard, Minimal verbal cues  Quality of Gait 2: Decreased step length, Forward flexed posture, Shuffling gait (slow dean)  Comments/Distance (ft) 2: 15 ft    Transfers  Transfer: Yes  Transfer 1  Transfer From 1: Bed to, Stand to  Transfer to 1: Stand, Chair with arms  Technique 1: Sit to stand, Stand to sit  Transfer Device 1: Walker  Transfer Level of Assistance 1:  (modAx1 for ascent, minAx1 for descent)  Trials/Comments 1: Cues for proper UE placement and technique  Transfers 2  Transfer From 2: Chair with arms to, Stand to  Transfer to 2: Stand, Chair with arms  Technique 2: Sit to stand, Stand to sit  Transfer Device 2: Walker  Transfer Level of Assistance 2: Minimum assistance, Minimal verbal cues  Transfers 3  Transfer From 3: Chair with arms to, Stand to  Transfer to 3: Stand, Bed  Technique 3: Sit to stand, Stand to sit  Transfer Device 3: Walker  Transfer Level of Assistance 3: Minimum assistance, Minimal verbal cues    Stairs  Stairs: No    Outcome Measures:  [facility] Basic Mobility  Turning from your back to your side while in a flat bed without using  bedrails: A lot  Moving from lying on your back to sitting on the side of a flat bed without using bedrails: A lot  Moving to and from bed to chair (including a wheelchair): A little  Standing up from a chair using your arms (e.g. wheelchair or bedside chair): A little  To walk in hospital room: A little  Climbing 3-5 steps with railing: A lot  Basic Mobility - Total Score: 15    Education Documentation  No documentation found.  Education Comments  No comments found.        OP EDUCATION:       Encounter Problems       Encounter Problems (Active)       PT Problem       Patient will perform bed mobility with </= MIN A x1 to reduce risk of developing decubitus ulcers.  (Progressing)       Start:  07/15/25    Expected End:  08/08/25            Patient will perform sit to stand and stand to sit transfers with </= MIN A x1 and LRD to increase functional strength.  (Progressing)       Start:  07/15/25    Expected End:  08/08/25            Patient will ambulate at least 15 ft. with </= MOD A x1 and LRD to improve tolerance of household distances.  (Met)       Start:  07/15/25    Expected End:  07/29/25    Resolved:  07/25/25         BLE 5/5 (Progressing)       Start:  07/15/25    Expected End:  08/08/25            Patient will stand with UE support of LRD and </= CGA at least 2 min to improve balance required for self-care tasks.  (Progressing)       Start:  07/15/25    Expected End:  08/08/25            Pt will ambulate >/= 250 ft [place] with LRAD and no acute LOB       Start:  07/25/25    Expected End:  08/08/25                Pt will complete TUG in </= 14 seconds with LRAD and no acute LOB       Start:  07/25/25    Expected End:  08/08/25 07/25/25 at 3:07 PM - Shelbie Lopez, PT

## 2025-07-25 NOTE — PROGRESS NOTES
"  Department of Neurosurgery  Daily Progress Note    Patient Name: Jerman Colón Jr.  MRN: [id number]  Date:  07/25/25     Subjective  No overnight events noted. Patient states voiding more last night. BM noted 7/24. Patient states preop lumbar and radicular leg pain improved postop. C/o incisional back pain.      Objective    Vital Signs  /73 (BP Location: Left arm, Patient Position: Lying)   Pulse 82   Temp 36.1 °C (97 °F) (Temporal)   Resp 17   Ht 1.854 m (6' 1\")   Wt 125 kg (274 lb 14.6 oz)   SpO2 100%   BMI 36.27 kg/m²      Physical Exam  Constitutional: A&Ox3, calm and cooperative, NAD.  Eyes: PERRL, EOMI.   ENMT: Moist mucous membranes, no apparent injuries or lesions.  Cardiovascular: Normal rate and regular rhythm. 2+ equal pulses of the distal extremities.  Respiratory/Thorax: CTAB, regular respirations on RA. Good symmetric chest expansion.   Gastrointestinal: Abdomen soft, non tender.   Genitourinary: voids   Neurological:   A&Ox3  Face symmetric, Facial SILT   Tongue midline and symmetric  RUE D5 / B5 / T5 / HG 5/ IO 5  LUE D5 / B5 / T5 / HG 5/ IO 5  RLE HF5 / KE 5/ DF 5/ PF 5  LLE HF5 / KE 5/ DF 5/ PF 5  No clonus, neg Velasco    Psychological: Appropriate mood and behavior.   Skin: Warm and dry. Incision lumbar c/d/I.      Diagnostics   Results for orders placed or performed during the hospital encounter of 07/14/25 (from the past 24 hours)   POCT GLUCOSE   Result Value Ref Range    POCT Glucose 145 (H) 74 - 99 mg/dL   CBC   Result Value Ref Range    WBC 11.8 (H) 4.4 - 11.3 x10*3/uL    nRBC 1.1 (H) 0.0 - 0.0 /100 WBCs    RBC 4.19 (L) 4.50 - 5.90 x10*6/uL    Hemoglobin 9.1 (L) 13.5 - 17.5 g/dL    Hematocrit 31.7 (L) 41.0 - 52.0 %    MCV 76 (L) 80 - 100 fL    MCH 21.7 (L) 26.0 - 34.0 pg    MCHC 28.7 (L) 32.0 - 36.0 g/dL    RDW 21.5 (H) 11.5 - 14.5 %    Platelets 289 150 - 450 x10*3/uL   POCT GLUCOSE   Result Value Ref Range    POCT Glucose 156 (H) 74 - 99 mg/dL   POCT GLUCOSE   Result " Value Ref Range    POCT Glucose 139 (H) 74 - 99 mg/dL   CBC   Result Value Ref Range    WBC 11.9 (H) 4.4 - 11.3 x10*3/uL    nRBC 1.0 (H) 0.0 - 0.0 /100 WBCs    RBC 3.82 (L) 4.50 - 5.90 x10*6/uL    Hemoglobin 8.3 (L) 13.5 - 17.5 g/dL    Hematocrit 29.2 (L) 41.0 - 52.0 %    MCV 76 (L) 80 - 100 fL    MCH 21.7 (L) 26.0 - 34.0 pg    MCHC 28.4 (L) 32.0 - 36.0 g/dL    RDW 21.5 (H) 11.5 - 14.5 %    Platelets 306 150 - 450 x10*3/uL   Magnesium   Result Value Ref Range    Magnesium 1.84 1.60 - 2.40 mg/dL   Renal function panel   Result Value Ref Range    Glucose 108 (H) 74 - 99 mg/dL    Sodium 150 (H) 136 - 145 mmol/L    Potassium 3.8 3.5 - 5.3 mmol/L    Chloride 105 98 - 107 mmol/L    Bicarbonate 33 (H) 21 - 32 mmol/L    Anion Gap 16 10 - 20 mmol/L    Urea Nitrogen 14 6 - 23 mg/dL    Creatinine 1.11 0.50 - 1.30 mg/dL    eGFR 74 >60 mL/min/1.73m*2    Calcium 8.0 (L) 8.6 - 10.6 mg/dL    Phosphorus 4.2 2.5 - 4.9 mg/dL    Albumin 2.9 (L) 3.4 - 5.0 g/dL   Heparin Assay, UFH   Result Value Ref Range    Heparin Unfractionated 0.5 See Comment Below for Therapeutic Ranges IU/mL   Renal Function Panel   Result Value Ref Range    Glucose 111 (H) 74 - 99 mg/dL    Sodium 139 136 - 145 mmol/L    Potassium 3.5 3.5 - 5.3 mmol/L    Chloride 98 98 - 107 mmol/L    Bicarbonate 33 (H) 21 - 32 mmol/L    Anion Gap 12 10 - 20 mmol/L    Urea Nitrogen 13 6 - 23 mg/dL    Creatinine 1.06 0.50 - 1.30 mg/dL    eGFR 78 >60 mL/min/1.73m*2    Calcium 7.9 (L) 8.6 - 10.6 mg/dL    Phosphorus 3.7 2.5 - 4.9 mg/dL    Albumin 2.8 (L) 3.4 - 5.0 g/dL   POCT GLUCOSE   Result Value Ref Range    POCT Glucose 126 (H) 74 - 99 mg/dL     Imaging  CT angio aorta and bilateral iliofemoral runoff including without contrast if performed  Result Date: 7/23/2025  Vascular findings: 1. Partially calcified splenic artery aneurysm measuring 1.5 cm. 2. Multifocal bilateral lower extremity vascular calcifications as above with two-vessel runoff to the bilateral lower extremities.    Nonvascular findings: 1. Interval increase in left-greater-than-right bibasilar consolidative opacities which may be infectious/inflammatory or represent evolving pulmonary infarcts patient's known pulmonary emboli, suboptimally assessed on the current examination. 2. Postsurgical changes of the lumbosacral spine with adjacent subcutaneous fluid collection described as above may represent postsurgical seroma versus postsurgical blood products.   I personally reviewed the images/study and I agree with the findings as stated by resident physician Ramiro Hall MD. This study was interpreted at [facility], [place], [place].   MACRO: None   Signed by: Royal Morton 7/23/2025 11:23 AM Dictation workstation:   [id number]    CT angio chest for pulmonary embolism  Result Date: 7/21/2025  1. Nonocclusive central filling defects within a segmental left lower lobe pulmonary artery and near occlusive filling defects within subsegmental left lower lobe pulmonary arteries are most consistent with acute pulmonary emboli on this motion limited exam. No evident right heart strain. 2. Subsegmental atelectasis within right-greater-than-left lower lobes. Additional findings of potential excessive dynamic airway collapse within the right lobar airways and left mainstem bronchus. Correlate with clinical findings. 3. Mild chronic cardiomegaly. 4. Splenic artery aneurysm measuring up to 1.9 cm.   MACRO: Dr. Meghna Mathews discussed the significance and urgency of this critical finding by EPIC secure chat with Dr. Patricia Lyn on 7/20/2025 at 10:55 pm.  (**-RCF-**) Findings:  Acute pulmonary embolus.   Signed by: Alex Mathias 7/21/2025 9:20 AM Dictation workstation:   [id number]    XR chest 1 view  Result Date: 7/20/2025  1. Suspected left retrocardiac opacity, which may represent consolidation or may be artifactual/due to x-ray beam underpenetration. Further evaluation with PA and lateral chest radiographs is  recommended.   Signed by: Familia Naik 7/20/2025 5:38 PM Dictation workstation:   [id number]    EEG  Result Date: 7/20/2025  IMPRESSION Impression This vEEG is indicative of a moderate diffuse encephalopathy. No epileptiform discharges or lateralizing signs are seen. A full report will be scanned into the patient's chart at a later time. This report has been interpreted and electronically signed by    Assessment/Plan  Jerman Colón Jr. is a 64 y.o. male with a past medical history of  postop nausea vomiting, hyperlipidemia, hypertension, CAD, sleep apnea, asthma, GERD, CKD, BPH, anemia, arthritis, lumbar disc disease and spinal stenosis who presented for scheduled surgery.    7/14 s/p L3-S1 lami and facetectomies/extension/revision ()  7/15 hgb 7.1 s/p 1u pRBC, post Tx cbc 7.5, s/p 1u pRBC.   7/18 drain auto dc'd,   7/20, the patient had a desat along with tachycardia and fever. CT PE positive for LLL segmental/subsegmental PE. He was transferred to NSU for closer monitoring and restarting of AC.   7/21 started on Heparin drip.  DVT US BUE/BLE preliminary negative.  Vasc Med consulted--> continue Heparin drip; likely discharge on eliquis 3-6 months in setting of a provoked VTE; likely longer with Afib  7/22 Cardiology consulted for Afib with RVR.  Given IV Amio bolus and converted to NSR; started on amiodarone 400 mg oral 2 times a day  Vasc surg consulted for Splenic aneurysm.  No surgical intervention needed; yearly follow up  7/23 Downgrade to NURA, Richardson removed- void trial, 1 unit PRBC given (HGB 7.6)  7/24 Patient refused richardson. Patient voiding without need of straight cath.    # S/p L3-4 L4-5 and L5-S1 laminectomy and facetectomy for decompression with L3-S1 instrumentation and Fusion.    - Neuro checks q 4hrs  - Monitor VS/pulse ox [id number]   - Monitor AM CBC/RFP  - Continue PT/OT    # Acute postoperative pain  - Well controlled per patient, continue current regimen       ·  Tylenol 650mg PO  [id number]        ·  Oxycodone 2.5/5/10mg PO [id number] PRN mild/mod/severe pain   - Bowel regimen with Colace 100 mg PO BID while using narcotics  - Zofran PRN nausea due to narcotics   - Pain assessments [id number]     #Acute postop anemia  - HGB 9.7 (7/25)  - Continue to monitor for signs and symptoms of bleeding  - Monitor CBC   - Replace blood products PRN    #New onset Afib  -Continue to monitor heart rate and rhythm  - Patient HR/BP wnl and rhythm currently NSR  - Currently stable on Amiodarone 400mg bid (per cards)  - Metoprolol 25mg bid transition to Toprol XL 50mg daily at discharge (per cards)  - Heparin gtt may bridge to eliquis (per Vascular Medicine)  - Continue ASA 81 mg daily    #PE  -Continue to monitor patient's respiratory status and SPO2  -Continue Heparin gtt (per )    #UTI  - Continue Rocephin until need for PO ABX (Augmentin per urine cx) at time of discharge for the remain doses. If patient's discharge early next week PO abx will not be needed.    #Urinary Retention/BPH  - Continue to monitor I/O's  - Continue Flomax 0.8mg daily    # Hx of HTN  - Continue to monitor and record BP  - Home BP medications being managed per cards    #Hx of BIANCA  - Continue Bipap at HS    Prophylaxis:   - DVT: SCDs, encourage ambulation   - Encourage IS x10 every hour while awake     FEN/GI:  - Monitor/replete electrolytes PRN   - IVF until voiding without difficulty   - Continue Regular diet   - GI protection with Protonix 40mg daily   - Bowel regimen: Scheduled Miralax and pericolace daily        Disposition: [facility] ordered for discharge.   Follow up appointment with neurosurgery scheduled for 8/6/2025.     Patient and plan discussed with chief neurosurgery resident, Dr. Martinez, and Dr. Patel.     Royal Oro, CNP   Neurologic Surgery   [place]  Team Pager #[phone]     Total face to face time spent with patient/family of > 60 minutes, with >50% of the time spent discussing plan of care/management,  counseling/educating on disease processes, explaining results of diagnostic testing.

## 2025-07-26 ENCOUNTER — DOCUMENTATION (OUTPATIENT)
Dept: HOME HEALTH SERVICES | Facility: HOME HEALTH | Age: 65
End: 2025-07-26
Payer: MEDICARE

## 2025-07-26 LAB
ALBUMIN SERPL BCP-MCNC: 3.1 G/DL (ref 3.4–5)
ANION GAP SERPL CALC-SCNC: 14 MMOL/L (ref 10–20)
BUN SERPL-MCNC: 16 MG/DL (ref 6–23)
CALCIUM SERPL-MCNC: 8.4 MG/DL (ref 8.6–10.6)
CHLORIDE SERPL-SCNC: 97 MMOL/L (ref 98–107)
CO2 SERPL-SCNC: 33 MMOL/L (ref 21–32)
CREAT SERPL-MCNC: 1.18 MG/DL (ref 0.5–1.3)
EGFRCR SERPLBLD CKD-EPI 2021: 69 ML/MIN/1.73M*2
ERYTHROCYTE [DISTWIDTH] IN BLOOD BY AUTOMATED COUNT: 22.2 % (ref 11.5–14.5)
GLUCOSE BLD MANUAL STRIP-MCNC: 116 MG/DL (ref 74–99)
GLUCOSE BLD MANUAL STRIP-MCNC: 128 MG/DL (ref 74–99)
GLUCOSE BLD MANUAL STRIP-MCNC: 145 MG/DL (ref 74–99)
GLUCOSE BLD MANUAL STRIP-MCNC: 186 MG/DL (ref 74–99)
GLUCOSE BLD MANUAL STRIP-MCNC: 219 MG/DL (ref 74–99)
GLUCOSE SERPL-MCNC: 86 MG/DL (ref 74–99)
HCT VFR BLD AUTO: 31.6 % (ref 41–52)
HGB BLD-MCNC: 8.5 G/DL (ref 13.5–17.5)
MAGNESIUM SERPL-MCNC: 1.83 MG/DL (ref 1.6–2.4)
MCH RBC QN AUTO: 21.1 PG (ref 26–34)
MCHC RBC AUTO-ENTMCNC: 26.9 G/DL (ref 32–36)
MCV RBC AUTO: 79 FL (ref 80–100)
NRBC BLD-RTO: 1.1 /100 WBCS (ref 0–0)
PHOSPHATE SERPL-MCNC: 3.5 MG/DL (ref 2.5–4.9)
PLATELET # BLD AUTO: 352 X10*3/UL (ref 150–450)
POTASSIUM SERPL-SCNC: 3.6 MMOL/L (ref 3.5–5.3)
RBC # BLD AUTO: 4.02 X10*6/UL (ref 4.5–5.9)
SODIUM SERPL-SCNC: 140 MMOL/L (ref 136–145)
UFH PPP CHRO-ACNC: 0.5 IU/ML (ref ?–1.1)
WBC # BLD AUTO: 12.2 X10*3/UL (ref 4.4–11.3)

## 2025-07-26 PROCEDURE — 2500000001 HC RX 250 WO HCPCS SELF ADMINISTERED DRUGS (ALT 637 FOR MEDICARE OP)

## 2025-07-26 PROCEDURE — 80069 RENAL FUNCTION PANEL: CPT | Performed by: PHYSICIAN ASSISTANT

## 2025-07-26 PROCEDURE — 85027 COMPLETE CBC AUTOMATED: CPT | Performed by: NURSE PRACTITIONER

## 2025-07-26 PROCEDURE — 83735 ASSAY OF MAGNESIUM: CPT | Performed by: PHYSICIAN ASSISTANT

## 2025-07-26 PROCEDURE — 36415 COLL VENOUS BLD VENIPUNCTURE: CPT | Performed by: NURSE PRACTITIONER

## 2025-07-26 PROCEDURE — 36415 COLL VENOUS BLD VENIPUNCTURE: CPT

## 2025-07-26 PROCEDURE — 2500000004 HC RX 250 GENERAL PHARMACY W/ HCPCS (ALT 636 FOR OP/ED)

## 2025-07-26 PROCEDURE — 85520 HEPARIN ASSAY: CPT

## 2025-07-26 PROCEDURE — 2500000002 HC RX 250 W HCPCS SELF ADMINISTERED DRUGS (ALT 637 FOR MEDICARE OP, ALT 636 FOR OP/ED): Performed by: NURSE PRACTITIONER

## 2025-07-26 PROCEDURE — 82947 ASSAY GLUCOSE BLOOD QUANT: CPT

## 2025-07-26 PROCEDURE — 94660 CPAP INITIATION&MGMT: CPT

## 2025-07-26 PROCEDURE — 2500000004 HC RX 250 GENERAL PHARMACY W/ HCPCS (ALT 636 FOR OP/ED): Performed by: NURSE PRACTITIONER

## 2025-07-26 PROCEDURE — 2500000002 HC RX 250 W HCPCS SELF ADMINISTERED DRUGS (ALT 637 FOR MEDICARE OP, ALT 636 FOR OP/ED): Performed by: STUDENT IN AN ORGANIZED HEALTH CARE EDUCATION/TRAINING PROGRAM

## 2025-07-26 PROCEDURE — 2500000002 HC RX 250 W HCPCS SELF ADMINISTERED DRUGS (ALT 637 FOR MEDICARE OP, ALT 636 FOR OP/ED)

## 2025-07-26 PROCEDURE — 1200000002 HC GENERAL ROOM WITH TELEMETRY DAILY

## 2025-07-26 RX ORDER — POLYETHYLENE GLYCOL 3350 17 G/17G
17 POWDER, FOR SOLUTION ORAL 2 TIMES DAILY
Qty: 14 PACKET | Refills: 0 | Status: SHIPPED | OUTPATIENT
Start: 2025-07-26 | End: 2025-08-02

## 2025-07-26 RX ORDER — ACETAMINOPHEN 325 MG/1
650 TABLET ORAL EVERY 6 HOURS
Qty: 56 TABLET | Refills: 0 | Status: SHIPPED | OUTPATIENT
Start: 2025-07-26 | End: 2025-08-02

## 2025-07-26 RX ORDER — AMIODARONE HYDROCHLORIDE 200 MG/1
200 TABLET ORAL DAILY
Qty: 30 TABLET | Refills: 1 | Status: SHIPPED | OUTPATIENT
Start: 2025-07-30 | End: 2025-09-28

## 2025-07-26 RX ORDER — AMIODARONE HYDROCHLORIDE 400 MG/1
400 TABLET ORAL 2 TIMES DAILY
Qty: 11 TABLET | Refills: 0 | Status: SHIPPED | OUTPATIENT
Start: 2025-07-26 | End: 2025-08-01

## 2025-07-26 RX ORDER — AMOXICILLIN 250 MG
2 CAPSULE ORAL 2 TIMES DAILY
Qty: 28 TABLET | Refills: 0 | Status: SHIPPED | OUTPATIENT
Start: 2025-07-26 | End: 2025-08-02

## 2025-07-26 RX ORDER — METOPROLOL SUCCINATE 50 MG/1
50 TABLET, EXTENDED RELEASE ORAL DAILY
Qty: 30 TABLET | Refills: 1 | Status: SHIPPED | OUTPATIENT
Start: 2025-07-26 | End: 2025-09-24

## 2025-07-26 RX ORDER — AMOXICILLIN AND CLAVULANATE POTASSIUM 875; 125 MG/1; MG/1
875 TABLET, FILM COATED ORAL 2 TIMES DAILY
Qty: 2 TABLET | Refills: 0 | Status: SHIPPED | OUTPATIENT
Start: 2025-07-26 | End: 2025-07-29 | Stop reason: HOSPADM

## 2025-07-26 RX ADMIN — AMIODARONE HYDROCHLORIDE 400 MG: 200 TABLET ORAL at 20:44

## 2025-07-26 RX ADMIN — INSULIN LISPRO 2 UNITS: 100 INJECTION, SOLUTION INTRAVENOUS; SUBCUTANEOUS at 13:00

## 2025-07-26 RX ADMIN — ACETAMINOPHEN 650 MG: 325 TABLET, FILM COATED ORAL at 20:44

## 2025-07-26 RX ADMIN — HEPARIN SODIUM 2700 UNITS/HR: 10000 INJECTION, SOLUTION INTRAVENOUS at 06:00

## 2025-07-26 RX ADMIN — ACETAMINOPHEN 650 MG: 325 TABLET, FILM COATED ORAL at 15:16

## 2025-07-26 RX ADMIN — GABAPENTIN 600 MG: 300 CAPSULE ORAL at 15:16

## 2025-07-26 RX ADMIN — APIXABAN 5 MG: 5 TABLET, FILM COATED ORAL at 09:11

## 2025-07-26 RX ADMIN — GABAPENTIN 600 MG: 300 CAPSULE ORAL at 20:45

## 2025-07-26 RX ADMIN — FUROSEMIDE 40 MG: 10 INJECTION, SOLUTION INTRAMUSCULAR; INTRAVENOUS at 09:11

## 2025-07-26 RX ADMIN — Medication: at 08:00

## 2025-07-26 RX ADMIN — CYCLOBENZAPRINE 10 MG: 10 TABLET, FILM COATED ORAL at 20:44

## 2025-07-26 RX ADMIN — ASPIRIN 81 MG: 81 TABLET, COATED ORAL at 09:11

## 2025-07-26 RX ADMIN — PANTOPRAZOLE SODIUM 40 MG: 40 TABLET, DELAYED RELEASE ORAL at 09:11

## 2025-07-26 RX ADMIN — INSULIN LISPRO 1 UNITS: 100 INJECTION, SOLUTION INTRAVENOUS; SUBCUTANEOUS at 21:36

## 2025-07-26 RX ADMIN — CYCLOBENZAPRINE 10 MG: 10 TABLET, FILM COATED ORAL at 15:16

## 2025-07-26 RX ADMIN — METOPROLOL TARTRATE 25 MG: 25 TABLET, FILM COATED ORAL at 20:45

## 2025-07-26 RX ADMIN — PANTOPRAZOLE SODIUM 40 MG: 40 TABLET, DELAYED RELEASE ORAL at 20:44

## 2025-07-26 RX ADMIN — CEFTRIAXONE 1 G: 1 INJECTION, SOLUTION INTRAVENOUS at 13:56

## 2025-07-26 RX ADMIN — APIXABAN 5 MG: 5 TABLET, FILM COATED ORAL at 20:44

## 2025-07-26 RX ADMIN — CYCLOBENZAPRINE 10 MG: 10 TABLET, FILM COATED ORAL at 09:11

## 2025-07-26 RX ADMIN — ATORVASTATIN CALCIUM 40 MG: 40 TABLET, FILM COATED ORAL at 20:45

## 2025-07-26 RX ADMIN — METOPROLOL TARTRATE 25 MG: 25 TABLET, FILM COATED ORAL at 09:11

## 2025-07-26 RX ADMIN — TAMSULOSIN HYDROCHLORIDE 0.8 MG: 0.4 CAPSULE ORAL at 20:44

## 2025-07-26 RX ADMIN — GABAPENTIN 600 MG: 300 CAPSULE ORAL at 09:11

## 2025-07-26 RX ADMIN — AMIODARONE HYDROCHLORIDE 400 MG: 200 TABLET ORAL at 09:11

## 2025-07-26 RX ADMIN — ACETAMINOPHEN 650 MG: 325 TABLET, FILM COATED ORAL at 09:12

## 2025-07-26 ASSESSMENT — COGNITIVE AND FUNCTIONAL STATUS - GENERAL
HELP NEEDED FOR BATHING: A LOT
MOVING TO AND FROM BED TO CHAIR: A LOT
PERSONAL GROOMING: A LOT
DRESSING REGULAR LOWER BODY CLOTHING: A LOT
DAILY ACTIVITIY SCORE: 14
MOBILITY SCORE: 14
TURNING FROM BACK TO SIDE WHILE IN FLAT BAD: A LOT
STANDING UP FROM CHAIR USING ARMS: A LOT
DRESSING REGULAR UPPER BODY CLOTHING: A LOT
TOILETING: A LOT
WALKING IN HOSPITAL ROOM: A LOT
CLIMB 3 TO 5 STEPS WITH RAILING: A LOT

## 2025-07-26 ASSESSMENT — PAIN DESCRIPTION - LOCATION: LOCATION: BACK

## 2025-07-26 ASSESSMENT — PAIN SCALES - GENERAL
PAINLEVEL_OUTOF10: 6
PAINLEVEL_OUTOF10: 0 - NO PAIN

## 2025-07-26 ASSESSMENT — PAIN - FUNCTIONAL ASSESSMENT
PAIN_FUNCTIONAL_ASSESSMENT: 0-10
PAIN_FUNCTIONAL_ASSESSMENT: 0-10

## 2025-07-26 ASSESSMENT — PAIN DESCRIPTION - DESCRIPTORS: DESCRIPTORS: ACHING

## 2025-07-26 NOTE — CARE PLAN
Transitional Care Coordinator Note: Patient discussed with medical team, per medical team patient is medically ready.   Discharge dispo: [facility], SOC 24-48 Hours. ADOD 7/26  Loco Gallagher RN  Transitional Care Coordinator

## 2025-07-26 NOTE — HH CARE COORDINATION
Home Care received a Referral for Physical Therapy and Occupational Therapy. We have processed the referral for a Start of Care within 48 hours of 7/27/2025.     If you have any questions or concerns, please feel free to contact us at [phone]. Follow the prompts, enter your five digit zip code, and you will be directed to your care team on WEST 3.

## 2025-07-26 NOTE — DISCHARGE SUMMARY
Discharge Diagnosis  Lumbar radicular pain    Issues Requiring Follow-Up  Scheduled neurosurgery follow up   Vascular medicine follow up - PE  Cardiology follow up - Afib  Vascular surgery follow up - splenic artery follow up    Test Results Pending At Discharge  Pending Labs       Order Current Status    Extra Urine Gray Tube Collected (07/18/25 1145)    Urinalysis with Reflex Culture and Microscopic In process            Hospital Course  Jerman Colón Jr. is a 64 y.o. male with a past medical history of  postop nausea vomiting, hyperlipidemia, hypertension, CAD, sleep apnea, asthma, GERD, CKD, BPH, anemia, arthritis, lumbar disc disease and spinal stenosis who presented for scheduled surgery.    7/14 s/p L3-S1 lami and facetectomies/extension/revision ()  7/15 hgb 7.1 s/p 1u pRBC, post Tx cbc 7.5, s/p 1u pRBC.   7/18 drain auto dc'd,   7/20, the patient had a desat along with tachycardia and fever. CT PE positive for LLL segmental/subsegmental PE. He was transferred to NSU for closer monitoring and restarting of AC.   7/21 started on Heparin drip.  DVT US BUE/BLE preliminary negative.  Vasc Med consulted--> continue Heparin drip; likely discharge on eliquis 3-6 months in setting of a provoked VTE; likely longer with Afib  7/22 Cardiology consulted for Afib with RVR.  Given IV Amio bolus and converted to NSR; started on amiodarone 400 mg oral 2 times a day  Vasc surg consulted for Splenic aneurysm.  No surgical intervention needed; yearly follow up  7/23 Downgrade to NURA, Alex removed- void trial, 1 unit PRBC given (HGB 7.6)  7/24 Card recs Lasix 40mg for one dose and Amiodarone 400mg PO BID for 6G load then 200mg daily until cards follow-up.       PT/OT evaluated patient and recommended Rehab; patient declines Rehab, will go home with home care    On the day of discharge, the patient was seen and evaluated by the neurosurgery team and deemed suitable for discharge. The patient was given detailed  discharge instructions and were scheduled to follow up as an outpatient.      Visit Vitals  /73 (BP Location: Left arm)   Pulse 88   Temp 36.8 °C (98.2 °F)   Resp 18     Vitals:    07/26/25 0538   Weight: 123 kg (270 lb 1 oz)       Immunization History   Administered Date(s) Administered    COVID-19, mRNA, LNP-S, PF, 30 mcg/0.3 mL dose 03/17/2021, 04/07/2021    Tdap vaccine, age 7 year and older (BOOSTRIX, ADACEL) 06/13/2017, 11/11/2024       Results      Pertinent Physical Exam At Time of Discharge  Physical Exam  Physical Exam  A&Ox3  Face symmetric  RUE 5/5  LUE 5/5  RLE 5/5  LLE 5/5  Sensation intact to light touch throughout all extremities  Home Medications     Medication List      START taking these medications     acetaminophen 325 mg tablet; Commonly known as: Tylenol; Take 2 tablets   (650 mg) by mouth every 6 hours for 7 days.   * amiodarone 400 mg tablet; Commonly known as: Pacerone; Take 1 tablet   (400 mg) by mouth 2 times a day for 11 doses. End after PM dose on 7/29.   * amiodarone 200 mg tablet; Commonly known as: Pacerone; Take 1 tablet   (200 mg) by mouth once daily. Start 7/30 after completion of 400mg load.   Continue until follow-up with cardiologist. Do not fill before July 30, 2025.; Start taking on: July 30, 2025   amoxicillin-clavulanate 875-125 mg tablet; Commonly known as: Augmentin;   Take 1 tablet (875 mg of amoxicillin) by mouth 2 times a day for 1 day.   apixaban 5 mg tablet; Commonly known as: Eliquis; Take 1 tablet (5 mg)   by mouth 2 times a day.   metoprolol succinate XL 50 mg 24 hr tablet; Commonly known as:   Toprol-XL; Take 1 tablet (50 mg) by mouth once daily. Do not crush or   chew. Medication started as inpatient continue until follow-up with   cardiologist.   oxygen gas therapy; Commonly known as: O2; Inhale 1 Dose once every 24   hours.  * This list has 2 medication(s) that are the same as other medications   prescribed for you. Read the directions carefully, and  ask your doctor or   other care provider to review them with you.     CHANGE how you take these medications     sennosides-docusate sodium 8.6-50 mg tablet; Commonly known as:   Mariia-Colace; Take 2 tablets by mouth 2 times a day for 7 days.; What   changed: when to take this     CONTINUE taking these medications     aspirin 81 mg EC tablet   atorvastatin 40 mg tablet; Commonly known as: Lipitor   gabapentin 600 mg tablet; Commonly known as: Neurontin   oxyCODONE-acetaminophen 5-325 mg tablet; Commonly known as: Percocet   pantoprazole 40 mg EC tablet; Commonly known as: ProtoNix   polyethylene glycol 17 gram packet; Commonly known as: Glycolax,   Miralax; Take 17 g by mouth 2 times a day for 7 days.   tamsulosin 0.4 mg 24 hr capsule; Commonly known as: Flomax     STOP taking these medications     albuterol 90 mcg/actuation inhaler   cyclobenzaprine 5 mg tablet; Commonly known as: Flexeril   losartan 25 mg tablet; Commonly known as: Cozaar   oxyCODONE 5 mg immediate release tablet; Commonly known as: Roxicodone       Outpatient Follow-Up  Future Appointments   Date Time Provider Department [place]   8/6/2025  9:45 AM NEUROSURGERY [facility] NURSE [id number] [facility]   8/28/2025 11:40 AM [facility] [place]   1/13/2026  8:00 AM [facility] 107 VASCULAR 3 [id number] [facility]   1/13/2026  9:00 AM Nadja Michelle MD [id number] [facility]   1/13/2026  9:30 AM Rajni Bruner MD [id number] [place]       William Trejo MD

## 2025-07-26 NOTE — DISCHARGE SUMMARY
Discharge Diagnosis  Lumbar radicular pain    Issues Requiring Follow-Up  Wound check    Test Results Pending At Discharge  Pending Labs       Order Current Status    Extra Urine Gray Tube Collected (07/18/25 1145)    Urinalysis with Reflex Culture and Microscopic In process            Hospital Course  Jerman Colón Jr. is a 64 y.o. male with a past medical history of  postop nausea vomiting, hyperlipidemia, hypertension, CAD, sleep apnea, asthma, GERD, CKD, BPH, anemia, arthritis, lumbar disc disease and spinal stenosis who presented for scheduled surgery.    7/14 s/p L3-S1 lami and facetectomies/extension/revision ()  7/15 hgb 7.1 s/p 1u pRBC, post Tx cbc 7.5, s/p 1u pRBC.   7/18 drain auto dc'd,   7/20, the patient had a desat along with tachycardia and fever. CT PE positive for LLL segmental/subsegmental PE. He was transferred to NSU for closer monitoring and restarting of AC.   7/21 started on Heparin drip.  DVT US BUE/BLE preliminary negative.  Vasc Med consulted--> continue Heparin drip; likely discharge on eliquis 3-6 months in setting of a provoked VTE; likely longer with Afib  7/22 Cardiology consulted for Afib with RVR.  Given IV Amio bolus and converted to NSR; started on amiodarone 400 mg oral 2 times a day  Vasc surg consulted for Splenic aneurysm.  No surgical intervention needed; yearly follow up  7/23 Downgrade to NURA, Alex removed- void trial, 1 unit PRBC given (HGB 7.6)  7/24 Card recs Lasix 40mg for one dose and Amiodarone 400mg PO BID for 6G load then 200mg daily until cards follow-up.       PT/OT evaluated patient and recommended Rehab; patient declines Rehab, will go home with home care    On the day of discharge, the patient was seen and evaluated by the neurosurgery team and deemed suitable for discharge. The patient was given detailed discharge instructions and were scheduled to follow up as an outpatient.      Visit Vitals  /74 (BP Location: Left arm, Patient Position:  Lying)   Pulse 62   Temp 36.2 °C (97.2 °F) (Temporal)   Resp 16     Vitals:    07/25/25 0535   Weight: 125 kg (274 lb 14.6 oz)       Immunization History   Administered Date(s) Administered    COVID-19, mRNA, LNP-S, PF, 30 mcg/0.3 mL dose 03/17/2021, 04/07/2021    Tdap vaccine, age 7 year and older (BOOSTRIX, ADACEL) 06/13/2017, 11/11/2024       Results    Pertinent Physical Exam At Time of Discharge  Physical Exam  A&Ox3  Face symmetric  RUE 5/5  LUE 5/5  RLE 5/5  LLE 5/5  Sensation intact to light touch throughout all extremities    Home Medications     Medication List      ASK your doctor about these medications     albuterol 90 mcg/actuation inhaler; Inhale 2 puffs every 6 hours if   needed for wheezing.   aspirin 81 mg EC tablet   atorvastatin 40 mg tablet; Commonly known as: Lipitor   cyclobenzaprine 5 mg tablet; Commonly known as: Flexeril; Take 1 tablet   (5 mg) by mouth 3 times a day.   gabapentin 600 mg tablet; Commonly known as: Neurontin   losartan 25 mg tablet; Commonly known as: Cozaar   oxyCODONE 5 mg immediate release tablet; Commonly known as: Roxicodone;   Take 1 tablet (5 mg) by mouth every 6 hours if needed for moderate pain (4   - 6).   oxyCODONE-acetaminophen 5-325 mg tablet; Commonly known as: Percocet   pantoprazole 40 mg EC tablet; Commonly known as: ProtoNix   polyethylene glycol 17 gram packet; Commonly known as: Glycolax,   Miralax; Take 17 g by mouth 2 times a day.   sennosides-docusate sodium 8.6-50 mg tablet; Commonly known as:   Mariia-Colace; Take 2 tablets by mouth once daily.   tamsulosin 0.4 mg 24 hr capsule; Commonly known as: Flomax       Outpatient Follow-Up  Future Appointments   Date Time Provider Department Center   8/6/2025  9:45 AM NEUROSURGERY [facility] NURSE [id number] [facility]   8/28/2025 11:40 AM Paul Patel MD PARSTYNEUS1 [place]   1/13/2026  8:00 AM [facility]AS [facility] 107 VASCULAR 3 [id number] [facility]   1/13/2026  9:00 AM Nadja Michelle MD [id number] [facility]   1/13/2026   9:30 AM Rajni Bruner MD [id number] [place]       Chriss Larsen MD

## 2025-07-27 ENCOUNTER — APPOINTMENT (OUTPATIENT)
Dept: RADIOLOGY | Facility: HOSPITAL | Age: 65
DRG: 447 | End: 2025-07-27
Payer: MEDICARE

## 2025-07-27 VITALS
WEIGHT: 270.06 LBS | TEMPERATURE: 99.9 F | SYSTOLIC BLOOD PRESSURE: 157 MMHG | DIASTOLIC BLOOD PRESSURE: 65 MMHG | OXYGEN SATURATION: 93 % | HEART RATE: 93 BPM | RESPIRATION RATE: 20 BRPM | BODY MASS INDEX: 35.79 KG/M2 | HEIGHT: 73 IN

## 2025-07-27 LAB
ALBUMIN SERPL BCP-MCNC: 3.2 G/DL (ref 3.4–5)
ANION GAP SERPL CALC-SCNC: 12 MMOL/L (ref 10–20)
APPEARANCE UR: CLEAR
BILIRUB UR STRIP.AUTO-MCNC: NEGATIVE MG/DL
BUN SERPL-MCNC: 12 MG/DL (ref 6–23)
CALCIUM SERPL-MCNC: 8.6 MG/DL (ref 8.6–10.6)
CHLORIDE SERPL-SCNC: 98 MMOL/L (ref 98–107)
CO2 SERPL-SCNC: 31 MMOL/L (ref 21–32)
COLOR UR: YELLOW
CREAT SERPL-MCNC: 1 MG/DL (ref 0.5–1.3)
EGFRCR SERPLBLD CKD-EPI 2021: 84 ML/MIN/1.73M*2
ERYTHROCYTE [DISTWIDTH] IN BLOOD BY AUTOMATED COUNT: 22.2 % (ref 11.5–14.5)
GLUCOSE BLD MANUAL STRIP-MCNC: 134 MG/DL (ref 74–99)
GLUCOSE BLD MANUAL STRIP-MCNC: 177 MG/DL (ref 74–99)
GLUCOSE BLD MANUAL STRIP-MCNC: 197 MG/DL (ref 74–99)
GLUCOSE SERPL-MCNC: 108 MG/DL (ref 74–99)
GLUCOSE UR STRIP.AUTO-MCNC: NORMAL MG/DL
HCT VFR BLD AUTO: 30.8 % (ref 41–52)
HGB BLD-MCNC: 8.6 G/DL (ref 13.5–17.5)
KETONES UR STRIP.AUTO-MCNC: NEGATIVE MG/DL
LEUKOCYTE ESTERASE UR QL STRIP.AUTO: NEGATIVE
MAGNESIUM SERPL-MCNC: 1.83 MG/DL (ref 1.6–2.4)
MCH RBC QN AUTO: 21.5 PG (ref 26–34)
MCHC RBC AUTO-ENTMCNC: 27.9 G/DL (ref 32–36)
MCV RBC AUTO: 77 FL (ref 80–100)
NITRITE UR QL STRIP.AUTO: NEGATIVE
NRBC BLD-RTO: 0.5 /100 WBCS (ref 0–0)
PH UR STRIP.AUTO: 8 [PH]
PHOSPHATE SERPL-MCNC: 2.9 MG/DL (ref 2.5–4.9)
PLATELET # BLD AUTO: 370 X10*3/UL (ref 150–450)
POTASSIUM SERPL-SCNC: 4.1 MMOL/L (ref 3.5–5.3)
PROT UR STRIP.AUTO-MCNC: ABNORMAL MG/DL
RBC # BLD AUTO: 4 X10*6/UL (ref 4.5–5.9)
RBC # UR STRIP.AUTO: NEGATIVE MG/DL
RBC #/AREA URNS AUTO: ABNORMAL /HPF
SODIUM SERPL-SCNC: 137 MMOL/L (ref 136–145)
SP GR UR STRIP.AUTO: 1.02
UROBILINOGEN UR STRIP.AUTO-MCNC: ABNORMAL MG/DL
WBC # BLD AUTO: 10.2 X10*3/UL (ref 4.4–11.3)
WBC #/AREA URNS AUTO: ABNORMAL /HPF

## 2025-07-27 PROCEDURE — 82947 ASSAY GLUCOSE BLOOD QUANT: CPT

## 2025-07-27 PROCEDURE — 2500000001 HC RX 250 WO HCPCS SELF ADMINISTERED DRUGS (ALT 637 FOR MEDICARE OP)

## 2025-07-27 PROCEDURE — 2500000002 HC RX 250 W HCPCS SELF ADMINISTERED DRUGS (ALT 637 FOR MEDICARE OP, ALT 636 FOR OP/ED)

## 2025-07-27 PROCEDURE — 2500000004 HC RX 250 GENERAL PHARMACY W/ HCPCS (ALT 636 FOR OP/ED)

## 2025-07-27 PROCEDURE — 2500000002 HC RX 250 W HCPCS SELF ADMINISTERED DRUGS (ALT 637 FOR MEDICARE OP, ALT 636 FOR OP/ED): Performed by: NURSE PRACTITIONER

## 2025-07-27 PROCEDURE — 36415 COLL VENOUS BLD VENIPUNCTURE: CPT | Performed by: NURSE PRACTITIONER

## 2025-07-27 PROCEDURE — 71045 X-RAY EXAM CHEST 1 VIEW: CPT

## 2025-07-27 PROCEDURE — 81001 URINALYSIS AUTO W/SCOPE: CPT

## 2025-07-27 PROCEDURE — 80069 RENAL FUNCTION PANEL: CPT | Performed by: PHYSICIAN ASSISTANT

## 2025-07-27 PROCEDURE — 2500000002 HC RX 250 W HCPCS SELF ADMINISTERED DRUGS (ALT 637 FOR MEDICARE OP, ALT 636 FOR OP/ED): Performed by: STUDENT IN AN ORGANIZED HEALTH CARE EDUCATION/TRAINING PROGRAM

## 2025-07-27 PROCEDURE — 83735 ASSAY OF MAGNESIUM: CPT | Performed by: PHYSICIAN ASSISTANT

## 2025-07-27 PROCEDURE — 71045 X-RAY EXAM CHEST 1 VIEW: CPT | Performed by: RADIOLOGY

## 2025-07-27 PROCEDURE — 85027 COMPLETE CBC AUTOMATED: CPT | Performed by: NURSE PRACTITIONER

## 2025-07-27 PROCEDURE — 1200000002 HC GENERAL ROOM WITH TELEMETRY DAILY

## 2025-07-27 RX ADMIN — CYCLOBENZAPRINE 10 MG: 10 TABLET, FILM COATED ORAL at 21:16

## 2025-07-27 RX ADMIN — METOPROLOL TARTRATE 25 MG: 25 TABLET, FILM COATED ORAL at 21:15

## 2025-07-27 RX ADMIN — PANTOPRAZOLE SODIUM 40 MG: 40 TABLET, DELAYED RELEASE ORAL at 21:16

## 2025-07-27 RX ADMIN — ACETAMINOPHEN 650 MG: 325 TABLET, FILM COATED ORAL at 14:29

## 2025-07-27 RX ADMIN — AMIODARONE HYDROCHLORIDE 400 MG: 200 TABLET ORAL at 09:48

## 2025-07-27 RX ADMIN — CYCLOBENZAPRINE 10 MG: 10 TABLET, FILM COATED ORAL at 09:48

## 2025-07-27 RX ADMIN — METOPROLOL TARTRATE 25 MG: 25 TABLET, FILM COATED ORAL at 09:50

## 2025-07-27 RX ADMIN — INSULIN LISPRO 1 UNITS: 100 INJECTION, SOLUTION INTRAVENOUS; SUBCUTANEOUS at 21:27

## 2025-07-27 RX ADMIN — PANTOPRAZOLE SODIUM 40 MG: 40 TABLET, DELAYED RELEASE ORAL at 10:00

## 2025-07-27 RX ADMIN — SODIUM CHLORIDE 1000 ML: 0.9 INJECTION, SOLUTION INTRAVENOUS at 16:43

## 2025-07-27 RX ADMIN — GABAPENTIN 600 MG: 300 CAPSULE ORAL at 14:29

## 2025-07-27 RX ADMIN — SENNOSIDES AND DOCUSATE SODIUM 2 TABLET: 50; 8.6 TABLET ORAL at 09:48

## 2025-07-27 RX ADMIN — CYCLOBENZAPRINE 10 MG: 10 TABLET, FILM COATED ORAL at 14:29

## 2025-07-27 RX ADMIN — INSULIN LISPRO 1 UNITS: 100 INJECTION, SOLUTION INTRAVENOUS; SUBCUTANEOUS at 16:40

## 2025-07-27 RX ADMIN — APIXABAN 5 MG: 5 TABLET, FILM COATED ORAL at 21:16

## 2025-07-27 RX ADMIN — ACETAMINOPHEN 650 MG: 325 TABLET, FILM COATED ORAL at 09:48

## 2025-07-27 RX ADMIN — AMIODARONE HYDROCHLORIDE 400 MG: 200 TABLET ORAL at 23:15

## 2025-07-27 RX ADMIN — INSULIN LISPRO 1 UNITS: 100 INJECTION, SOLUTION INTRAVENOUS; SUBCUTANEOUS at 12:45

## 2025-07-27 RX ADMIN — ATORVASTATIN CALCIUM 40 MG: 40 TABLET, FILM COATED ORAL at 21:15

## 2025-07-27 RX ADMIN — GABAPENTIN 600 MG: 300 CAPSULE ORAL at 21:15

## 2025-07-27 RX ADMIN — OXYCODONE HYDROCHLORIDE 10 MG: 5 TABLET ORAL at 09:49

## 2025-07-27 RX ADMIN — TAMSULOSIN HYDROCHLORIDE 0.8 MG: 0.4 CAPSULE ORAL at 21:16

## 2025-07-27 RX ADMIN — APIXABAN 5 MG: 5 TABLET, FILM COATED ORAL at 09:48

## 2025-07-27 RX ADMIN — ACETAMINOPHEN 650 MG: 325 TABLET, FILM COATED ORAL at 21:15

## 2025-07-27 RX ADMIN — ASPIRIN 81 MG: 81 TABLET, COATED ORAL at 09:50

## 2025-07-27 RX ADMIN — GABAPENTIN 600 MG: 300 CAPSULE ORAL at 09:48

## 2025-07-27 ASSESSMENT — PAIN SCALES - GENERAL
PAINLEVEL_OUTOF10: 9
PAINLEVEL_OUTOF10: 0 - NO PAIN

## 2025-07-27 ASSESSMENT — COGNITIVE AND FUNCTIONAL STATUS - GENERAL
DAILY ACTIVITIY SCORE: 12
TURNING FROM BACK TO SIDE WHILE IN FLAT BAD: A LOT
HELP NEEDED FOR BATHING: A LOT
MOBILITY SCORE: 14
PERSONAL GROOMING: A LOT
CLIMB 3 TO 5 STEPS WITH RAILING: A LOT
DRESSING REGULAR LOWER BODY CLOTHING: A LOT
PERSONAL GROOMING: A LOT
MOBILITY SCORE: 14
CLIMB 3 TO 5 STEPS WITH RAILING: A LOT
MOVING TO AND FROM BED TO CHAIR: A LOT
EATING MEALS: A LOT
DRESSING REGULAR UPPER BODY CLOTHING: A LOT
TURNING FROM BACK TO SIDE WHILE IN FLAT BAD: A LOT
DAILY ACTIVITIY SCORE: 12
STANDING UP FROM CHAIR USING ARMS: A LOT
DRESSING REGULAR UPPER BODY CLOTHING: A LOT
STANDING UP FROM CHAIR USING ARMS: A LOT
WALKING IN HOSPITAL ROOM: A LOT
TOILETING: A LOT
TOILETING: A LOT
WALKING IN HOSPITAL ROOM: A LOT
MOVING TO AND FROM BED TO CHAIR: A LOT
DRESSING REGULAR LOWER BODY CLOTHING: A LOT
HELP NEEDED FOR BATHING: A LOT
EATING MEALS: A LOT

## 2025-07-27 ASSESSMENT — PAIN DESCRIPTION - DESCRIPTORS: DESCRIPTORS: ACHING;TENDER

## 2025-07-27 ASSESSMENT — PAIN DESCRIPTION - ORIENTATION: ORIENTATION: RIGHT

## 2025-07-27 ASSESSMENT — PAIN DESCRIPTION - LOCATION: LOCATION: ARM

## 2025-07-27 ASSESSMENT — PAIN - FUNCTIONAL ASSESSMENT
PAIN_FUNCTIONAL_ASSESSMENT: 0-10
PAIN_FUNCTIONAL_ASSESSMENT: 0-10

## 2025-07-27 NOTE — NURSING NOTE
Around 1545; Patient destaurated to79% with exertion for almost 1 minute. Fever 101 with chills and tremors in both hands. supplemntal oxygen 3 L/min given. Saturation improved to >95%,lung sounds clear. Provider resident at bed side. Urine analysis sent. Chext xray done.IV bolus NS 0.9% 1000 ml done. Neuro signs stable

## 2025-07-27 NOTE — PROGRESS NOTES
"Jerman Colón Jr. is a 64 y.o. male on day 13 of admission presenting with Lumbar radicular pain.    Subjective   No acute events overnight    Objective     Physical Exam  A&Ox3  Face symmetric  RUE 5/5  LUE 5/5  RLE 5/5  LLE 5/5  Sensation intact to light touch throughout all extremities    Last Recorded Vitals  Blood pressure 142/69, pulse 70, temperature 36.7 °C (98.1 °F), resp. rate 18, height 1.854 m (6' 1\"), weight 123 kg (270 lb 1 oz), SpO2 98%.  Intake/Output last 3 Shifts:  I/O last 3 completed shifts:  In: 800 (6.5 mL/kg) [P.O.:700; IV Piggyback:100]  Out: 2650 (21.6 mL/kg) [Urine:2650 (0.6 mL/kg/hr)]  Weight: 122.5 kg     Relevant Results    Assessment/Plan   Assessment & Plan  Lumbar radicular pain    Lumbar foraminal stenosis    Lumbar radiculopathy, chronic    Assessment  h/o prior L5-S1 fusion, HTN, HLD, CAD, sleep apnea (central/obstructive), asthma, GERD, CKD, BPH, anemia, 1/20/25 C4-6 ACDF/C2-T4 PCDF p/w BLE radiculopathy, 7/14 s/p L3-S1 lami and facetectomies/extension/revision (), 7/15 hgb 7.1 s/p 1u pRBC, post Tx 7.5, s/p 1u pRBC, 7/18 drain auto dc'd, 7/20 CT PE nonocclusive PE in segmental LLL pulm artery and near occlusive PE in subsegmental LLL pulm artery, 7/21 TTE 55-60%, DVT US x4 neg, 7/22 afib w RVR s/p metop, CTA A/P incr L>R bibasilar opacities, partially calcified splenic artery aneurysm, 7/21 s/p 1u pRBC 7/23 s/p 1unit pRBC (post Hbg 9.0)     Plan  Tele  Patient didn't leave yesterday due to concerns for poss fevers. Was afebrile overnight, ready for dispo  Discharge today on eliquis, PO augmentin      Jitsupa Sirinit, MD      "

## 2025-07-27 NOTE — DOCUMENTATION CLARIFICATION NOTE
"    PATIENT:               STEF TOUSSAINT  ACCT #:                  [id number]  MRN:                       [id number]  :                       1960  ADMIT DATE:       2025 5:43 AM  DISCH DATE:  RESPONDING PROVIDER #:        [id number]          PROVIDER RESPONSE TEXT:    Acute Hypercapnic Respiratory Failure    CDI QUERY TEXT:    Clarification    Instruction:    Based on your assessment of the patient and the clinical information, please provide the requested documentation by clicking on the appropriate radio button and enter any additional information if prompted.    Question: Is there a diagnosis indicative of the clinical information    When answering this query, please exercise your independent professional judgment. The fact that a question is being asked, does not imply that any particular answer is desired or expected.    The patient's clinical indicators include:  Clinical Information:  Patient is a 64 year old male who presented with low back and lower extremity pain. Imaging showed presence of lumbar stenosis adjacent to a previous L5-S1 fusion with good correlation between imaging findings and clinical symptoms.  The patient failed all conservative treatment and in view of the presence of significant symptoms affecting the ADLs, it was decided to proceed with surgical treatment.    Clinical Indicators:  -From the Significant Event Note on , \" At approximately 19:25, the patient experienced a single episode of desaturation while sleeping and was subsequently placed on CPAP. Given the desaturation in the context of ongoing tachycardia and fever, a CT pulmonary embolism (PE) study was ordered. Preliminary CT PE results indicate a nonocclusive PE in the segmental left lower lobe (LLL) pulmonary artery and near occlusive PE in the subsegmental LLL pulmonary artery \"    -From the PN on , \" Episodic desaturations overnight while wearing CPAP. Required transfer back to ICU \"    -From the CT angio of " "the chest on 7/20, \" Nonocclusive central filling defects within a segmental left lower lobe pulmonary artery and near occlusive filling defects within subsegmental left lower lobe pulmonary arteries are most consistent with acute pulmonary emboli on this motion limited exam. No evident right heart strain \"    -Blood gases on 7/20 at 2323 show pH 7.36, pCO2 60, pO2 180, lactate 1.0, base excess 6.9, bicarb 33.9, FiO2 40    -On 7/19 at 2100 RR 31  On 7/19 at 2132 RR 26  On 7/20 at 0100 RR 27 and oxygen saturation 96%  On 7/20 at 0200 RR 25 and oxygen saturation 78%  On 7/20 at 0212 RR 24    Treatment:  -CT angio of chest  -CPAP  -Oxygen therapy  -Heparin drip per protocol  -Transfer to NSU for close monitoring  -Continuous pulse oximetry  -Safety precautions    Risk Factors:  PE, spinal surgery, decreased mobility, obesity, sleep apnea, COPD, need for increase oxygen  Options provided:  -- Acute Hypoxemic Respiratory Failure  -- Acute Hypercapnic Respiratory Failure  -- Acute Hypoxemic and Hypercapnic Respiratory Failure  -- No acute respiratory failure  -- Other - I will add my own diagnosis  -- Refer to Clinical Documentation Reviewer    Query created by: Sho Wilson on 7/23/2025 11:48 AM      Electronically signed by:  JOCELYN DRAPER MD 7/26/2025 8:06 PM          "

## 2025-07-28 ENCOUNTER — APPOINTMENT (OUTPATIENT)
Dept: RADIOLOGY | Facility: HOSPITAL | Age: 65
DRG: 447 | End: 2025-07-28
Payer: MEDICARE

## 2025-07-28 LAB
ALBUMIN SERPL BCP-MCNC: 3 G/DL (ref 3.4–5)
ANION GAP SERPL CALC-SCNC: 11 MMOL/L (ref 10–20)
ATRIAL RATE: 277 BPM
ATRIAL RATE: 92 BPM
BUN SERPL-MCNC: 10 MG/DL (ref 6–23)
CALCIUM SERPL-MCNC: 8.6 MG/DL (ref 8.6–10.6)
CHLORIDE SERPL-SCNC: 101 MMOL/L (ref 98–107)
CO2 SERPL-SCNC: 29 MMOL/L (ref 21–32)
CREAT SERPL-MCNC: 0.96 MG/DL (ref 0.5–1.3)
EGFRCR SERPLBLD CKD-EPI 2021: 88 ML/MIN/1.73M*2
ERYTHROCYTE [DISTWIDTH] IN BLOOD BY AUTOMATED COUNT: 21.9 % (ref 11.5–14.5)
GLUCOSE BLD MANUAL STRIP-MCNC: 135 MG/DL (ref 74–99)
GLUCOSE BLD MANUAL STRIP-MCNC: 158 MG/DL (ref 74–99)
GLUCOSE BLD MANUAL STRIP-MCNC: 168 MG/DL (ref 74–99)
GLUCOSE BLD MANUAL STRIP-MCNC: 196 MG/DL (ref 74–99)
GLUCOSE SERPL-MCNC: 126 MG/DL (ref 74–99)
HCT VFR BLD AUTO: 30.1 % (ref 41–52)
HGB BLD-MCNC: 8.4 G/DL (ref 13.5–17.5)
HOLD SPECIMEN: NORMAL
MAGNESIUM SERPL-MCNC: 1.86 MG/DL (ref 1.6–2.4)
MCH RBC QN AUTO: 21.9 PG (ref 26–34)
MCHC RBC AUTO-ENTMCNC: 27.9 G/DL (ref 32–36)
MCV RBC AUTO: 78 FL (ref 80–100)
NRBC BLD-RTO: 0.3 /100 WBCS (ref 0–0)
P AXIS: 51 DEGREES
P OFFSET: 196 MS
P ONSET: 143 MS
PHOSPHATE SERPL-MCNC: 2.9 MG/DL (ref 2.5–4.9)
PLATELET # BLD AUTO: 375 X10*3/UL (ref 150–450)
POTASSIUM SERPL-SCNC: 4.1 MMOL/L (ref 3.5–5.3)
PR INTERVAL: 146 MS
Q ONSET: 216 MS
Q ONSET: 223 MS
QRS COUNT: 15 BEATS
QRS COUNT: 19 BEATS
QRS DURATION: 92 MS
QRS DURATION: 92 MS
QT INTERVAL: 308 MS
QT INTERVAL: 348 MS
QTC CALCULATION(BAZETT): 422 MS
QTC CALCULATION(BAZETT): 430 MS
QTC FREDERICIA: 380 MS
QTC FREDERICIA: 401 MS
R AXIS: 4 DEGREES
R AXIS: 6 DEGREES
RBC # BLD AUTO: 3.84 X10*6/UL (ref 4.5–5.9)
SODIUM SERPL-SCNC: 137 MMOL/L (ref 136–145)
T AXIS: -10 DEGREES
T AXIS: 10 DEGREES
T OFFSET: 377 MS
T OFFSET: 390 MS
VENTRICULAR RATE: 113 BPM
VENTRICULAR RATE: 92 BPM
WBC # BLD AUTO: 9.6 X10*3/UL (ref 4.4–11.3)

## 2025-07-28 PROCEDURE — 2500000002 HC RX 250 W HCPCS SELF ADMINISTERED DRUGS (ALT 637 FOR MEDICARE OP, ALT 636 FOR OP/ED): Performed by: STUDENT IN AN ORGANIZED HEALTH CARE EDUCATION/TRAINING PROGRAM

## 2025-07-28 PROCEDURE — 2500000001 HC RX 250 WO HCPCS SELF ADMINISTERED DRUGS (ALT 637 FOR MEDICARE OP)

## 2025-07-28 PROCEDURE — 2500000002 HC RX 250 W HCPCS SELF ADMINISTERED DRUGS (ALT 637 FOR MEDICARE OP, ALT 636 FOR OP/ED): Performed by: NURSE PRACTITIONER

## 2025-07-28 PROCEDURE — 70450 CT HEAD/BRAIN W/O DYE: CPT | Performed by: RADIOLOGY

## 2025-07-28 PROCEDURE — 70450 CT HEAD/BRAIN W/O DYE: CPT

## 2025-07-28 PROCEDURE — 83735 ASSAY OF MAGNESIUM: CPT | Performed by: PHYSICIAN ASSISTANT

## 2025-07-28 PROCEDURE — 1200000002 HC GENERAL ROOM WITH TELEMETRY DAILY

## 2025-07-28 PROCEDURE — 97530 THERAPEUTIC ACTIVITIES: CPT | Mod: GP

## 2025-07-28 PROCEDURE — 2500000005 HC RX 250 GENERAL PHARMACY W/O HCPCS: Performed by: NURSE PRACTITIONER

## 2025-07-28 PROCEDURE — 99024 POSTOP FOLLOW-UP VISIT: CPT

## 2025-07-28 PROCEDURE — 2500000002 HC RX 250 W HCPCS SELF ADMINISTERED DRUGS (ALT 637 FOR MEDICARE OP, ALT 636 FOR OP/ED)

## 2025-07-28 PROCEDURE — 97110 THERAPEUTIC EXERCISES: CPT | Mod: GP

## 2025-07-28 PROCEDURE — 97116 GAIT TRAINING THERAPY: CPT | Mod: GP

## 2025-07-28 PROCEDURE — 80069 RENAL FUNCTION PANEL: CPT | Performed by: PHYSICIAN ASSISTANT

## 2025-07-28 PROCEDURE — 36415 COLL VENOUS BLD VENIPUNCTURE: CPT | Performed by: NURSE PRACTITIONER

## 2025-07-28 PROCEDURE — 82947 ASSAY GLUCOSE BLOOD QUANT: CPT

## 2025-07-28 PROCEDURE — 85027 COMPLETE CBC AUTOMATED: CPT | Performed by: NURSE PRACTITIONER

## 2025-07-28 RX ADMIN — GABAPENTIN 600 MG: 300 CAPSULE ORAL at 09:09

## 2025-07-28 RX ADMIN — INSULIN LISPRO 1 UNITS: 100 INJECTION, SOLUTION INTRAVENOUS; SUBCUTANEOUS at 21:40

## 2025-07-28 RX ADMIN — APIXABAN 5 MG: 5 TABLET, FILM COATED ORAL at 09:09

## 2025-07-28 RX ADMIN — ACETAMINOPHEN 650 MG: 325 TABLET, FILM COATED ORAL at 09:58

## 2025-07-28 RX ADMIN — AMIODARONE HYDROCHLORIDE 400 MG: 200 TABLET ORAL at 21:39

## 2025-07-28 RX ADMIN — GABAPENTIN 600 MG: 300 CAPSULE ORAL at 21:39

## 2025-07-28 RX ADMIN — AMIODARONE HYDROCHLORIDE 400 MG: 200 TABLET ORAL at 09:09

## 2025-07-28 RX ADMIN — PANTOPRAZOLE SODIUM 40 MG: 40 TABLET, DELAYED RELEASE ORAL at 21:39

## 2025-07-28 RX ADMIN — CYCLOBENZAPRINE 10 MG: 10 TABLET, FILM COATED ORAL at 21:39

## 2025-07-28 RX ADMIN — Medication: at 09:41

## 2025-07-28 RX ADMIN — ACETAMINOPHEN 650 MG: 325 TABLET, FILM COATED ORAL at 21:39

## 2025-07-28 RX ADMIN — ATORVASTATIN CALCIUM 40 MG: 40 TABLET, FILM COATED ORAL at 21:39

## 2025-07-28 RX ADMIN — CYCLOBENZAPRINE 10 MG: 10 TABLET, FILM COATED ORAL at 15:28

## 2025-07-28 RX ADMIN — METOPROLOL TARTRATE 25 MG: 25 TABLET, FILM COATED ORAL at 09:09

## 2025-07-28 RX ADMIN — ACETAMINOPHEN 650 MG: 325 TABLET, FILM COATED ORAL at 04:05

## 2025-07-28 RX ADMIN — GABAPENTIN 600 MG: 300 CAPSULE ORAL at 15:28

## 2025-07-28 RX ADMIN — TAMSULOSIN HYDROCHLORIDE 0.8 MG: 0.4 CAPSULE ORAL at 21:39

## 2025-07-28 RX ADMIN — METOPROLOL TARTRATE 25 MG: 25 TABLET, FILM COATED ORAL at 21:39

## 2025-07-28 RX ADMIN — INSULIN LISPRO 1 UNITS: 100 INJECTION, SOLUTION INTRAVENOUS; SUBCUTANEOUS at 12:52

## 2025-07-28 RX ADMIN — ACETAMINOPHEN 650 MG: 325 TABLET, FILM COATED ORAL at 15:28

## 2025-07-28 RX ADMIN — APIXABAN 5 MG: 5 TABLET, FILM COATED ORAL at 21:39

## 2025-07-28 RX ADMIN — INSULIN LISPRO 1 UNITS: 100 INJECTION, SOLUTION INTRAVENOUS; SUBCUTANEOUS at 09:41

## 2025-07-28 RX ADMIN — PANTOPRAZOLE SODIUM 40 MG: 40 TABLET, DELAYED RELEASE ORAL at 09:09

## 2025-07-28 RX ADMIN — CYCLOBENZAPRINE 10 MG: 10 TABLET, FILM COATED ORAL at 09:09

## 2025-07-28 RX ADMIN — ASPIRIN 81 MG: 81 TABLET, COATED ORAL at 06:25

## 2025-07-28 ASSESSMENT — COGNITIVE AND FUNCTIONAL STATUS - GENERAL
TURNING FROM BACK TO SIDE WHILE IN FLAT BAD: A LOT
CLIMB 3 TO 5 STEPS WITH RAILING: A LOT
MOVING FROM LYING ON BACK TO SITTING ON SIDE OF FLAT BED WITH BEDRAILS: A LITTLE
WALKING IN HOSPITAL ROOM: A LITTLE
MOBILITY SCORE: 16
MOVING TO AND FROM BED TO CHAIR: A LITTLE
STANDING UP FROM CHAIR USING ARMS: A LITTLE

## 2025-07-28 ASSESSMENT — PAIN - FUNCTIONAL ASSESSMENT
PAIN_FUNCTIONAL_ASSESSMENT: 0-10

## 2025-07-28 ASSESSMENT — PAIN SCALES - GENERAL
PAINLEVEL_OUTOF10: 0 - NO PAIN

## 2025-07-28 NOTE — CARE PLAN
The patient's goals for the shift include      The clinical goals for the shift include Pt will not have any signs or symptoms of fever during the shift.    Over the shift, the patient remained free from any fever during shift. Pt currently awaiting head ct prior to being discharged.

## 2025-07-28 NOTE — PROGRESS NOTES
Met with patient at bedside to discuss PT/OT recommendations for home health care upon discharge. AOC provided and patient agreeable with [facility] providing their services.   Message sent to [facility] liaison advising of new referral and pending discharge.     DIANNE Echevarria, RN  [facility], [facility]9  Transitional Care Coordinator, Mon-Fri  Cell: [phone], Office: [phone]  Email: [email or web address]

## 2025-07-28 NOTE — PROGRESS NOTES
"Jerman Colón Jr. is a 64 y.o. male on day 14 of admission presenting with Lumbar radicular pain.    Subjective   No acute events overnight. Pt wants to go home    Objective     Physical Exam  A&Ox3  Face symmetric  RUE 5/5  LUE 5/5  RLE 5/5  LLE 5/5  Sensation intact to light touch throughout all extremities    Last Recorded Vitals  Blood pressure 133/67, pulse 76, temperature 36.8 °C (98.2 °F), resp. rate 20, height 1.854 m (6' 1\"), weight 123 kg (270 lb 1 oz), SpO2 94%.  Intake/Output last 3 Shifts:  I/O last 3 completed shifts:  In: 1660 (13.6 mL/kg) [P.O.:660; IV Piggyback:1000]  Out: 1750 (14.3 mL/kg) [Urine:1750 (0.4 mL/kg/hr)]  Weight: 122.5 kg     Relevant Results    Assessment/Plan   Assessment & Plan  Lumbar radicular pain    Lumbar foraminal stenosis    Lumbar radiculopathy, chronic    Assessment  h/o prior L5-S1 fusion, HTN, HLD, CAD, sleep apnea (central/obstructive), asthma, GERD, CKD, BPH, anemia, 1/20/25 C4-6 ACDF/C2-T4 PCDF p/w BLE radiculopathy, 7/14 s/p L3-S1 lami and facetectomies/extension/revision (), 7/15 hgb 7.1 s/p 1u pRBC, post Tx 7.5, s/p 1u pRBC, 7/18 drain auto dc'd, 7/20 CT PE nonocclusive PE in segmental LLL pulm artery and near occlusive PE in subsegmental LLL pulm artery, 7/21 TTE 55-60%, DVT US x4 neg, 7/22 afib w RVR s/p metop, CTA A/P incr L>R bibasilar opacities, partially calcified splenic artery aneurysm, 7/21 s/p 1u pRBC 7/23 s/p 1unit pRBC (post Hbg 9.0)     Plan  Tele  Patient didn't leave yesterday due to concerns for poss fevers. Was afebrile overnight, ready for dispo  Discharge today on olaf Lyn MD      "

## 2025-07-28 NOTE — PROGRESS NOTES
Physical Therapy    Physical Therapy Treatment    Patient Name: Jerman Colón Jr.  MRN: [id number]  Department: [place] [address]  Room: [address] [place] [address]  Today's Date: 7/28/2025  Time Calculation  Start Time: 1455  Stop Time: 1534  Time Calculation (min): 39 min    Assessment/Plan   PT Assessment  Barriers to Discharge Home: Physical needs, Caregiver assistance  Caregiver Assistance: Caregiver assistance needed per identified barriers - however, level of patient's required assistance exceeds assistance available at home  Evaluation/Treatment Tolerance: Patient tolerated treatment well  Medical Staff Made Aware: Yes  End of Session Communication: Bedside nurse  Assessment Comment: Pt making progress, but still requiring Mod A for bed mobility, and Max A on stairs.  Pt really wants to return home, and reports wife can help him as needed, as she has done after previous surgeries.  Pt still appropriate for High intensity PT at time of d/c.  In the meantime, will continue to attempt to  progress pt as best as possible  End of Session Patient Position: Up in chair, Alarm on  PT Plan  Inpatient/Swing Bed or Outpatient: Inpatient  PT Plan  Treatment/Interventions: Bed mobility, Transfer training, Gait training, Balance training, Neuromuscular re-education, Strengthening, Endurance training, Therapeutic exercise, Therapeutic activity, Home exercise program, Postural re-education, Orthotic fitting/training  PT Plan: Ongoing PT  PT Frequency: Daily  PT Discharge Recommendations: High intensity level of continued care  Equipment Recommended upon Discharge:  (TBD)  PT Recommended Transfer Status: Assist x1, Assistive device  PT - OK to Discharge: Yes    PT Visit Info:  PT Received On: 07/28/25  Response to Previous Treatment: Patient with no complaints from previous session.     General Visit Information:   General  Family/Caregiver Present: No  Prior to Session Communication: Bedside nurse  Patient Position Received: Bed, 3 rail up,  Alarm on  General Comment: Supine.  Pt extremely pleasant, cooperative and agreeable to PT.  Pt able to ambulate in chi again today, and start stair training.  Tolerated well.    Subjective   Precautions:  Precautions  Medical Precautions: Fall precautions  Post-Surgical Precautions: Spinal precautions    Objective   Pain:  Pain Assessment  Pain Assessment: 0-10  0-10 (Numeric) Pain Score: 0 - No pain  Cognition:  Cognition  Overall Cognitive Status: Within Functional Limits  Arousal/Alertness: Appropriate responses to stimuli  Orientation Level: Oriented X4  Following Commands: Follows all commands and directions without difficulty  Impulsive: Moderately    Activity Tolerance:  Activity Tolerance  Endurance: Endurance does not limit participation in activity  Treatments:  Therapeutic Exercise  Therapeutic Exercise Activity 1: Supine: AP 3x10, HS x10.  Sitting EOB: LAQ, hip flx x10    Bed Mobility 1  Bed Mobility 1: Rolling left  Level of Assistance 1: Close supervision, Minimal verbal cues  Bed Mobility 2  Bed Mobility  2: Supine to sitting, Log roll  Level of Assistance 2: Moderate assistance, Minimal verbal cues    Ambulation/Gait Training 1  Surface 1: Level tile  Device 1: Rolling walker  Assistance 1: Contact guard, Minimal verbal cues  Quality of Gait 1: Decreased step length, Forward flexed posture, Shuffling gait (Posture improvement with VCs)  Comments/Distance (ft) 1: 50 feet, 2x 100 feet  Transfer 1  Transfer From 1: Sit to, Stand to  Transfer to 1: Sit  Transfer Device 1: Walker  Transfer Level of Assistance 1: Minimum assistance, Minimal verbal cues (Very light Min A)  Transfers 2  Transfer From 2: Bed to  Transfer to 2: Chair with arms  Transfer Device 2: Walker  Transfer Level of Assistance 2: Minimum assistance, Minimal verbal cues    Stairs  Stairs: Yes  Stairs  Rails 1: Left  Device 1: Railing  Assistance 1: Maximum assistance, Moderate verbal cues (Primarily Min A, but requiring Max A for LOB x1  with RLE buckling)  Comment/Number of Steps 1: Up/dn 5, both feet same step    Outcome Measures:    [facility] Basic Mobility  Turning from your back to your side while in a flat bed without using bedrails: A little  Moving from lying on your back to sitting on the side of a flat bed without using bedrails: A lot  Moving to and from bed to chair (including a wheelchair): A little  Standing up from a chair using your arms (e.g. wheelchair or bedside chair): A little  To walk in hospital room: A little  Climbing 3-5 steps with railing: A lot  Basic Mobility - Total Score: 16    Tinetti  Sitting Balance: Steady, safe  Arises: Unable without help  Attempts to Arise: Unable without help  Immediate Standing Balance (First 5 Seconds): Steady but uses walker or other support  Standing Balance: Unsteady  Nudged: Staggers, grabs, catches self  Eyes Closed: Unsteady  Turned 360 Degrees: Steadiness: Unsteady (Grabs, staggers)  Turned 360 Degrees: Continuity of Steps: Discontinuous steps  Sitting Down: Uses arms or not a smooth motion  Balance Score: 4  Initiation of Gait: No hesitancy  Step Height: R Swing Foot: Right foot complete clears floor  Step Length: R Swing Foot: Passes left stance foot  Step Height: L Swing Foot: Left foot complete clears floor  Step Length: L Swing Foot: Passes right stance foot  Step Symmetry: Right and left step appear equal  Step Continuity: Stopping or discontinuity between steps  Path: Mild/moderate deviation or uses walking aid  Trunk: Marked sway or uses walking aid  Walking Time: Heels apart  Gait Score: 7  Total Score: 11    Education Documentation  Precautions, taught by Franc Harrell PT at 7/28/2025  3:52 PM.  Learner: Patient  Readiness: Eager  Method: Explanation  Response: Verbalizes Understanding    Body Mechanics, taught by Franc Harrell, PT at 7/28/2025  3:52 PM.  Learner: Patient  Readiness: Eager  Method: Explanation  Response: Verbalizes Understanding    Mobility Training,  taught by Franc Harrell, PT at 7/28/2025  3:52 PM.  Learner: Patient  Readiness: Eager  Method: Explanation  Response: Verbalizes Understanding    Education Comments  No comments found.      OP EDUCATION:       Encounter Problems       Encounter Problems (Active)       PT Problem       Patient will perform bed mobility with </= MIN A x1 to reduce risk of developing decubitus ulcers.  (Progressing)       Start:  07/15/25    Expected End:  08/08/25            Patient will perform sit to stand and stand to sit transfers with </= MIN A x1 and LRD to increase functional strength.  (Met)       Start:  07/15/25    Expected End:  08/08/25    Resolved:  07/28/25    Updated to: sit<>stand, bed<>chair, LRD, SBA         Patient will ambulate at least 15 ft. with </= MOD A x1 and LRD to improve tolerance of household distances.  (Met)       Start:  07/15/25    Expected End:  07/29/25    Resolved:  07/25/25         BLE 5/5 (Progressing)       Start:  07/15/25    Expected End:  08/08/25            Patient will stand with UE support of LRD and </= CGA at least 2 min to improve balance required for self-care tasks.  (Met)       Start:  07/15/25    Expected End:  08/08/25    Resolved:  07/28/25    Updated to: Tinetti>24 to reflect improvement in balance and decreased falls risk         Pt will ambulate >/= 250 ft Leyla with LRAD and no acute LOB (Progressing)       Start:  07/25/25    Expected End:  08/08/25                Pt will complete TUG in </= 14 seconds with LRAD and no acute LOB (Progressing)       Start:  07/25/25    Expected End:  08/08/25                sit<>stand, bed<>chair, LRD, SBA (Progressing)       Start:  07/28/25    Expected End:  08/08/25                Tinetti>24 to reflect improvement in balance and decreased falls risk (Progressing)       Start:  07/28/25    Expected End:  08/08/25                   PT Problem       Up/dn 12 stairs, Min A (Progressing)       Start:  07/28/25    Expected End:  08/11/25

## 2025-07-28 NOTE — DISCHARGE SUMMARY
Discharge Diagnosis  Lumbar radicular pain    Issues Requiring Follow-Up  Incision check with Neurosurgery  Cardiology follow up regarding metoprolol  Follow up with Primary care regarding fever of unknown origin and post admission check up     Test Results Pending At Discharge  Pending Labs       Order Current Status    Extra Urine Gray Tube Collected (07/18/25 1145)    Extra Urine Gray Tube In process    Urinalysis with Reflex Culture and Microscopic In process    Urinalysis with Reflex Culture and Microscopic In process            Hospital Course  Jerman Colón Jr. is a 64 y.o. male with a past medical history of  postop nausea vomiting, hyperlipidemia, hypertension, CAD, sleep apnea, asthma, GERD, CKD, BPH, anemia, arthritis, lumbar disc disease and spinal stenosis who presented for scheduled surgery.    7/14 s/p L3-S1 lami and facetectomies/extension/revision ()  7/15 hgb 7.1 s/p 1u pRBC, post Tx cbc 7.5, s/p 1u pRBC.   7/18 drain auto dc'd,   7/20, the patient had a desat along with tachycardia and fever. CT PE positive for LLL segmental/subsegmental PE. He was transferred to NSU for closer monitoring and restarting of AC.   7/21 started on Heparin drip.  DVT US BUE/BLE preliminary negative.  Vasc Med consulted--> continue Heparin drip; likely discharge on eliquis 3-6 months in setting of a provoked VTE; likely longer with Afib  7/22 Cardiology consulted for Afib with RVR.  Given IV Amio bolus and converted to NSR; started on amiodarone 400 mg oral 2 times a day  Vasc surg consulted for Splenic aneurysm.  No surgical intervention needed; yearly follow up  7/23 Downgrade to NURA, Alex removed- void trial, 1 unit PRBC given (HGB 7.6)  7/24 Card recs Lasix 40mg for one dose and Amiodarone 400mg PO BID for 6G load then 200mg daily until cards follow-up.       PT/OT evaluated patient and recommended Rehab; patient declines Rehab, will go home with home care    On the day of discharge, the patient was  seen and evaluated by the neurosurgery team and deemed suitable for discharge. The patient was given detailed discharge instructions and were scheduled to follow up as an outpatient.      Visit Vitals  /71 (BP Location: Right arm, Patient Position: Lying)   Pulse 68   Temp 36.4 °C (97.5 °F) (Temporal)   Resp 20     Vitals:    07/26/25 0538   Weight: 123 kg (270 lb 1 oz)       Immunization History   Administered Date(s) Administered    COVID-19, mRNA, LNP-S, PF, 30 mcg/0.3 mL dose 03/17/2021, 04/07/2021    Tdap vaccine, age 7 year and older (BOOSTRIX, ADACEL) 06/13/2017, 11/11/2024       Results      Pertinent Physical Exam At Time of Discharge  Physical Exam  HENT:      Head: Normocephalic.     Eyes:      Pupils: Pupils are equal, round, and reactive to light.       Cardiovascular:      Pulses: Normal pulses.   Pulmonary:      Effort: Pulmonary effort is normal.   Abdominal:      Palpations: Abdomen is soft.     Musculoskeletal:         General: Normal range of motion.     Skin:     General: Skin is warm.      Comments: Incision C/D/I     Neurological:      Mental Status: He is alert and oriented to person, place, and time.      Comments: BUE 5/5  BLE 5/5       Home Medications     Medication List      START taking these medications     acetaminophen 325 mg tablet; Commonly known as: Tylenol; Take 2 tablets   (650 mg) by mouth every 6 hours for 7 days.   * amiodarone 400 mg tablet; Commonly known as: Pacerone; Take 1 tablet   (400 mg) by mouth 2 times a day for 11 doses. End after PM dose on 7/29.   * amiodarone 200 mg tablet; Commonly known as: Pacerone; Take 1 tablet   (200 mg) by mouth once daily. Start 7/30 after completion of 400mg load.   Continue until follow-up with cardiologist. Do not fill before July 30, 2025.; Start taking on: July 30, 2025   apixaban 5 mg tablet; Commonly known as: Eliquis; Take 1 tablet (5 mg)   by mouth 2 times a day.   metoprolol succinate XL 50 mg 24 hr tablet; Commonly  known as:   Toprol-XL; Take 1 tablet (50 mg) by mouth once daily. Do not crush or   chew. Medication started as inpatient continue until follow-up with   cardiologist.   oxygen gas therapy; Commonly known as: O2; Inhale 1 Dose once every 24   hours.  * This list has 2 medication(s) that are the same as other medications   prescribed for you. Read the directions carefully, and ask your doctor or   other care provider to review them with you.     CHANGE how you take these medications     sennosides-docusate sodium 8.6-50 mg tablet; Commonly known as:   Mariia-Colace; Take 2 tablets by mouth 2 times a day for 7 days.; What   changed: when to take this     CONTINUE taking these medications     aspirin 81 mg EC tablet   atorvastatin 40 mg tablet; Commonly known as: Lipitor   gabapentin 600 mg tablet; Commonly known as: Neurontin   oxyCODONE-acetaminophen 5-325 mg tablet; Commonly known as: Percocet   pantoprazole 40 mg EC tablet; Commonly known as: ProtoNix   polyethylene glycol 17 gram packet; Commonly known as: Glycolax,   Miralax; Take 17 g by mouth 2 times a day for 7 days.   tamsulosin 0.4 mg 24 hr capsule; Commonly known as: Flomax     STOP taking these medications     albuterol 90 mcg/actuation inhaler   cyclobenzaprine 5 mg tablet; Commonly known as: Flexeril   losartan 25 mg tablet; Commonly known as: Cozaar   oxyCODONE 5 mg immediate release tablet; Commonly known as: Roxicodone     ASK your doctor about these medications     amoxicillin-clavulanate 875-125 mg tablet; Commonly known as: Augmentin;   Take 1 tablet (875 mg of amoxicillin) by mouth 2 times a day for 1 day.;   Ask about: Should I take this medication?       Outpatient Follow-Up  Future Appointments   Date Time Provider Department Center   7/29/2025 To Be Determined Rika Lopez OT [place]   7/29/2025 To Be Determined Serena Garcia PT [place]   8/6/2025  9:45 AM NEUROSURGERY [facility] NURSE [id number] [facility]   8/28/2025 11:40 AM  Paul Patel MD [id number] [place]   1/13/2026  8:00 AM [facility] WLBY [id number] VASCULAR 3 [id number]   1/13/2026  9:00 AM Nadja Michelle MD [id number] [facility]   1/13/2026  9:30 AM Rajni Bruner MD [id number] [place]       Corrie Evans, APRN-CNP  Total face to face time spent with patient/family of 30 minutes, with >50% of the time spent discussing plan of care/management, counseling/educating on disease processes, explaining results of diagnostic testing.

## 2025-07-29 VITALS
OXYGEN SATURATION: 97 % | TEMPERATURE: 97.9 F | WEIGHT: 270.06 LBS | HEIGHT: 73 IN | RESPIRATION RATE: 18 BRPM | DIASTOLIC BLOOD PRESSURE: 69 MMHG | HEART RATE: 67 BPM | SYSTOLIC BLOOD PRESSURE: 121 MMHG | BODY MASS INDEX: 35.79 KG/M2

## 2025-07-29 LAB
ALBUMIN SERPL BCP-MCNC: 3 G/DL (ref 3.4–5)
ANION GAP SERPL CALC-SCNC: 13 MMOL/L (ref 10–20)
ATRIAL RATE: 95 BPM
BUN SERPL-MCNC: 11 MG/DL (ref 6–23)
CALCIUM SERPL-MCNC: 8.6 MG/DL (ref 8.6–10.6)
CHLORIDE SERPL-SCNC: 102 MMOL/L (ref 98–107)
CO2 SERPL-SCNC: 28 MMOL/L (ref 21–32)
CREAT SERPL-MCNC: 0.87 MG/DL (ref 0.5–1.3)
EGFRCR SERPLBLD CKD-EPI 2021: >90 ML/MIN/1.73M*2
GLUCOSE BLD MANUAL STRIP-MCNC: 127 MG/DL (ref 74–99)
GLUCOSE SERPL-MCNC: 109 MG/DL (ref 74–99)
MAGNESIUM SERPL-MCNC: 1.93 MG/DL (ref 1.6–2.4)
P AXIS: 41 DEGREES
P OFFSET: 192 MS
P ONSET: 140 MS
PHOSPHATE SERPL-MCNC: 3.6 MG/DL (ref 2.5–4.9)
POTASSIUM SERPL-SCNC: 4.7 MMOL/L (ref 3.5–5.3)
PR INTERVAL: 146 MS
Q ONSET: 213 MS
QRS COUNT: 16 BEATS
QRS DURATION: 76 MS
QT INTERVAL: 316 MS
QTC CALCULATION(BAZETT): 397 MS
QTC FREDERICIA: 368 MS
R AXIS: 1 DEGREES
SODIUM SERPL-SCNC: 138 MMOL/L (ref 136–145)
T AXIS: 14 DEGREES
T OFFSET: 371 MS
VENTRICULAR RATE: 95 BPM

## 2025-07-29 PROCEDURE — 2500000001 HC RX 250 WO HCPCS SELF ADMINISTERED DRUGS (ALT 637 FOR MEDICARE OP)

## 2025-07-29 PROCEDURE — 36415 COLL VENOUS BLD VENIPUNCTURE: CPT | Performed by: PHYSICIAN ASSISTANT

## 2025-07-29 PROCEDURE — 82947 ASSAY GLUCOSE BLOOD QUANT: CPT

## 2025-07-29 PROCEDURE — 2500000002 HC RX 250 W HCPCS SELF ADMINISTERED DRUGS (ALT 637 FOR MEDICARE OP, ALT 636 FOR OP/ED): Performed by: NURSE PRACTITIONER

## 2025-07-29 PROCEDURE — 80069 RENAL FUNCTION PANEL: CPT | Performed by: PHYSICIAN ASSISTANT

## 2025-07-29 PROCEDURE — 83735 ASSAY OF MAGNESIUM: CPT | Performed by: PHYSICIAN ASSISTANT

## 2025-07-29 RX ADMIN — ACETAMINOPHEN 650 MG: 325 TABLET, FILM COATED ORAL at 09:30

## 2025-07-29 RX ADMIN — AMIODARONE HYDROCHLORIDE 400 MG: 200 TABLET ORAL at 08:39

## 2025-07-29 RX ADMIN — METOPROLOL TARTRATE 25 MG: 25 TABLET, FILM COATED ORAL at 08:39

## 2025-07-29 RX ADMIN — ACETAMINOPHEN 650 MG: 325 TABLET, FILM COATED ORAL at 03:11

## 2025-07-29 RX ADMIN — ASPIRIN 81 MG: 81 TABLET, COATED ORAL at 06:06

## 2025-07-29 RX ADMIN — PANTOPRAZOLE SODIUM 40 MG: 40 TABLET, DELAYED RELEASE ORAL at 08:39

## 2025-07-29 RX ADMIN — CYCLOBENZAPRINE 10 MG: 10 TABLET, FILM COATED ORAL at 08:39

## 2025-07-29 RX ADMIN — GABAPENTIN 600 MG: 300 CAPSULE ORAL at 08:39

## 2025-07-29 RX ADMIN — APIXABAN 5 MG: 5 TABLET, FILM COATED ORAL at 08:39

## 2025-07-29 ASSESSMENT — PAIN SCALES - GENERAL: PAINLEVEL_OUTOF10: 0 - NO PAIN

## 2025-07-29 ASSESSMENT — PAIN - FUNCTIONAL ASSESSMENT: PAIN_FUNCTIONAL_ASSESSMENT: 0-10

## 2025-07-29 NOTE — NURSING NOTE
Pt discharged to home. Homegoing instructions gone over with patient and wife. IV removed tip intact. All questions answered.

## 2025-07-29 NOTE — CARE PLAN
Problem: Pain - Adult  Goal: Verbalizes/displays adequate comfort level or baseline comfort level  Outcome: Progressing     Problem: Safety - Adult  Goal: Free from fall injury  Outcome: Progressing     Problem: Discharge Planning  Goal: Discharge to home or other facility with appropriate resources  Outcome: Progressing   The patient's goals for the shift include  Have CT scan    The clinical goals for the shift include Maintain HDS throughout shift

## 2025-07-30 ENCOUNTER — HOME CARE VISIT (OUTPATIENT)
Dept: HOME HEALTH SERVICES | Facility: HOME HEALTH | Age: 65
End: 2025-07-30
Payer: MEDICARE

## 2025-07-30 VITALS — DIASTOLIC BLOOD PRESSURE: 60 MMHG | SYSTOLIC BLOOD PRESSURE: 180 MMHG | HEART RATE: 87 BPM | OXYGEN SATURATION: 99 %

## 2025-07-30 PROCEDURE — G0152 HHCP-SERV OF OT,EA 15 MIN: HCPCS | Mod: HHH

## 2025-07-30 PROCEDURE — G0151 HHCP-SERV OF PT,EA 15 MIN: HCPCS | Mod: HHH

## 2025-07-30 PROCEDURE — 169592 NO-PAY CLAIM PROCEDURE

## 2025-07-30 SDOH — HEALTH STABILITY: PHYSICAL HEALTH: EXERCISE TYPE: SEATED

## 2025-07-30 SDOH — HEALTH STABILITY: MENTAL HEALTH: SMOKING IN HOME: 0

## 2025-07-30 SDOH — ECONOMIC STABILITY: HOUSING INSECURITY: EVIDENCE OF SMOKING MATERIAL: 0

## 2025-07-30 SDOH — HEALTH STABILITY: PHYSICAL HEALTH: EXERCISE COMMENTS: HEEL TOE RAISE, MARCHING, LAQ, HIP ADDUCTION

## 2025-07-30 ASSESSMENT — ACTIVITIES OF DAILY LIVING (ADL)
BATHING_CURRENT_FUNCTION: MINIMUM ASSIST
AMBULATION_DISTANCE/DURATION_TOLERATED: 50 FEET
OASIS_M1830: 03
TOILETING: 1
DRESSING_UB_CURRENT_FUNCTION: MINIMUM ASSIST
AMBULATION ASSISTANCE ON FLAT SURFACES: 1
TOILETING: STAND BY ASSIST
DRESSING_LB_CURRENT_FUNCTION: MINIMUM ASSIST
ENTERING_EXITING_HOME: STAND BY ASSIST
BATHING ASSESSED: 1

## 2025-07-30 ASSESSMENT — ENCOUNTER SYMPTOMS
PAIN: 1
PAIN LOCATION - PAIN SEVERITY: 7/10
PERSON REPORTING PAIN: PATIENT
SUBJECTIVE PAIN PROGRESSION: WAXING AND WANING
PERSON REPORTING PAIN: PATIENT
AGGRESSION WITHIN DEFINED LIMITS: 1
PAIN LOCATION - PAIN SEVERITY: 7/10
SLEEP QUALITY: ADEQUATE
PAIN: 1
SUBJECTIVE PAIN PROGRESSION: GRADUALLY IMPROVING
LOWEST PAIN SEVERITY IN PAST 24 HOURS: 5/10
ANGER WITHIN DEFINED LIMITS: 1
PAIN LOCATION: BACK
HIGHEST PAIN SEVERITY IN PAST 24 HOURS: 8/10
PAIN LOCATION - PAIN FREQUENCY: FREQUENT
LOWEST PAIN SEVERITY IN PAST 24 HOURS: 6/10
PAIN LOCATION: BACK
PAIN LOCATION - PAIN QUALITY: ACHING
HIGHEST PAIN SEVERITY IN PAST 24 HOURS: 7/10

## 2025-07-31 DIAGNOSIS — Z98.1 S/P LUMBAR SPINAL FUSION: ICD-10-CM

## 2025-07-31 DIAGNOSIS — Z98.1 STATUS POST CERVICAL SPINAL FUSION: ICD-10-CM

## 2025-07-31 LAB
GLUCOSE BLD MANUAL STRIP-MCNC: 136 MG/DL (ref 74–99)
GLUCOSE BLD MANUAL STRIP-MCNC: 153 MG/DL (ref 74–99)
GLUCOSE BLD MANUAL STRIP-MCNC: 160 MG/DL (ref 74–99)
GLUCOSE BLD MANUAL STRIP-MCNC: 163 MG/DL (ref 74–99)

## 2025-08-04 ENCOUNTER — HOME CARE VISIT (OUTPATIENT)
Dept: HOME HEALTH SERVICES | Facility: HOME HEALTH | Age: 65
End: 2025-08-04
Payer: MEDICARE

## 2025-08-04 PROCEDURE — G0151 HHCP-SERV OF PT,EA 15 MIN: HCPCS | Mod: HHH

## 2025-08-04 SDOH — HEALTH STABILITY: PHYSICAL HEALTH: EXERCISE TYPE: SEATED, STANDING

## 2025-08-04 SDOH — HEALTH STABILITY: PHYSICAL HEALTH: EXERCISE COMMENTS: HEEL TOE RAISE, MARCHING, LAQ, HIP ADDUCTION  STANDING HEEL RAISE, MARCHING, SIDE STEPPING

## 2025-08-04 ASSESSMENT — ENCOUNTER SYMPTOMS
PERSON REPORTING PAIN: PATIENT
PAIN LOCATION: BACK
LOWEST PAIN SEVERITY IN PAST 24 HOURS: 5/10
PAIN LOCATION - PAIN SEVERITY: 7/10
PAIN LOCATION - PAIN QUALITY: ACHING
PAIN: 1
HIGHEST PAIN SEVERITY IN PAST 24 HOURS: 9/10
SUBJECTIVE PAIN PROGRESSION: WAXING AND WANING

## 2025-08-04 ASSESSMENT — ACTIVITIES OF DAILY LIVING (ADL)
AMBULATION_DISTANCE/DURATION_TOLERATED: 75 FEET
AMBULATION ASSISTANCE ON FLAT SURFACES: 1

## 2025-08-05 ENCOUNTER — TELEPHONE (OUTPATIENT)
Dept: NEUROSURGERY | Facility: HOSPITAL | Age: 65
End: 2025-08-05
Payer: MEDICARE

## 2025-08-05 DIAGNOSIS — M54.12 CERVICAL RADICULOPATHY AT C7: ICD-10-CM

## 2025-08-05 DIAGNOSIS — Z98.1 S/P LUMBAR SPINAL FUSION: ICD-10-CM

## 2025-08-05 NOTE — TELEPHONE ENCOUNTER
Talked to pt and wife, He said that his incision is with out redness, swelling or drainage. I discussed incision care and activity. He is being followed by pain management for medication. He will see Dr. Patel on 8/28 and he will have xray prior to this appointment.

## 2025-08-06 ENCOUNTER — APPOINTMENT (OUTPATIENT)
Dept: NEUROSURGERY | Facility: HOSPITAL | Age: 65
End: 2025-08-06
Payer: MEDICARE

## 2025-08-06 ENCOUNTER — HOME CARE VISIT (OUTPATIENT)
Dept: HOME HEALTH SERVICES | Facility: HOME HEALTH | Age: 65
End: 2025-08-06
Payer: MEDICARE

## 2025-08-06 PROCEDURE — G0151 HHCP-SERV OF PT,EA 15 MIN: HCPCS | Mod: HHH

## 2025-08-06 SDOH — HEALTH STABILITY: PHYSICAL HEALTH: EXERCISE TYPE: SEATED, STANDING

## 2025-08-06 SDOH — HEALTH STABILITY: PHYSICAL HEALTH
EXERCISE COMMENTS: HEEL TOE RAISE, MARCHING, LAQ, HIP ADDUCTION    STANDING: HEEL RAISE, MARCHING HAMSTRING CURL, SIDE STEPPING AT COUNTER

## 2025-08-06 ASSESSMENT — ENCOUNTER SYMPTOMS
LOWEST PAIN SEVERITY IN PAST 24 HOURS: 5/10
HIGHEST PAIN SEVERITY IN PAST 24 HOURS: 7/10
PAIN LOCATION - PAIN QUALITY: ACHING
SUBJECTIVE PAIN PROGRESSION: WAXING AND WANING
PAIN LOCATION: BACK
PAIN LOCATION - PAIN SEVERITY: 7/10
PAIN: 1
PERSON REPORTING PAIN: PATIENT

## 2025-08-06 ASSESSMENT — ACTIVITIES OF DAILY LIVING (ADL)
AMBULATION_DISTANCE/DURATION_TOLERATED: 100 FEET
AMBULATION ASSISTANCE ON FLAT SURFACES: 1

## 2025-08-08 NOTE — DOCUMENTATION CLARIFICATION NOTE
"    PATIENT:               STEF TOUSSAINT  ACCT #:                  [id number]  MRN:                       [id number]  :                       1960  ADMIT DATE:       2025 5:43 AM  DISCH DATE:        2025 1:05 PM  RESPONDING PROVIDER #:        [id number]          PROVIDER RESPONSE TEXT:    BIANCA only; COPD and Asthma ruled out    CDI QUERY TEXT:    Clarification      Instruction:    Based on your assessment of the patient and the clinical information, please provide the requested documentation by clicking on the appropriate radio button and enter any additional information if prompted.    Question: Please further clarify the diagnosis of COPD    When answering this query, please exercise your independent professional judgment. The fact that a question is being asked, does not imply that any particular answer is desired or expected.    The patient's clinical indicators include:  Clinical Information: 64 year old male admitted for spinal fusion surgery    Clinical Indicators:    [facility] LUNG VOLUMES 2020: \"Spirometry is normal. The TLC, RV and RV/TLC are normal. The diffusing capacity is normal. ... FEV1/FVC 72.22%\"    [facility] PN 24: \"He was given trelogy and albuterol for emphysema but tests indicated no such diagnosis and were discontinued\"    [facility] IM PN 25: \"Most recent spirometry-- normal, sleep  severe sleep apnea\"    H&P 25: \"Asthma, sleep apnea - reports is well-controlled and does not use any respiratory treatments.  patient was diagnosed with sleep apnea, uses oxygen at bedtime but does not need CPAP.  Denies any exacerbations or hospitalizations in the past year.\"    OT PN 7/15: \"Patient on 4L O2 at home during night.\"    CHEST XRAY : \"There is no focal consolidation, edema or pneumothorax. No sizeable pleural effusion.\"    SIG EVENT : \"Rapid called again 7:25PM for desaturation, nurse reports desat was as low as 40%s while patient was sleeping. He was difficult to arouse but " "eventually able to be awakened and sat improved to >90%. Desat thought due to patient known BIANCA.\"    VASC MED 7/21/25: \"past medical history of ... COPD ... Sleep apnea ... He reports that he has never smoked. He has never been exposed to tobacco smoke.\"    CARDIOLOGY PN 7/23: \"Severe obstructive and central sleep apnea per patient - strongly encouraged him to complete follow up for sleep apnea and obtain mask for at home\"    Treatment: HFNC, CPAP at night    Risk Factors: obesity, elderly, never smoked  Options provided:  -- BIANCA only; COPD and Asthma ruled out  -- BIANCA and Asthma only; COPD ruled out  -- BIANCA, Asthma, and COPD  -- Other - I will add my own diagnosis  -- Refer to Clinical Documentation Reviewer    Query created by: Kristine Olmos on 8/7/2025 9:33 AM      Electronically signed by:  IRINA ALCALA MD 8/8/2025 9:34 AM          "

## 2025-08-11 ENCOUNTER — HOME CARE VISIT (OUTPATIENT)
Dept: HOME HEALTH SERVICES | Facility: HOME HEALTH | Age: 65
End: 2025-08-11
Payer: MEDICARE

## 2025-08-11 PROCEDURE — G0151 HHCP-SERV OF PT,EA 15 MIN: HCPCS | Mod: HHH

## 2025-08-11 SDOH — HEALTH STABILITY: PHYSICAL HEALTH: EXERCISE TYPE: SEATED, STANDING

## 2025-08-11 SDOH — HEALTH STABILITY: PHYSICAL HEALTH
EXERCISE COMMENTS: HEEL TOE RAISE, MARCHING, LAQ, HIP ADDUCTION, GREEN TBAND    STANDING :HEEL RAISE, MARCHING, HIP ABDUCTION, HAMSTRING CURL X 10     SIDE STEPPING DOWN AND BACK

## 2025-08-11 ASSESSMENT — ENCOUNTER SYMPTOMS
PAIN LOCATION - PAIN QUALITY: ACHING
PAIN: 1
PAIN LOCATION: BACK
SUBJECTIVE PAIN PROGRESSION: WAXING AND WANING
PERSON REPORTING PAIN: PATIENT
LOWEST PAIN SEVERITY IN PAST 24 HOURS: 5/10
HIGHEST PAIN SEVERITY IN PAST 24 HOURS: 7/10
PAIN LOCATION - PAIN SEVERITY: 5/10

## 2025-08-11 ASSESSMENT — ACTIVITIES OF DAILY LIVING (ADL)
AMBULATION ASSISTANCE ON FLAT SURFACES: 1
AMBULATION_DISTANCE/DURATION_TOLERATED: 125 FEET

## 2025-08-13 ENCOUNTER — HOME CARE VISIT (OUTPATIENT)
Dept: HOME HEALTH SERVICES | Facility: HOME HEALTH | Age: 65
End: 2025-08-13
Payer: MEDICARE

## 2025-08-13 PROCEDURE — G0151 HHCP-SERV OF PT,EA 15 MIN: HCPCS | Mod: HHH

## 2025-08-13 SDOH — HEALTH STABILITY: PHYSICAL HEALTH: EXERCISE TYPE: SEATED

## 2025-08-13 SDOH — HEALTH STABILITY: PHYSICAL HEALTH: EXERCISE COMMENTS: HEEL TOE RAISE, MARCHING, LAQ, HIP ADDUCTION

## 2025-08-13 ASSESSMENT — ENCOUNTER SYMPTOMS
PAIN LOCATION: BACK
LOWEST PAIN SEVERITY IN PAST 24 HOURS: 6/10
PAIN LOCATION - PAIN SEVERITY: 7/10
PAIN: 1
HIGHEST PAIN SEVERITY IN PAST 24 HOURS: 9/10
PAIN LOCATION - PAIN QUALITY: SHARP
SUBJECTIVE PAIN PROGRESSION: WAXING AND WANING
PERSON REPORTING PAIN: PATIENT

## 2025-08-13 ASSESSMENT — ACTIVITIES OF DAILY LIVING (ADL)
AMBULATION_DISTANCE/DURATION_TOLERATED: 100 FEET
AMBULATION ASSISTANCE ON FLAT SURFACES: 1

## 2025-08-14 NOTE — DOCUMENTATION CLARIFICATION NOTE
"    PATIENT:               STEF TOUSSAINT  ACCT #:                  [id number]  MRN:                       [id number]  :                       1960  ADMIT DATE:       2025 5:43 AM  DISCH DATE:        2025 1:05 PM  RESPONDING PROVIDER #:        [id number]          PROVIDER RESPONSE TEXT:    Sepsis 2/2 Klebsiella CAUTI with acute neurological organ dysfunction of metabolic encephalopathy    CDI QUERY TEXT:    Clarification    Instruction:  Based on your assessment of the patient and the clinical information, please provide the requested documentation by clicking on the appropriate radio button and enter any additional information if prompted.    Question: Is there a diagnosis indicative of a patient meeting SIRS criteria and with organ dysfunction in the setting of Klebsiella UTI and PE    When answering this query, please exercise your independent professional judgment. The fact that a question is being asked, does not imply that any particular answer is desired or expected.    The patient's clinical indicators include:  Clinical Information: 64 year old male admitted for spinal stenosis s/p fusion    Clinical Indicators:    NEURO PN : \"Was CPAPing overnight, this AM had episode of confusion\"    NURSE NOTE : \"continues to be febrile and now is significantly diaphoretic. ... increased somnolence and confusion per spouse. ... skin is hot. He is somnolent - falling asleep during interview. Largely oriented x 3-4.\"    SIG EVENT : \"noted to be tachycardic and febrile during the day and also appeared diaphoretic. ... had a urinary tract infection ... on Macrobid; antibiotic coverage was broadened to ceftriaxone ... sepsis workup was initiated, including repeat blood cultures and a lactate level. ... At approximately 19:25, the patient experienced a single episode of desaturation while sleeping and was subsequently placed on CPAP. ... Preliminary CT PE results indicate a nonocclusive PE in the " "segmental left lower lobe (LLL) pulmonary artery and near occlusive PE in the subsegmental LLL pulmonary artery.\"    NEURO PN 7/21: \"Febrile and tachycardic throughout yesterday afternoon. Episodic desaturations overnight while wearing CPAP. Required transfer back to ICU. CTE PE as well as infectious work up obtained.\"    VITALS 7/17 - 20:  TEMP: 38.9  37.1  38.6  39  38.1  36.6  38.7  35.8  38.4  38.7  38.9  HR: 152  126  130  128  102  117  103  125  112  85  112  121  105  128  RR: 25  36  24  15  20  13  23  10  20  18  25  17  23  24  15  22  26  21    WBC 7/17 - 7/20: 8.0  6.4  8.8  6.1  6.7  LACTATE 7/18 - 7/20: 1.4  1.8  1.4  1.0  1.3  URINE CX 7/18: >=100,000 CFU/mL Klebsiella oxytoca/Raoultella  BLOOD CX 7/18 - 7/21: NGTD    CTPE 7/20: \"Nonocclusive central filling defects within a segmental left lower lobe pulmonary artery and near occlusive filling defects within subsegmental left lower lobe pulmonary arteries are most consistent with acute pulmonary emboli\"    Treatment: CPAP & HFNC 7/15-22; IV ceftriaxone 7/20-26; IV heparin 7/21-26    Risk Factors: Alex 7/14-17; PE, UTI, surgery, elderly  Options provided:  -- Sepsis 2/2 Klebsiella CAUTI with acute neurological organ dysfunction of metabolic encephalopathy  -- Non-infectious SIRS 2/2 PE with acute neurological organ dysfunction of metabolic encephalopathy  -- Other - I will add my own diagnosis  -- Refer to Clinical Documentation Reviewer    Query created by: Kristine Olmos on 8/13/2025 12:24 PM      Electronically signed by:  IRINA ALCALA MD 8/13/2025 9:43 PM          "

## 2025-08-18 ENCOUNTER — HOME CARE VISIT (OUTPATIENT)
Dept: HOME HEALTH SERVICES | Facility: HOME HEALTH | Age: 65
End: 2025-08-18
Payer: MEDICARE

## 2025-08-18 PROCEDURE — G0151 HHCP-SERV OF PT,EA 15 MIN: HCPCS | Mod: HHH

## 2025-08-18 SDOH — HEALTH STABILITY: PHYSICAL HEALTH
EXERCISE COMMENTS: HEEL TOE RAISE, MARCHING, HIP ABDUCTION, HIP EXTENSION, HAMSTRING CURL    HEEL TOE RAISE, MARCHING, LAQ, HIP ADDUCTION

## 2025-08-18 SDOH — HEALTH STABILITY: PHYSICAL HEALTH: EXERCISE TYPE: SEATED STANDING

## 2025-08-18 ASSESSMENT — ENCOUNTER SYMPTOMS
PAIN LOCATION: BACK
PAIN LOCATION - PAIN QUALITY: ACHING
PERSON REPORTING PAIN: PATIENT
HIGHEST PAIN SEVERITY IN PAST 24 HOURS: 7/10
PAIN: 1
SUBJECTIVE PAIN PROGRESSION: WAXING AND WANING
PAIN LOCATION - PAIN SEVERITY: 5/10
LOWEST PAIN SEVERITY IN PAST 24 HOURS: 5/10

## 2025-08-18 ASSESSMENT — ACTIVITIES OF DAILY LIVING (ADL)
AMBULATION ASSISTANCE ON FLAT SURFACES: 1
AMBULATION_DISTANCE/DURATION_TOLERATED: 150 FEET

## 2025-08-20 ENCOUNTER — HOME CARE VISIT (OUTPATIENT)
Dept: HOME HEALTH SERVICES | Facility: HOME HEALTH | Age: 65
End: 2025-08-20
Payer: MEDICARE

## 2025-08-20 PROCEDURE — G0151 HHCP-SERV OF PT,EA 15 MIN: HCPCS | Mod: HHH

## 2025-08-20 SDOH — HEALTH STABILITY: PHYSICAL HEALTH: EXERCISE TYPE: STANDING

## 2025-08-20 SDOH — HEALTH STABILITY: PHYSICAL HEALTH: EXERCISE COMMENTS: HEEL TOE RAISE, MARCHING, HIP ABDUCTION, HIP EXTENSION, HAMSTRING CURL, MINI SQUAT

## 2025-08-20 ASSESSMENT — ACTIVITIES OF DAILY LIVING (ADL)
AMBULATION ASSISTANCE ON FLAT SURFACES: 1
HOME_HEALTH_OASIS: 00
OASIS_M1830: 00
AMBULATION_DISTANCE/DURATION_TOLERATED: 50 FEET

## 2025-08-20 ASSESSMENT — ENCOUNTER SYMPTOMS
PAIN LOCATION: BACK
LOWEST PAIN SEVERITY IN PAST 24 HOURS: 5/10
PAIN: 1
HIGHEST PAIN SEVERITY IN PAST 24 HOURS: 7/10
PAIN LOCATION - PAIN SEVERITY: 6/10
SUBJECTIVE PAIN PROGRESSION: WAXING AND WANING
PERSON REPORTING PAIN: PATIENT

## 2025-08-28 ENCOUNTER — APPOINTMENT (OUTPATIENT)
Dept: NEUROSURGERY | Facility: CLINIC | Age: 65
End: 2025-08-28
Payer: MEDICARE

## 2025-08-29 ENCOUNTER — HOSPITAL ENCOUNTER (OUTPATIENT)
Dept: RADIOLOGY | Facility: HOSPITAL | Age: 65
Discharge: HOME | End: 2025-08-29
Payer: MEDICARE

## 2025-08-29 DIAGNOSIS — M54.12 CERVICAL RADICULOPATHY AT C7: ICD-10-CM

## 2025-08-29 DIAGNOSIS — Z98.1 S/P LUMBAR SPINAL FUSION: ICD-10-CM

## 2025-08-29 PROCEDURE — 72082 X-RAY EXAM ENTIRE SPI 2/3 VW: CPT

## 2025-09-02 ENCOUNTER — APPOINTMENT (OUTPATIENT)
Dept: NEUROSURGERY | Facility: CLINIC | Age: 65
End: 2025-09-02
Payer: MEDICARE

## 2025-09-02 ENCOUNTER — HOSPITAL ENCOUNTER (OUTPATIENT)
Dept: RADIOLOGY | Facility: CLINIC | Age: 65
Discharge: HOME | End: 2025-09-02
Payer: MEDICARE

## 2025-09-02 DIAGNOSIS — Z98.1 STATUS POST CERVICAL SPINAL FUSION: ICD-10-CM

## 2025-09-02 DIAGNOSIS — Z98.1 S/P LUMBAR SPINAL FUSION: ICD-10-CM

## 2025-09-02 ASSESSMENT — PAIN SCALES - GENERAL: PAINLEVEL_OUTOF10: 6

## (undated) DEVICE — CATHETER TRAY, SURESTEP, 16FR, URINE METER W/STATLOCK

## (undated) DEVICE — DRESSING, MEPILEX, BORDER FLEX, 6 X 8

## (undated) DEVICE — APPLICATOR, PREP, ONE-STEP, ANTIMICROBIAL, CHLORAPREP, TINTED, 10.5ML

## (undated) DEVICE — PAD, GROUNDING, ELECTROSURGICAL, W/9 FT CABLE, POLYHESIVE II, ADULT, LF

## (undated) DEVICE — SUTURE, VICRYL, 3-0, 18 IN, UNDYED

## (undated) DEVICE — DRAIN, WOUND, ROUND, W/TROCAR, HOLE PATTERN, 10 IN, MEDIUM, 1/8 X 49 IN

## (undated) DEVICE — CLOSURE SYSTEM, DERMABOND, PRINEO, 22CM, STERILE

## (undated) DEVICE — MARKER, SKIN, RULER AND LABEL PACK, CUSTOM

## (undated) DEVICE — TOOL, MR8 14CM MATCH 3MM

## (undated) DEVICE — ELECTRODE, ELECTROSURGICAL, BLADE EXT 4 INCH, INSULATED

## (undated) DEVICE — SUTURE, POLYSORB, 2-0, 27 IN, GS21, UNDYED

## (undated) DEVICE — TUBING, SMOKE EVAC, 3/8 X 10 FT

## (undated) DEVICE — SUTURE, VICRYL, 4-0, 18 IN, UNDYED BR PS-2

## (undated) DEVICE — DRESSING, MEPILEX, BORDER LIGHT, 3 X 3 IN

## (undated) DEVICE — DRESSING, MEPILEX, POST OP, 8 X 4

## (undated) DEVICE — TIP,  ELECTRODE COATED INSULATED, EXTENDED, LF

## (undated) DEVICE — APPLICATOR, CHLORAPREP, W/ORANGE TINT, 26ML

## (undated) DEVICE — SPONGE, HEMOSTATIC, GELATIN, SURGIFOAM, 8 X 12.5 CM X 10 MM

## (undated) DEVICE — BLADE, MILL, MEDIUM

## (undated) DEVICE — RELINE C DRILL BIT

## (undated) DEVICE — DRAPE, MICROSCOPE, FOR ZEISS 65MM, VARI-LENS II, 52 X 150

## (undated) DEVICE — EVACUATOR, WOUND, CLOSED, 3 SPRING, 400 CC, Y CONNECTING TUBE

## (undated) DEVICE — COVER, CART, 45 X 27 X 48 IN, CLEAR

## (undated) DEVICE — BUR, 3MM X 3.8MM, PRECISION, NEURO DRILL

## (undated) DEVICE — Device

## (undated) DEVICE — WOUND SYSTEM, DEBRIDEMENT & CLEANING, O.R DUOPAK

## (undated) DEVICE — SEALANT, HEMOSTATIC, FLOSEAL, 10 ML

## (undated) DEVICE — SPONGE, HEMOSTATIC, CELLULOSE, SURGICEL, FIBRILLAR, 2 X 4 IN

## (undated) DEVICE — MANIFOLD, 4 PORT NEPTUNE STANDARD

## (undated) DEVICE — HEMOSTAT, SURGICEL POWDER 3.0GRAMS

## (undated) DEVICE — SEALER, BIPOLAR, AQUA MANTYS 6.0

## (undated) DEVICE — REST, HEAD, BAGEL, 7 IN

## (undated) DEVICE — DRESSING, MEPILEX, BORDER FLEX, 3 X 3

## (undated) DEVICE — SUTURE, MONOCRYL, 4-0, 18 IN, PS2, UNDYED

## (undated) DEVICE — ADHESIVE, SKIN, DERMABOND ADVANCED, 15CM, PEN-STYLE

## (undated) DEVICE — CARTRIDGE, THE PREP+

## (undated) DEVICE — BUR,  3MM X 3.8MM, NEURODRILL, LESS AGGR

## (undated) DEVICE — BONE, MILL, MIDAS REX, DUAL BLADE, ELECTRIC

## (undated) DEVICE — TRACTION ROPE, 10FT, STERILE

## (undated) DEVICE — DRAIN, WOUND, FLAT, JACKSON-PRATT, FULL PERFORATION, 7 MM, SILICONE

## (undated) DEVICE — TUBING, SMOKE EVACUATOR, LF, 7/8 X 10

## (undated) DEVICE — ELECTRODE, ELECTROSURGICAL, BLADE, INSULATED, ENT/IMA, STERILE

## (undated) DEVICE — CAUTERY, PENCIL, PUSH BUTTON, SMOKE EVAC, 70MM

## (undated) DEVICE — SPONGE, DISSECTOR, PEANUT, 3/8, STERILE 5 FOAM HOLDER"

## (undated) DEVICE — TAPE, SILK, DURAPORE, 3 IN X 10 YD, LF

## (undated) DEVICE — KIT, PATIENT CARE, JACKSON TABLE W/PRONE-SAFE HEADREST

## (undated) DEVICE — DRAPE, SHEET, FAN FOLDED, HALF, 44 X 58 IN, DISPOSABLE, LF, STERILE

## (undated) DEVICE — EVACUATOR, WOUND, SUCTION, CLOSED, JACKSON-PRATT, 100 CC, SILICONE

## (undated) DEVICE — SUTURE, VICRYL, 0, 18 IN, UNDYED

## (undated) DEVICE — REST, HEAD, BAGEL, 9 IN